# Patient Record
Sex: FEMALE | Race: WHITE | Employment: OTHER | URBAN - METROPOLITAN AREA
[De-identification: names, ages, dates, MRNs, and addresses within clinical notes are randomized per-mention and may not be internally consistent; named-entity substitution may affect disease eponyms.]

---

## 2018-07-27 ENCOUNTER — ANESTHESIA (OUTPATIENT)
Dept: SURGERY | Age: 83
DRG: 871 | End: 2018-07-27
Payer: MEDICARE

## 2018-07-27 ENCOUNTER — ANESTHESIA EVENT (OUTPATIENT)
Dept: SURGERY | Age: 83
DRG: 871 | End: 2018-07-27
Payer: MEDICARE

## 2018-07-27 ENCOUNTER — HOSPITAL ENCOUNTER (INPATIENT)
Age: 83
LOS: 1 days | Discharge: ANOTHER HOSPITAL | DRG: 871 | End: 2018-07-28
Attending: SURGERY | Admitting: INTERNAL MEDICINE
Payer: MEDICARE

## 2018-07-27 ENCOUNTER — ANESTHESIA EVENT (OUTPATIENT)
Dept: SURGERY | Age: 83
End: 2018-07-27

## 2018-07-27 ENCOUNTER — APPOINTMENT (OUTPATIENT)
Dept: GENERAL RADIOLOGY | Age: 83
DRG: 871 | End: 2018-07-27
Attending: ANESTHESIOLOGY
Payer: MEDICARE

## 2018-07-27 PROBLEM — K35.80 ACUTE APPENDICITIS: Status: ACTIVE | Noted: 2018-07-27

## 2018-07-27 PROBLEM — E87.6 HYPOKALEMIA: Status: ACTIVE | Noted: 2018-07-27

## 2018-07-27 LAB
ALBUMIN SERPL-MCNC: 3.3 G/DL (ref 3.2–4.6)
ALBUMIN/GLOB SERPL: 1 {RATIO} (ref 1.2–3.5)
ALP SERPL-CCNC: 70 U/L (ref 50–136)
ALT SERPL-CCNC: 27 U/L (ref 12–65)
ANION GAP SERPL CALC-SCNC: 15 MMOL/L (ref 7–16)
ANION GAP SERPL CALC-SCNC: 8 MMOL/L (ref 7–16)
APPEARANCE UR: CLEAR
APTT PPP: 30.7 SEC (ref 23.2–35.3)
ARTERIAL PATENCY WRIST A: ABNORMAL
ARTERIAL PATENCY WRIST A: POSITIVE
ARTERIAL PATENCY WRIST A: POSITIVE
AST SERPL-CCNC: 21 U/L (ref 15–37)
BACTERIA URNS QL MICRO: 0 /HPF
BASE DEFICIT BLDA-SCNC: 11.7 MMOL/L (ref 0–2)
BASE DEFICIT BLDA-SCNC: 4.5 MMOL/L (ref 0–2)
BASE DEFICIT BLDA-SCNC: 6.6 MMOL/L (ref 0–2)
BASOPHILS # BLD: 0 K/UL (ref 0–0.2)
BASOPHILS NFR BLD: 1 % (ref 0–2)
BDY SITE: ABNORMAL
BILIRUB SERPL-MCNC: 0.5 MG/DL (ref 0.2–1.1)
BILIRUB UR QL: NEGATIVE
BNP SERPL-MCNC: 682 PG/ML
BUN SERPL-MCNC: 11 MG/DL (ref 8–23)
BUN SERPL-MCNC: 12 MG/DL (ref 8–23)
CALCIUM SERPL-MCNC: 8.5 MG/DL (ref 8.3–10.4)
CALCIUM SERPL-MCNC: 9 MG/DL (ref 8.3–10.4)
CHLORIDE SERPL-SCNC: 104 MMOL/L (ref 98–107)
CHLORIDE SERPL-SCNC: 105 MMOL/L (ref 98–107)
CO2 SERPL-SCNC: 24 MMOL/L (ref 21–32)
CO2 SERPL-SCNC: 27 MMOL/L (ref 21–32)
COHGB MFR BLD: 0 % (ref 0.5–1.5)
COHGB MFR BLD: 0 % (ref 0.5–1.5)
COLOR UR: YELLOW
CREAT SERPL-MCNC: 0.86 MG/DL (ref 0.6–1)
CREAT SERPL-MCNC: 1.27 MG/DL (ref 0.6–1)
DIFFERENTIAL METHOD BLD: ABNORMAL
DO-HGB BLD-MCNC: 7 % (ref 0–5)
DO-HGB BLD-MCNC: 8 % (ref 0–5)
EOSINOPHIL # BLD: 0.2 K/UL (ref 0–0.8)
EOSINOPHIL NFR BLD: 2 % (ref 0.5–7.8)
EPI CELLS #/AREA URNS HPF: ABNORMAL /HPF
ERYTHROCYTE [DISTWIDTH] IN BLOOD BY AUTOMATED COUNT: 13.3 % (ref 11.9–14.6)
GLOBULIN SER CALC-MCNC: 3.2 G/DL (ref 2.3–3.5)
GLUCOSE SERPL-MCNC: 136 MG/DL (ref 65–100)
GLUCOSE SERPL-MCNC: 275 MG/DL (ref 65–100)
GLUCOSE UR STRIP.AUTO-MCNC: 100 MG/DL
HCO3 BLDA-SCNC: 19 MMOL/L (ref 22–26)
HCO3 BLDA-SCNC: 21 MMOL/L (ref 22–26)
HCO3 BLDA-SCNC: 22 MMOL/L (ref 22–26)
HCT VFR BLD AUTO: 33 % (ref 35.8–46.3)
HGB BLD-MCNC: 10.8 G/DL (ref 11.7–15.4)
HGB BLDMV-MCNC: 14.3 GM/DL (ref 11.7–15)
HGB BLDMV-MCNC: 14.7 GM/DL (ref 11.7–15)
HGB UR QL STRIP: ABNORMAL
IMM GRANULOCYTES # BLD: 0 K/UL (ref 0–0.5)
IMM GRANULOCYTES NFR BLD AUTO: 0 % (ref 0–5)
INR PPP: 1.2
KETONES UR QL STRIP.AUTO: NEGATIVE MG/DL
LEUKOCYTE ESTERASE UR QL STRIP.AUTO: NEGATIVE
LYMPHOCYTES # BLD: 1.7 K/UL (ref 0.5–4.6)
LYMPHOCYTES NFR BLD: 21 % (ref 13–44)
MAGNESIUM SERPL-MCNC: 2.3 MG/DL (ref 1.8–2.4)
MCH RBC QN AUTO: 31.8 PG (ref 26.1–32.9)
MCHC RBC AUTO-ENTMCNC: 32.7 G/DL (ref 31.4–35)
MCV RBC AUTO: 97.1 FL (ref 79.6–97.8)
METHGB MFR BLD: 0.3 % (ref 0–1.5)
METHGB MFR BLD: 0.3 % (ref 0–1.5)
MONOCYTES # BLD: 0.8 K/UL (ref 0.1–1.3)
MONOCYTES NFR BLD: 10 % (ref 4–12)
NEUTS SEG # BLD: 5.5 K/UL (ref 1.7–8.2)
NEUTS SEG NFR BLD: 66 % (ref 43–78)
NITRITE UR QL STRIP.AUTO: NEGATIVE
OTHER OBSERVATIONS,UCOM: ABNORMAL
OXYHGB MFR BLDA: 91.3 % (ref 94–97)
OXYHGB MFR BLDA: 92.4 % (ref 94–97)
PCO2 BLDA: 43 MMHG (ref 35–45)
PCO2 BLDA: 52 MMHG (ref 35–45)
PCO2 BLDA: 63 MMHG (ref 35–45)
PH BLDA: 7.09 [PH] (ref 7.35–7.45)
PH BLDA: 7.23 [PH] (ref 7.35–7.45)
PH BLDA: 7.32 [PH] (ref 7.35–7.45)
PH UR STRIP: 6 [PH] (ref 5–9)
PHOSPHATE SERPL-MCNC: 6.1 MG/DL (ref 2.3–3.7)
PLATELET # BLD AUTO: 190 K/UL (ref 150–450)
PMV BLD AUTO: 10.8 FL (ref 10.8–14.1)
PO2 BLDA: 75 MMHG (ref 80–105)
PO2 BLDA: 85 MMHG (ref 80–105)
PO2 BLDA: 88 MMHG (ref 80–105)
POTASSIUM SERPL-SCNC: 3.3 MMOL/L (ref 3.5–5.1)
POTASSIUM SERPL-SCNC: 3.4 MMOL/L (ref 3.5–5.1)
PROT SERPL-MCNC: 6.5 G/DL (ref 6.3–8.2)
PROT UR STRIP-MCNC: 30 MG/DL
PROTHROMBIN TIME: 15.1 SEC (ref 11.5–14.5)
RBC # BLD AUTO: 3.4 M/UL (ref 4.05–5.25)
RBC #/AREA URNS HPF: ABNORMAL /HPF
SAO2 % BLD: 92 % (ref 92–98.5)
SAO2 % BLD: 93 % (ref 92–98.5)
SERVICE CMNT-IMP: ABNORMAL
SODIUM SERPL-SCNC: 139 MMOL/L (ref 136–145)
SODIUM SERPL-SCNC: 144 MMOL/L (ref 136–145)
SP GR UR REFRACTOMETRY: 1.01 (ref 1–1.02)
TROPONIN I SERPL-MCNC: 0.42 NG/ML (ref 0.02–0.05)
UROBILINOGEN UR QL STRIP.AUTO: 0.2 EU/DL (ref 0.2–1)
VENTILATION MODE VENT: ABNORMAL
WBC # BLD AUTO: 8.3 K/UL (ref 4.3–11.1)
WBC URNS QL MICRO: ABNORMAL /HPF

## 2018-07-27 PROCEDURE — 86900 BLOOD TYPING SEROLOGIC ABO: CPT | Performed by: HOSPITALIST

## 2018-07-27 PROCEDURE — 74011000250 HC RX REV CODE- 250: Performed by: HOSPITALIST

## 2018-07-27 PROCEDURE — 74011250636 HC RX REV CODE- 250/636: Performed by: ANESTHESIOLOGY

## 2018-07-27 PROCEDURE — 74011000302 HC RX REV CODE- 302: Performed by: HOSPITALIST

## 2018-07-27 PROCEDURE — 75810000275 HC EMERGENCY DEPT VISIT NO LEVEL OF CARE: Performed by: EMERGENCY MEDICINE

## 2018-07-27 PROCEDURE — 81001 URINALYSIS AUTO W/SCOPE: CPT | Performed by: HOSPITALIST

## 2018-07-27 PROCEDURE — 80053 COMPREHEN METABOLIC PANEL: CPT | Performed by: EMERGENCY MEDICINE

## 2018-07-27 PROCEDURE — 74011250636 HC RX REV CODE- 250/636

## 2018-07-27 PROCEDURE — 74011250636 HC RX REV CODE- 250/636: Performed by: HOSPITALIST

## 2018-07-27 PROCEDURE — 99218 HC RM OBSERVATION: CPT

## 2018-07-27 PROCEDURE — 84484 ASSAY OF TROPONIN QUANT: CPT | Performed by: SURGERY

## 2018-07-27 PROCEDURE — 83735 ASSAY OF MAGNESIUM: CPT | Performed by: SURGERY

## 2018-07-27 PROCEDURE — 85610 PROTHROMBIN TIME: CPT | Performed by: SURGERY

## 2018-07-27 PROCEDURE — 84100 ASSAY OF PHOSPHORUS: CPT | Performed by: SURGERY

## 2018-07-27 PROCEDURE — 86580 TB INTRADERMAL TEST: CPT | Performed by: HOSPITALIST

## 2018-07-27 PROCEDURE — 77030019605

## 2018-07-27 PROCEDURE — 85025 COMPLETE CBC W/AUTO DIFF WBC: CPT | Performed by: EMERGENCY MEDICINE

## 2018-07-27 PROCEDURE — 74011250637 HC RX REV CODE- 250/637: Performed by: HOSPITALIST

## 2018-07-27 PROCEDURE — 77030032490 HC SLV COMPR SCD KNE COVD -B

## 2018-07-27 PROCEDURE — 82803 BLOOD GASES ANY COMBINATION: CPT

## 2018-07-27 PROCEDURE — 71045 X-RAY EXAM CHEST 1 VIEW: CPT

## 2018-07-27 PROCEDURE — 74011250637 HC RX REV CODE- 250/637: Performed by: ANESTHESIOLOGY

## 2018-07-27 PROCEDURE — 36600 WITHDRAWAL OF ARTERIAL BLOOD: CPT

## 2018-07-27 PROCEDURE — 94660 CPAP INITIATION&MGMT: CPT

## 2018-07-27 PROCEDURE — 85730 THROMBOPLASTIN TIME PARTIAL: CPT | Performed by: SURGERY

## 2018-07-27 PROCEDURE — 83880 ASSAY OF NATRIURETIC PEPTIDE: CPT | Performed by: HOSPITALIST

## 2018-07-27 PROCEDURE — 74011000250 HC RX REV CODE- 250

## 2018-07-27 RX ORDER — ENALAPRILAT 1.25 MG/ML
0.62 INJECTION INTRAVENOUS 2 TIMES DAILY
Status: DISCONTINUED | OUTPATIENT
Start: 2018-07-27 | End: 2018-07-28

## 2018-07-27 RX ORDER — ONDANSETRON 2 MG/ML
4 INJECTION INTRAMUSCULAR; INTRAVENOUS
Status: DISCONTINUED | OUTPATIENT
Start: 2018-07-27 | End: 2018-07-28 | Stop reason: HOSPADM

## 2018-07-27 RX ORDER — SODIUM CHLORIDE 0.9 % (FLUSH) 0.9 %
5-10 SYRINGE (ML) INJECTION AS NEEDED
Status: DISCONTINUED | OUTPATIENT
Start: 2018-07-27 | End: 2018-07-28 | Stop reason: HOSPADM

## 2018-07-27 RX ORDER — FUROSEMIDE 10 MG/ML
40 INJECTION INTRAMUSCULAR; INTRAVENOUS ONCE
Status: COMPLETED | OUTPATIENT
Start: 2018-07-27 | End: 2018-07-27

## 2018-07-27 RX ORDER — METOPROLOL TARTRATE 5 MG/5ML
5 INJECTION INTRAVENOUS ONCE
Status: COMPLETED | OUTPATIENT
Start: 2018-07-27 | End: 2018-07-27

## 2018-07-27 RX ORDER — SODIUM CHLORIDE 0.9 % (FLUSH) 0.9 %
5-10 SYRINGE (ML) INJECTION EVERY 8 HOURS
Status: DISCONTINUED | OUTPATIENT
Start: 2018-07-27 | End: 2018-07-28 | Stop reason: HOSPADM

## 2018-07-27 RX ORDER — FUROSEMIDE 10 MG/ML
INJECTION INTRAMUSCULAR; INTRAVENOUS
Status: COMPLETED
Start: 2018-07-27 | End: 2018-07-27

## 2018-07-27 RX ORDER — SODIUM BICARBONATE 1 MEQ/ML
50 SYRINGE (ML) INTRAVENOUS ONCE
Status: COMPLETED | OUTPATIENT
Start: 2018-07-27 | End: 2018-07-27

## 2018-07-27 RX ORDER — POTASSIUM CHLORIDE 14.9 MG/ML
20 INJECTION INTRAVENOUS ONCE
Status: DISCONTINUED | OUTPATIENT
Start: 2018-07-27 | End: 2018-07-27

## 2018-07-27 RX ORDER — METRONIDAZOLE 500 MG/100ML
500 INJECTION, SOLUTION INTRAVENOUS EVERY 8 HOURS
Status: DISCONTINUED | OUTPATIENT
Start: 2018-07-27 | End: 2018-07-28 | Stop reason: HOSPADM

## 2018-07-27 RX ORDER — SODIUM CHLORIDE 9 MG/ML
75 INJECTION, SOLUTION INTRAVENOUS CONTINUOUS
Status: DISCONTINUED | OUTPATIENT
Start: 2018-07-27 | End: 2018-07-28

## 2018-07-27 RX ORDER — CIPROFLOXACIN 2 MG/ML
400 INJECTION, SOLUTION INTRAVENOUS EVERY 12 HOURS
Status: DISCONTINUED | OUTPATIENT
Start: 2018-07-27 | End: 2018-07-28 | Stop reason: HOSPADM

## 2018-07-27 RX ORDER — HYDROMORPHONE HYDROCHLORIDE 2 MG/ML
1 INJECTION, SOLUTION INTRAMUSCULAR; INTRAVENOUS; SUBCUTANEOUS
Status: DISCONTINUED | OUTPATIENT
Start: 2018-07-27 | End: 2018-07-28 | Stop reason: HOSPADM

## 2018-07-27 RX ORDER — ACETAMINOPHEN 650 MG/1
650 SUPPOSITORY RECTAL
Status: DISCONTINUED | OUTPATIENT
Start: 2018-07-27 | End: 2018-07-28 | Stop reason: HOSPADM

## 2018-07-27 RX ORDER — SODIUM BICARBONATE 1 MEQ/ML
SYRINGE (ML) INTRAVENOUS
Status: COMPLETED
Start: 2018-07-27 | End: 2018-07-27

## 2018-07-27 RX ORDER — NITROGLYCERIN 80 MG/1
1 PATCH TRANSDERMAL DAILY
COMMUNITY
End: 2018-08-15 | Stop reason: ALTCHOICE

## 2018-07-27 RX ORDER — NALOXONE HYDROCHLORIDE 0.4 MG/ML
0.4 INJECTION, SOLUTION INTRAMUSCULAR; INTRAVENOUS; SUBCUTANEOUS AS NEEDED
Status: DISCONTINUED | OUTPATIENT
Start: 2018-07-27 | End: 2018-07-28 | Stop reason: HOSPADM

## 2018-07-27 RX ORDER — POTASSIUM CHLORIDE 14.9 MG/ML
20 INJECTION INTRAVENOUS
Status: COMPLETED | OUTPATIENT
Start: 2018-07-27 | End: 2018-07-28

## 2018-07-27 RX ORDER — MORPHINE SULFATE 2 MG/ML
2 INJECTION, SOLUTION INTRAMUSCULAR; INTRAVENOUS
Status: DISCONTINUED | OUTPATIENT
Start: 2018-07-27 | End: 2018-07-28 | Stop reason: HOSPADM

## 2018-07-27 RX ORDER — GUAIFENESIN 100 MG/5ML
81 LIQUID (ML) ORAL
Status: COMPLETED | OUTPATIENT
Start: 2018-07-27 | End: 2018-07-27

## 2018-07-27 RX ORDER — FUROSEMIDE 10 MG/ML
20 INJECTION INTRAMUSCULAR; INTRAVENOUS ONCE
Status: COMPLETED | OUTPATIENT
Start: 2018-07-27 | End: 2018-07-27

## 2018-07-27 RX ORDER — METOPROLOL TARTRATE 5 MG/5ML
5 INJECTION INTRAVENOUS
Status: DISCONTINUED | OUTPATIENT
Start: 2018-07-27 | End: 2018-07-28 | Stop reason: HOSPADM

## 2018-07-27 RX ORDER — MAGNESIUM SULFATE HEPTAHYDRATE 40 MG/ML
2 INJECTION, SOLUTION INTRAVENOUS ONCE
Status: COMPLETED | OUTPATIENT
Start: 2018-07-27 | End: 2018-07-27

## 2018-07-27 RX ORDER — FUROSEMIDE 10 MG/ML
40 INJECTION INTRAMUSCULAR; INTRAVENOUS EVERY 12 HOURS
Status: DISCONTINUED | OUTPATIENT
Start: 2018-07-28 | End: 2018-07-28

## 2018-07-27 RX ADMIN — Medication 10 ML: at 21:14

## 2018-07-27 RX ADMIN — MINERAL OIL AND WHITE PETROLATUM: 30; 940 OINTMENT OPHTHALMIC at 21:13

## 2018-07-27 RX ADMIN — ASPIRIN 81 MG 81 MG: 81 TABLET ORAL at 16:48

## 2018-07-27 RX ADMIN — MORPHINE SULFATE 2 MG: 2 INJECTION, SOLUTION INTRAMUSCULAR; INTRAVENOUS at 20:51

## 2018-07-27 RX ADMIN — POTASSIUM CHLORIDE 20 MEQ: 14.9 INJECTION, SOLUTION INTRAVENOUS at 23:16

## 2018-07-27 RX ADMIN — ENALAPRILAT 0.62 MG: 1.25 INJECTION INTRAVENOUS at 21:58

## 2018-07-27 RX ADMIN — FUROSEMIDE 40 MG: 10 INJECTION INTRAMUSCULAR; INTRAVENOUS at 18:08

## 2018-07-27 RX ADMIN — METOPROLOL TARTRATE 5 MG: 5 INJECTION, SOLUTION INTRAVENOUS at 20:08

## 2018-07-27 RX ADMIN — Medication 10 ML: at 20:25

## 2018-07-27 RX ADMIN — MAGNESIUM SULFATE HEPTAHYDRATE 2 G: 40 INJECTION, SOLUTION INTRAVENOUS at 21:12

## 2018-07-27 RX ADMIN — FUROSEMIDE 40 MG: 10 INJECTION, SOLUTION INTRAMUSCULAR; INTRAVENOUS at 18:08

## 2018-07-27 RX ADMIN — SODIUM BICARBONATE 50 MEQ: 84 INJECTION, SOLUTION INTRAVENOUS at 18:50

## 2018-07-27 RX ADMIN — FUROSEMIDE 20 MG: 10 INJECTION INTRAMUSCULAR; INTRAVENOUS at 17:33

## 2018-07-27 RX ADMIN — CIPROFLOXACIN 400 MG: 2 INJECTION, SOLUTION INTRAVENOUS at 20:56

## 2018-07-27 RX ADMIN — TUBERCULIN PURIFIED PROTEIN DERIVATIVE 5 UNITS: 5 INJECTION, SOLUTION INTRADERMAL at 21:14

## 2018-07-27 RX ADMIN — FAMOTIDINE 20 MG: 10 INJECTION INTRAVENOUS at 21:06

## 2018-07-27 RX ADMIN — FUROSEMIDE 20 MG: 10 INJECTION, SOLUTION INTRAMUSCULAR; INTRAVENOUS at 17:33

## 2018-07-27 RX ADMIN — Medication 50 MEQ: at 18:50

## 2018-07-27 RX ADMIN — MINERAL OIL AND WHITE PETROLATUM: 30; 940 OINTMENT OPHTHALMIC at 23:19

## 2018-07-27 RX ADMIN — Medication 10 ML: at 21:13

## 2018-07-27 RX ADMIN — POTASSIUM CHLORIDE 20 MEQ: 14.9 INJECTION, SOLUTION INTRAVENOUS at 21:12

## 2018-07-27 RX ADMIN — METRONIDAZOLE 500 MG: 500 INJECTION, SOLUTION INTRAVENOUS at 23:15

## 2018-07-27 RX ADMIN — NITROGLYCERIN 0.5 INCH: 20 OINTMENT TOPICAL at 20:49

## 2018-07-27 RX ADMIN — FUROSEMIDE 40 MG: 10 INJECTION, SOLUTION INTRAMUSCULAR; INTRAVENOUS at 19:55

## 2018-07-27 NOTE — PROGRESS NOTES
TRANSFER - IN REPORT: 
 
Verbal report received from [address] on Hayden Winters  being received from PACU for routine progression of care Report consisted of patients Situation, Background, Assessment and  
Recommendations(SBAR). Information from the following report(s) SBAR, Kardex, Intake/Output, MAR and Recent Results was reviewed with the receiving nurse. Opportunity for questions and clarification was provided. Assessment completed upon patients arrival to unit and care assumed.

## 2018-07-27 NOTE — H&P
H&P/Consult Note/Progress Note/Office Note: Ally Stone  MRN: [id number]  [id number]  Age:89 y.o. 
 
HPI: Ally Stone is a 80 y.o. female who is seen urgently for acute appendicitis. She started to have right lower quadrant pain since this Monday, her appetite was down but denies nausea, vomiting. She went to PCP yesterday, CT was done, and report was called today for acute appendicitis. She was immediately brought in the hospital for surgery. She just had lunch however. She has chronic pain, on oxycodone 15 mg every 4 hours. She has CAD, cardiac stent, on ASA, but denies active cardiac symptoms. History of pneumonia. Past Medical History:  
Diagnosis Date  Thyroid cyst Distant past  
 thyroid cyst removed x40 years ago Past Surgical History:  
Procedure Laterality Date  HX CHOLECYSTECTOMY  HX HEENT    
 thyroglossal cyst  
 HX HYSTERECTOMY Current Facility-Administered Medications Medication Dose Route Frequency  aspirin chewable tablet 81 mg  81 mg Oral NOW Amlodipine; Amoxicillin; and Tetracycline Social History Social History  Marital status:  Spouse name: N/A  
 Number of children: N/A  
 Years of education: N/A Social History Main Topics  Smoking status: Never Smoker  Smokeless tobacco: None  Alcohol use No  
 Drug use: No  
 Sexual activity: Not Asked Other Topics Concern  None Social History Narrative , lives with  and 2 daughters. Pt did work as  at one time. 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 History Smoking Status  Never Smoker Smokeless Tobacco  
 Not on file Family History Problem Relation Age of Onset  Other Father  David Bardales Other Mother  ROS: The patient has no difficulty with chest pain or shortness of breath. No fever or chills.   Comprehensive review of systems was otherwise unremarkable except as noted above. 
 
Physical Exam:  
Visit Vitals  /57 (BP 1 Location: Left arm, BP Patient Position: At rest)  Pulse 81  Temp 98.1 °F (36.7 °C)  Resp 19  
 Ht 5' 4\" (1.626 m)  Wt 103 lb (46.7 kg)  SpO2 99%  BMI 17.68 kg/m2 Constitutional: Alert, oriented, cooperative patient in no acute distress; appears stated age Eyes:Sclera are clear. EOMs intact ENMT: no external lesions gross hearing normal; no obvious neck masses, no ear or lip lesions, nares normal 
CV: RRR. Normal perfusion Resp: No JVD. Breathing is  non-labored; no audible wheezing. GI: soft and non-distended. Moderate tenderness at right lower quadrant. Musculoskeletal: unremarkable with normal function. No embolic signs or cyanosis. Neuro:  Oriented; moves all 4; no focal deficits Psychiatric: normal affect and mood, no memory impairment Recent vitals (if inpt): 
Patient Vitals for the past 24 hrs: 
 BP Temp Pulse Resp SpO2 Height Weight  
07/27/18 1519 136/57 98.1 °F (36.7 °C) 81 19 99 % 5' 4\" (1.626 m) 103 lb (46.7 kg) Labs: 
No results for input(s): WBC, HGB, PLT, NA, K, CL, CO2, BUN, CREA, GLU, PTP, INR, APTT, TBIL, TBILI, CBIL, SGOT, GPT, ALT, AP, AML, LPSE, LCAD, NH4, TROPT, TROIQ, PCO2, PO2, HCO3, HGBEXT, PLTEXT in the last 72 hours. No lab exists for component:  PH, INREXT Lab Results Component Value Date/Time WBC 15.0 (H) 12/01/2015 04:06 AM  
 HGB 11.1 (L) 12/01/2015 04:06 AM  
 PLATELET 510 55/84/0747 04:06 AM  
 Sodium 141 12/01/2015 04:06 AM  
 Potassium 3.6 12/01/2015 04:06 AM  
 Chloride 105 12/01/2015 04:06 AM  
 CO2 27 12/01/2015 04:06 AM  
 BUN 25 (H) 12/01/2015 04:06 AM  
 Creatinine 0.69 12/01/2015 04:06 AM  
 Glucose 115 (H) 12/01/2015 04:06 AM  
 INR 1.1 11/29/2015 09:55 AM  
 Bilirubin, total 0.6 11/29/2015 09:55 AM  
 Bilirubin, direct 0.2 11/22/2008 04:25 AM  
 AST (SGOT) 39 (H) 11/29/2015 09:55 AM  
 ALT (SGPT) 58 11/29/2015 09:55 AM  
 Alk.  phosphatase 70 11/29/2015 09:55 AM  
 Lactic acid 3.4 (H) 11/29/2015 10:04 AM  
 Troponin-I, Qt. 1.24 (HH) 11/29/2015 08:50 PM  
 
 
I reviewed recent labs and recent radiologic studies. I independently reviewed radiology images for studies I described above or studies I have ordered. Admission date (for inpatients): 7/27/2018 Day of Surgery  Procedure(s): 
APPENDECTOMY LAPAROSCOPIC 
 
CT: outside report reviewed ASSESSMENT/PLAN: 
Problem List  Date Reviewed: 12/28/2015 Codes Class Noted Acute appendicitis ICD-10-CM: K35.80 ICD-9-CM: 540.9  7/27/2018 Hypertension (Chronic) ICD-10-CM: I10 
ICD-9-CM: 401.9  12/1/2015 CAD (coronary artery disease) (Chronic) ICD-10-CM: I25.10 ICD-9-CM: 414.00  11/29/2015 Active Problems: 
  Acute appendicitis (7/27/2018) plan to do lap appy, poss open tonight since she just had lunch. Keep her in hospital prior to surgery.   
 
 
 
Signed:  Quynh Ervin MD,  FACS

## 2018-07-27 NOTE — PROGRESS NOTES
Received pt. In PACU on bipap    ABG being drawn by dayshift therapist and resulted to Dr. Miguel Henderson. Pt. Transported to ICU on NRB mask and placed on bipap (settings as per order) upon arrival    Stabilizing for repeat ABG.

## 2018-07-27 NOTE — ED TRIAGE NOTES
Pt in sent from pcp for appendicitis. States had CT at [facility] and was told to come to ed. States she had abdominal pain yesterday but no pain today. States nausea. No vomiting

## 2018-07-27 NOTE — PROGRESS NOTES
Patient received from PACU and place on cardiac monitor. Sinus tach with BBB. Heart rate 153. Patient respond to name. Respiration labored at 43. BIPAP in place.  at bedside. Orders received for Lasix. See Mar. Head of bed elevated. Dual skin assessment completed with Naseem Vega RN. Ecchymosis to upper extremities. Sacral/coccyx slightly red but blanchable. Allevyn place. Skin is cool,clammy and fragile. Bed in low/lock position.

## 2018-07-27 NOTE — PERIOP NOTES
TRANSFER - OUT REPORT: 
 
Verbal report given to Natalie MOON(name) on Alfonso Leblanc  being transferred to [facility](unit) for routine progression of care Report consisted of patients Situation, Background, Assessment and  
Recommendations(SBAR). Information from the following report(s) SBAR, Kardex, Intake/Output, MAR and Cardiac Rhythm SVT was reviewed with the receiving nurse. Lines:  
Peripheral IV 07/27/18 Right Arm (Active) Site Assessment Clean, dry, & intact;Dry;Clean; Intact 7/27/2018  4:35 PM  
Phlebitis Assessment 0 7/27/2018  4:35 PM  
Infiltration Assessment 0 7/27/2018  4:35 PM  
Dressing Status Clean, dry, & intact; Clean;Dry; Intact; New 7/27/2018  4:35 PM  
Dressing Type Tape;Transparent 7/27/2018  4:35 PM  
Hub Color/Line Status Pink; Infusing 7/27/2018  4:35 PM  
  
 
Opportunity for questions and clarification was provided. Patient transported with: 
 O2 @ 15 liters NRB mask

## 2018-07-28 ENCOUNTER — ANESTHESIA (OUTPATIENT)
Dept: SURGERY | Age: 83
End: 2018-07-28

## 2018-07-28 ENCOUNTER — APPOINTMENT (OUTPATIENT)
Dept: GENERAL RADIOLOGY | Age: 83
DRG: 871 | End: 2018-07-28
Attending: HOSPITALIST
Payer: MEDICARE

## 2018-07-28 ENCOUNTER — ANESTHESIA EVENT (OUTPATIENT)
Dept: SURGERY | Age: 83
DRG: 853 | End: 2018-07-28
Payer: MEDICARE

## 2018-07-28 ENCOUNTER — ANESTHESIA (OUTPATIENT)
Dept: SURGERY | Age: 83
DRG: 853 | End: 2018-07-28
Payer: MEDICARE

## 2018-07-28 ENCOUNTER — HOSPITAL ENCOUNTER (INPATIENT)
Age: 83
LOS: 4 days | Discharge: HOME HEALTH | DRG: 853 | End: 2018-08-02
Attending: SURGERY | Admitting: FAMILY MEDICINE
Payer: MEDICARE

## 2018-07-28 VITALS
HEIGHT: 64 IN | WEIGHT: 119.27 LBS | RESPIRATION RATE: 18 BRPM | HEART RATE: 82 BPM | SYSTOLIC BLOOD PRESSURE: 126 MMHG | OXYGEN SATURATION: 92 % | BODY MASS INDEX: 20.36 KG/M2 | TEMPERATURE: 98.1 F | DIASTOLIC BLOOD PRESSURE: 60 MMHG

## 2018-07-28 PROBLEM — I21.4 NSTEMI (NON-ST ELEVATED MYOCARDIAL INFARCTION) (HCC): Status: ACTIVE | Noted: 2018-07-28

## 2018-07-28 PROBLEM — J81.0 ACUTE PULMONARY EDEMA (HCC): Status: ACTIVE | Noted: 2018-07-28

## 2018-07-28 PROBLEM — A41.9 SEPSIS (HCC): Status: ACTIVE | Noted: 2018-07-28

## 2018-07-28 LAB
ABO + RH BLD: NORMAL
ABO + RH BLD: NORMAL
ALBUMIN SERPL-MCNC: 2.7 G/DL (ref 3.2–4.6)
ALBUMIN/GLOB SERPL: 0.8 {RATIO} (ref 1.2–3.5)
ALP SERPL-CCNC: 85 U/L (ref 50–136)
ALT SERPL-CCNC: 37 U/L (ref 12–65)
ANION GAP SERPL CALC-SCNC: 11 MMOL/L (ref 7–16)
APTT PPP: 71.9 SEC (ref 23.2–35.3)
ARTERIAL PATENCY WRIST A: POSITIVE
AST SERPL-CCNC: 59 U/L (ref 15–37)
BASE DEFICIT BLDA-SCNC: 2 MMOL/L (ref 0–2)
BASOPHILS # BLD: 0 K/UL (ref 0–0.2)
BASOPHILS NFR BLD: 0 % (ref 0–2)
BDY SITE: ABNORMAL
BILIRUB SERPL-MCNC: 0.4 MG/DL (ref 0.2–1.1)
BLOOD GROUP ANTIBODIES SERPL: NORMAL
BLOOD GROUP ANTIBODIES SERPL: NORMAL
BUN SERPL-MCNC: 18 MG/DL (ref 8–23)
CALCIUM SERPL-MCNC: 8.2 MG/DL (ref 8.3–10.4)
CHLORIDE SERPL-SCNC: 105 MMOL/L (ref 98–107)
CK MB CFR SERPL CALC: 13.8 %
CK MB SERPL-MCNC: 23.9 NG/ML (ref 0.5–3.6)
CK SERPL-CCNC: 173 U/L (ref 21–215)
CO2 SERPL-SCNC: 25 MMOL/L (ref 21–32)
CREAT SERPL-MCNC: 1.52 MG/DL (ref 0.6–1)
CRP SERPL-MCNC: 1.3 MG/DL (ref 0–0.9)
DIFFERENTIAL METHOD BLD: ABNORMAL
EOSINOPHIL # BLD: 0 K/UL (ref 0–0.8)
EOSINOPHIL NFR BLD: 0 % (ref 0.5–7.8)
ERYTHROCYTE [DISTWIDTH] IN BLOOD BY AUTOMATED COUNT: 13.7 % (ref 11.9–14.6)
ERYTHROCYTE [DISTWIDTH] IN BLOOD BY AUTOMATED COUNT: 13.7 % (ref 11.9–14.6)
GLOBULIN SER CALC-MCNC: 3.5 G/DL (ref 2.3–3.5)
GLUCOSE SERPL-MCNC: 275 MG/DL (ref 65–100)
HCO3 BLDA-SCNC: 21 MMOL/L (ref 22–26)
HCT VFR BLD AUTO: 38.6 % (ref 35.8–46.3)
HCT VFR BLD AUTO: 39.9 % (ref 35.8–46.3)
HGB BLD-MCNC: 12.9 G/DL (ref 11.7–15.4)
HGB BLD-MCNC: 13 G/DL (ref 11.7–15.4)
IMM GRANULOCYTES # BLD: 0.1 K/UL (ref 0–0.5)
IMM GRANULOCYTES NFR BLD AUTO: 0 % (ref 0–5)
LACTATE SERPL-SCNC: 2.4 MMOL/L (ref 0.4–2)
LYMPHOCYTES # BLD: 1.1 K/UL (ref 0.5–4.6)
LYMPHOCYTES NFR BLD: 6 % (ref 13–44)
MAGNESIUM SERPL-MCNC: 2.5 MG/DL (ref 1.8–2.4)
MCH RBC QN AUTO: 31.5 PG (ref 26.1–32.9)
MCH RBC QN AUTO: 32 PG (ref 26.1–32.9)
MCHC RBC AUTO-ENTMCNC: 32.6 G/DL (ref 31.4–35)
MCHC RBC AUTO-ENTMCNC: 33.4 G/DL (ref 31.4–35)
MCV RBC AUTO: 95.8 FL (ref 79.6–97.8)
MCV RBC AUTO: 96.6 FL (ref 79.6–97.8)
MM INDURATION POC: 0 MM (ref 0–5)
MONOCYTES # BLD: 0.8 K/UL (ref 0.1–1.3)
MONOCYTES NFR BLD: 4 % (ref 4–12)
MYOGLOBIN SERPL-MCNC: 248 NG/ML (ref 9–82)
NEUTS SEG # BLD: 15.6 K/UL (ref 1.7–8.2)
NEUTS SEG NFR BLD: 90 % (ref 43–78)
PCO2 BLDA: 33 MMHG (ref 35–45)
PH BLDA: 7.43 [PH] (ref 7.35–7.45)
PHOSPHATE SERPL-MCNC: 4.4 MG/DL (ref 2.3–3.7)
PLATELET # BLD AUTO: 214 K/UL (ref 150–450)
PLATELET # BLD AUTO: 233 K/UL (ref 150–450)
PMV BLD AUTO: 10.7 FL (ref 10.8–14.1)
PMV BLD AUTO: 10.9 FL (ref 10.8–14.1)
PO2 BLDA: 176 MMHG (ref 80–105)
POTASSIUM SERPL-SCNC: 5.1 MMOL/L (ref 3.5–5.1)
PPD POC: NEGATIVE NEGATIVE
PROT SERPL-MCNC: 6.2 G/DL (ref 6.3–8.2)
RBC # BLD AUTO: 4.03 M/UL (ref 4.05–5.25)
RBC # BLD AUTO: 4.13 M/UL (ref 4.05–5.25)
SERVICE CMNT-IMP: ABNORMAL
SODIUM SERPL-SCNC: 141 MMOL/L (ref 136–145)
SPECIMEN EXP DATE BLD: NORMAL
SPECIMEN EXP DATE BLD: NORMAL
T4 SERPL-MCNC: 7.5 UG/DL (ref 4.8–13.9)
TROPONIN I SERPL-MCNC: 2.96 NG/ML (ref 0.02–0.05)
TROPONIN I SERPL-MCNC: 4.44 NG/ML (ref 0.02–0.05)
TROPONIN I SERPL-MCNC: 5.27 NG/ML (ref 0.02–0.05)
TSH SERPL DL<=0.005 MIU/L-ACNC: 0.06 UIU/ML (ref 0.36–3.74)
VENTILATION MODE VENT: ABNORMAL
WBC # BLD AUTO: 17.5 K/UL (ref 4.3–11.1)
WBC # BLD AUTO: 20.7 K/UL (ref 4.3–11.1)

## 2018-07-28 PROCEDURE — 77030035048 HC TRCR ENDOSC OPTCL COVD -B: Performed by: SURGERY

## 2018-07-28 PROCEDURE — 77030008522 HC TBNG INSUF LAPRO STRY -B: Performed by: SURGERY

## 2018-07-28 PROCEDURE — 77030018836 HC SOL IRR NACL ICUM -A: Performed by: SURGERY

## 2018-07-28 PROCEDURE — 76010000161 HC OR TIME 1 TO 1.5 HR INTENSV-TIER 1: Performed by: SURGERY

## 2018-07-28 PROCEDURE — 77030016151 HC PROTCTR LNS DFOG COVD -B: Performed by: SURGERY

## 2018-07-28 PROCEDURE — 74011250636 HC RX REV CODE- 250/636: Performed by: SURGERY

## 2018-07-28 PROCEDURE — 83735 ASSAY OF MAGNESIUM: CPT | Performed by: HOSPITALIST

## 2018-07-28 PROCEDURE — 77010033678 HC OXYGEN DAILY

## 2018-07-28 PROCEDURE — 77030013794 HC KT TRNSDUC BLD EDWD -B: Performed by: ANESTHESIOLOGY

## 2018-07-28 PROCEDURE — 77030008756 HC TU IRR SUC STRY -B: Performed by: SURGERY

## 2018-07-28 PROCEDURE — 77030019940 HC BLNKT HYPOTHRM STRY -B: Performed by: ANESTHESIOLOGY

## 2018-07-28 PROCEDURE — 74011000250 HC RX REV CODE- 250: Performed by: ANESTHESIOLOGY

## 2018-07-28 PROCEDURE — 77030022473 HC HNDL ENDO GIA UNIV USDA -C: Performed by: SURGERY

## 2018-07-28 PROCEDURE — 74011250636 HC RX REV CODE- 250/636: Performed by: INTERNAL MEDICINE

## 2018-07-28 PROCEDURE — 86140 C-REACTIVE PROTEIN: CPT | Performed by: HOSPITALIST

## 2018-07-28 PROCEDURE — 74011000250 HC RX REV CODE- 250: Performed by: HOSPITALIST

## 2018-07-28 PROCEDURE — 74011250637 HC RX REV CODE- 250/637: Performed by: INTERNAL MEDICINE

## 2018-07-28 PROCEDURE — 88304 TISSUE EXAM BY PATHOLOGIST: CPT | Performed by: SURGERY

## 2018-07-28 PROCEDURE — 86900 BLOOD TYPING SEROLOGIC ABO: CPT | Performed by: SURGERY

## 2018-07-28 PROCEDURE — 74011250636 HC RX REV CODE- 250/636

## 2018-07-28 PROCEDURE — 99218 HC RM OBSERVATION: CPT

## 2018-07-28 PROCEDURE — 84436 ASSAY OF TOTAL THYROXINE: CPT | Performed by: HOSPITALIST

## 2018-07-28 PROCEDURE — 77030035051: Performed by: SURGERY

## 2018-07-28 PROCEDURE — 77030022474 HC RELD STPLR ENDO GIA COVD -C: Performed by: SURGERY

## 2018-07-28 PROCEDURE — 82550 ASSAY OF CK (CPK): CPT | Performed by: HOSPITALIST

## 2018-07-28 PROCEDURE — 82803 BLOOD GASES ANY COMBINATION: CPT

## 2018-07-28 PROCEDURE — 77030011640 HC PAD GRND REM COVD -A: Performed by: SURGERY

## 2018-07-28 PROCEDURE — 83605 ASSAY OF LACTIC ACID: CPT | Performed by: HOSPITALIST

## 2018-07-28 PROCEDURE — 77030012406 HC DRN WND PENRS BARD -A: Performed by: SURGERY

## 2018-07-28 PROCEDURE — 74011000250 HC RX REV CODE- 250: Performed by: INTERNAL MEDICINE

## 2018-07-28 PROCEDURE — 77030034154 HC SHR COAG HARM ACE J&J -F: Performed by: SURGERY

## 2018-07-28 PROCEDURE — 77030032490 HC SLV COMPR SCD KNE COVD -B: Performed by: SURGERY

## 2018-07-28 PROCEDURE — 74011000250 HC RX REV CODE- 250: Performed by: SURGERY

## 2018-07-28 PROCEDURE — 71045 X-RAY EXAM CHEST 1 VIEW: CPT

## 2018-07-28 PROCEDURE — 83874 ASSAY OF MYOGLOBIN: CPT | Performed by: HOSPITALIST

## 2018-07-28 PROCEDURE — 77030002996 HC SUT SLK J&J -A: Performed by: SURGERY

## 2018-07-28 PROCEDURE — 65620000000 HC RM CCU GENERAL

## 2018-07-28 PROCEDURE — 0D9W40Z DRAINAGE OF PERITONEUM WITH DRAINAGE DEVICE, PERCUTANEOUS ENDOSCOPIC APPROACH: ICD-10-PCS | Performed by: SURGERY

## 2018-07-28 PROCEDURE — 0DTJ4ZZ RESECTION OF APPENDIX, PERCUTANEOUS ENDOSCOPIC APPROACH: ICD-10-PCS | Performed by: SURGERY

## 2018-07-28 PROCEDURE — 82553 CREATINE MB FRACTION: CPT | Performed by: HOSPITALIST

## 2018-07-28 PROCEDURE — 85027 COMPLETE CBC AUTOMATED: CPT | Performed by: HOSPITALIST

## 2018-07-28 PROCEDURE — 77030013567 HC DRN WND RESERV BARD -A: Performed by: SURGERY

## 2018-07-28 PROCEDURE — 77030031139 HC SUT VCRL2 J&J -A: Performed by: SURGERY

## 2018-07-28 PROCEDURE — 77030019908 HC STETH ESOPH SIMS -A: Performed by: ANESTHESIOLOGY

## 2018-07-28 PROCEDURE — 77030005401 HC CATH RAD ARRO -A: Performed by: ANESTHESIOLOGY

## 2018-07-28 PROCEDURE — 77030008703 HC TU ET UNCUF COVD -A: Performed by: ANESTHESIOLOGY

## 2018-07-28 PROCEDURE — 77030029359 HC PRB ESOPH TEMP CATH ANTM -F: Performed by: ANESTHESIOLOGY

## 2018-07-28 PROCEDURE — 76060000033 HC ANESTHESIA 1 TO 1.5 HR: Performed by: SURGERY

## 2018-07-28 PROCEDURE — 65660000000 HC RM CCU STEPDOWN

## 2018-07-28 PROCEDURE — 74011250636 HC RX REV CODE- 250/636: Performed by: HOSPITALIST

## 2018-07-28 PROCEDURE — 36415 COLL VENOUS BLD VENIPUNCTURE: CPT | Performed by: SURGERY

## 2018-07-28 PROCEDURE — 74011000250 HC RX REV CODE- 250

## 2018-07-28 PROCEDURE — 36600 WITHDRAWAL OF ARTERIAL BLOOD: CPT

## 2018-07-28 PROCEDURE — 84443 ASSAY THYROID STIM HORMONE: CPT | Performed by: HOSPITALIST

## 2018-07-28 PROCEDURE — 77030027498 HC RELD STLPR EGIA VASC COVD -C: Performed by: SURGERY

## 2018-07-28 PROCEDURE — 76210000006 HC OR PH I REC 0.5 TO 1 HR: Performed by: SURGERY

## 2018-07-28 PROCEDURE — 85025 COMPLETE CBC W/AUTO DIFF WBC: CPT | Performed by: INTERNAL MEDICINE

## 2018-07-28 PROCEDURE — 77030013292 HC BOWL MX PRSM J&J -A: Performed by: ANESTHESIOLOGY

## 2018-07-28 PROCEDURE — 80053 COMPREHEN METABOLIC PANEL: CPT | Performed by: HOSPITALIST

## 2018-07-28 PROCEDURE — 77030020186 HC BOOT HL PROTCT SAGE -B

## 2018-07-28 PROCEDURE — 77030035220 HC TRCR ENDOSC BLNTPRT ANCHR COVD -B: Performed by: SURGERY

## 2018-07-28 PROCEDURE — 84484 ASSAY OF TROPONIN QUANT: CPT | Performed by: INTERNAL MEDICINE

## 2018-07-28 PROCEDURE — 84484 ASSAY OF TROPONIN QUANT: CPT | Performed by: HOSPITALIST

## 2018-07-28 PROCEDURE — 77030008477 HC STYL SATN SLP COVD -A: Performed by: ANESTHESIOLOGY

## 2018-07-28 PROCEDURE — C8929 TTE W OR WO FOL WCON,DOPPLER: HCPCS

## 2018-07-28 PROCEDURE — 77030037892: Performed by: SURGERY

## 2018-07-28 PROCEDURE — 85730 THROMBOPLASTIN TIME PARTIAL: CPT | Performed by: INTERNAL MEDICINE

## 2018-07-28 PROCEDURE — 84100 ASSAY OF PHOSPHORUS: CPT | Performed by: HOSPITALIST

## 2018-07-28 PROCEDURE — 93005 ELECTROCARDIOGRAM TRACING: CPT | Performed by: INTERNAL MEDICINE

## 2018-07-28 RX ORDER — NALOXONE HYDROCHLORIDE 0.4 MG/ML
0.4 INJECTION, SOLUTION INTRAMUSCULAR; INTRAVENOUS; SUBCUTANEOUS AS NEEDED
Status: DISCONTINUED | OUTPATIENT
Start: 2018-07-28 | End: 2018-08-02 | Stop reason: HOSPADM

## 2018-07-28 RX ORDER — NALOXONE HYDROCHLORIDE 0.4 MG/ML
0.4 INJECTION, SOLUTION INTRAMUSCULAR; INTRAVENOUS; SUBCUTANEOUS AS NEEDED
Status: CANCELLED | OUTPATIENT
Start: 2018-07-28

## 2018-07-28 RX ORDER — SODIUM CHLORIDE, SODIUM LACTATE, POTASSIUM CHLORIDE, CALCIUM CHLORIDE 600; 310; 30; 20 MG/100ML; MG/100ML; MG/100ML; MG/100ML
INJECTION, SOLUTION INTRAVENOUS
Status: DISCONTINUED | OUTPATIENT
Start: 2018-07-28 | End: 2018-07-28 | Stop reason: HOSPADM

## 2018-07-28 RX ORDER — DILTIAZEM HYDROCHLORIDE 5 MG/ML
10 INJECTION INTRAVENOUS ONCE
Status: COMPLETED | OUTPATIENT
Start: 2018-07-28 | End: 2018-07-28

## 2018-07-28 RX ORDER — HYDROMORPHONE HYDROCHLORIDE 2 MG/ML
1 INJECTION, SOLUTION INTRAMUSCULAR; INTRAVENOUS; SUBCUTANEOUS
Status: DISCONTINUED | OUTPATIENT
Start: 2018-07-28 | End: 2018-07-28 | Stop reason: SDUPTHER

## 2018-07-28 RX ORDER — DOCUSATE SODIUM 100 MG/1
100 CAPSULE, LIQUID FILLED ORAL 2 TIMES DAILY
Status: DISCONTINUED | OUTPATIENT
Start: 2018-07-29 | End: 2018-08-02 | Stop reason: HOSPADM

## 2018-07-28 RX ORDER — METOPROLOL TARTRATE 5 MG/5ML
5 INJECTION INTRAVENOUS
Status: DISCONTINUED | OUTPATIENT
Start: 2018-07-28 | End: 2018-07-29

## 2018-07-28 RX ORDER — ROCURONIUM BROMIDE 10 MG/ML
INJECTION, SOLUTION INTRAVENOUS AS NEEDED
Status: DISCONTINUED | OUTPATIENT
Start: 2018-07-28 | End: 2018-07-28 | Stop reason: HOSPADM

## 2018-07-28 RX ORDER — MORPHINE SULFATE 2 MG/ML
2 INJECTION, SOLUTION INTRAMUSCULAR; INTRAVENOUS
Status: DISCONTINUED | OUTPATIENT
Start: 2018-07-28 | End: 2018-08-02 | Stop reason: HOSPADM

## 2018-07-28 RX ORDER — ETOMIDATE 2 MG/ML
INJECTION INTRAVENOUS AS NEEDED
Status: DISCONTINUED | OUTPATIENT
Start: 2018-07-28 | End: 2018-07-28 | Stop reason: HOSPADM

## 2018-07-28 RX ORDER — ESMOLOL HYDROCHLORIDE 10 MG/ML
INJECTION INTRAVENOUS AS NEEDED
Status: DISCONTINUED | OUTPATIENT
Start: 2018-07-28 | End: 2018-07-28 | Stop reason: HOSPADM

## 2018-07-28 RX ORDER — METOPROLOL TARTRATE 25 MG/1
12.5 TABLET, FILM COATED ORAL 2 TIMES DAILY
Status: DISCONTINUED | OUTPATIENT
Start: 2018-07-28 | End: 2018-07-29

## 2018-07-28 RX ORDER — CIPROFLOXACIN 2 MG/ML
400 INJECTION, SOLUTION INTRAVENOUS EVERY 24 HOURS
Status: DISCONTINUED | OUTPATIENT
Start: 2018-07-28 | End: 2018-07-29

## 2018-07-28 RX ORDER — ACETAMINOPHEN 650 MG/1
650 SUPPOSITORY RECTAL
Status: CANCELLED | OUTPATIENT
Start: 2018-07-28

## 2018-07-28 RX ORDER — SODIUM CHLORIDE 0.9 % (FLUSH) 0.9 %
5-10 SYRINGE (ML) INJECTION AS NEEDED
Status: DISCONTINUED | OUTPATIENT
Start: 2018-07-28 | End: 2018-08-02 | Stop reason: HOSPADM

## 2018-07-28 RX ORDER — ONDANSETRON 2 MG/ML
4 INJECTION INTRAMUSCULAR; INTRAVENOUS
Status: DISCONTINUED | OUTPATIENT
Start: 2018-07-28 | End: 2018-08-02 | Stop reason: HOSPADM

## 2018-07-28 RX ORDER — GLYCOPYRROLATE 0.2 MG/ML
INJECTION INTRAMUSCULAR; INTRAVENOUS AS NEEDED
Status: DISCONTINUED | OUTPATIENT
Start: 2018-07-28 | End: 2018-07-28 | Stop reason: HOSPADM

## 2018-07-28 RX ORDER — LIDOCAINE HYDROCHLORIDE 20 MG/ML
INJECTION, SOLUTION EPIDURAL; INFILTRATION; INTRACAUDAL; PERINEURAL AS NEEDED
Status: DISCONTINUED | OUTPATIENT
Start: 2018-07-28 | End: 2018-07-28 | Stop reason: HOSPADM

## 2018-07-28 RX ORDER — CIPROFLOXACIN 2 MG/ML
400 INJECTION, SOLUTION INTRAVENOUS EVERY 12 HOURS
Status: DISCONTINUED | OUTPATIENT
Start: 2018-07-28 | End: 2018-07-28

## 2018-07-28 RX ORDER — PANTOPRAZOLE SODIUM 40 MG/1
40 TABLET, DELAYED RELEASE ORAL
Status: DISCONTINUED | OUTPATIENT
Start: 2018-07-29 | End: 2018-08-02 | Stop reason: HOSPADM

## 2018-07-28 RX ORDER — CIPROFLOXACIN 2 MG/ML
400 INJECTION, SOLUTION INTRAVENOUS EVERY 12 HOURS
Status: CANCELLED | OUTPATIENT
Start: 2018-07-28

## 2018-07-28 RX ORDER — HEPARIN SODIUM 5000 [USP'U]/100ML
12-25 INJECTION, SOLUTION INTRAVENOUS
Status: CANCELLED | OUTPATIENT
Start: 2018-07-28

## 2018-07-28 RX ORDER — HYDROMORPHONE HYDROCHLORIDE 2 MG/ML
1 INJECTION, SOLUTION INTRAMUSCULAR; INTRAVENOUS; SUBCUTANEOUS
Status: CANCELLED | OUTPATIENT
Start: 2018-07-28

## 2018-07-28 RX ORDER — METOPROLOL TARTRATE 5 MG/5ML
5 INJECTION INTRAVENOUS
Status: CANCELLED | OUTPATIENT
Start: 2018-07-28

## 2018-07-28 RX ORDER — ONDANSETRON 2 MG/ML
4 INJECTION INTRAMUSCULAR; INTRAVENOUS
Status: CANCELLED | OUTPATIENT
Start: 2018-07-28

## 2018-07-28 RX ORDER — METRONIDAZOLE 500 MG/100ML
500 INJECTION, SOLUTION INTRAVENOUS EVERY 8 HOURS
Status: CANCELLED | OUTPATIENT
Start: 2018-07-28

## 2018-07-28 RX ORDER — GUAIFENESIN 100 MG/5ML
81 LIQUID (ML) ORAL DAILY
Status: DISCONTINUED | OUTPATIENT
Start: 2018-07-28 | End: 2018-08-02 | Stop reason: HOSPADM

## 2018-07-28 RX ORDER — GUAIFENESIN 100 MG/5ML
81 LIQUID (ML) ORAL DAILY
Status: CANCELLED | OUTPATIENT
Start: 2018-07-28

## 2018-07-28 RX ORDER — FUROSEMIDE 10 MG/ML
40 INJECTION INTRAMUSCULAR; INTRAVENOUS EVERY 12 HOURS
Status: CANCELLED | OUTPATIENT
Start: 2018-07-28

## 2018-07-28 RX ORDER — MORPHINE SULFATE 2 MG/ML
2 INJECTION, SOLUTION INTRAMUSCULAR; INTRAVENOUS
Status: CANCELLED | OUTPATIENT
Start: 2018-07-28

## 2018-07-28 RX ORDER — METOPROLOL TARTRATE 25 MG/1
12.5 TABLET, FILM COATED ORAL 2 TIMES DAILY
Status: CANCELLED | OUTPATIENT
Start: 2018-07-28

## 2018-07-28 RX ORDER — HEPARIN SODIUM 5000 [USP'U]/100ML
12-25 INJECTION, SOLUTION INTRAVENOUS
Status: DISCONTINUED | OUTPATIENT
Start: 2018-07-28 | End: 2018-07-31

## 2018-07-28 RX ORDER — SODIUM CHLORIDE 0.9 % (FLUSH) 0.9 %
5-10 SYRINGE (ML) INJECTION AS NEEDED
Status: CANCELLED | OUTPATIENT
Start: 2018-07-28

## 2018-07-28 RX ORDER — MELATONIN
1000 DAILY
Status: CANCELLED | OUTPATIENT
Start: 2018-07-28

## 2018-07-28 RX ORDER — HEPARIN SODIUM 5000 [USP'U]/100ML
12-25 INJECTION, SOLUTION INTRAVENOUS
Status: DISCONTINUED | OUTPATIENT
Start: 2018-07-28 | End: 2018-07-28

## 2018-07-28 RX ORDER — METRONIDAZOLE 500 MG/100ML
500 INJECTION, SOLUTION INTRAVENOUS EVERY 12 HOURS
Status: DISCONTINUED | OUTPATIENT
Start: 2018-07-28 | End: 2018-07-31

## 2018-07-28 RX ORDER — ACETAMINOPHEN 650 MG/1
650 SUPPOSITORY RECTAL
Status: DISCONTINUED | OUTPATIENT
Start: 2018-07-28 | End: 2018-08-01

## 2018-07-28 RX ORDER — NEOSTIGMINE METHYLSULFATE 1 MG/ML
INJECTION INTRAVENOUS AS NEEDED
Status: DISCONTINUED | OUTPATIENT
Start: 2018-07-28 | End: 2018-07-28 | Stop reason: HOSPADM

## 2018-07-28 RX ORDER — METRONIDAZOLE 500 MG/100ML
500 INJECTION, SOLUTION INTRAVENOUS EVERY 8 HOURS
Status: DISCONTINUED | OUTPATIENT
Start: 2018-07-28 | End: 2018-07-28

## 2018-07-28 RX ORDER — HYDROMORPHONE HYDROCHLORIDE 2 MG/ML
1 INJECTION, SOLUTION INTRAMUSCULAR; INTRAVENOUS; SUBCUTANEOUS
Status: DISCONTINUED | OUTPATIENT
Start: 2018-07-28 | End: 2018-07-30 | Stop reason: SDUPTHER

## 2018-07-28 RX ORDER — ONDANSETRON 2 MG/ML
INJECTION INTRAMUSCULAR; INTRAVENOUS AS NEEDED
Status: DISCONTINUED | OUTPATIENT
Start: 2018-07-28 | End: 2018-07-28 | Stop reason: HOSPADM

## 2018-07-28 RX ORDER — MELATONIN
1000 DAILY
Status: DISCONTINUED | OUTPATIENT
Start: 2018-07-28 | End: 2018-08-02 | Stop reason: HOSPADM

## 2018-07-28 RX ORDER — HEPARIN SODIUM 5000 [USP'U]/ML
60 INJECTION, SOLUTION INTRAVENOUS; SUBCUTANEOUS ONCE
Status: COMPLETED | OUTPATIENT
Start: 2018-07-28 | End: 2018-07-28

## 2018-07-28 RX ORDER — FENTANYL CITRATE 50 UG/ML
INJECTION, SOLUTION INTRAMUSCULAR; INTRAVENOUS AS NEEDED
Status: DISCONTINUED | OUTPATIENT
Start: 2018-07-28 | End: 2018-07-28 | Stop reason: HOSPADM

## 2018-07-28 RX ORDER — BUPIVACAINE HYDROCHLORIDE 2.5 MG/ML
INJECTION, SOLUTION EPIDURAL; INFILTRATION; INTRACAUDAL AS NEEDED
Status: DISCONTINUED | OUTPATIENT
Start: 2018-07-28 | End: 2018-07-28 | Stop reason: HOSPADM

## 2018-07-28 RX ORDER — HEPARIN SODIUM 5000 [USP'U]/100ML
12-25 INJECTION, SOLUTION INTRAVENOUS
Status: DISCONTINUED | OUTPATIENT
Start: 2018-07-28 | End: 2018-07-28 | Stop reason: HOSPADM

## 2018-07-28 RX ADMIN — ESMOLOL HYDROCHLORIDE 20 MG: 10 INJECTION INTRAVENOUS at 14:24

## 2018-07-28 RX ADMIN — NEOSTIGMINE METHYLSULFATE 3 MG: 1 INJECTION INTRAVENOUS at 14:10

## 2018-07-28 RX ADMIN — ESMOLOL HYDROCHLORIDE 20 MG: 10 INJECTION INTRAVENOUS at 14:17

## 2018-07-28 RX ADMIN — FENTANYL CITRATE 25 MCG: 50 INJECTION, SOLUTION INTRAMUSCULAR; INTRAVENOUS at 13:32

## 2018-07-28 RX ADMIN — ESMOLOL HYDROCHLORIDE 20 MG: 10 INJECTION INTRAVENOUS at 13:52

## 2018-07-28 RX ADMIN — HEPARIN SODIUM 3250 UNITS: 5000 INJECTION INTRAVENOUS; SUBCUTANEOUS at 08:22

## 2018-07-28 RX ADMIN — ROCURONIUM BROMIDE 5 MG: 10 INJECTION, SOLUTION INTRAVENOUS at 13:34

## 2018-07-28 RX ADMIN — Medication 10 ML: at 21:57

## 2018-07-28 RX ADMIN — GLYCOPYRROLATE 0.3 MG: 0.2 INJECTION INTRAMUSCULAR; INTRAVENOUS at 14:10

## 2018-07-28 RX ADMIN — FENTANYL CITRATE 25 MCG: 50 INJECTION, SOLUTION INTRAMUSCULAR; INTRAVENOUS at 13:34

## 2018-07-28 RX ADMIN — METRONIDAZOLE 500 MG: 500 INJECTION, SOLUTION INTRAVENOUS at 19:54

## 2018-07-28 RX ADMIN — ESMOLOL HYDROCHLORIDE 10 MG: 10 INJECTION INTRAVENOUS at 14:00

## 2018-07-28 RX ADMIN — ESMOLOL HYDROCHLORIDE 20 MG: 10 INJECTION INTRAVENOUS at 14:20

## 2018-07-28 RX ADMIN — METOPROLOL TARTRATE 12.5 MG: 25 TABLET ORAL at 21:57

## 2018-07-28 RX ADMIN — HEPARIN SODIUM AND DEXTROSE 12 UNITS/KG/HR: 5000; 5 INJECTION INTRAVENOUS at 08:22

## 2018-07-28 RX ADMIN — HEPARIN SODIUM AND DEXTROSE 12 UNITS/KG/HR: 5000; 5 INJECTION INTRAVENOUS at 20:04

## 2018-07-28 RX ADMIN — FUROSEMIDE 40 MG: 10 INJECTION, SOLUTION INTRAMUSCULAR; INTRAVENOUS at 04:54

## 2018-07-28 RX ADMIN — CIPROFLOXACIN 400 MG: 2 INJECTION, SOLUTION INTRAVENOUS at 19:54

## 2018-07-28 RX ADMIN — SODIUM CHLORIDE, SODIUM LACTATE, POTASSIUM CHLORIDE, CALCIUM CHLORIDE: 600; 310; 30; 20 INJECTION, SOLUTION INTRAVENOUS at 13:15

## 2018-07-28 RX ADMIN — METRONIDAZOLE 500 MG: 500 INJECTION, SOLUTION INTRAVENOUS at 07:43

## 2018-07-28 RX ADMIN — Medication 10 ML: at 04:55

## 2018-07-28 RX ADMIN — MORPHINE SULFATE 2 MG: 2 INJECTION, SOLUTION INTRAMUSCULAR; INTRAVENOUS at 17:54

## 2018-07-28 RX ADMIN — METOROPROLOL TARTRATE 5 MG: 5 INJECTION, SOLUTION INTRAVENOUS at 16:12

## 2018-07-28 RX ADMIN — LIDOCAINE HYDROCHLORIDE 40 MG: 20 INJECTION, SOLUTION EPIDURAL; INFILTRATION; INTRACAUDAL; PERINEURAL at 13:32

## 2018-07-28 RX ADMIN — ROCURONIUM BROMIDE 20 MG: 10 INJECTION, SOLUTION INTRAVENOUS at 13:32

## 2018-07-28 RX ADMIN — PERFLUTREN 1 ML: 6.52 INJECTION, SUSPENSION INTRAVENOUS at 09:00

## 2018-07-28 RX ADMIN — FAMOTIDINE 20 MG: 10 INJECTION INTRAVENOUS at 08:22

## 2018-07-28 RX ADMIN — ASPIRIN 81 MG 81 MG: 81 TABLET ORAL at 10:21

## 2018-07-28 RX ADMIN — ETOMIDATE 10 MG: 2 INJECTION INTRAVENOUS at 13:33

## 2018-07-28 RX ADMIN — METOPROLOL TARTRATE 12.5 MG: 25 TABLET ORAL at 10:21

## 2018-07-28 RX ADMIN — ONDANSETRON 4 MG: 2 INJECTION INTRAMUSCULAR; INTRAVENOUS at 14:10

## 2018-07-28 RX ADMIN — DILTIAZEM HYDROCHLORIDE 10 MG: 5 INJECTION INTRAVENOUS at 15:56

## 2018-07-28 RX ADMIN — ETOMIDATE 10 MG: 2 INJECTION INTRAVENOUS at 13:32

## 2018-07-28 RX ADMIN — MINERAL OIL AND WHITE PETROLATUM: 30; 940 OINTMENT OPHTHALMIC at 04:55

## 2018-07-28 RX ADMIN — ESMOLOL HYDROCHLORIDE 10 MG: 10 INJECTION INTRAVENOUS at 14:10

## 2018-07-28 NOTE — ANESTHESIA POSTPROCEDURE EVALUATION
Post-Anesthesia Evaluation and Assessment Patient: Faisal Su MRN: [id number]  SSN: [id number] YOB: 1924  Age: 80 y.o. Sex: female Cardiovascular Function/Vital Signs Visit Vitals  BP (!) 168/91  Pulse (!) 139  Temp 36.4 °C (97.5 °F)  Resp (!) 42  
 Ht 5' 4\" (1.626 m)  Wt 54.1 kg (119 lb 4.3 oz)  SpO2 98%  BMI 20.47 kg/m2 Patient is status post general anesthesia for Procedure(s): PROCEDURE CANCELLED - NOT PERFORMED. Pt awaiting surgery in preop area and inadvertently received 1 liter of fluid bolus. She had acute onset of SOB. Oxygen was placed by NC and patient taken to PACU for EKG/CXR. Lasix 20 mg IV given. CXR returned and  showed pulmonary edema. A rios was placed and lasix 40 mg IV given. Oxygen changed to NRBM at 15 liters. EKG showed ST no acute changes. ABG ordered. Dr. Thuan Ibarra notified that surgery needed to be postponed and patient likely ICU admission overnight. He asked to consult cardiology. Discussed with family. Bicarb given after ABG reviewed with PH 7.09/O2 88/ CO2 63/ BE -11.7. BiPap started. I spoke with cardiology Link ) who recommended management by hospitalist tonight and to reconsult cardiology if patient did not respond to treatment. Hospitalist (Juan Bhatt) consulted. Repeat ABG PH 7.23/75/52/-6.6 on 60% FIO2. Pt moved to ICU. Discussed with family. Pt continues to improve in ICU on biPap and additional lasix 40 mg IV with UOP greater than 400 ml. ABG 7.35/85/43/-4.5 Dr. Markus Weems at bedside now ordering electrolytes, cardiac enzymes and EKG. Will follow. Family now also at bedside. Signed By: Vishal Julien MD   
 July 27, 2018

## 2018-07-28 NOTE — PROGRESS NOTES
History of present illness:  Female with history of coronary artery disease status post PCI to LAD in 2008 followed by Dr. Naima Toledo. Patient presented to [address] [place] [address]. Imaging of her abdomen and clinical presentation consistent with appendicitis. Patient received IV fluid resuscitation resulting in pulmonary edema. She was treated with noninvasive ventilation with improvement of respiratory symptoms at time of consultation patient was accompanied by 2 of her daughters. They stated patient has had episodes of occasional fatigue over the past several months. A cardiac monitor  monitor was placed by her cardiologist with no clear etiology. She has previous history of stenting of left anterior descending coronary artery this is an 2008 done by Dr. Medora Phalen. Patient's family state that she had an echocardiogram done recently at her cardiologist's office that was within normal limits. Cardiac biomarkers checked at the time of her respiratory decompensation revealed elevated cardiac biomarkers the troponin greater than 5. She had an echocardiogram performed that showed diminished left ventricle systolic function with ejection fraction 25-30% with multiple wall motion of modalities most notably on the anterior and apical wall segments. Cardiac history PCI to LAD 2008 Echocardiogram 7/28/2018 diminished left ventricular systolic function EF 10-15% mild aortic stenosis 1. Perioperative risk assessment case discussed with anesthesiology patient is high risk for surgical procedures her acute indications for surgery supersede her perioperative risk assessment and this was discussed in detail with patient and family in the room by myself and anesthesiologist Dr Adriana Cary.   
2. Coronary disease diminished left ventricular systolic function previous PCI to LAD patient should continue low-dose aspirin therapy she would have indications for dual antiplatelet therapy following her procedure for medical management. 3. Elevated troponin in setting of previous pulmonary edema acute infection. Likely patient has underlying coronary artery disease and is to supply demand mismatch event. She has no chest discomfort or EKG changes at this time. Medical management reasonable IV heparin has been discontinued prior to surgery. Patient woudl eb a candidate for arixtra when safe fom bleeding perspetive. 4. Congestive heart failure unknown previous left ventricle systolic function records should be checked to determine if this was preceding her current hospitalization. Additional medical management will be recommended following surgery. Review of Systems HENT: Negative for hearing loss. Respiratory: Negative for shortness of breath. Cardiovascular: Negative for chest pain. Past Medical History:  
Diagnosis Date  Thyroid cyst Distant past  
 thyroid cyst removed x40 years ago Past Surgical History:  
Procedure Laterality Date  HX CHOLECYSTECTOMY  HX HEENT    
 thyroglossal cyst  
 HX HYSTERECTOMY Family History Problem Relation Age of Onset  Other Father  [facility] Other Mother  Social History Social History  Marital status:  Spouse name: N/A  
 Number of children: N/A  
 Years of education: N/A Occupational History  Not on file. Social History Main Topics  Smoking status: Never Smoker  Smokeless tobacco: Not on file  Alcohol use No  
 Drug use: No  
 Sexual activity: Not on file Other Topics Concern  Not on file Social History Narrative , lives with  and 2 daughters. Pt did work as  at one time. 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 Current Facility-Administered Medications Medication Dose Route Frequency  heparin 25,000 units in dextrose 500 mL infusion  12-25 Units/kg/hr IntraVENous TITRATE  acetaminophen (TYLENOL) suppository 650 mg  650 mg Rectal Q4H PRN  
 morphine injection 2 mg  2 mg IntraVENous Q4H PRN  
 naloxone (NARCAN) injection 0.4 mg  0.4 mg IntraVENous PRN  
 sodium chloride (NS) flush 5-10 mL  5-10 mL IntraVENous PRN  
 white Petrolatum-Mineral Oil (AKWA TEARS) ophthalmic ointment   Both Eyes Q4H  
 NUTRITIONAL SUPPORT ELECTROLYTE PRN ORDERS   Does Not Apply PRN  
 ondansetron (ZOFRAN) injection 4 mg  4 mg IntraVENous Q4H PRN  
 aspirin chewable tablet 81 mg  81 mg Oral DAILY  cholecalciferol (VITAMIN D3) tablet 1,000 Units  1,000 Units Oral DAILY  metoprolol (LOPRESSOR) injection 5 mg  5 mg IntraVENous Q6H PRN  
 metoprolol tartrate (LOPRESSOR) tablet 12.5 mg  12.5 mg Oral BID  ciprofloxacin (CIPRO) 400 mg IVPB (premix)  400 mg IntraVENous Q24H  
 [START ON 7/29/2018] famotidine (PF) (PEPCID) 20 mg in sodium chloride 0.9% 10 mL injection  20 mg IntraVENous Q24H  
 metroNIDAZOLE (FLAGYL) IVPB premix 500 mg  500 mg IntraVENous Q12H Visit Vitals  /74  Pulse 90  Temp 98.3 °F (36.8 °C)  Resp 25  Wt 49.1 kg (108 lb 3.9 oz)  SpO2 95%  Breastfeeding No  
 BMI 18.58 kg/m2 Physical Exam  
Constitutional: She appears well-developed. Neck: Neck supple. Cardiovascular: Murmur heard. Abdominal: Soft. There is no rebound. Musculoskeletal: She exhibits no edema. Neurological: She is alert. dimished hearing Intake/Output Summary (Last 24 hours) at 07/28/18 1306 Last data filed at 07/28/18 1113 Gross per 24 hour Intake                0 ml Output              550 ml Net             -550 ml Lab Results Component Value Date/Time  Sodium 141 07/28/2018 03:16 AM  
 Potassium 5.1 07/28/2018 03:16 AM  
 Chloride 105 07/28/2018 03:16 AM  
 CO2 25 07/28/2018 03:16 AM  
 Anion gap 11 07/28/2018 03:16 AM  
 Glucose 275 (H) 07/28/2018 03:16 AM  
 BUN 18 07/28/2018 03:16 AM  
 Creatinine 1.52 (H) 07/28/2018 03:16 AM  
 GFR est AA 41 (L) 07/28/2018 03:16 AM  
 GFR est non-AA 34 (L) 07/28/2018 03:16 AM  
 Calcium 8.2 (L) 07/28/2018 03:16 AM  
 
Lab Results Component Value Date/Time WBC 17.5 (H) 07/28/2018 10:48 AM  
 HGB 12.9 07/28/2018 10:48 AM  
 HCT 38.6 07/28/2018 10:48 AM  
 PLATELET 546 18/46/5217 10:48 AM  
 MCV 95.8 07/28/2018 10:48 AM  
 
Lab Results Component Value Date/Time  07/28/2018 03:16 AM  
 CK - MB 23.9 (H) 07/28/2018 03:16 AM  
 CK-MB Index 13.8 07/28/2018 03:16 AM  
 Troponin-I, Qt. 4.44 (HH) 07/28/2018 08:37 AM  
  07/27/2018 04:29 PM  
  11/29/2015 09:55 AM  
 
No results found for: HBA1C, HGBE8, KED2AOXJ, DJM8DGZV Assessment: 
No diagnosis found. Recommendations:

## 2018-07-28 NOTE — PROGRESS NOTES
TRANSFER - IN REPORT: 
 
Verbal report received from SARAI Man(name) on Kristen Hills  To be received from PACU(unit) for routine progression of care Report consisted of patients Situation, Background, Assessment and  
Recommendations(SBAR). Information from the following report(s) OR Summary and MAR was reviewed with the receiving nurse. Opportunity for questions and clarification was provided. Assessment completed upon patients arrival to unit and care assumed.

## 2018-07-28 NOTE — PROGRESS NOTES
TRANSFER - IN REPORT: 
 
Verbal report received from Satish RN(name) on Keisha Pinto  To be received from [facility] ICU(unit) for urgent transfer; scheduled procedure. Report consisted of patients Situation, Background, Assessment and  
Recommendations(SBAR). Information from the following report(s) SBAR, Kardex, [facility] and Cardiac Rhythm SR was reviewed with the receiving nurse. Opportunity for questions and clarification was provided. Assessment to be completed upon patients arrival to unit and care assumed.

## 2018-07-28 NOTE — IP AVS SNAPSHOT
[address] 
[phone] Patient: Ally Stone MRN: [id number] AIU:[id number] A check armida indicates which time of day the medication should be taken. My Medications START taking these medications Instructions Each Dose to Equal  
 Morning Noon Evening Bedtime  
 amiodarone 200 mg tablet Commonly known as:  CORDARONE Start taking on:  8/3/2018 Take 1 Tab by mouth daily. 200 mg  
    
  
   
   
   
  
 carvedilol 3.125 mg tablet Commonly known as:  Haskel Scarce Take 1 Tab by mouth two (2) times daily (with meals). 3.125 mg  
    
  
   
   
  
   
  
 clopidogrel 75 mg Tab Commonly known as:  PLAVIX Start taking on:  8/3/2018 Take 1 Tab by mouth daily. 75 mg  
    
  
   
   
   
  
 pantoprazole 40 mg tablet Commonly known as:  PROTONIX Start taking on:  8/3/2018 Take 1 Tab by mouth Daily (before breakfast). 40 mg  
    
  
   
   
   
  
 potassium chloride 20 mEq tablet Commonly known as:  K-DUR, KLOR-CON Start taking on:  8/3/2018 Take 1 Tab by mouth daily. 20 mEq CHANGE how you take these medications Instructions Each Dose to Equal  
 Morning Noon Evening Bedtime  
 furosemide 40 mg tablet Commonly known as:  LASIX What changed:   
- medication strength 
- how much to take Take 1 Tab by mouth daily. 40 mg  
    
  
   
   
   
  
 lisinopril 2.5 mg tablet Commonly known as:  Irma Chandler What changed:   
- medication strength 
- how much to take Take 1 Tab by mouth daily. 2.5 mg  
    
  
   
   
   
  
  
CONTINUE taking these medications Instructions Each Dose to Equal  
 Morning Noon Evening Bedtime  
 aspirin 81 mg chewable tablet Take 81 mg by mouth daily. 81 mg NITRO-DUR 0.4 mg/hr Generic drug:  nitroglycerin 1 Patch by TransDERmal route daily. 9pm-11am  
 1 Patch  
    
   
   
   
  
  
 oxyCODONE IR 15 mg immediate release tablet Commonly known as:  OXY-IR Take 15 mg by mouth every four (4) hours as needed for Pain. 15 mg  
    
   
   
   
  
 VITAMIN B-6 100 mg tablet Generic drug:  pyridoxine (vitamin B6) Take 100 mg by mouth daily. Takes at NOON  
 100 mg  
    
   
  
   
   
  
 VITAMIN D3 2,000 unit Tab Generic drug:  cholecalciferol (vitamin D3) Take  by mouth daily. STOP taking these medications   
 metoprolol tartrate 50 mg tablet Commonly known as:  LOPRESSOR Where to Get Your Medications Information on where to get these meds will be given to you by the nurse or doctor. ! Ask your nurse or doctor about these medications  
  amiodarone 200 mg tablet  
 carvedilol 3.125 mg tablet  
 clopidogrel 75 mg Tab  
 furosemide 40 mg tablet  
 lisinopril 2.5 mg tablet  
 pantoprazole 40 mg tablet  
 potassium chloride 20 mEq tablet

## 2018-07-28 NOTE — PROGRESS NOTES
As requested by Dr. Natalie Duke, Dr. Jennifer Christine notified that OR will not occur at 0730 in AM due to need for ECHO. Dr. Jennifer Christine requests that he be called when ready to go to OR after ECHO is completed and resulted.

## 2018-07-28 NOTE — PROGRESS NOTES
Dr. Ham Hathaway spoke directly with Veronica Lopez, Cardiology NP on call, regarding patient current status and transfer to [facility].

## 2018-07-28 NOTE — PROGRESS NOTES
Patient is currently off BIPAP and on 2 LPM NC. She is tolerating well, and we will continue to monitor

## 2018-07-28 NOTE — PROGRESS NOTES
TRANSFER - OUT REPORT: 
 
Verbal report given to Adina Lam RN(name) on Sarika Big  being transferred to OR(unit) for ordered procedure Report consisted of patients Situation, Background, Assessment and  
Recommendations(SBAR). Information from the following report(s) SBAR, Kardex and MAR was reviewed with the receiving nurse. Opportunity for questions and clarification was provided. Patient transported with: 
 Monitor O2 @ 2 liters Registered Nurse

## 2018-07-28 NOTE — ROUTINE PROCESS
TRANSFER - OUT REPORT: 
 
Verbal report given to [facility] RN on Brittnee Damon  being transferred to [id number]  for routine post - op Report consisted of patients Situation, Background, Assessment and  
Recommendations(SBAR). Information from the following report(s) Procedure Summary was reviewed with the receiving nurse. Lines:  
Peripheral IV 07/28/18 Left Forearm (Active) Site Assessment Clean, dry, & intact 7/28/2018  3:16 PM  
Phlebitis Assessment 0 7/28/2018  3:16 PM  
Infiltration Assessment 0 7/28/2018  3:16 PM  
Dressing Status Clean, dry, & intact 7/28/2018  3:16 PM  
Dressing Type Transparent;Tape 7/28/2018  3:16 PM  
Hub Color/Line Status Pink; Infusing 7/28/2018  3:16 PM  
Alcohol Cap Used No 7/28/2018  3:16 PM  
   
Peripheral IV 07/28/18 Right Forearm (Active) Site Assessment Clean, dry, & intact 7/28/2018  3:16 PM  
Phlebitis Assessment 0 7/28/2018  3:16 PM  
Infiltration Assessment 0 7/28/2018  3:16 PM  
Dressing Status Clean, dry, & intact 7/28/2018  3:16 PM  
Dressing Type Transparent;Tape 7/28/2018  3:16 PM  
Hub Color/Line Status Pink; Infusing 7/28/2018  3:16 PM  
Alcohol Cap Used Yes 7/28/2018  3:16 PM  
   
Arterial Line 07/28/18 Left Radial artery (Active) Site Assessment Clean, dry, & intact 7/28/2018  3:16 PM  
Dressing Status Clean, dry, & intact 7/28/2018  3:16 PM  
Dressing Type Transparent;Tape 7/28/2018  3:16 PM  
Line Status Intact and in place 7/28/2018  3:16 PM  
Treatment Arm board on 7/28/2018  3:16 PM  
Affected Extremity/Extremities Pulses palpable;Range of motion performed; Color distal to insertion site pink (or appropriate for race) 7/28/2018  3:16 PM  
  
 
Opportunity for questions and clarification was provided. Patient transported with: 
 O2 @ 4 liters Registered Nurse VTE prophylaxis orders have been written for Brittnee Damon. Patient and family given floor number and nurses name. Family updated re: pt status after security code verified.

## 2018-07-28 NOTE — PROGRESS NOTES
Abnormal troponin called to . Orders received. Vida Thompson made aware of ECHO can only be done stat if Cardiology order.

## 2018-07-28 NOTE — PROGRESS NOTES
Late entry due to hands on patient care. Stat EKG done.  notified of Atrial fib. No further orders received. Will continue to monitor.

## 2018-07-28 NOTE — PROGRESS NOTES
Spoke with Nursing supervisor at [facility] requesting ICU or CCU bed. Awaiting room assignment.

## 2018-07-28 NOTE — PROGRESS NOTES
Patient arrived via EMS from [facility] ICU. Patient oriented x4, follows commands, eyes focus and track. Breath sounds diminished and shallow, symmetrical chest expansion on 2L NC. SR with BBB and PACs on monitor, S1/S2 auscultated. Bowel sounds active, abdomen intact and semi-soft. Dual skin assessment completed with Dave Villatoro, SARAI. Skin has scattered ecchymosis and varicosities present. Sacrum has nonblanchable redness, allevyn applied. Bilateral heels are boggy, prevalon boots applied. Lines: 20G L FA, 20G R FA Drips: Heparin @ 12, continued on admission and dual verified with Dave Villatoro RN. Drains: rios Patient denies chest pain and abdominal pain. EKG and Echo department notified of patient's arrival to complete ordered studies.

## 2018-07-28 NOTE — PROGRESS NOTES
[facility] transport form filled out and placed on chart to prepare for transport. Supervisor Corazon to notify us when bed assigned in CCU [place].

## 2018-07-28 NOTE — PROGRESS NOTES
Patient's second troponin at 6 hours is now 5.27. Previously 0.42 New EKG with T wave inversion. She has no chest pain. HR, BP improved , CKMB 23.9 and myoglobin 248, indicative of MI. Cr worse 1.52 ABG improved and clinically her pulmonary edema improving Increased RLQ tenderness and WBC has increased to 20.7 On ABX cipro/flagyl. Lactic acid is 2.4. She remains on BIPAP Needs transfer downtown for stat Cardiology evaluation, pulmonary management Have notified House supervisor who is trying to make arrangement if bed available

## 2018-07-28 NOTE — ANESTHESIA PREPROCEDURE EVALUATION
Anesthetic History No history of anesthetic complications Review of Systems / Medical History Patient summary reviewed and pertinent labs reviewed Pulmonary Within defined limits Neuro/Psych Within defined limits Cardiovascular Hypertension: well controlled Past MI (2008), CAD and cardiac stents (2008?) Pertinent negatives: Angina: 2008. Exercise tolerance: <4 METS: Walker/cane Comments: Takes bASA; last 7/27/18 Followed by cardiologist (Dr Jeanie Porter) who recently did holter monitor with no significant findings per patient. This was done because pt has had episodes of rapid heartbeat and difficulty breathing in the recent past. 
 
Pt inadvertently received 1 liter of LR in preop yesterday and developed flash pulmonary edema. Pt had lasix and bipap overnight and is doing much better on NC this am.  Pt is scheduled for an echo this am prior to surgery to assess EF/hospitalist also concerned about possible AS (though none on previous ECHO). Her cardiac enzymes were elevated last night and hospitalist has requested transfer downtown. She also developed a fib at [facility] last night. Update 7/28 1200 - Echo shows decreased LV fxn (EF~30%) with obvious focal WMA. No sig valve dz. A fib remains but HR controlled. Hemodynamically stable without complaints of CP/SOB. O2 sat mid-90's on 2L NC.  
 
  
GI/Hepatic/Renal 
Within defined limits Endo/Other Hypothyroidism: well controlled Other Findings Comments: presbycusis Physical Exam 
 
Airway Mallampati: I 
TM Distance: 4 - 6 cm Neck ROM: normal range of motion Mouth opening: Normal 
 
 Cardiovascular Rhythm: irregular Rate: normal 
 
 
 
 Dental 
No notable dental hx Pulmonary Breath sounds clear to auscultation Abdominal 
GI exam deferred Other Findings Anesthetic Plan ASA: 4 Anesthesia type: general 
 
Monitoring Plan: Arterial line Induction: Intravenous Anesthetic plan and risks discussed with: Patient Discussed with Dr. Idania Allen. Pt is high-risk for complications with surgery and anesthesia. She has depressed LV fxn and apical/inf WMA. She likely has some degree of CAD but she is not a candidate for cath/intervention. I discussed with Dr. Abhi Orellana and with patient's family. Her risk of proceeding with surgery vs her risk of non-operative management is hard to define. If she was managed conservatively with antibiotics/supportive care and she were to develop perforation with peritonitis, her prognosis would be extremely poor. We have decided to proceed with surgery after interval off Heparin gtt. Family understands she is high-risk.

## 2018-07-28 NOTE — PROGRESS NOTES
TRANSFER - OUT REPORT: 
 
Verbal report given to [facility] RN(name) on Juan Pablo Slade  being transferred to [facility] CCU 3305(unit) for routine progression of care Report consisted of patients Situation, Background, Assessment and  
Recommendations(SBAR). Information from the following report(s) SBAR, Kardex, Intake/Output, MAR, Recent Results and Cardiac Rhythm A. Fib/SR with BBB was reviewed with the receiving nurse. Lines:  
Peripheral IV 07/27/18 Right Arm (Active) Site Assessment Clean, dry, & intact 7/28/2018  3:30 AM  
Phlebitis Assessment 0 7/28/2018  3:30 AM  
Infiltration Assessment 0 7/28/2018  3:30 AM  
Dressing Status Clean, dry, & intact 7/28/2018  3:30 AM  
Dressing Type Tape;Transparent 7/28/2018  3:30 AM  
Hub Color/Line Status Pink;Flushed;Capped 7/28/2018  3:30 AM  
   
Peripheral IV 07/27/18 Left Forearm (Active) Site Assessment Clean, dry, & intact 7/28/2018  3:30 AM  
Phlebitis Assessment 0 7/28/2018  3:30 AM  
Infiltration Assessment 0 7/28/2018  3:30 AM  
Dressing Status Clean, dry, & intact 7/28/2018  3:30 AM  
Dressing Type Tape;Transparent 7/28/2018  3:30 AM  
Hub Color/Line Status Pink;Flushed;Capped 7/28/2018  3:30 AM  
  
 
Opportunity for questions and clarification was provided. Patient transported with: heparin gtt; O2; belongings; [facility] Patient's daughter notified of transfer.

## 2018-07-28 NOTE — DISCHARGE SUMMARY
Hospitalist Discharge Summary     Admit Date:  2018  3:22 PM   Name:  Brittnee Damon   Age:  80 y.o.  :  3/3/1924   MRN:  [id number]   PCP:  Tyrel Crane MD  Treatment Team: Attending Provider: Sarah Mabry MD; Consulting Provider: Rochelle Castro MD; Consulting Provider: Rock Martínez MD    Problem List for this Hospitalization:  Hospital Problems as of 2018  Date Reviewed: 2015          Codes Class Noted - Resolved POA    NSTEMI (non-ST elevated myocardial infarction) [facility]) ICD-10-CM: I21.4  ICD-9-CM: 410.70  2018 - Present Yes        Sepsis ([facility] ICD-10-CM: A41.9  ICD-9-CM: 038.9, 995.91  2018 - Present Yes        * (Principal)Acute appendicitis ICD-10-CM: K35.80  ICD-9-CM: 540.9  2018 - Present Yes        Hypokalemia ICD-10-CM: E87.6  ICD-9-CM: 276.8  2018 - Present Yes        Acute pulmonary edema with congestive heart failure ([facility] ICD-10-CM: I50.1  ICD-9-CM: 428.1  2015 - Present No        Hypertension (Chronic) ICD-10-CM: I10  ICD-9-CM: 401.9  2015 - Present Yes        Acute respiratory failure with hypoxia and hypercapnia (HCC) ICD-10-CM: J96.01, J96.02  ICD-9-CM: 518.81  2015 - Present No        CAD (coronary artery disease) (Chronic) ICD-10-CM: I25.10  ICD-9-CM: 414.00  2015 - Present Yes                Hospitalist Consult HPI from 2018:    \" 79 y/o female with hx CAD, HTN, EF 40-45% on echo  went to her PCP with complaint of RLQ pain. She was sent to [address] [place] [address] imaging center and had CT scan which showed acute appendicitis. Surgery called and she was sent to [facility] for appendectomy. Pre-operatively she received IV fluids which apparently put her into flash pulmonary edema so surgery is postponed so that CHF can be treated first. She was initially quite acidotic with ABG showing pH of 7.09, CO2 63 and PO2 88. Placed on BIPAP and given IV lasix. Hospitalist service consulted urgently to assist with treatment.  She was hypertensive and tachycardic but responded well to IV lopressor. Is tolerating BIPAP and starting to diurese. She denies any chest pain and prior to this admission had not complained of any CHF symptoms. Follow-up ABG is improving 7.32/43/85/22/96%. Further workup and treatment in progress. \"    Hospital Course:  Pt was admitted with acute appendicitis by Dr Rhonda Coronel. Started on cipro and flagyl. She was started on lasix and HTN control for pulm edema. Had to be placed on bipap. troponins came back elevated 5.27 and echo showing EF 40-45% and G2DD. Lactic acid 2.4. WBC now elevated 20.7. Heparin gtt will be started this morning. Her hypoxia improved and she was taken off bipap this morning. Her IV lasix will be stopped for now bc her resp status is improving and her Cr is climbing; do not want GLENYS to complicate care if cardiology plans cath. She is being transferred downtown to CCU for closer monitoring. Follow up instructions and discharge meds at bottom of this note. Plan was discussed with pt. All questions answered. Patient was stable at time of discharge. Diagnostic Imaging/Tests:   Xr Chest Sngl V    Result Date: 7/28/2018  Portable AP upright chest DATE: 7/28/2018, 0457 hours Comparison exam 7/27/2018 CLINICAL INFORMATION: Acute CHF Heart is normal in size and mediastinum unremarkable. Minimal infiltrate remains at the left lung base, left lung have not significantly improved. There is hazy infiltrate right upper and lower lobe. Right lung is also shown moderate improvement. No pleural effusion. IMPRESSION: Interval improvement in the appearance of the chest    Xr Chest Port    Result Date: 7/27/2018  Portable chest: History: Acute respiratory distress. Comparison: 12/28/2015 Findings: A single view of the chest was obtained at 1750 hours. The cardiac and mediastinal silhouette are normal in size and configuration.  Moderately extensive bilateral airspace opacities are present, right greater than left. There are no significant pleural effusions. IMPRESSION: Bilateral airspace opacities, right greater than left. The findings would raise suspicion for pulmonary edema and/or pneumonia. Echocardiogram results:  No results found for this visit on 07/27/18.       All Micro Results     None          Labs: Results:       BMP, Mg, Phos Recent Labs      07/28/18 0316 07/27/18 2015 07/27/18   1629   NA  141  144  139   K  5.1  3.4*  3.3*   CL  105  105  104   CO2  25  24  27   AGAP  11  15  8   BUN  18  12  11   CREA  1.52*  1.27*  0.86   CA  8.2*  9.0  8.5   GLU  275*  275*  136*   MG  2.5*  2.3   --    PHOS  4.4*  6.1*   --       CBC Recent Labs      07/28/18 0316 07/27/18   1629   WBC  20.7*  8.3   RBC  4.13  3.40*   HGB  13.0  10.8*   HCT  39.9  33.0*   PLT  214  190   GRANS   --   66   LYMPH   --   21   EOS   --   2   MONOS   --   10   BASOS   --   1   IG   --   0   ANEU   --   5.5   ABL   --   1.7   ESTEFANI   --   0.2   ABM   --   0.8   ABB   --   0.0   AIG   --   0.0      LFT Recent Labs      07/28/18 0316 07/27/18   1629   SGOT  59*  21   ALT  37  27   AP  85  70   TP  6.2*  6.5   ALB  2.7*  3.3   GLOB  3.5  3.2   AGRAT  0.8*  1.0*      Cardiac Testing Lab Results   Component Value Date/Time     07/27/2018 04:29 PM     11/29/2015 09:55 AM     07/28/2018 03:16 AM    CK - MB 23.9 (H) 07/28/2018 03:16 AM    CK-MB Index 13.8 07/28/2018 03:16 AM    Troponin-I, Qt. 5.27 (HH) 07/28/2018 03:16 AM    Troponin-I, Qt. 0.42 () 07/27/2018 08:15 PM    Troponin-I, Qt. 1.24 () 11/29/2015 08:50 PM      Coagulation Tests Lab Results   Component Value Date/Time    Prothrombin time 15.1 (H) 07/27/2018 08:15 PM    Prothrombin time 11.4 11/29/2015 09:55 AM    INR 1.2 07/27/2018 08:15 PM    INR 1.1 11/29/2015 09:55 AM    aPTT 30.7 07/27/2018 08:15 PM      A1c No results found for: HBA1C, HGBE8, LRH8PIET, DRW6NLQD   Lipid Panel Lab Results   Component Value Date/Time    Cholesterol, total 151 11/21/2008 05:07 AM    HDL Cholesterol 38 (L) 11/21/2008 05:07 AM    LDL, calculated 82.6 11/21/2008 05:07 AM    VLDL, calculated 30.4 (H) 11/21/2008 05:07 AM    Triglyceride 152 (H) 11/21/2008 05:07 AM    CHOL/HDL Ratio 4.0 11/21/2008 05:07 AM      Thyroid Panel Lab Results   Component Value Date/Time    TSH 0.057 (L) 07/28/2018 03:16 AM    T4, Total 7.5 07/28/2018 03:16 AM        Most Recent UA Lab Results   Component Value Date/Time    Color YELLOW 07/27/2018 11:23 PM    Appearance CLEAR 07/27/2018 11:23 PM    Specific gravity 1.007 07/27/2018 11:23 PM    pH (UA) 6.0 07/27/2018 11:23 PM    Protein 30 (A) 07/27/2018 11:23 PM    Glucose 100 07/27/2018 11:23 PM    Ketone NEGATIVE  07/27/2018 11:23 PM    Bilirubin NEGATIVE  07/27/2018 11:23 PM    Blood TRACE (A) 07/27/2018 11:23 PM    Urobilinogen 0.2 07/27/2018 11:23 PM    Nitrites NEGATIVE  07/27/2018 11:23 PM    Leukocyte Esterase NEGATIVE  07/27/2018 11:23 PM        Allergies   Allergen Reactions    Amlodipine Hives    Amoxicillin Rash    Tetracycline Rash     Immunization History   Administered Date(s) Administered    Influenza Vaccine 10/01/2015    Influenza Vaccine Whole 10/20/2008    TB Skin Test (PPD) Intradermal 07/27/2018       All Labs from Last 24 Hrs:  Recent Results (from the past 24 hour(s))   CBC WITH AUTOMATED DIFF    Collection Time: 07/27/18  4:29 PM   Result Value Ref Range    WBC 8.3 4.3 - 11.1 K/uL    RBC 3.40 (L) 4.05 - 5.25 M/uL    HGB 10.8 (L) 11.7 - 15.4 g/dL    HCT 33.0 (L) 35.8 - 46.3 %    MCV 97.1 79.6 - 97.8 FL    MCH 31.8 26.1 - 32.9 PG    MCHC 32.7 31.4 - 35.0 g/dL    RDW 13.3 11.9 - 14.6 %    PLATELET 044 594 - 002 K/uL    MPV 10.8 10.8 - 14.1 FL    DF AUTOMATED      NEUTROPHILS 66 43 - 78 %    LYMPHOCYTES 21 13 - 44 %    MONOCYTES 10 4.0 - 12.0 %    EOSINOPHILS 2 0.5 - 7.8 %    BASOPHILS 1 0.0 - 2.0 %    IMMATURE GRANULOCYTES 0 0.0 - 5.0 %    ABS. NEUTROPHILS 5.5 1.7 - 8.2 K/UL    ABS.  LYMPHOCYTES 1.7 0.5 - 4.6 K/UL ABS. MONOCYTES 0.8 0.1 - 1.3 K/UL    ABS. EOSINOPHILS 0.2 0.0 - 0.8 K/UL    ABS. BASOPHILS 0.0 0.0 - 0.2 K/UL    ABS. IMM. GRANS. 0.0 0.0 - 0.5 K/UL   METABOLIC PANEL, COMPREHENSIVE    Collection Time: 07/27/18  4:29 PM   Result Value Ref Range    Sodium 139 136 - 145 mmol/L    Potassium 3.3 (L) 3.5 - 5.1 mmol/L    Chloride 104 98 - 107 mmol/L    CO2 27 21 - 32 mmol/L    Anion gap 8 7 - 16 mmol/L    Glucose 136 (H) 65 - 100 mg/dL    BUN 11 8 - 23 MG/DL    Creatinine 0.86 0.6 - 1.0 MG/DL    GFR est AA >60 >60 ml/min/1.73m2    GFR est non-AA >60 >60 ml/min/1.73m2    Calcium 8.5 8.3 - 10.4 MG/DL    Bilirubin, total 0.5 0.2 - 1.1 MG/DL    ALT (SGPT) 27 12 - 65 U/L    AST (SGOT) 21 15 - 37 U/L    Alk. phosphatase 70 50 - 136 U/L    Protein, total 6.5 6.3 - 8.2 g/dL    Albumin 3.3 3.2 - 4.6 g/dL    Globulin 3.2 2.3 - 3.5 g/dL    A-G Ratio 1.0 (L) 1.2 - 3.5     BNP    Collection Time: 07/27/18  4:29 PM   Result Value Ref Range     pg/mL   BLOOD GAS, ARTERIAL    Collection Time: 07/27/18  6:15 PM   Result Value Ref Range    pH 7.09 (L) 7.35 - 7.45      PCO2 63 (H) 35 - 45 mmHg    PO2 88 80 - 105 mmHg    BICARBONATE 19 (L) 22 - 26 mmol/L    BASE DEFICIT 11.7 (H) 0 - 2 mmol/L    TOTAL HEMOGLOBIN 14.3 11.7 - 15.0 GM/DL    O2 SAT 93 92 - 98.5 %    Arterial O2 Hgb 92.4 (L) 94 - 97 %    CARBOXYHEMOGLOBIN 0.0 (L) 0.5 - 1.5 %    METHEMOGLOBIN 0.3 0.0 - 1.5 %    DEOXYHEMOGLOBIN 7 (H) 0.0 - 5.0 %    SITE RB     ALLENS TEST NA     Respiratory comment: SARAI Quesada at 7 27 2018 6 19 15 PM. Read back.     BLOOD GAS, ARTERIAL    Collection Time: 07/27/18  6:56 PM   Result Value Ref Range    pH 7.23 (L) 7.35 - 7.45      PCO2 52 (H) 35 - 45 mmHg    PO2 75 (L) 80 - 105 mmHg    BICARBONATE 21 (L) 22 - 26 mmol/L    BASE DEFICIT 6.6 (H) 0 - 2 mmol/L    TOTAL HEMOGLOBIN 14.7 11.7 - 15.0 GM/DL    O2 SAT 92 92 - 98.5 %    Arterial O2 Hgb 91.3 (L) 94 - 97 %    CARBOXYHEMOGLOBIN 0.0 (L) 0.5 - 1.5 %    METHEMOGLOBIN 0.3 0.0 - 1.5 % DEOXYHEMOGLOBIN 8 (H) 0.0 - 5.0 %    SITE RR     ALLENS TEST POSITIVE      Respiratory comment: SARAI Quesada at 7 27 2018 7 00 06 PM. Read back. BLOOD GAS, ARTERIAL    Collection Time: 07/27/18  8:02 PM   Result Value Ref Range    pH 7.32 (L) 7.35 - 7.45      PCO2 43 35 - 45 mmHg    PO2 85 80 - 105 mmHg    BICARBONATE 22 22 - 26 mmol/L    BASE DEFICIT 4.5 (H) 0 - 2 mmol/L    SITE RR     ALLENS TEST POSITIVE      MODE BIPAP     Respiratory comment: Dr. Araceli Ann at 7 27 2018 8 07 10 PM. Read back.     PTT    Collection Time: 07/27/18  8:15 PM   Result Value Ref Range    aPTT 30.7 23.2 - 35.3 SEC   PROTHROMBIN TIME + INR    Collection Time: 07/27/18  8:15 PM   Result Value Ref Range    Prothrombin time 15.1 (H) 11.5 - 14.5 sec    INR 1.2     MAGNESIUM    Collection Time: 07/27/18  8:15 PM   Result Value Ref Range    Magnesium 2.3 1.8 - 2.4 mg/dL   PHOSPHORUS    Collection Time: 07/27/18  8:15 PM   Result Value Ref Range    Phosphorus 6.1 (H) 2.3 - 3.7 MG/DL   METABOLIC PANEL, BASIC    Collection Time: 07/27/18  8:15 PM   Result Value Ref Range    Sodium 144 136 - 145 mmol/L    Potassium 3.4 (L) 3.5 - 5.1 mmol/L    Chloride 105 98 - 107 mmol/L    CO2 24 21 - 32 mmol/L    Anion gap 15 7 - 16 mmol/L    Glucose 275 (H) 65 - 100 mg/dL    BUN 12 8 - 23 MG/DL    Creatinine 1.27 (H) 0.6 - 1.0 MG/DL    GFR est AA 50 (L) >60 ml/min/1.73m2    GFR est non-AA 42 (L) >60 ml/min/1.73m2    Calcium 9.0 8.3 - 10.4 MG/DL   TROPONIN I    Collection Time: 07/27/18  8:15 PM   Result Value Ref Range    Troponin-I, Qt. 0.42 (HH) 0.02 - 0.05 NG/ML   TYPE & SCREEN    Collection Time: 07/27/18  8:48 PM   Result Value Ref Range    Crossmatch Expiration 07/30/2018     ABO/Rh(D) A NEGATIVE     Antibody screen NEG    URINALYSIS W/ RFLX MICROSCOPIC    Collection Time: 07/27/18 11:23 PM   Result Value Ref Range    Color YELLOW      Appearance CLEAR      Specific gravity 1.007 1.001 - 1.023      pH (UA) 6.0 5.0 - 9.0      Protein 30 (A) NEG mg/dL Glucose 100 mg/dL    Ketone NEGATIVE  NEG mg/dL    Bilirubin NEGATIVE  NEG      Blood TRACE (A) NEG      Urobilinogen 0.2 0.2 - 1.0 EU/dL    Nitrites NEGATIVE  NEG      Leukocyte Esterase NEGATIVE  NEG      WBC 5-10 0 /hpf    RBC 0-3 0 /hpf    Epithelial cells 0-3 0 /hpf    Bacteria 0 0 /hpf    Other observations RESULTS VERIFIED MANUALLY     BLOOD GAS, ARTERIAL    Collection Time: 07/28/18  3:10 AM   Result Value Ref Range    pH 7.43 7.35 - 7.45      PCO2 33 (L) 35 - 45 mmHg    PO2 176 (H) 80 - 105 mmHg    BICARBONATE 21 (L) 22 - 26 mmol/L    BASE DEFICIT 2.0 0 - 2 mmol/L    SITE RR     ALLENS TEST POSITIVE      MODE BIPAP     Respiratory comment:       Roberth Taylor RN at 7 28 2018 3 14 33 AM. Read back. Bipap   14 8  r. 16   METABOLIC PANEL, COMPREHENSIVE    Collection Time: 07/28/18  3:16 AM   Result Value Ref Range    Sodium 141 136 - 145 mmol/L    Potassium 5.1 3.5 - 5.1 mmol/L    Chloride 105 98 - 107 mmol/L    CO2 25 21 - 32 mmol/L    Anion gap 11 7 - 16 mmol/L    Glucose 275 (H) 65 - 100 mg/dL    BUN 18 8 - 23 MG/DL    Creatinine 1.52 (H) 0.6 - 1.0 MG/DL    GFR est AA 41 (L) >60 ml/min/1.73m2    GFR est non-AA 34 (L) >60 ml/min/1.73m2    Calcium 8.2 (L) 8.3 - 10.4 MG/DL    Bilirubin, total 0.4 0.2 - 1.1 MG/DL    ALT (SGPT) 37 12 - 65 U/L    AST (SGOT) 59 (H) 15 - 37 U/L    Alk.  phosphatase 85 50 - 136 U/L    Protein, total 6.2 (L) 6.3 - 8.2 g/dL    Albumin 2.7 (L) 3.2 - 4.6 g/dL    Globulin 3.5 2.3 - 3.5 g/dL    A-G Ratio 0.8 (L) 1.2 - 3.5     MAGNESIUM    Collection Time: 07/28/18  3:16 AM   Result Value Ref Range    Magnesium 2.5 (H) 1.8 - 2.4 mg/dL   PHOSPHORUS    Collection Time: 07/28/18  3:16 AM   Result Value Ref Range    Phosphorus 4.4 (H) 2.3 - 3.7 MG/DL   TSH 3RD GENERATION    Collection Time: 07/28/18  3:16 AM   Result Value Ref Range    TSH 0.057 (L) 0.358 - 3.740 uIU/mL   T4 (THYROXINE)    Collection Time: 07/28/18  3:16 AM   Result Value Ref Range    T4, Total 7.5 4.8 - 13.9 ug/dL   CBC W/O DIFF    Collection Time: 07/28/18  3:16 AM   Result Value Ref Range    WBC 20.7 (H) 4.3 - 11.1 K/uL    RBC 4.13 4.05 - 5.25 M/uL    HGB 13.0 11.7 - 15.4 g/dL    HCT 39.9 35.8 - 46.3 %    MCV 96.6 79.6 - 97.8 FL    MCH 31.5 26.1 - 32.9 PG    MCHC 32.6 31.4 - 35.0 g/dL    RDW 13.7 11.9 - 14.6 %    PLATELET 731 795 - 499 K/uL    MPV 10.7 (L) 10.8 - 14.1 FL   LACTIC ACID    Collection Time: 07/28/18  3:16 AM   Result Value Ref Range    Lactic acid 2.4 (HH) 0.4 - 2.0 MMOL/L   C REACTIVE PROTEIN, QT    Collection Time: 07/28/18  3:16 AM   Result Value Ref Range    C-Reactive protein 1.3 (H) 0.0 - 0.9 mg/dL   TROPONIN I    Collection Time: 07/28/18  3:16 AM   Result Value Ref Range    Troponin-I, Qt. 5.27 (HH) 0.02 - 0.05 NG/ML   CK    Collection Time: 07/28/18  3:16 AM   Result Value Ref Range     21 - 215 U/L   MYOGLOBIN    Collection Time: 07/28/18  3:16 AM   Result Value Ref Range    Myoglobin 248 (H) 9 - 82 ng/mL   CK-MB,QT    Collection Time: 07/28/18  3:16 AM   Result Value Ref Range    CK - MB 23.9 (H) 0.5 - 3.6 ng/ml    CK-MB Index 13.8 %       Discharge Exam:  Patient Vitals for the past 24 hrs:   Temp Pulse Resp BP SpO2   07/28/18 0738 - - - - 98 %   07/28/18 0631 - 84 25 153/74 97 %   07/28/18 0601 - 83 24 134/73 97 %   07/28/18 0550 - 87 27 149/70 97 %   07/28/18 0504 - (!) 109 28 133/62 97 %   07/28/18 0454 - 89 - 141/80 -   07/28/18 0400 - 88 22 141/80 97 %   07/28/18 0355 - 87 24 119/80 96 %   07/28/18 0345 - 94 24 126/75 96 %   07/28/18 0330 96.7 °F (35.9 °C) 88 20 137/73 97 %   07/28/18 0315 - (!) 104 24 141/81 98 %   07/28/18 0303 - - - - 99 %   07/28/18 0300 - 88 21 132/82 99 %   07/28/18 0245 - 87 28 128/75 99 %   07/28/18 0230 - 94 30 124/78 98 %   07/28/18 0215 - 88 27 153/75 98 %   07/28/18 0200 - 97 29 144/87 98 %   07/28/18 0145 - 92 (!) 31 151/86 98 %   07/28/18 0130 - (!) 103 (!) 31 166/79 99 %   07/28/18 0115 - (!) 102 29 (!) 142/99 99 %   07/28/18 0100 - (!) 112 (!) 38 147/90 99 % 07/28/18 0045 - (!) 103 28 152/90 98 %   07/28/18 0030 - (!) 103 (!) 31 158/88 99 %   07/28/18 0015 - (!) 109 (!) 35 (!) 157/91 99 %   07/28/18 0000 - 95 (!) 31 140/82 99 %   07/27/18 2345 - 90 (!) 44 132/81 98 %   07/27/18 2330 96.9 °F (36.1 °C) 95 (!) 32 157/90 98 %   07/27/18 2315 - 93 (!) 33 (!) 140/93 98 %   07/27/18 2300 - 90 26 109/68 98 %   07/27/18 2250 - - - - 98 %   07/27/18 2245 - 98 (!) 32 136/80 98 %   07/27/18 2230 - (!) 114 (!) 34 156/87 99 %   07/27/18 2215 - (!) 120 (!) 31 148/80 98 %   07/27/18 2200 - (!) 110 (!) 40 151/90 99 %   07/27/18 2158 - (!) 104 - 134/65 -   07/27/18 2145 - (!) 134 (!) 42 134/65 99 %   07/27/18 2130 - (!) 132 (!) 39 (!) 153/91 99 %   07/27/18 2115 - (!) 150 (!) 41 (!) 162/92 99 %   07/27/18 2100 - (!) 147 (!) 41 (!) 169/101 99 %   07/27/18 2049 - (!) 146 - - -   07/27/18 2045 - (!) 145 (!) 43 (!) 168/101 98 %   07/27/18 2030 - (!) 143 (!) 46 160/84 98 %   07/27/18 2015 - (!) 121 (!) 46 (!) 143/99 95 %   07/27/18 2008 - (!) 156 - (!) 164/102 -   07/27/18 2000 - (!) 133 (!) 58 (!) 164/102 94 %   07/27/18 1945 - (!) 154 (!) 67 (!) 178/97 94 %   07/27/18 1941 - (!) 151 (!) 44 (!) 165/96 93 %   07/27/18 1931 - (!) 139 (!) 42 (!) 168/91 -   07/27/18 1928 - (!) 146 (!) 38 (!) 157/95 -   07/27/18 1927 97.5 °F (36.4 °C) (!) 144 30 169/85 -   07/27/18 1856 - (!) 141 - 168/74 98 %   07/27/18 1854 - (!) 134 - 170/81 98 %   07/27/18 1840 - (!) 149 - 173/84 97 %   07/27/18 1834 - (!) 146 - 164/77 97 %   07/27/18 1831 - (!) 141 - 161/74 100 %   07/27/18 1820 - - - - 96 %   07/27/18 1819 - (!) 122 - 157/75 100 %   07/27/18 1816 - (!) 135 - 165/75 97 %   07/27/18 1811 - (!) 128 - 151/69 99 %   07/27/18 1806 - (!) 128 - 168/74 90 %   07/27/18 1757 - (!) 134 - - -   07/27/18 1733 - (!) 137 - - -   07/27/18 1519 98.1 °F (36.7 °C) 81 19 136/57 99 %     Oxygen Therapy  O2 Sat (%): 98 % (07/28/18 0738)  Pulse via Oximetry: 102 beats per minute (07/28/18 0738)  O2 Device: Nasal cannula (07/28/18 0738)  O2 Flow Rate (L/min): 2 l/min (07/28/18 0738)  O2 Temperature: 87.8 °F (31 °C) (07/28/18 0303)  FIO2 (%): 28 % (07/28/18 0738)    Intake/Output Summary (Last 24 hours) at 07/28/18 0828  Last data filed at 07/28/18 0454   Gross per 24 hour   Intake             1550 ml   Output              800 ml   Net              750 ml       General:    Well nourished. Alert. Eyes:   Normal sclera. Extraocular movements intact. ENT:  Normocephalic, atraumatic. Moist mucous membranes  CV:   Regular rate and rhythm. No murmur, rub, or gallop. Lungs:  Rales bilat  Abdomen: Soft, nontender, nondistended. Bowel sounds normal.   Extremities: Warm and dry. No cyanosis or edema. Neurologic: CN II-XII grossly intact. Sensation intact. Skin:     No rashes or jaundice. Psych:  Normal mood and affect.     Discharge Info:   Current Discharge Medication List        Current Facility-Administered Medications   Medication Dose Route Frequency    heparin (porcine) injection 3,250 Units  60 Units/kg IntraVENous ONCE    heparin 25,000 units in dextrose 500 mL infusion  12-25 Units/kg/hr IntraVENous TITRATE    sodium chloride (NS) flush 5-10 mL  5-10 mL IntraVENous Q8H    sodium chloride (NS) flush 5-10 mL  5-10 mL IntraVENous PRN    HYDROmorphone (PF) (DILAUDID) injection 1 mg  1 mg IntraVENous Q4H PRN    ondansetron (ZOFRAN) injection 4 mg  4 mg IntraVENous Q4H PRN    piperacillin-tazobactam (ZOSYN) 3.375 g in 0.9% sodium chloride (MBP/ADV) 100 mL  3.375 g IntraVENous ON CALL    NUTRITIONAL SUPPORT ELECTROLYTE PRN ORDERS   Does Not Apply PRN    sodium chloride (NS) flush 5-10 mL  5-10 mL IntraVENous Q8H    sodium chloride (NS) flush 5-10 mL  5-10 mL IntraVENous PRN    NUTRITIONAL SUPPORT ELECTROLYTE PRN ORDERS   Does Not Apply PRN    morphine injection 2 mg  2 mg IntraVENous Q4H PRN    acetaminophen (TYLENOL) suppository 650 mg  650 mg Rectal Q4H PRN    naloxone (NARCAN) injection 0.4 mg  0.4 mg IntraVENous PRN    white Petrolatum-Mineral Oil (AKWA TEARS) ophthalmic ointment   Both Eyes Q4H    tuberculin injection 5 Units  5 Units IntraDERMal ONCE    metoprolol (LOPRESSOR) injection 5 mg  5 mg IntraVENous Q6H PRN    ciprofloxacin (CIPRO) 400 mg IVPB (premix)  400 mg IntraVENous Q12H    metroNIDAZOLE (FLAGYL) IVPB premix 500 mg  500 mg IntraVENous Q8H    famotidine (PF) (PEPCID) 20 mg in sodium chloride 0.9% 10 mL injection  20 mg IntraVENous Q12H         Disposition: [facility]  Activity: Bedrest  Diet: DIET NPO    No orders of the defined types were placed in this encounter. Follow-up Information     Follow up With Details Comments Contact Info    Lan Hale MD   [address] [address]  [phone]            Time spent in patient discharge planning and coordination 35 minutes.     Signed:  Lawrence Lira MD

## 2018-07-28 NOTE — ANESTHESIA POSTPROCEDURE EVALUATION
Post-Anesthesia Evaluation and Assessment Patient: Alfonso Leblanc MRN: [id number]  SSN: [id number] YOB: 1924  Age: 80 y.o. Sex: female Cardiovascular Function/Vital Signs Visit Vitals  /66  Pulse 86  Temp 37.2 °C (99 °F)  Resp 15  Wt 49.1 kg (108 lb 3.9 oz)  SpO2 94%  Breastfeeding No  
 BMI 18.58 kg/m2 Patient is status post general anesthesia for Procedure(s): 
APPENDECTOMY LAPAROSCOPIC. Nausea/Vomiting: None Postoperative hydration reviewed and adequate. Pain: 
Pain Scale 1: Visual (07/28/18 1435) Pain Intensity 1: 0 (07/28/18 1435) Managed Neurological Status:  
Neuro (WDL): Exceptions to WDL (07/28/18 1435) Neuro Neurologic State: Sleeping;Eyes open to stimulus (07/28/18 1435) Orientation Level: Unable to verbalize (07/28/18 1435) Cognition: No command following (07/28/18 1435) Speech: Appropriate for age;Clear (07/28/18 0920) Assessment L Pupil: Brisk;Round (07/28/18 0920) Size L Pupil (mm): 3 (07/28/18 0920) Assessment R Pupil: Brisk;Round (07/28/18 0920) Size R Pupil (mm): 3 (07/28/18 0920) LUE Motor Response: Spontaneous  (07/28/18 1435) LLE Motor Response: Spontaneous  (07/28/18 1435) RUE Motor Response: Spontaneous  (07/28/18 1435) RLE Motor Response: Spontaneous  (07/28/18 1435) At baseline Mental Status and Level of Consciousness: Arousable Pulmonary Status:  
O2 Device: Nasal cannula (07/28/18 1435) Adequate oxygenation and airway patent Complications related to anesthesia: None Post-anesthesia assessment completed. Tolerated GA and surgery reasonably well. IVF limited to 300 ml intraop and 50 ml in PACU. HR required intermittent Esmolol boluses for rate control throughout the case and during initial PACU stay. Eventually given Diltiazem 10 mg IV for longer acting rate control. Discharged to CCU in stable condition. Signed By: Shanti Montano MD   
 July 28, 2018

## 2018-07-28 NOTE — PROGRESS NOTES
Patient arrived from PACU on 5L NC. COLLEEN drain present charged and with no output. Art line connected and zeroed with good waveform. Patient denies pain at this time. All surgical dressings are CDI.

## 2018-07-28 NOTE — PROGRESS NOTES
Dr. Rhonda Coronel and  notified Mrs. Mayo to be transferred to [facility] per =Troponin 5.27.

## 2018-07-28 NOTE — PROGRESS NOTES
Dr. Jarvis Olea aware that patient is currently at [facility] ICU until bed available at . Dr. Jarvis Olea stated she would notify  anesthesia of pending transfer.

## 2018-07-28 NOTE — ANESTHESIA PROCEDURE NOTES
Arterial Line Placement Start time: 7/28/2018 1:24 PM 
End time: 7/28/2018 1:26 PM 
Performed by: Xin Garcia Authorized by: Xin Garcia  
 
Pre-Procedure Indications:  Arterial pressure monitoring Preanesthetic Checklist: patient identified, risks and benefits discussed, anesthesia consent, site marked, patient being monitored, timeout performed and patient being monitored Timeout Time: 13:23 Procedure:  
Prep:  Chlorhexidine Seldinger Technique?: Yes Orientation:  Left Location:  Radial artery Catheter size:  20 G Number of attempts:  1 Cont Cardiac Output Sensor: No   
 
Assessment:  
Post-procedure:  Line secured and sterile dressing applied Patient Tolerance:  Patient tolerated the procedure well with no immediate complications

## 2018-07-28 NOTE — ANESTHESIA PREPROCEDURE EVALUATION
Anesthetic History No history of anesthetic complications Review of Systems / Medical History Patient summary reviewed and pertinent labs reviewed Pulmonary Within defined limits Neuro/Psych Within defined limits Cardiovascular Hypertension: well controlled Past MI (2008), CAD and cardiac stents (2008?) Pertinent negatives: Angina: 2008. Exercise tolerance: <4 METS: Walker/cane Comments: Takes bASA; last 7/27/18 Followed by cardiologist (Dr Rosibel Avitia) who recently did holter monitor with no significant findings per patient. This was done because pt has had episodes of rapid heartbeat and difficulty breathing in the recent past. 
 
2015 ECHO showed EF 40-45%, LAE Pt inadvertently received 1 liter of LR in preop yesterday and developed flash pulmonary edema. Pt had lasix and bipap overnight and is doing much better on NC this am.  Pt is scheduled for an echo this am prior to surgery to assess EF/hospitalist also concerned about possible AS (though none on previous ECHO). Her cardiac enzymes were elevated last night and hospitalist has requested transfer downtown. She also developed a fib at [facility] last night. She is beta blocked and is currently . GI/Hepatic/Renal 
Within defined limits Endo/Other Hypothyroidism: well controlled Other Findings Comments: presbycusis Physical Exam 
 
Airway Mallampati: I 
TM Distance: 4 - 6 cm Neck ROM: normal range of motion Mouth opening: Normal 
 
 Cardiovascular Rhythm: irregular Rate: abnormal 
 
 
 
 Dental 
No notable dental hx Pulmonary [place] Abdominal 
GI exam deferred Other Findings Anesthetic Plan ASA: 4 Anesthesia type: general 
 
Monitoring Plan: Arterial line Induction: Intravenous Anesthetic plan and risks discussed with: Patient Will need to review ECHO to be done this am.

## 2018-07-28 NOTE — PROGRESS NOTES
aware of patient still at [facility] waiting til bed available downtown.

## 2018-07-28 NOTE — BRIEF OP NOTE
BRIEF OPERATIVE NOTE Date of Procedure: 7/28/2018 Preoperative Diagnosis: acute appendicitis Postoperative Diagnosis: acute appendicitis with perforation and abscess at retrocecal location Procedure(s): 
APPENDECTOMY LAPAROSCOPIC, abscess cavity debridement and placement of drain Surgeon(s) and Role: Harshil Denson MD - Primary Surgical Staff: 
Circ-1: Severo Counter, RN; Sonia Aguilera Scrub Tech-2: Kevin Horn 
Event Time In Incision Start  1:47 PM  
Incision Close  2:25 PM  
 
Anesthesia: General  
Estimated Blood Loss: minimal 
Specimens:  
ID Type Source Tests Collected by Time Destination 1 : Appendix Preservative Appendix  Sary Estrada MD 7/28/2018  2:20 PM Pathology Findings: retrocecal appendix, the tip was perforated with an abscess around it. Complications: none Implants: COLLEEN x 1

## 2018-07-28 NOTE — IP AVS SNAPSHOT
[address] [facility] 
 
 
 [address] [facility] [address] [facility] 
[phone] Patient: Keisha Pinto MRN: [id number] ZNU:[birth year] About your hospitalization You were admitted on:  July 28, 2018 You last received care in the:  [facility] 3 TELEMETRY You were discharged on:  August 2, 2018 Why you were hospitalized Your primary diagnosis was:  Not on File Your diagnoses also included:  Sepsis (Hcc), Hypokalemia, Acute Appendicitis, Acute Pulmonary Edema With Congestive Heart Failure (Hcc), Acute Respiratory Failure With Hypoxia And Hypercapnia (Hcc), Hypertension, Cad (Coronary Artery Disease), Nstemi (Non-St Elevated Myocardial Infarction) (Hcc), Appendicitis, Acute, Respiratory Failure With Hypoxia And Hypercapnia (Hcc), Volume Overload, Demand Ischemia (Hcc), Systolic Chf, Acute On Chronic (Hcc) Follow-up Information Follow up With Details Comments Contact Info Kate Vale., MD On 8/9/2018 [address] [facility]   Mele Medel and Pauly Ash [facility] [address] 
[phone]  35 [facility]  RN/PT/OT  [address] [facility] 230 [facility] [address] 
[phone] Duc Walton MD On 8/15/2018 3pm 2 [address] 400 [facility] Cardiology [facility] [address] 
[phone] Your Scheduled Appointments Wednesday August 15, 2018  3:00 PM EDT HOSPITAL FOLLOW-UP with Duc Walton MD  
[address] (800 [facility]) 2 [facility] 400 [facility] 81  
[phone] Discharge Orders None A check armida indicates which time of day the medication should be taken. My Medications START taking these medications Instructions Each Dose to Equal  
 Morning Noon Evening Bedtime  
 amiodarone 200 mg tablet Commonly known as:  CORDARONE Start taking on:  8/3/2018 Take 1 Tab by mouth daily.   
 200 mg  
    
  
   
   
 carvedilol 3.125 mg tablet Commonly known as:  Darcie Ferrara Take 1 Tab by mouth two (2) times daily (with meals). 3.125 mg  
    
  
   
   
  
   
  
 clopidogrel 75 mg Tab Commonly known as:  PLAVIX Start taking on:  8/3/2018 Take 1 Tab by mouth daily. 75 mg  
    
  
   
   
   
  
 pantoprazole 40 mg tablet Commonly known as:  PROTONIX Start taking on:  8/3/2018 Take 1 Tab by mouth Daily (before breakfast). 40 mg  
    
  
   
   
   
  
 potassium chloride 20 mEq tablet Commonly known as:  K-DUR, KLOR-CON Start taking on:  8/3/2018 Take 1 Tab by mouth daily. 20 mEq CHANGE how you take these medications Instructions Each Dose to Equal  
 Morning Noon Evening Bedtime  
 furosemide 40 mg tablet Commonly known as:  LASIX What changed:   
- medication strength 
- how much to take Take 1 Tab by mouth daily. 40 mg  
    
  
   
   
   
  
 lisinopril 2.5 mg tablet Commonly known as:  Marimar Mercer What changed:   
- medication strength 
- how much to take Take 1 Tab by mouth daily. 2.5 mg  
    
  
   
   
   
  
  
CONTINUE taking these medications Instructions Each Dose to Equal  
 Morning Noon Evening Bedtime  
 aspirin 81 mg chewable tablet Take 81 mg by mouth daily. 81 mg NITRO-DUR 0.4 mg/hr Generic drug:  nitroglycerin 1 Patch by TransDERmal route daily. 9pm-11am  
 1 Patch  
    
   
   
   
  
  
 oxyCODONE IR 15 mg immediate release tablet Commonly known as:  OXY-IR Take 15 mg by mouth every four (4) hours as needed for Pain. 15 mg  
    
   
   
   
  
 VITAMIN B-6 100 mg tablet Generic drug:  pyridoxine (vitamin B6) Take 100 mg by mouth daily. Takes at NOON  
 100 mg  
    
   
  
   
   
  
 VITAMIN D3 2,000 unit Tab Generic drug:  cholecalciferol (vitamin D3) Take  by mouth daily. STOP taking these medications   
 metoprolol tartrate 50 mg tablet Commonly known as:  LOPRESSOR Where to Get Your Medications Information on where to get these meds will be given to you by the nurse or doctor. ! Ask your nurse or doctor about these medications  
  amiodarone 200 mg tablet  
 carvedilol 3.125 mg tablet  
 clopidogrel 75 mg Tab  
 furosemide 40 mg tablet  
 lisinopril 2.5 mg tablet  
 pantoprazole 40 mg tablet  
 potassium chloride 20 mEq tablet Opioid Education Prescription Opioids: What You Need to Know: 
 
Prescription opioids can be used to help relieve moderate-to-severe pain and are often prescribed following a surgery or injury, or for certain health conditions. These medications can be an important part of treatment but also come with serious risks. Opioids are strong pain medicines. Examples include hydrocodone, oxycodone, fentanyl, and morphine. Heroin is an example of an illegal opioid. It is important to work with your health care provider to make sure you are getting the safest, most effective care. WHAT ARE THE RISKS AND SIDE EFFECTS OF OPIOID USE? Prescription opioids carry serious risks of addiction and overdose, especially with prolonged use. An opioid overdose, often marked by slow breathing, can cause sudden death. The use of prescription opioids can have a number of side effects as well, even when taken as directed. · Tolerance-meaning you might need to take more of a medication for the same pain relief · Physical dependence-meaning you have symptoms of withdrawal when the medication is stopped. Withdrawal symptoms can include nausea, sweating, chills, diarrhea, stomach cramps, and muscle aches. Withdrawal can last up to several weeks, depending on which drug you took and how long you took it. · Increased sensitivity to pain · Constipation · Nausea, vomiting, and dry mouth · Sleepiness and dizziness · Confusion · Depression · Low levels of testosterone that can result in lower sex drive, energy, and strength · Itching and sweating RISKS ARE GREATER WITH:      
· History of drug misuse, substance use disorder, or overdose · Mental health conditions (such as depression or anxiety) · Sleep apnea · Older age (72 years or older) · Pregnancy Avoid alcohol while taking prescription opioids. Also, unless specifically advised by your health care provider, medications to avoid include: · Benzodiazepines (such as Xanax or Valium) · Muscle relaxants (such as Soma or Flexeril) · Hypnotics (such as Ambien or Lunesta) · Other prescription opioids KNOW YOUR OPTIONS Talk to your health care provider about ways to manage your pain that don't involve prescription opioids. Some of these options may actually work better and have fewer risks and side effects. Options may include: 
· Pain relievers such as acetaminophen, ibuprofen, and naproxen · Some medications that are also used for depression or seizures · Physical therapy and exercise · Counseling to help patients learn how to cope better with triggers of pain and stress. · Application of heat or cold compress · Massage therapy · Relaxation techniques Be Informed Make sure you know the name of your medication, how much and how often to take it, and its potential risks & side effects. IF YOU ARE PRESCRIBED OPIOIDS FOR PAIN: 
· Never take opioids in greater amounts or more often than prescribed. Remember the goal is not to be pain-free but to manage your pain at a tolerable level. · Follow up with your primary care provider to: · Work together to create a plan on how to manage your pain. · Talk about ways to help manage your pain that don't involve prescription opioids. · Talk about any and all concerns and side effects. · Help prevent misuse and abuse. · Never sell or share prescription opioids · Help prevent misuse and abuse. · Store prescription opioids in a secure place and out of reach of others (this may include visitors, children, friends, and family). · Safely dispose of unused/unwanted prescription opioids: Find your community drug take-back program or your pharmacy mail-back program, or flush them down the toilet, following guidance from the Food and Drug Administration (www.fda.gov/Drugs/ResourcesForYou). · Visit www.cdc.gov/drugoverdose to learn about the risks of opioid abuse and overdose. · If you believe you may be struggling with addiction, tell your health care provider and ask for guidance or call [employer] at 2-144-710-FXFC. Discharge Instructions Learning About Appendectomy What is an appendectomy? An appendectomy is surgery to take out the appendix. This organ is a small sac that is shaped like a finger. It's attached to your large intestine. Appendicitis happens when the appendix becomes infected and inflamed. An appendectomy is the main treatment for it. If surgery is delayed, the inflamed appendix may burst. A burst appendix can cause serious health problems. If your appendix has burst, you may need an emergency surgery to remove the burst appendix. How is this surgery done? Before surgery, you will get medicine to make you sleep. Appendectomy is usually done as a laparoscopic surgery. That means it is done with only small cuts. These cuts are called incisions. The doctor puts a lighted tube, or scope, and other surgical tools through the cuts in your belly. The doctor is able to see your organs with the scope. The doctor removes the appendix. The cuts heal quickly, and the scars usually fade over time. In some cases, the surgery is done through a single larger cut in the belly. This is called open surgery. What can you expect after surgery? Most people leave the hospital 1 to 3 days after surgery. Some even go home the same day. After you go home, it is normal to feel weak and tired for several days. Your belly may be swollen and may be painful. If you had laparoscopic surgery, you may have shoulder pain for about 24 hours. You may also feel sick to your stomach and have diarrhea, constipation, gas, or a headache. This usually goes away in a few days. Your recovery time depends on the type of surgery you had. If you had laparoscopic surgery, you will probably be able to go back to work or your normal routine 1 to 3 weeks after surgery. If you had an open surgery, it may take 2 to 4 weeks. If your appendix burst, you may have a drain in your incision. Your body will work fine without an appendix. You will not have to make any changes in your diet or daily life. After surgery, be sure to follow your doctor's advice about problems to watch for. These may include fever, worse belly pain, or problems with your incision. Follow-up care is a key part of your treatment and safety. Be sure to make and go to all appointments, and call your doctor if you are having problems. It's also a good idea to know your test results and keep a list of the medicines you take. Where can you learn more? Go to http://dinora-ellyn.info/. Enter H690 in the search box to learn more about \"Learning About Appendectomy. \" Current as of: May 12, 2017 Content Version: 11.7 © 3324-6938 [employer]. Care instructions adapted under license by [employer] (which disclaims liability or warranty for this information). If you have questions about a medical condition or this instruction, always ask your healthcare professional. [place] [address] any warranty or liability for your use of this information. Superficial Thrombophlebitis: Care Instructions Your Care Instructions Superficial thrombophlebitis is inflammation in a vein where a blood clot has formed close to the surface of the skin. You may be able to feel the clot as a firm lump under the skin. The skin over the clot can become red, tender, and warm to the touch. Blood clots in veins close to the skin's surface usually are not serious and often can be treated at home. Sometimes superficial thrombophlebitis spreads to a deeper vein (deep vein thrombosis, or DVT). These deeper clots can be serious, even life-threatening. It is very important that you follow your doctor's instructions, keep all follow-up appointments, and watch for new or worsening symptoms of a clot. Follow-up care is a key part of your treatment and safety. Be sure to make and go to all appointments, and call your doctor if you are having problems. It's also a good idea to know your test results and keep a list of the medicines you take. How can you care for yourself at home? · Take your medicines exactly as prescribed. Call your doctor if you think you are having a problem with your medicine. You will get more details on the specific medicines your doctor prescribes. · Prop up the sore leg or arm on a pillow anytime you sit or lie down. Try to keep it above the level of your heart. To prevent thrombophlebitis · Exercise. Keep blood moving in your legs to keep new clots from forming. · Get up out of bed as soon as possible after an illness or surgery. · Do not smoke. If you need help quitting, talk to your doctor about stop-smoking programs and medicines. These can increase your chances of quitting for good. · Ask your doctor about compression stockings. These may help prevent blood clots from forming in your legs. But there are different types of stockings, and they need to fit right. So your doctor will recommend what you need. When should you call for help? Call 911 anytime you think you may need emergency care. For example, call if:   · You have sudden chest pain and shortness of breath, or you cough up blood.  
  · You vomit blood or what looks like coffee grounds.  
  · You pass maroon or very bloody stools.  
  · You passed out (lost consciousness).  
 Call your doctor now or seek immediate medical care if: 
  · You have signs of a blood clot, such as: 
¨ Pain in your calf, back of the knee, thigh, or groin. ¨ Redness and swelling in your leg or groin.  
  · You notice a new hard, red, or tender area in your leg.  
  · Your stools are black and tarlike or have streaks of blood.  
 Watch closely for changes in your health, and be sure to contact your doctor if: 
  · You do not get better as expected. Where can you learn more? Go to http://dinora-ellyn.info/. Enter [id number] in the search box to learn more about \"Superficial Thrombophlebitis: Care Instructions. \" Current as of: November 21, 2017 Content Version: 11.7 © 4372-6923 [employer]. Care instructions adapted under license by [employer] (which disclaims liability or warranty for this information). If you have questions about a medical condition or this instruction, always ask your healthcare professional. [address] any warranty or liability for your use of this information. Blue Medora Announcement We are excited to announce that we are making your provider's discharge notes available to you in Blue Medora. You will see these notes when they are completed and signed by the physician that discharged you from your recent hospital stay. If you have any questions or concerns about any information you see in Blue Medora, please call the Health Information Department where you were seen or reach out to your Primary Care Provider for more information about your plan of care. Introducing [facility]! Dear Jose Hoyt: 
Thank you for requesting a Blue Medora account.   Our records indicate that you already have an active [employer] account. You can access your account anytime at [email or web address] Media Did you know that you can access your hospital and ER discharge instructions at any time in Interior Define? You can also review all of your test results from your hospital stay or ER visit. Additional Information If you have questions, please visit the Frequently Asked Questions section of the Interior Define website at [email or web address] Remember, Interior Define is NOT to be used for urgent needs. For medical emergencies, dial 911. Now available from your iPhone and Android! Introducing Basim Bridges As a [facility] patient, I wanted to make you aware of our electronic visit tool called Basim Bridges. [facility] 24/7 allows you to connect within minutes with a medical provider 24 hours a day, seven days a week via a mobile device or tablet or logging into a secure website from your computer. You can access Basim Bridges from anywhere in the [place]. A virtual visit might be right for you when you have a simple condition and feel like you just dont want to get out of bed, or cant get away from work for an appointment, when your regular [facility] provider is not available (evenings, weekends or holidays), or when youre out of town and need minor care. Electronic visits cost only $49 and if the [facility] Myesha 24/7 provider determines a prescription is needed to treat your condition, one can be electronically transmitted to a nearby pharmacy*. Please take a moment to enroll today if you have not already done so. The enrollment process is free and takes just a few minutes. To enroll, please download the Cortney Myesha 24/7 winter to your tablet or phone, or visit [email or web address] to enroll on your computer.    
And, as an [address] [place] [address] patient with a [employer] account, the results of your visits will be scanned into your electronic medical record and your primary care provider will be able to view the scanned results. We urge you to continue to see your regular [employer] provider for your ongoing medical care. And while your primary care provider may not be the one available when you seek a Basim Valdesfin virtual visit, the peace of mind you get from getting a real diagnosis real time can be priceless. For more information on Basim [employer]uis felipefin, view our Frequently Asked Questions (FAQs) at [email or web address]. Sincerely, 
 
Mckinley Haas MD 
Chief Medical Officer [employer] *:  certain medications cannot be prescribed via [place] Semiluis felipefin Providers Seen During Your Hospitalization Provider Specialty Primary office phone Sary Estrada MD General Surgery [phone] Praful Zhu, [address] [place] [address] Internal Medicine [phone] Your Primary Care Physician (PCP) Primary Care Physician Office Phone Office Fax Atilio Uribe [phone] You are allergic to the following Allergen Reactions Amlodipine Hives Amoxicillin Rash Tetracycline Rash Recent Documentation Height Weight Breastfeeding? BMI Smoking Status 1.626 m 48.9 kg No 18.52 kg/m2 Never Smoker Emergency Contacts Name Discharge Info Relation Home Work Mobile [facility] CAREGIVER [3] Daughter [21] [phone] [facility] CAREGIVER [3] Daughter [21] [phone] Patient Belongings The following personal items are in your possession at time of discharge: 
  Dental Appliances: None  Visual Aid: Contacts   Hearing Aids/Status: Bilateral, Functioning  Home Medications: None   Jewelry: None  Clothing: None    Other Valuables: None  Personal Items Sent to Safe: none Please provide this summary of care documentation to your next provider. Signatures-by signing, you are acknowledging that this After Visit Summary has been reviewed with you and you have received a copy. Patient Signature:  ____________________________________________________________ Date:  ____________________________________________________________  
  
[facility] Perfect Provider Signature:  ____________________________________________________________ Date:  ____________________________________________________________

## 2018-07-28 NOTE — PROGRESS NOTES
Patient remain lethargic but easy to arouse. Follow some simple commands. Denies chest pain. Waiting for transfer to CCU  [place]. Dr.BDava Mack talk with Julien Mack NP for Cardiology. Will continue to monitor.

## 2018-07-28 NOTE — H&P
H&P/Consult Note/Progress Note/Office Note: Barrera Chua  MRN: [id number]  FPP:  Age:89 y. o. 
  
HPI: Barrera Chua is a 80 y.o. female who is seen urgently for acute appendicitis. She started to have right lower quadrant pain since this Monday, her appetite was down but denies nausea, vomiting. She went to PCP yesterday, CT was done, and report was called today for acute appendicitis. She was immediately brought in the hospital for surgery. She just had lunch however.  
  
She has chronic pain, on oxycodone 15 mg every 4 hours. She has CAD, cardiac stent, on ASA, but denies active cardiac symptoms. History of pneumonia.  
  
  
  
  
    
Past Medical History:  
Diagnosis Date  Thyroid cyst Distant past  
  thyroid cyst removed x40 years ago  
  
     
Past Surgical History:  
Procedure Laterality Date  HX CHOLECYSTECTOMY      
 HX HEENT      
  thyroglossal cyst  
 HX HYSTERECTOMY      
  
      
Current Facility-Administered Medications Medication Dose Route Frequency  aspirin chewable tablet 81 mg  81 mg Oral NOW  
  
Amlodipine; Amoxicillin; and Tetracycline Social History  
  
   
     
Social History  Marital status:   
    Spouse name: N/A  
 Number of children: N/A  
 Years of education: N/A  
  
    
Social History Main Topics  Smoking status: Never Smoker  Smokeless tobacco: None  Alcohol use No  
 Drug use: No  
 Sexual activity: Not Asked  
  
    
Other Topics Concern  None  
  
   
Social History Narrative  
  , lives with  and 2 daughters. Pt did work as  at one time. 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   
  
  
   
History Smoking Status  Never Smoker Smokeless Tobacco  
 Not on file  
  
      
Family History Problem Relation Age of Onset  Other Father    
     24 [facility] Other Mother    
      
  
ROS: The patient has no difficulty with chest pain or shortness of breath.   No fever or chills. Comprehensive review of systems was otherwise unremarkable except as noted above. 
  
Physical Exam:  
    
Visit Vitals  /57 (BP 1 Location: Left arm, BP Patient Position: At rest)  Pulse 81  Temp 98.1 °F (36.7 °C)  Resp 19  
 Ht 5' 4\" (1.626 m)  Wt 103 lb (46.7 kg)  SpO2 99%  BMI 17.68 kg/m2  
  
Constitutional: Alert, oriented, cooperative patient in no acute distress; appears stated age Eyes:Sclera are clear. EOMs intact ENMT: no external lesions gross hearing normal; no obvious neck masses, no ear or lip lesions, nares normal 
CV: RRR. Normal perfusion Resp: No JVD. Breathing is  non-labored; no audible wheezing. GI: soft and non-distended. Moderate tenderness at right lower quadrant. Musculoskeletal: unremarkable with normal function. No embolic signs or cyanosis. Neuro:  Oriented; moves all 4; no focal deficits Psychiatric: normal affect and mood, no memory impairment 
  
Recent vitals (if inpt): 
Patient Vitals for the past 24 hrs: 
  BP Temp Pulse Resp SpO2 Height Weight  
07/27/18 1519 136/57 98.1 °F (36.7 °C) 81 19 99 % 5' 4\" (1.626 m) 103 lb (46.7 kg)  
  
  
Labs: 
No results for input(s): WBC, HGB, PLT, NA, K, CL, CO2, BUN, CREA, GLU, PTP, INR, APTT, TBIL, TBILI, CBIL, SGOT, GPT, ALT, AP, AML, LPSE, LCAD, NH4, TROPT, TROIQ, PCO2, PO2, HCO3, HGBEXT, PLTEXT in the last 72 hours. 
  
No lab exists for component:  PH, INREXT 
  
     
Lab Results Component Value Date/Time  
  WBC 15.0 (H) 12/01/2015 04:06 AM  
  HGB 11.1 (L) 12/01/2015 04:06 AM  
  PLATELET 136 06/20/3868 04:06 AM  
  Sodium 141 12/01/2015 04:06 AM  
  Potassium 3.6 12/01/2015 04:06 AM  
  Chloride 105 12/01/2015 04:06 AM  
  CO2 27 12/01/2015 04:06 AM  
  BUN 25 (H) 12/01/2015 04:06 AM  
  Creatinine 0.69 12/01/2015 04:06 AM  
  Glucose 115 (H) 12/01/2015 04:06 AM  
  INR 1.1 11/29/2015 09:55 AM  
  Bilirubin, total 0.6 11/29/2015 09:55 AM  
  Bilirubin, direct 0.2 11/22/2008 04:25 AM  
  AST (SGOT) 39 (H) 11/29/2015 09:55 AM  
  ALT (SGPT) 58 11/29/2015 09:55 AM  
  Alk. phosphatase 70 11/29/2015 09:55 AM  
  Lactic acid 3.4 (H) 11/29/2015 10:04 AM  
  Troponin-I, Qt. 1.24 (HH) 11/29/2015 08:50 PM  
  
  
I reviewed recent labs and recent radiologic studies. I independently reviewed radiology images for studies I described above or studies I have ordered. Admission date (for inpatients): 7/27/2018 Day of Surgery  Procedure(s): 
APPENDECTOMY LAPAROSCOPIC 
  
CT: outside report reviewed 
  
ASSESSMENT/PLAN: 
Problem List  Date Reviewed: 12/28/2015  
          Codes Class Noted  
  Acute appendicitis ICD-10-CM: K35.80 ICD-9-CM: 172. 9   7/27/2018  
     
  Hypertension (Chronic) ICD-10-CM: I10 
ICD-9-CM: 274. 9   12/1/2015  
     
  CAD (coronary artery disease) (Chronic) ICD-10-CM: I25.10 ICD-9-CM: 414.00   11/29/2015  
     
  
  
Active Problems: 
  Acute appendicitis (7/27/2018) 
  
 plan to do lap appy, poss open tonight since she just had lunch. Keep her in hospital prior to surgery.   
 
New events since admission as described in chart.  
  
  
  
Signed:  Sary Estrada MD,  FACS

## 2018-07-28 NOTE — PROGRESS NOTES
Patient resting quietly at presently. Patient follow  simple commands. Semi dickens position. Remain on BIPAP. Heart rate . Tachypnea. Bed in low/lock position. Bed alarm on.

## 2018-07-28 NOTE — CONSULTS
Hospitalist H&P/Consult Note     Admit Date:  2018  3:22 PM   Name:  Ellie Sumner   Age:  80 y.o.  :  3/3/1924   MRN:  [id number]   PCP:  David Pinzon MD  Treatment Team: Attending Provider: Chase Wong MD; Consulting Provider: Manuella Fabry, MD    HPI:   79 y/o female with hx CAD, HTN, EF 40-45% on echo  went to her PCP with complaint of RLQ pain. She was sent to [address] [place] [address] imaging center and had CT scan which showed acute appendicitis. Surgery called and she was sent to [facility] for appendectomy. Pre-operatively she received IV fluids which apparently put her into flash pulmonary edema so surgery is postponed so that CHF can be treated first. She was initially quite acidotic with ABG showing pH of 7.09, CO2 63 and PO2 88. Placed on BIPAP and given IV lasix. Hospitalist service consulted urgently to assist with treatment. She was hypertensive and tachycardic but responded well to IV lopressor. Is tolerating BIPAP and starting to diurese. She denies any chest pain and prior to this admission had not complained of any CHF symptoms. Follow-up ABG is improving 7.32/43/85/22/96%. Further workup and treatment in progress. 10 systems reviewed and negative except as noted in HPI. no F/C,   Past Medical History:   Diagnosis Date    Thyroid cyst Distant past    thyroid cyst removed x40 years ago      Past Surgical History:   Procedure Laterality Date    HX CHOLECYSTECTOMY      HX HEENT      thyroglossal cyst    HX HYSTERECTOMY        Prior to Admission Medications   Prescriptions Last Dose Informant Patient Reported? Taking?   aspirin 81 mg chewable tablet 2018 at Unknown time  Yes Yes   Sig: Take 81 mg by mouth daily. cholecalciferol, vitamin D3, (VITAMIN D3) 2,000 unit tab 2018 at Unknown time  Yes Yes   Sig: Take  by mouth daily. furosemide (LASIX) 20 mg tablet 2018 at 0900  No Yes   Sig: Take 1 Tab by mouth daily.    lisinopril (PRINIVIL, ZESTRIL) 40 mg tablet 2018 at 0900  Yes Yes   Sig: Take 40 mg by mouth daily. metoprolol (LOPRESSOR) 50 mg tablet 2018 at 0900  Yes Yes   Sig: Take 12.5 mg by mouth two (2) times a day. nitroglycerin (NITRO-DUR) 0.4 mg/hr 2018 at Unknown time  Yes Yes   Si Patch by TransDERmal route daily. 9pm-11am   oxyCODONE IR (OXY-IR) 15 mg immediate release tablet 2018 at 1330  Yes Yes   Sig: Take 15 mg by mouth every four (4) hours as needed for Pain.   pyridoxine (VITAMIN B-6) 100 mg tablet 2018 at Unknown time  Yes Yes   Sig: Take 100 mg by mouth daily.  Takes at [facility]      Facility-Administered Medications: None     Allergies   Allergen Reactions    Amlodipine Hives    Amoxicillin Rash    Tetracycline Rash      Social History   Substance Use Topics    Smoking status: Never Smoker    Smokeless tobacco: Not on file    Alcohol use No      Family History   Problem Relation Age of Onset    Other Father          Other Mother            Immunization History   Administered Date(s) Administered    Influenza Vaccine 10/01/2015    Influenza Vaccine Whole 10/20/2008       Objective:   Patient Vitals for the past 24 hrs:   Temp Pulse Resp BP SpO2   18 - (!) 156 - (!) 164/102 -   18 193 - (!) 139 (!) 42 (!) 168/91 -   18 - (!) 146 (!) 38 (!) 157/95 -   18 97.5 °F (36.4 °C) (!) 144 30 169/85 -   18 185 - (!) 141 - 168/74 98 %   18 185 - (!) 134 - 170/81 98 %   18 1840 - (!) 149 - 173/84 97 %   18 183 - (!) 146 - 164/77 97 %   18 183 - (!) 141 - 161/74 100 %   18 - - - - 96 %   18 - (!) 122 - 157/75 100 %   18 - (!) 135 - 165/75 97 %   18 - (!) 128 - 151/69 99 %   18 1806 - (!) 128 - 168/74 90 %   18 1757 - (!) 134 - - -   18 1733 - (!) 137 - - -   18 1519 98.1 °F (36.7 °C) 81 19 136/57 99 %     Oxygen Therapy  O2 Sat (%): 98 % (18 1856)  Pulse via Oximetry: 151 beats per minute (07/27/18 1820)  O2 Device: BIPAP (07/27/18 1927)  FIO2 (%): 60 % (07/27/18 1942)    Intake/Output Summary (Last 24 hours) at 07/27/18 2041  Last data filed at 07/27/18 1733   Gross per 24 hour   Intake             1000 ml   Output                0 ml   Net             1000 ml       Physical Exam:  General:    Thin, frail, elderly, in moderate respiratory distress   Eyes:   Normal sclera. Extraocular movements intact. ENT:  Normocephalic, atraumatic. Moist mucous membranes  CV:   Tachycardic, hypertensive, increased JVD   Lungs:  Diffuse bilateral rales, tachypneic, on BIPAP  Abdomen: Firm, distended with RLQ tenderness  Extremities: Warm and dry. No cyanosis SCDs in place  Neurologic: CN II-XII grossly intact. Sensation intact. Skin:     No rashes or jaundice. No wounds. Psych:  Lethargic   In [employer]    I reviewed the labs, imaging, EKGs, telemetry, and other studies done this admission. Data Review:   Recent Results (from the past 24 hour(s))   CBC WITH AUTOMATED DIFF    Collection Time: 07/27/18  4:29 PM   Result Value Ref Range    WBC 8.3 4.3 - 11.1 K/uL    RBC 3.40 (L) 4.05 - 5.25 M/uL    HGB 10.8 (L) 11.7 - 15.4 g/dL    HCT 33.0 (L) 35.8 - 46.3 %    MCV 97.1 79.6 - 97.8 FL    MCH 31.8 26.1 - 32.9 PG    MCHC 32.7 31.4 - 35.0 g/dL    RDW 13.3 11.9 - 14.6 %    PLATELET 035 467 - 063 K/uL    MPV 10.8 10.8 - 14.1 FL    DF AUTOMATED      NEUTROPHILS 66 43 - 78 %    LYMPHOCYTES 21 13 - 44 %    MONOCYTES 10 4.0 - 12.0 %    EOSINOPHILS 2 0.5 - 7.8 %    BASOPHILS 1 0.0 - 2.0 %    IMMATURE GRANULOCYTES 0 0.0 - 5.0 %    ABS. NEUTROPHILS 5.5 1.7 - 8.2 K/UL    ABS. LYMPHOCYTES 1.7 0.5 - 4.6 K/UL    ABS. MONOCYTES 0.8 0.1 - 1.3 K/UL    ABS. EOSINOPHILS 0.2 0.0 - 0.8 K/UL    ABS. BASOPHILS 0.0 0.0 - 0.2 K/UL    ABS. IMM.  GRANS. 0.0 0.0 - 0.5 K/UL   METABOLIC PANEL, COMPREHENSIVE    Collection Time: 07/27/18  4:29 PM   Result Value Ref Range    Sodium 139 136 - 145 mmol/L    Potassium 3.3 (L) 3.5 - 5.1 mmol/L    Chloride 104 98 - 107 mmol/L    CO2 27 21 - 32 mmol/L    Anion gap 8 7 - 16 mmol/L    Glucose 136 (H) 65 - 100 mg/dL    BUN 11 8 - 23 MG/DL    Creatinine 0.86 0.6 - 1.0 MG/DL    GFR est AA >60 >60 ml/min/1.73m2    GFR est non-AA >60 >60 ml/min/1.73m2    Calcium 8.5 8.3 - 10.4 MG/DL    Bilirubin, total 0.5 0.2 - 1.1 MG/DL    ALT (SGPT) 27 12 - 65 U/L    AST (SGOT) 21 15 - 37 U/L    Alk. phosphatase 70 50 - 136 U/L    Protein, total 6.5 6.3 - 8.2 g/dL    Albumin 3.3 3.2 - 4.6 g/dL    Globulin 3.2 2.3 - 3.5 g/dL    A-G Ratio 1.0 (L) 1.2 - 3.5     BLOOD GAS, ARTERIAL    Collection Time: 07/27/18  6:15 PM   Result Value Ref Range    pH 7.09 (L) 7.35 - 7.45      PCO2 63 (H) 35 - 45 mmHg    PO2 88 80 - 105 mmHg    BICARBONATE 19 (L) 22 - 26 mmol/L    BASE DEFICIT 11.7 (H) 0 - 2 mmol/L    TOTAL HEMOGLOBIN 14.3 11.7 - 15.0 GM/DL    O2 SAT 93 92 - 98.5 %    Arterial O2 Hgb 92.4 (L) 94 - 97 %    CARBOXYHEMOGLOBIN 0.0 (L) 0.5 - 1.5 %    METHEMOGLOBIN 0.3 0.0 - 1.5 %    DEOXYHEMOGLOBIN 7 (H) 0.0 - 5.0 %    SITE RB     ALLENS TEST NA     Respiratory comment: SARAI Quesada at 7 27 2018 6 19 15 PM. Read back. BLOOD GAS, ARTERIAL    Collection Time: 07/27/18  6:56 PM   Result Value Ref Range    pH 7.23 (L) 7.35 - 7.45      PCO2 52 (H) 35 - 45 mmHg    PO2 75 (L) 80 - 105 mmHg    BICARBONATE 21 (L) 22 - 26 mmol/L    BASE DEFICIT 6.6 (H) 0 - 2 mmol/L    TOTAL HEMOGLOBIN 14.7 11.7 - 15.0 GM/DL    O2 SAT 92 92 - 98.5 %    Arterial O2 Hgb 91.3 (L) 94 - 97 %    CARBOXYHEMOGLOBIN 0.0 (L) 0.5 - 1.5 %    METHEMOGLOBIN 0.3 0.0 - 1.5 %    DEOXYHEMOGLOBIN 8 (H) 0.0 - 5.0 %    SITE RR     ALLENS TEST POSITIVE      Respiratory comment: SARAI Quesada at 7 27 2018 7 00 06 PM. Read back.     BLOOD GAS, ARTERIAL    Collection Time: 07/27/18  8:02 PM   Result Value Ref Range    pH 7.32 (L) 7.35 - 7.45      PCO2 43 35 - 45 mmHg    PO2 85 80 - 105 mmHg    BICARBONATE 22 22 - 26 mmol/L    BASE DEFICIT 4.5 (H) 0 - 2 mmol/L    SITE RR ALLENS TEST POSITIVE      MODE BIPAP     Respiratory comment: Dr. Araceli Ann at 7 27 2018 8 07 10 PM. Read back. PTT    Collection Time: 07/27/18  8:15 PM   Result Value Ref Range    aPTT 30.7 23.2 - 35.3 SEC   PROTHROMBIN TIME + INR    Collection Time: 07/27/18  8:15 PM   Result Value Ref Range    Prothrombin time 15.1 (H) 11.5 - 14.5 sec    INR 1.2         Imaging Studies:  CXR Results  (Last 48 hours)               07/27/18 1744  XR CHEST PORT Final result    Impression:  IMPRESSION: Bilateral airspace opacities, right greater than left. The findings   would raise suspicion for pulmonary edema and/or pneumonia. Narrative:  Portable chest:        History: Acute respiratory distress. Comparison: 12/28/2015       Findings: A single view of the chest was obtained at 1750 hours. The cardiac and mediastinal silhouette are normal in size and configuration. Moderately extensive bilateral airspace opacities are present, right greater   than left. There are no significant pleural effusions.                CT Results  (Last 48 hours)    None          Assessment and Plan:     Hospital Problems as of 7/27/2018  Date Reviewed: 12/28/2015          Codes Class Noted - Resolved POA    * (Principal)Acute appendicitis ICD-10-CM: K35.80  ICD-9-CM: 540.9  7/27/2018 - Present Yes        Acute respiratory failure with hypoxia and hypercapnia (HCC) ICD-10-CM: J96.01, J96.02  ICD-9-CM: 518.81  11/29/2015 - Present No        Acute pulmonary edema with congestive heart failure ([facility] [address] ICD-10-CM: I50.1  ICD-9-CM: 428.1  12/1/2015 - Present No        Hypokalemia ICD-10-CM: E87.6  ICD-9-CM: 276.8  7/27/2018 - Present Yes        Hypertension (Chronic) ICD-10-CM: I10  ICD-9-CM: 401.9  12/1/2015 - Present Yes        CAD (coronary artery disease) (Chronic) ICD-10-CM: I25.10  ICD-9-CM: 414.00  11/29/2015 - Present Yes              PLAN:  · Patient has been admitted to ICU for acute respiratory failure with acute pulmonary edema  · Surgery for acute appendicitis on hold tonight  · Agree with aggressive diuresis IV lasix 40 mg BID  · Continue BIPAP as she is tolerating and ABG numbers improving. Hopefully will respond to current treatment and not require intubation and mechanical ventilator  · ICU order set has been utilized. Bedrest, NPO, rios cath, ins/outs  · Place nitro paste, use IV morphine prn. Holding asa as scheduled for surgery  · Check STAT EKG to r/o acute MI. Check stat troponin and BNP. If troponin indicates acute MI would suggest she transfer to [facility]  · If possible obtain echo in am prior to surgery. EF 2015 40-45 %. With grade 2 diastolic dysfunction. Aortic valve on chest xray appears calcified. Would like to r/o severe stenosis as she would not be able to tolerate fluids intraoperatively  · Check stat BMP, Mg, Phos. Place on electrolyte replacement protocol  · Type and screen. Check baseline coags  · Use IV metoprolol prn for HTN and tachycardia. As she is NPO and unable to take her PO lisinopril will use IV vasotec for now  · IV pepcid for GI prophylaxis  · As she has amoxicillin allergy will use IV cipro/flagyl for GI antibiotic prophylaxis  · SCDs for DVT prophylaxis  · Discussed with family in room, nursing staff and Anesthesia  · Total time spent in critical care in stabilization of this acutely ill patient 60 minutes. She will be high risk for surgery given acute CHF but surgery will be necessary for appendicitis. May need to remain on ventilator for a period of time post-operatively.        Estimated LOS:  Several days, minimum    Signed:  Kyleigh Vann MD

## 2018-07-29 PROBLEM — J96.92 RESPIRATORY FAILURE WITH HYPOXIA AND HYPERCAPNIA (HCC): Status: ACTIVE | Noted: 2018-07-29

## 2018-07-29 PROBLEM — K35.80 APPENDICITIS, ACUTE: Status: ACTIVE | Noted: 2018-07-29

## 2018-07-29 PROBLEM — J96.91 RESPIRATORY FAILURE WITH HYPOXIA AND HYPERCAPNIA (HCC): Status: ACTIVE | Noted: 2018-07-29

## 2018-07-29 LAB
ANION GAP SERPL CALC-SCNC: 8 MMOL/L (ref 7–16)
APTT PPP: 156.4 SEC (ref 23.2–35.3)
APTT PPP: 66.7 SEC (ref 23.2–35.3)
APTT PPP: 69.5 SEC (ref 23.2–35.3)
ATRIAL RATE: 72 BPM
ATRIAL RATE: 83 BPM
BASOPHILS # BLD: 0 K/UL (ref 0–0.2)
BASOPHILS NFR BLD: 0 % (ref 0–2)
BNP SERPL-MCNC: 2033 PG/ML
BUN SERPL-MCNC: 25 MG/DL (ref 8–23)
CALCIUM SERPL-MCNC: 8.2 MG/DL (ref 8.3–10.4)
CALCULATED P AXIS, ECG09: 90 DEGREES
CALCULATED P AXIS, ECG09: 94 DEGREES
CALCULATED R AXIS, ECG10: -87 DEGREES
CALCULATED R AXIS, ECG10: -89 DEGREES
CALCULATED T AXIS, ECG11: 114 DEGREES
CALCULATED T AXIS, ECG11: 114 DEGREES
CHLORIDE SERPL-SCNC: 109 MMOL/L (ref 98–107)
CO2 SERPL-SCNC: 26 MMOL/L (ref 21–32)
CREAT SERPL-MCNC: 1.2 MG/DL (ref 0.6–1)
DIAGNOSIS, 93000: NORMAL
DIAGNOSIS, 93000: NORMAL
DIFFERENTIAL METHOD BLD: ABNORMAL
EOSINOPHIL # BLD: 0 K/UL (ref 0–0.8)
EOSINOPHIL NFR BLD: 0 % (ref 0.5–7.8)
ERYTHROCYTE [DISTWIDTH] IN BLOOD BY AUTOMATED COUNT: 13.9 % (ref 11.9–14.6)
GLUCOSE SERPL-MCNC: 183 MG/DL (ref 65–100)
HCT VFR BLD AUTO: 33 % (ref 35.8–46.3)
HGB BLD-MCNC: 10.9 G/DL (ref 11.7–15.4)
IMM GRANULOCYTES # BLD: 0.1 K/UL (ref 0–0.5)
IMM GRANULOCYTES NFR BLD AUTO: 0 % (ref 0–5)
LYMPHOCYTES # BLD: 1.5 K/UL (ref 0.5–4.6)
LYMPHOCYTES NFR BLD: 10 % (ref 13–44)
MCH RBC QN AUTO: 31.7 PG (ref 26.1–32.9)
MCHC RBC AUTO-ENTMCNC: 33 G/DL (ref 31.4–35)
MCV RBC AUTO: 95.9 FL (ref 79.6–97.8)
MONOCYTES # BLD: 1.1 K/UL (ref 0.1–1.3)
MONOCYTES NFR BLD: 8 % (ref 4–12)
NEUTS SEG # BLD: 12.5 K/UL (ref 1.7–8.2)
NEUTS SEG NFR BLD: 82 % (ref 43–78)
P-R INTERVAL, ECG05: 168 MS
P-R INTERVAL, ECG05: 180 MS
PLATELET # BLD AUTO: 179 K/UL (ref 150–450)
PMV BLD AUTO: 10.4 FL (ref 10.8–14.1)
POTASSIUM SERPL-SCNC: 3.7 MMOL/L (ref 3.5–5.1)
Q-T INTERVAL, ECG07: 468 MS
Q-T INTERVAL, ECG07: 520 MS
QRS DURATION, ECG06: 150 MS
QRS DURATION, ECG06: 150 MS
QTC CALCULATION (BEZET), ECG08: 549 MS
QTC CALCULATION (BEZET), ECG08: 569 MS
RBC # BLD AUTO: 3.44 M/UL (ref 4.05–5.25)
SODIUM SERPL-SCNC: 143 MMOL/L (ref 136–145)
VENTRICULAR RATE, ECG03: 72 BPM
VENTRICULAR RATE, ECG03: 83 BPM
WBC # BLD AUTO: 15.1 K/UL (ref 4.3–11.1)

## 2018-07-29 PROCEDURE — 74011000250 HC RX REV CODE- 250: Performed by: NURSE PRACTITIONER

## 2018-07-29 PROCEDURE — 83880 ASSAY OF NATRIURETIC PEPTIDE: CPT | Performed by: FAMILY MEDICINE

## 2018-07-29 PROCEDURE — 36600 WITHDRAWAL OF ARTERIAL BLOOD: CPT

## 2018-07-29 PROCEDURE — 74011250636 HC RX REV CODE- 250/636: Performed by: SURGERY

## 2018-07-29 PROCEDURE — 74011000250 HC RX REV CODE- 250: Performed by: SURGERY

## 2018-07-29 PROCEDURE — 74011250637 HC RX REV CODE- 250/637: Performed by: INTERNAL MEDICINE

## 2018-07-29 PROCEDURE — 36415 COLL VENOUS BLD VENIPUNCTURE: CPT | Performed by: INTERNAL MEDICINE

## 2018-07-29 PROCEDURE — 93005 ELECTROCARDIOGRAM TRACING: CPT | Performed by: INTERNAL MEDICINE

## 2018-07-29 PROCEDURE — 77010033678 HC OXYGEN DAILY

## 2018-07-29 PROCEDURE — 80048 BASIC METABOLIC PNL TOTAL CA: CPT | Performed by: FAMILY MEDICINE

## 2018-07-29 PROCEDURE — 74011250636 HC RX REV CODE- 250/636: Performed by: INTERNAL MEDICINE

## 2018-07-29 PROCEDURE — 85025 COMPLETE CBC W/AUTO DIFF WBC: CPT | Performed by: FAMILY MEDICINE

## 2018-07-29 PROCEDURE — 65620000000 HC RM CCU GENERAL

## 2018-07-29 PROCEDURE — 74011250637 HC RX REV CODE- 250/637: Performed by: SURGERY

## 2018-07-29 PROCEDURE — 85730 THROMBOPLASTIN TIME PARTIAL: CPT | Performed by: INTERNAL MEDICINE

## 2018-07-29 PROCEDURE — 85730 THROMBOPLASTIN TIME PARTIAL: CPT | Performed by: SURGERY

## 2018-07-29 PROCEDURE — 74011000258 HC RX REV CODE- 258: Performed by: NURSE PRACTITIONER

## 2018-07-29 RX ORDER — FUROSEMIDE 10 MG/ML
20 INJECTION INTRAMUSCULAR; INTRAVENOUS ONCE
Status: COMPLETED | OUTPATIENT
Start: 2018-07-29 | End: 2018-07-29

## 2018-07-29 RX ORDER — DIPHENHYDRAMINE HYDROCHLORIDE 50 MG/ML
12.5 INJECTION, SOLUTION INTRAMUSCULAR; INTRAVENOUS ONCE
Status: COMPLETED | OUTPATIENT
Start: 2018-07-29 | End: 2018-07-29

## 2018-07-29 RX ORDER — CIPROFLOXACIN 500 MG/1
500 TABLET ORAL EVERY 24 HOURS
Status: DISCONTINUED | OUTPATIENT
Start: 2018-07-30 | End: 2018-07-29

## 2018-07-29 RX ORDER — CIPROFLOXACIN 500 MG/1
500 TABLET ORAL EVERY 24 HOURS
Status: DISCONTINUED | OUTPATIENT
Start: 2018-07-30 | End: 2018-08-02 | Stop reason: HOSPADM

## 2018-07-29 RX ORDER — METOPROLOL TARTRATE 25 MG/1
12.5 TABLET, FILM COATED ORAL EVERY 8 HOURS
Status: DISCONTINUED | OUTPATIENT
Start: 2018-07-29 | End: 2018-08-02

## 2018-07-29 RX ORDER — HEPARIN SODIUM 5000 [USP'U]/ML
35 INJECTION, SOLUTION INTRAVENOUS; SUBCUTANEOUS ONCE
Status: COMPLETED | OUTPATIENT
Start: 2018-07-29 | End: 2018-07-29

## 2018-07-29 RX ORDER — CLOPIDOGREL BISULFATE 75 MG/1
75 TABLET ORAL DAILY
Status: DISCONTINUED | OUTPATIENT
Start: 2018-07-30 | End: 2018-08-02 | Stop reason: HOSPADM

## 2018-07-29 RX ORDER — METOPROLOL SUCCINATE 50 MG/1
25 TABLET, EXTENDED RELEASE ORAL DAILY
Status: DISCONTINUED | OUTPATIENT
Start: 2018-07-30 | End: 2018-07-29

## 2018-07-29 RX ORDER — DILTIAZEM HYDROCHLORIDE 5 MG/ML
10 INJECTION INTRAVENOUS ONCE
Status: COMPLETED | OUTPATIENT
Start: 2018-07-29 | End: 2018-07-29

## 2018-07-29 RX ADMIN — SODIUM CHLORIDE 5 MG/HR: 900 INJECTION, SOLUTION INTRAVENOUS at 21:25

## 2018-07-29 RX ADMIN — DOCUSATE SODIUM 100 MG: 100 CAPSULE, LIQUID FILLED ORAL at 08:49

## 2018-07-29 RX ADMIN — DILTIAZEM HYDROCHLORIDE 10 MG: 5 INJECTION INTRAVENOUS at 21:18

## 2018-07-29 RX ADMIN — HEPARIN SODIUM 1800 UNITS: 5000 INJECTION, SOLUTION INTRAVENOUS; SUBCUTANEOUS at 18:14

## 2018-07-29 RX ADMIN — HEPARIN SODIUM 1700 UNITS: 5000 INJECTION, SOLUTION INTRAVENOUS; SUBCUTANEOUS at 03:56

## 2018-07-29 RX ADMIN — MORPHINE SULFATE 2 MG: 2 INJECTION, SOLUTION INTRAMUSCULAR; INTRAVENOUS at 00:05

## 2018-07-29 RX ADMIN — METOPROLOL TARTRATE 12.5 MG: 25 TABLET, FILM COATED ORAL at 22:05

## 2018-07-29 RX ADMIN — MORPHINE SULFATE 2 MG: 2 INJECTION, SOLUTION INTRAMUSCULAR; INTRAVENOUS at 07:28

## 2018-07-29 RX ADMIN — METRONIDAZOLE 500 MG: 500 INJECTION, SOLUTION INTRAVENOUS at 08:49

## 2018-07-29 RX ADMIN — METOPROLOL TARTRATE 12.5 MG: 25 TABLET, FILM COATED ORAL at 15:34

## 2018-07-29 RX ADMIN — VITAMIN D, TAB 1000IU (100/BT) 1000 UNITS: 25 TAB at 08:49

## 2018-07-29 RX ADMIN — DOCUSATE SODIUM 100 MG: 100 CAPSULE, LIQUID FILLED ORAL at 18:13

## 2018-07-29 RX ADMIN — DIPHENHYDRAMINE HYDROCHLORIDE 12.5 MG: 50 INJECTION, SOLUTION INTRAMUSCULAR; INTRAVENOUS at 20:01

## 2018-07-29 RX ADMIN — PANTOPRAZOLE SODIUM 40 MG: 40 TABLET, DELAYED RELEASE ORAL at 08:48

## 2018-07-29 RX ADMIN — METOPROLOL TARTRATE 12.5 MG: 25 TABLET ORAL at 08:49

## 2018-07-29 RX ADMIN — CIPROFLOXACIN 400 MG: 2 INJECTION, SOLUTION INTRAVENOUS at 19:11

## 2018-07-29 RX ADMIN — ASPIRIN 81 MG 81 MG: 81 TABLET ORAL at 08:49

## 2018-07-29 RX ADMIN — METRONIDAZOLE 500 MG: 500 INJECTION, SOLUTION INTRAVENOUS at 22:15

## 2018-07-29 RX ADMIN — FUROSEMIDE 20 MG: 10 INJECTION, SOLUTION INTRAMUSCULAR; INTRAVENOUS at 14:07

## 2018-07-29 NOTE — OP NOTES
[facility] REPORT    Tereso Vanessa  MR#: [id number]  : 1924  ACCOUNT #: [de-identified]   DATE OF SERVICE: 2018    SURGEON:  Shubham Barry MD    PREOPERATIVE DIAGNOSIS:  Acute appendicitis. POSTOPERATIVE DIAGNOSIS:  Acute appendicitis with perforation and abscess at retrocecal location. NAME OF PROCEDURE:  Laparoscopic appendectomy with abscess cavity debridement and placement of drain. ANESTHESIA:  General.    SPECIMENS REMOVED: as above    COMPLICATIONS:  none    IMPLANTS:  COLLEEN x 1    INDICATION:  This is a 80-year-old female who presented to the emergency room yesterday with a 5-day history of right lower quadrant pain. She had a CT scan in the primary care office indicating acute appendicitis. The patient was immediately brought into the hospital for surgery. However, she developed pulmonary edema, congestive heart failure in the preoperative area and she was admitted for BiPAP. A cardiology consult was obtained and she was found to have elevated troponin. Echo showed focal wall motion abnormality with decreased ejection fraction. We had extensive discussion with the family and the patient. She is considered very high risk for surgery. However, if she developed sepsis then it would be fatal.  We eventually decided to proceed with surgery. Everybody understands her surgical risk is high. FINDINGS:  She has a retrocecal appendix. The tip of the appendix was already perforated with abscess around it. DESCRIPTION OF PROCEDURE:  After informed consent obtained, the patient brought into the operating room, left in supine position. General anesthesia was administered. The patient was then prepped and draped in usual routine fashion. The infraumbilical incision was made and the Bhavana cannula inserted. Pneumoperitoneum created.   Two 5 mm trocars placed in the suprapubic area in the left lower quadrant, and with appropriate positioning the right lower quadrant was exposed. The root appendix looks unremarkable, but the appendix obviously traveled behind the cecum with the tip densely stuck into an abscess cavity. I first isolated the base and this was divided with Endo-SHAHANA stapler and then the cecum was dissected off the retroperitoneum and the abscess cavity was entered and the appendix actually broke into pieces. This was removed and placed into an Endobag. Then the abscess cavity was cleaned out and the pus was actually removed. Surgical field inspected. There was no evidence of bleeding. The staple line looks nice. A #19 COLLEEN round was placed. Then all the trocars were removed. The infraumbilical incisions were closed on the fascia with 0 Vicryl stitch. All skin incisions were closed with 4-0 Vicryl stitch in subcuticular fashion. The patient tolerated the procedure well, transferred to recovery room in stable condition. All instrument counts and lap counts were correct. ESTIMATED BLOOD LOSS:  Minimum.       Stuart Moran MD       BY / Herminia Vernon  D: 07/28/2018 15:12     T: 07/28/2018 17:23  JOB #: [id number]

## 2018-07-29 NOTE — PROGRESS NOTES
Arterial line becoming positional, not correlating. Site benign, dc'd line with tip intact, pressure held for 5 minutes, no obvious bleeding or hematoma at site. Dressing applied with pressure bandage. Left hand with good color, warm, cap refill.

## 2018-07-29 NOTE — PROGRESS NOTES
[facility] CARDIOLOGY PROGRESS NOTE 
      
 
7/29/2018 11:54 AM 
 
Admit Date: 7/28/2018 Subjective: POD #1 s/p appendectomy. Review of Systems Constitutional: Negative for fever. Respiratory: Negative for cough. Cardiovascular: Positive for chest pain. Gastrointestinal: Positive for abdominal pain. Neurological: Negative for dizziness. Psychiatric/Behavioral: Negative for depression. Objective:  
  
Vitals:  
 07/29/18 0800 07/29/18 0849 07/29/18 1118 07/29/18 1130 BP: 130/56 146/71  151/72 Pulse:  (!) 101 (!) 101 83 Resp:      
Temp:    98.3 °F (36.8 °C) SpO2: 95%  (!) 89% 94% Weight:      
 
 
 
Physical Exam  
HENT:  
Head: Normocephalic and atraumatic. Eyes: Conjunctivae are normal. Pupils are equal, round, and reactive to light. Neck: Normal range of motion. No JVD present. Cardiovascular: Normal rate. Murmur heard. Pulmonary/Chest: Effort normal. She has decreased breath sounds in the right lower field and the left lower field. She has no wheezes. Abdominal: She exhibits no distension. Musculoskeletal: She exhibits no edema. Neurological: She is alert. dimminshed hearing Skin: No rash noted. She is not diaphoretic. Data Review:  
Recent Labs  
   07/29/18 
 0855  07/28/18 
 1753  07/28/18 
 1048  07/28/18 
 8941  07/28/18 
 0316  07/27/18 2015 NA  143   --    --    --   141  144  
K  3.7   --    --    --   5.1  3.4* MG   --    --    --    --   2.5*  2.3 BUN  25*   --    --    --   18  12 CREA  1.20*   --    --    --   1.52*  1.27* GLU  183*   --    --    --   275*  275* WBC  15.1*   --   17.5*   --   20.7*   --   
HGB  10.9*   --   12.9   --   13.0   --   
HCT  33.0*   --   38.6   --   39.9   --   
PLT  179   --   233   --   214   --   
INR   --    --    --    --    --   1.2  
TROIQ   --   2.96*   --   4.44*  5.27*  0.42* Intake/Output Summary (Last 24 hours) at 07/29/18 1154 Last data filed at 07/29/18 1047 
 Gross per 24 hour Intake            974.5 ml Output             1147 ml Net           -172.5 ml  
 
Current Facility-Administered Medications Medication Dose Route Frequency  [START ON 7/30/2018] clopidogrel (PLAVIX) tablet 75 mg  75 mg Oral DAILY  acetaminophen (TYLENOL) suppository 650 mg  650 mg Rectal Q4H PRN  
 morphine injection 2 mg  2 mg IntraVENous Q4H PRN  
 naloxone (NARCAN) injection 0.4 mg  0.4 mg IntraVENous PRN  
 sodium chloride (NS) flush 5-10 mL  5-10 mL IntraVENous PRN  
 white Petrolatum-Mineral Oil (AKWA TEARS) ophthalmic ointment   Both Eyes Q4H  
 NUTRITIONAL SUPPORT ELECTROLYTE PRN ORDERS   Does Not Apply PRN  
 ondansetron (ZOFRAN) injection 4 mg  4 mg IntraVENous Q4H PRN  
 aspirin chewable tablet 81 mg  81 mg Oral DAILY  cholecalciferol (VITAMIN D3) tablet 1,000 Units  1,000 Units Oral DAILY  metoprolol (LOPRESSOR) injection 5 mg  5 mg IntraVENous Q6H PRN  
 metoprolol tartrate (LOPRESSOR) tablet 12.5 mg  12.5 mg Oral BID  ciprofloxacin (CIPRO) 400 mg IVPB (premix)  400 mg IntraVENous Q24H  
 metroNIDAZOLE (FLAGYL) IVPB premix 500 mg  500 mg IntraVENous Q12H  
 HYDROmorphone (PF) (DILAUDID) injection 1 mg  1 mg IntraVENous Q4H PRN  
 heparin 25,000 units in dextrose 500 mL infusion  12-25 Units/kg/hr IntraVENous TITRATE  pantoprazole (PROTONIX) tablet 40 mg  40 mg Oral ACB  docusate sodium (COLACE) capsule 100 mg  100 mg Oral BID Assessment/Plan: 1. Non-ST elevated myocardial infarction likely demand mismatch in the setting of acute illness with underlying coronary disease. Patient has a history of LAD stent. Initiate dual antiplatelet therapy heparin drip present. Patient hypertensive and tachycardic sinus rhythm with PACs. On short acting beta blocker. Medical management 2. Cardiomyopathy patient on short acting metoprolol which will be transitioned to long-acting beta blocker. ACE inhibitor therapy in the future.  IV lasix today x1.  
 
 
 
Keith Restrepo MD 
7/29/2018 11:54 AM

## 2018-07-29 NOTE — PROGRESS NOTES
Bedside rounding with Dr. Bri Ramos, updated on pt, reviewed heparin protocol. Dr. Bri Ramos removed lap dressing to abdomen and updated family and pt on poc. Verbal orders to remove rios.

## 2018-07-29 NOTE — PROGRESS NOTES
Bedside report given to [facility], RN. Pt alert and oriented, family at bedside, pt denies pain or needs at this time.

## 2018-07-29 NOTE — PROGRESS NOTES
Bedside rounding with Dr. Kristine Bryan cardiology, updated on pt. Verbal order for ekg. Orders entered.

## 2018-07-29 NOTE — PROGRESS NOTES
Hospitalist Progress Note Admit Date:  2018  9:18 AM  
Name:  Reinaldo Lock Age:  80 y.o. 
:  3/3/1924 MRN:  [id number] PCP:  Yohannes Austin MD 
Treatment Team: Attending Provider: Shubham Barry MD; Consulting Provider: Lionel Buckley MD; Utilization Review: Kale Childs RN Subjective:  
From notes from [address] Pt was admitted with acute appendicitis by Dr Shawna Hernandez. Started on cipro and flagyl. She was started on lasix and HTN control for pulm edema. Had to be placed on bipap. troponins came back elevated 5.27 and echo showing EF 40-45% and G2DD. Lactic acid 2.4. WBC now elevated 20.7. Heparin gtt will be started this morning. Her hypoxia improved and she was taken off bipap this morning. Her IV lasix will be stopped for now bc her resp status is improving and her Cr is climbing; do not want GLENYS to complicate care if cardiology plans cath. She is being transferred downtown to CCU for closer monitoring for elevated trop. Objective:  
Patient Vitals for the past 24 hrs: 
 Temp Pulse Resp BP SpO2  
18 1130 98.3 °F (36.8 °C) 83 - 151/72 94 %  
18 1118 - (!) 101 - - (!) 89 %  
18 0849 - (!) 101 - 146/71 -  
18 0800 - - - 130/56 95 %  
18 0759 - 81 22 - -  
18 0730 98.6 °F (37 °C) (!) 114 24 139/74 94 %  
18 0645 - 78 21 - 95 %  
18 0630 - 93 22 167/69 95 %  
18 0615 - 81 22 - 96 %  
18 0600 - 93 25 122/70 96 %  
18 0545 - 76 17 - 96 %  
18 0530 - 83 19 124/56 96 %  
18 0515 - 74 23 - 97 %  
18 0500 - 76 20 - 96 %  
18 0445 - 71 19 - 93 % 18 0443 - 72 21 129/57 94 %  
18 0430 - 77 20 - 95 %  
18 0415 - 75 19 - 96 %  
18 0400 - 77 24 - 94 %  
18 0345 - 75 - - 97 %  
18 0330 98.8 °F (37.1 °C) 70 21 120/56 97 %  
18 0315 - 70 24 - 97 %  
18 0300 - 73 22 - 96 %  
18 0245 - 93 23 - 95 %  
18 0230 - 74 20 - 96 %  
18 0215 - 81 (!) 40 - 90 % 07/29/18 0203 - 86 25 140/63 96 %  
07/29/18 0200 - 91 (!) 38 - 96 %  
07/29/18 0145 - 72 21 - 97 %  
07/29/18 0133 - 76 24 116/89 95 %  
07/29/18 0130 - 79 26 - 95 %  
07/29/18 0115 - 75 21 - 96 %  
07/29/18 0100 - 79 22 129/56 96 %  
07/29/18 0045 - 69 20 - 97 %  
07/29/18 0033 - 71 22 - 95 %  
07/29/18 0030 - 73 25 123/59 95 %  
07/29/18 0015 - 74 19 - 94 %  
07/29/18 0013 - 76 24 - 95 %  
07/29/18 0002 98.8 °F (37.1 °C) 78 30 142/60 95 %  
07/28/18 2346 - 75 24 122/58 98 %  
07/28/18 2345 - 69 22 - 97 %  
07/28/18 2331 - 75 21 124/66 96 %  
07/28/18 2330 - 73 27 - 96 %  
07/28/18 2315 - 73 19 133/63 97 %  
07/28/18 2300 - 74 17 125/63 97 %  
07/28/18 2245 - 76 22 131/73 95 %  
07/28/18 2230 - 81 30 131/67 96 %  
07/28/18 2215 - 80 (!) 34 118/55 96 %  
07/28/18 2147 - 90 22 137/62 93 % 07/28/18 2131 - 83 22 105/59 95 %  
07/28/18 2115 - 78 16 126/59 96 %  
07/28/18 2100 - 78 24 112/56 97 %  
07/28/18 2045 - 76 21 115/54 96 %  
07/28/18 2030 - 92 21 133/58 96 %  
07/28/18 2015 - 81 27 120/59 97 %  
07/28/18 2001 98.7 °F (37.1 °C) 84 21 129/61 95 %  
07/28/18 2000 - 80 18 - 96 %  
07/28/18 1946 - 85 25 116/56 95 %  
07/28/18 1945 - 83 21 - 96 %  
07/28/18 1930 - 78 21 127/60 97 %  
07/28/18 1915 - 78 20 139/64 95 %  
07/28/18 1900 - 77 20 142/63 90 % 07/28/18 1830 - 78 20 144/64 98 %  
07/28/18 1816 - 77 21 138/65 97 %  
07/28/18 1801 - 96 22 141/67 97 %  
07/28/18 1745 - 80 21 150/68 99 % 07/28/18 1730 - 78 21 142/66 100 % 07/28/18 1715 - 75 19 131/70 100 % 07/28/18 1700 - 74 23 129/66 100 % 07/28/18 1645 - 74 22 134/64 90 % 07/28/18 1632 - - - - 98 %  
07/28/18 1630 - 71 21 128/59 98 %  
07/28/18 1615 - 80 24 - 92 %  
07/28/18 1609 98.1 °F (36.7 °C) 75 21 154/66 97 %  
07/28/18 1602 - 76 23 156/67 96 %  
07/28/18 1556 - (!) 125 - 149/80 -  
07/28/18 1554 - (!) 125 - - -  
07/28/18 1519 - 86 16 - 94 %  
07/28/18 1516 97.8 °F (36.6 °C) 85 16 154/66 94 %  
07/28/18 1511 - 81 17 144/61 93 %  
07/28/18 1506 - 80 16 136/71 98 %  
07/28/18 1501 - 90 18 136/70 97 %  
07/28/18 1456 - 97 16 135/70 97 %  
07/28/18 1450 - (!) 125 15 149/80 97 %  
07/28/18 1446 - (!) 125 12 153/82 96 %  
07/28/18 1440 - (!) 127 14 145/85 92 %  
07/28/18 1435 99 °F (37.2 °C) (!) 125 14 142/83 93 % 07/28/18 1231 - 90 25 125/74 95 % Oxygen Therapy O2 Sat (%): 94 % (07/29/18 1130) Pulse via Oximetry: 82 beats per minute (07/29/18 1130) O2 Device: Nasal cannula (07/29/18 0730) O2 Flow Rate (L/min): 2 l/min (07/29/18 0730) Intake/Output Summary (Last 24 hours) at 07/29/18 1230 Last data filed at 07/29/18 1047 Gross per 24 hour Intake            974.5 ml Output             1147 ml Net           -172.5 ml  
   
 
General:    Well nourished. Alert. on nasal cannula, no chest pain HEENT- normal   
CV:   RRR. No murmur, rub, or gallop. Lungs:   Coarse breath sounds bilaterally Abdomen:   Soft, nontender, nondistended. , mild tenderness lower abdomen,no guarding, drainage over op site CNS- no focal neurological deficits Extremities: Warm and dry. No cyanosis or edema. Skin:     No rashes or jaundice. Data Review: 
I have reviewed all labs, meds, telemetry events, and studies from the last 24 hours. Recent Results (from the past 24 hour(s)) TROPONIN I Collection Time: 07/28/18  5:53 PM  
Result Value Ref Range Troponin-I, Qt. 2.96 (HH) 0.02 - 0.05 NG/ML  
PLEASE READ & DOCUMENT PPD TEST IN 24 HRS Collection Time: 07/28/18  9:15 PM  
Result Value Ref Range PPD Negative Negative  
 mm Induration 0 mm PTT Collection Time: 07/29/18  2:13 AM  
Result Value Ref Range aPTT 69.5 (H) 23.2 - 35.3 SEC  
EKG, 12 LEAD, INITIAL Collection Time: 07/29/18  7:59 AM  
Result Value Ref Range Ventricular Rate 83 BPM  
 Atrial Rate 83 BPM  
 P-R Interval 168 ms QRS Duration 150 ms  
 Q-T Interval 468 ms QTC Calculation (Bezet) 549 ms Calculated P Axis 90 degrees  Calculated R Axis -89 degrees Calculated T Axis 114 degrees Diagnosis Sinus rhythm with Premature supraventricular complexes Left axis deviation Left ventricular hypertrophy with QRS widening and repolarization abnormality Abnormal ECG When compared with ECG of 28-JUL-2018 09:44, 
T wave inversion no longer evident in Anterior leads CBC WITH AUTOMATED DIFF Collection Time: 07/29/18  8:55 AM  
Result Value Ref Range WBC 15.1 (H) 4.3 - 11.1 K/uL  
 RBC 3.44 (L) 4.05 - 5.25 M/uL  
 HGB 10.9 (L) 11.7 - 15.4 g/dL HCT 33.0 (L) 35.8 - 46.3 % MCV 95.9 79.6 - 97.8 FL  
 MCH 31.7 26.1 - 32.9 PG  
 MCHC 33.0 31.4 - 35.0 g/dL  
 RDW 13.9 11.9 - 14.6 % PLATELET 868 668 - 471 K/uL MPV 10.4 (L) 10.8 - 14.1 FL  
 DF AUTOMATED NEUTROPHILS 82 (H) 43 - 78 % LYMPHOCYTES 10 (L) 13 - 44 % MONOCYTES 8 4.0 - 12.0 % EOSINOPHILS 0 (L) 0.5 - 7.8 % BASOPHILS 0 0.0 - 2.0 % IMMATURE GRANULOCYTES 0 0.0 - 5.0 %  
 ABS. NEUTROPHILS 12.5 (H) 1.7 - 8.2 K/UL  
 ABS. LYMPHOCYTES 1.5 0.5 - 4.6 K/UL  
 ABS. MONOCYTES 1.1 0.1 - 1.3 K/UL  
 ABS. EOSINOPHILS 0.0 0.0 - 0.8 K/UL  
 ABS. BASOPHILS 0.0 0.0 - 0.2 K/UL  
 ABS. IMM. GRANS. 0.1 0.0 - 0.5 K/UL METABOLIC PANEL, BASIC Collection Time: 07/29/18  8:55 AM  
Result Value Ref Range Sodium 143 136 - 145 mmol/L Potassium 3.7 3.5 - 5.1 mmol/L Chloride 109 (H) 98 - 107 mmol/L  
 CO2 26 21 - 32 mmol/L Anion gap 8 7 - 16 mmol/L Glucose 183 (H) 65 - 100 mg/dL BUN 25 (H) 8 - 23 MG/DL Creatinine 1.20 (H) 0.6 - 1.0 MG/DL  
 GFR est AA 54 (L) >60 ml/min/1.73m2 GFR est non-AA 44 (L) >60 ml/min/1.73m2 Calcium 8.2 (L) 8.3 - 10.4 MG/DL  
BNP Collection Time: 07/29/18  8:55 AM  
Result Value Ref Range BNP 2033 pg/mL PTT Collection Time: 07/29/18  9:54 AM  
Result Value Ref Range aPTT 156.4 (HH) 23.2 - 35.3 SEC All Micro Results None Current Meds: 
Current Facility-Administered Medications Medication Dose Route Frequency  [START ON 7/30/2018] clopidogrel (PLAVIX) tablet 75 mg  75 mg Oral DAILY  metoprolol tartrate (LOPRESSOR) tablet 12.5 mg  12.5 mg Oral Q8H  
 furosemide (LASIX) injection 20 mg  20 mg IntraVENous ONCE  
 acetaminophen (TYLENOL) suppository 650 mg  650 mg Rectal Q4H PRN  
 morphine injection 2 mg  2 mg IntraVENous Q4H PRN  
 naloxone (NARCAN) injection 0.4 mg  0.4 mg IntraVENous PRN  
 sodium chloride (NS) flush 5-10 mL  5-10 mL IntraVENous PRN  
 white Petrolatum-Mineral Oil (AKWA TEARS) ophthalmic ointment   Both Eyes Q4H  
 NUTRITIONAL SUPPORT ELECTROLYTE PRN ORDERS   Does Not Apply PRN  
 ondansetron (ZOFRAN) injection 4 mg  4 mg IntraVENous Q4H PRN  
 aspirin chewable tablet 81 mg  81 mg Oral DAILY  cholecalciferol (VITAMIN D3) tablet 1,000 Units  1,000 Units Oral DAILY  ciprofloxacin (CIPRO) 400 mg IVPB (premix)  400 mg IntraVENous Q24H  
 metroNIDAZOLE (FLAGYL) IVPB premix 500 mg  500 mg IntraVENous Q12H  
 HYDROmorphone (PF) (DILAUDID) injection 1 mg  1 mg IntraVENous Q4H PRN  
 heparin 25,000 units in dextrose 500 mL infusion  12-25 Units/kg/hr IntraVENous TITRATE  pantoprazole (PROTONIX) tablet 40 mg  40 mg Oral ACB  docusate sodium (COLACE) capsule 100 mg  100 mg Oral BID Other Studies (last 24 hours): No results found. Assessment and Plan:  
 
Hospital Problems as of 7/29/2018  Date Reviewed: 12/28/2015 Codes Class Noted - Resolved POA  
 NSTEMI (non-ST elevated myocardial infarction) ([facility].) ICD-10-CM: I21.4 ICD-9-CM: 410.70  7/28/2018 - Present Yes Sepsis ([facility] ICD-10-CM: A41.9 ICD-9-CM: 038.9, 995.91  7/28/2018 - Present Yes Acute appendicitis ICD-10-CM: K35.80 ICD-9-CM: 540.9  7/27/2018 - Present Yes Hypokalemia ICD-10-CM: E87.6 ICD-9-CM: 276.8  7/27/2018 - Present Yes Acute pulmonary edema with congestive heart failure (HCC) ICD-10-CM: I50.1 ICD-9-CM: 428.1  12/1/2015 - Present Yes  Hypertension (Chronic) ICD-10-CM: I10 
ICD-9-CM: 401.9  12/1/2015 - Present Yes Acute respiratory failure with hypoxia and hypercapnia (HCC) ICD-10-CM: J96.01, J96.02 
ICD-9-CM: 518.81  11/29/2015 - Present Yes CAD (coronary artery disease) (Chronic) ICD-10-CM: I25.10 ICD-9-CM: 414.00  11/29/2015 - Present Yes PLAN:  
Sepsis - improving Acute appendicitis- s/p laproscopic surgery- drain present, improving abdominal pain , on gi soft diet. H/o cad- now Elevated trop- nstemi- heparin drip- cardiology following Acute chf- improving- echo pending 
lenard- improving  
htn- stable. DC planning/Dispo: DVT ppx:  heparin Signed: 
Delta Pringle MD

## 2018-07-29 NOTE — PROGRESS NOTES
H&P/Consult Note/Progress Note/Office Note: Ester Alpers  MRN: [id number]  SJY:[birth year]  Age:89 y. o. 
  
HPI: Ester Alpers is a 80 y.o. female who is seen urgently for acute appendicitis. She started to have right lower quadrant pain since this Monday, her appetite was down but denies nausea, vomiting. She went to PCP yesterday, CT was done, and report was called today for acute appendicitis. She was immediately brought in the hospital for surgery. She just had lunch however.  
  
She has chronic pain, on oxycodone 15 mg every 4 hours. She has CAD, cardiac stent, on ASA, but denies active cardiac symptoms. History of pneumonia.  
  
7/29/18: POD #1 s/p Laparoscopic appendectomy. Pt sitting up in bed. No complaints. Tolerating Clear Liquids. Copeland patent - 1475ml 24hr. COLLEEN drain 7ml 24/hr. Family at bedside. AF, NAD. WBC 15.1, Creat 1.20,  
  
  
    
Past Medical History:  
Diagnosis Date  Thyroid cyst Distant past  
  thyroid cyst removed x40 years ago  
  
     
Past Surgical History:  
Procedure Laterality Date  HX CHOLECYSTECTOMY      
 HX HEENT      
  thyroglossal cyst  
 HX HYSTERECTOMY      
  
      
Current Facility-Administered Medications Medication Dose Route Frequency  aspirin chewable tablet 81 mg  81 mg Oral NOW  
  
Amlodipine; Amoxicillin; and Tetracycline Social History  
  
   
     
Social History  Marital status:   
    Spouse name: N/A  
 Number of children: N/A  
 Years of education: N/A  
  
    
Social History Main Topics  Smoking status: Never Smoker  Smokeless tobacco: None  Alcohol use No  
 Drug use: No  
 Sexual activity: Not Asked  
  
    
Other Topics Concern  None  
  
   
Social History Narrative  
  , lives with  and 2 daughters. Pt did work as  at one time. 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   
  
  
   
History Smoking Status  Never Smoker Smokeless Tobacco  
 Not on file  
  
 Family History Problem Relation Age of Onset  Other Father    
     24 [facility] Other Mother    
      
  
ROS: Comprehensive review of systems was otherwise unremarkable except as noted above. 
  
Physical Exam:  
    
Visit Vitals  /57 (BP 1 Location: Left arm, BP Patient Position: At rest)  Pulse 81  Temp 98.1 °F (36.7 °C)  Resp 19  
 Ht 5' 4\" (1.626 m)  Wt 103 lb (46.7 kg)  SpO2 99%  BMI 17.68 kg/m2  
  
Constitutional: Alert, cooperative, no acute distress; appears stated age Eyes:Sclera are clear. ENMT: no external lesions gross hearing normal; no obvious neck masses, no ear or lip lesions, nares normal 
CV: RRR. Normal perfusion Resp: No JVD. Breathing is  non-labored; no audible wheezing. GI: soft, appropriately tender, non-distended. Post op dressing removed. COLLEEN drain : Copeland Musculoskeletal: unremarkable with normal function. No embolic signs or cyanosis. Neuro:  Oriented; moves all 4; no focal deficits Psychiatric: normal affect and mood, no memory impairment 
  
Recent vitals (if inpt): 
Patient Vitals for the past 24 hrs: 
  BP Temp Pulse Resp SpO2 Height Weight  
18 1519 136/57 98.1 °F (36.7 °C) 81 19 99 % 5' 4\" (1.626 m) 103 lb (46.7 kg)  
  
  
Labs: 
No results for input(s): WBC, HGB, PLT, NA, K, CL, CO2, BUN, CREA, GLU, PTP, INR, APTT, TBIL, TBILI, CBIL, SGOT, GPT, ALT, AP, AML, LPSE, LCAD, NH4, TROPT, TROIQ, PCO2, PO2, HCO3, HGBEXT, PLTEXT in the last 72 hours. 
  
No lab exists for component:  PH, INREXT 
  
     
Lab Results Component Value Date/Time  
  WBC 15.0 (H) 2015 04:06 AM  
  HGB 11.1 (L) 2015 04:06 AM  
  PLATELET 469  04:06 AM  
  Sodium 141 2015 04:06 AM  
  Potassium 3.6 2015 04:06 AM  
  Chloride 105 2015 04:06 AM  
  CO2 27 2015 04:06 AM  
  BUN 25 (H) 2015 04:06 AM  
  Creatinine 0.69 2015 04:06 AM  
  Glucose 115 (H) 2015 04:06 AM  
  INR 1.1 11/29/2015 09:55 AM  
  Bilirubin, total 0.6 11/29/2015 09:55 AM  
  Bilirubin, direct 0.2 11/22/2008 04:25 AM  
  AST (SGOT) 39 (H) 11/29/2015 09:55 AM  
  ALT (SGPT) 58 11/29/2015 09:55 AM  
  Alk. phosphatase 70 11/29/2015 09:55 AM  
  Lactic acid 3.4 (H) 11/29/2015 10:04 AM  
  Troponin-I, Qt. 1.24 (HH) 11/29/2015 08:50 PM  
  
  
I reviewed recent labs and recent radiologic studies. I independently reviewed radiology images for studies I described above or studies I have ordered. Admission date (for inpatients): 7/27/2018 Day of Surgery  Procedure(s): 
APPENDECTOMY LAPAROSCOPIC 
  
CT: outside report reviewed 
  
ASSESSMENT/PLAN: 
Problem List  Date Reviewed: 12/28/2015  
          Codes Class Noted  
  Acute appendicitis ICD-10-CM: K35.80 ICD-9-CM: 686. 9   7/27/2018  
     
  Hypertension (Chronic) ICD-10-CM: I10 
ICD-9-CM: 044. 9   12/1/2015  
     
  CAD (coronary artery disease) (Chronic) ICD-10-CM: I25.10 ICD-9-CM: 414.00   11/29/2015  
     
  
  
Active Problems: 
  Acute appendicitis (7/27/2018) 
  
Plan: 
IV abx - Cipro & Flagyl DC Copeland Advance to Soft diet Will likely remove COLLEEN drain tomorrow. Will hold heparin 4 hours prior to removing drain Hospitalist following Cardiology following 
  
Signed:  Bridger Orellana MD,  FACS

## 2018-07-29 NOTE — PROGRESS NOTES
Report received from Rosangela Diaz, [facility]. Heparin drip verified at 14 units/kg/hr.

## 2018-07-29 NOTE — PROGRESS NOTES
Patient's right posterior forearm noted to be red and has small white raised area which looks like bite armida. Patient c/o feeling very itchy once Cipro IVPB was started. Cipro stopped, IV on right hand flushed and is patent- no infiltration noted. MD Varghese Taylor notified and orders received to stop IV Cipro and start Cipro 500 mg tablet PO every 12 hrs, and to administer benadryl 12.5 mg IVP once for itchiness.

## 2018-07-29 NOTE — PROGRESS NOTES
Pt oob to chair, sats high 90's on 2lnc, placed on room air while oob in chair, sats high 80's. Placed back on 2lnc, sats 95%. Pt denies sob or cp. Family at side.

## 2018-07-29 NOTE — PROGRESS NOTES
Right posterior forearm noted to have redness with small raised area noted. Pt denies pain or itching at the site. Daughters at bedside and voiced appears to look like a bite. Will monitor and report off.

## 2018-07-29 NOTE — PROGRESS NOTES
Bedside report from Yusuf Thompson, rn.  Pt alert and oriented, c/o pain 7/10 in lower abdomen. Hypoactive bs, whitney in place with abd dressing intact, old drainage seen.

## 2018-07-30 PROBLEM — I24.8 DEMAND ISCHEMIA (HCC): Status: ACTIVE | Noted: 2018-07-30

## 2018-07-30 PROBLEM — I21.4 NSTEMI (NON-ST ELEVATED MYOCARDIAL INFARCTION) (HCC): Status: RESOLVED | Noted: 2018-07-28 | Resolved: 2018-07-30

## 2018-07-30 PROBLEM — E87.70 VOLUME OVERLOAD: Status: ACTIVE | Noted: 2018-07-30

## 2018-07-30 LAB
ANION GAP SERPL CALC-SCNC: 7 MMOL/L (ref 7–16)
APTT PPP: 104.3 SEC (ref 23.2–35.3)
APTT PPP: 50.9 SEC (ref 23.2–35.3)
APTT PPP: 79.9 SEC (ref 23.2–35.3)
BASOPHILS # BLD: 0 K/UL (ref 0–0.2)
BASOPHILS NFR BLD: 0 % (ref 0–2)
BUN SERPL-MCNC: 19 MG/DL (ref 8–23)
CALCIUM SERPL-MCNC: 8 MG/DL (ref 8.3–10.4)
CHLORIDE SERPL-SCNC: 106 MMOL/L (ref 98–107)
CO2 SERPL-SCNC: 29 MMOL/L (ref 21–32)
CREAT SERPL-MCNC: 0.81 MG/DL (ref 0.6–1)
DIFFERENTIAL METHOD BLD: ABNORMAL
EOSINOPHIL # BLD: 0.1 K/UL (ref 0–0.8)
EOSINOPHIL NFR BLD: 1 % (ref 0.5–7.8)
ERYTHROCYTE [DISTWIDTH] IN BLOOD BY AUTOMATED COUNT: 13.8 % (ref 11.9–14.6)
GLUCOSE SERPL-MCNC: 118 MG/DL (ref 65–100)
HCT VFR BLD AUTO: 34 % (ref 35.8–46.3)
HGB BLD-MCNC: 11 G/DL (ref 11.7–15.4)
IMM GRANULOCYTES # BLD: 0 K/UL (ref 0–0.5)
IMM GRANULOCYTES NFR BLD AUTO: 0 % (ref 0–5)
LYMPHOCYTES # BLD: 1.6 K/UL (ref 0.5–4.6)
LYMPHOCYTES NFR BLD: 13 % (ref 13–44)
MCH RBC QN AUTO: 31.4 PG (ref 26.1–32.9)
MCHC RBC AUTO-ENTMCNC: 32.4 G/DL (ref 31.4–35)
MCV RBC AUTO: 97.1 FL (ref 79.6–97.8)
MM INDURATION POC: 0 MM (ref 0–5)
MONOCYTES # BLD: 1.3 K/UL (ref 0.1–1.3)
MONOCYTES NFR BLD: 11 % (ref 4–12)
NEUTS SEG # BLD: 8.9 K/UL (ref 1.7–8.2)
NEUTS SEG NFR BLD: 75 % (ref 43–78)
PLATELET # BLD AUTO: 190 K/UL (ref 150–450)
PMV BLD AUTO: 11 FL (ref 10.8–14.1)
POTASSIUM SERPL-SCNC: 3 MMOL/L (ref 3.5–5.1)
PPD POC: NEGATIVE NEGATIVE
RBC # BLD AUTO: 3.5 M/UL (ref 4.05–5.25)
SODIUM SERPL-SCNC: 142 MMOL/L (ref 136–145)
WBC # BLD AUTO: 12 K/UL (ref 4.3–11.1)

## 2018-07-30 PROCEDURE — 74011250637 HC RX REV CODE- 250/637: Performed by: INTERNAL MEDICINE

## 2018-07-30 PROCEDURE — 74011000258 HC RX REV CODE- 258: Performed by: INTERNAL MEDICINE

## 2018-07-30 PROCEDURE — 74011000250 HC RX REV CODE- 250: Performed by: SURGERY

## 2018-07-30 PROCEDURE — 80048 BASIC METABOLIC PNL TOTAL CA: CPT | Performed by: FAMILY MEDICINE

## 2018-07-30 PROCEDURE — 36415 COLL VENOUS BLD VENIPUNCTURE: CPT | Performed by: FAMILY MEDICINE

## 2018-07-30 PROCEDURE — 74011250636 HC RX REV CODE- 250/636: Performed by: INTERNAL MEDICINE

## 2018-07-30 PROCEDURE — 65660000000 HC RM CCU STEPDOWN

## 2018-07-30 PROCEDURE — 85730 THROMBOPLASTIN TIME PARTIAL: CPT | Performed by: INTERNAL MEDICINE

## 2018-07-30 PROCEDURE — 74011250637 HC RX REV CODE- 250/637: Performed by: SURGERY

## 2018-07-30 PROCEDURE — 85730 THROMBOPLASTIN TIME PARTIAL: CPT | Performed by: NURSE PRACTITIONER

## 2018-07-30 PROCEDURE — 85025 COMPLETE CBC W/AUTO DIFF WBC: CPT | Performed by: FAMILY MEDICINE

## 2018-07-30 PROCEDURE — 74011000258 HC RX REV CODE- 258: Performed by: NURSE PRACTITIONER

## 2018-07-30 PROCEDURE — 74011000250 HC RX REV CODE- 250: Performed by: NURSE PRACTITIONER

## 2018-07-30 PROCEDURE — 74011250636 HC RX REV CODE- 250/636: Performed by: SURGERY

## 2018-07-30 RX ORDER — POTASSIUM CHLORIDE 14.9 MG/ML
20 INJECTION INTRAVENOUS ONCE
Status: DISPENSED | OUTPATIENT
Start: 2018-07-30 | End: 2018-07-30

## 2018-07-30 RX ORDER — POTASSIUM CHLORIDE 20MEQ/15ML
40 LIQUID (ML) ORAL 2 TIMES DAILY
Status: COMPLETED | OUTPATIENT
Start: 2018-07-30 | End: 2018-07-30

## 2018-07-30 RX ORDER — HYDROMORPHONE HYDROCHLORIDE 2 MG/ML
1 INJECTION, SOLUTION INTRAMUSCULAR; INTRAVENOUS; SUBCUTANEOUS
Status: DISCONTINUED | OUTPATIENT
Start: 2018-07-30 | End: 2018-08-02 | Stop reason: HOSPADM

## 2018-07-30 RX ORDER — HYDROMORPHONE HYDROCHLORIDE 2 MG/ML
1 INJECTION, SOLUTION INTRAMUSCULAR; INTRAVENOUS; SUBCUTANEOUS
Status: DISCONTINUED | OUTPATIENT
Start: 2018-07-30 | End: 2018-07-30 | Stop reason: SDUPTHER

## 2018-07-30 RX ORDER — HEPARIN SODIUM 5000 [USP'U]/ML
35 INJECTION, SOLUTION INTRAVENOUS; SUBCUTANEOUS ONCE
Status: COMPLETED | OUTPATIENT
Start: 2018-07-30 | End: 2018-07-30

## 2018-07-30 RX ADMIN — DOCUSATE SODIUM 100 MG: 100 CAPSULE, LIQUID FILLED ORAL at 18:23

## 2018-07-30 RX ADMIN — METRONIDAZOLE 500 MG: 500 INJECTION, SOLUTION INTRAVENOUS at 21:12

## 2018-07-30 RX ADMIN — METOPROLOL TARTRATE 12.5 MG: 25 TABLET, FILM COATED ORAL at 05:16

## 2018-07-30 RX ADMIN — METOPROLOL TARTRATE 12.5 MG: 25 TABLET, FILM COATED ORAL at 13:34

## 2018-07-30 RX ADMIN — Medication 5 ML: at 14:20

## 2018-07-30 RX ADMIN — PANTOPRAZOLE SODIUM 40 MG: 40 TABLET, DELAYED RELEASE ORAL at 08:02

## 2018-07-30 RX ADMIN — CIPROFLOXACIN 500 MG: 500 TABLET, FILM COATED ORAL at 18:23

## 2018-07-30 RX ADMIN — METRONIDAZOLE 500 MG: 500 INJECTION, SOLUTION INTRAVENOUS at 10:28

## 2018-07-30 RX ADMIN — CLOPIDOGREL BISULFATE 75 MG: 75 TABLET ORAL at 08:53

## 2018-07-30 RX ADMIN — Medication 10 ML: at 13:26

## 2018-07-30 RX ADMIN — POTASSIUM CHLORIDE 40 MEQ: 20 SOLUTION ORAL at 18:00

## 2018-07-30 RX ADMIN — SODIUM CHLORIDE 12.5 MG/HR: 900 INJECTION, SOLUTION INTRAVENOUS at 04:15

## 2018-07-30 RX ADMIN — HEPARIN SODIUM 1900 UNITS: 5000 INJECTION, SOLUTION INTRAVENOUS; SUBCUTANEOUS at 22:10

## 2018-07-30 RX ADMIN — AMIODARONE HYDROCHLORIDE 150 MG: 50 INJECTION, SOLUTION INTRAVENOUS at 08:27

## 2018-07-30 RX ADMIN — HEPARIN SODIUM AND DEXTROSE 14 UNITS/KG/HR: 5000; 5 INJECTION INTRAVENOUS at 07:17

## 2018-07-30 RX ADMIN — VITAMIN D, TAB 1000IU (100/BT) 1000 UNITS: 25 TAB at 08:53

## 2018-07-30 RX ADMIN — METOPROLOL TARTRATE 12.5 MG: 25 TABLET, FILM COATED ORAL at 21:12

## 2018-07-30 RX ADMIN — MORPHINE SULFATE 2 MG: 2 INJECTION, SOLUTION INTRAMUSCULAR; INTRAVENOUS at 12:38

## 2018-07-30 RX ADMIN — POTASSIUM CHLORIDE 40 MEQ: 20 SOLUTION ORAL at 08:02

## 2018-07-30 RX ADMIN — DOCUSATE SODIUM 100 MG: 100 CAPSULE, LIQUID FILLED ORAL at 08:53

## 2018-07-30 RX ADMIN — ASPIRIN 81 MG 81 MG: 81 TABLET ORAL at 08:53

## 2018-07-30 RX ADMIN — AMIODARONE HYDROCHLORIDE 1 MG/MIN: 50 INJECTION, SOLUTION INTRAVENOUS at 08:48

## 2018-07-30 NOTE — PROGRESS NOTES
H&P/Consult Note/Progress Note/Office Note: Edith Jeffries  MRN: [id number]  HJR:4/9/1998  Age:89 y. o. 
  
HPI: Edith Jeffries is a 80 y.o. female who is seen urgently for acute appendicitis. She started to have right lower quadrant pain since this Monday, her appetite was down but denies nausea, vomiting. She went to PCP yesterday, CT was done, and report was called today for acute appendicitis. She was immediately brought in the hospital for surgery. She just had lunch however.  
  
She has chronic pain, on oxycodone 15 mg every 4 hours. She has CAD, cardiac stent, on ASA, but denies active cardiac symptoms. History of pneumonia.  
  
7/29/18: POD #1 s/p Laparoscopic appendectomy. Pt sitting up in bed. No complaints. Tolerating Clear Liquids. Copeland patent - 1475ml 24hr. COLLEEN drain 7ml 24/hr. Family at bedside. AF, NAD. WBC 15.1, Creat 1.20,  
7/30/18 POD 2, awake in bed, tolerating diet. NAD, AF. K 3.0 
  
  
    
Past Medical History:  
Diagnosis Date  Thyroid cyst Distant past  
  thyroid cyst removed x40 years ago  
  
     
Past Surgical History:  
Procedure Laterality Date  HX CHOLECYSTECTOMY      
 HX HEENT      
  thyroglossal cyst  
 HX HYSTERECTOMY      
  
      
Current Facility-Administered Medications Medication Dose Route Frequency  aspirin chewable tablet 81 mg  81 mg Oral NOW  
  
Amlodipine; Amoxicillin; and Tetracycline Social History  
  
   
     
Social History  Marital status:   
    Spouse name: N/A  
 Number of children: N/A  
 Years of education: N/A  
  
    
Social History Main Topics  Smoking status: Never Smoker  Smokeless tobacco: None  Alcohol use No  
 Drug use: No  
 Sexual activity: Not Asked  
  
    
Other Topics Concern  None  
  
   
Social History Narrative  
  , lives with  and 2 daughters. Pt did work as  at one time. 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   
  
  
   
History Smoking Status  Never Smoker Smokeless Tobacco  
 Not on file  
  
      
Family History Problem Relation Age of Onset  Other Father    
     24 [facility] Other Mother    
      
  
ROS: Comprehensive review of systems was otherwise unremarkable except as noted above. 
  
Physical Exam:  
    
Visit Vitals  /57 (BP 1 Location: Left arm, BP Patient Position: At rest)  Pulse 81  Temp 98.1 °F (36.7 °C)  Resp 19  
 Ht 5' 4\" (1.626 m)  Wt 103 lb (46.7 kg)  SpO2 99%  BMI 17.68 kg/m2  
  
Constitutional: Alert, cooperative, no acute distress; appears stated age Eyes:Sclera are clear. ENMT: no external lesions gross hearing normal; no obvious neck masses, no ear or lip lesions, nares normal 
CV: RRR. Normal perfusion Resp: No JVD. Breathing is  non-labored; no audible wheezing. GI: soft, appropriately tender, non-distended. Post op dressing removed. COLLEEN drain : Copeland Musculoskeletal: unremarkable with normal function. No embolic signs or cyanosis. Neuro:  Oriented; moves all 4; no focal deficits Psychiatric: normal affect and mood, no memory impairment 
  
Recent vitals (if inpt): 
Patient Vitals for the past 24 hrs: 
  BP Temp Pulse Resp SpO2 Height Weight  
18 1519 136/57 98.1 °F (36.7 °C) 81 19 99 % 5' 4\" (1.626 m) 103 lb (46.7 kg)  
  
  
Labs: 
No results for input(s): WBC, HGB, PLT, NA, K, CL, CO2, BUN, CREA, GLU, PTP, INR, APTT, TBIL, TBILI, CBIL, SGOT, GPT, ALT, AP, AML, LPSE, LCAD, NH4, TROPT, TROIQ, PCO2, PO2, HCO3, HGBEXT, PLTEXT in the last 72 hours. 
  
No lab exists for component:  PH, INREXT 
  
     
Lab Results Component Value Date/Time  
  WBC 15.0 (H) 2015 04:06 AM  
  HGB 11.1 (L) 2015 04:06 AM  
  PLATELET 122  04:06 AM  
  Sodium 141 2015 04:06 AM  
  Potassium 3.6 2015 04:06 AM  
  Chloride 105 2015 04:06 AM  
  CO2 27 2015 04:06 AM  
  BUN 25 (H) 2015 04:06 AM  
  Creatinine 0.69 2015 04:06 AM  
  Glucose 115 (H) 12/01/2015 04:06 AM  
  INR 1.1 11/29/2015 09:55 AM  
  Bilirubin, total 0.6 11/29/2015 09:55 AM  
  Bilirubin, direct 0.2 11/22/2008 04:25 AM  
  AST (SGOT) 39 (H) 11/29/2015 09:55 AM  
  ALT (SGPT) 58 11/29/2015 09:55 AM  
  Alk. phosphatase 70 11/29/2015 09:55 AM  
  Lactic acid 3.4 (H) 11/29/2015 10:04 AM  
  Troponin-I, Qt. 1.24 (HH) 11/29/2015 08:50 PM  
  
  
I reviewed recent labs and recent radiologic studies. I independently reviewed radiology images for studies I described above or studies I have ordered. Admission date (for inpatients): 7/27/2018 Day of Surgery  Procedure(s): 
APPENDECTOMY LAPAROSCOPIC 
  
CT: outside report reviewed 
  
ASSESSMENT/PLAN: 
Problem List  Date Reviewed: 12/28/2015  
          Codes Class Noted  
  Acute appendicitis ICD-10-CM: K35.80 ICD-9-CM: 170. 9   7/27/2018  
     
  Hypertension (Chronic) ICD-10-CM: I10 
ICD-9-CM: 663. 9   12/1/2015  
     
  CAD (coronary artery disease) (Chronic) ICD-10-CM: I25.10 ICD-9-CM: 414.00   11/29/2015  
     
  
  
Active Problems: 
  Acute appendicitis (7/27/2018) 
  
Plan: 
IV abx - Cipro & Flagyl x 5 days (8/3) Advance to Soft diet Hold Heparin, will remove COLLEEN at noon Hospitalist following Cardiology following Replete K Will request hospitalist take over as primary; surgical issues resolved 
 
  
Signed: Yuridia Willett, NP

## 2018-07-30 NOTE — PROGRESS NOTES
Date of Outreach Update: Elsi Hoover was seen and assessed. MEWS Score: 2 (07/30/18 0800) Vitals:  
 07/30/18 0730 07/30/18 0800 07/30/18 0830 07/30/18 1230 BP: 134/68 127/70 129/62 110/68 Pulse: 93 81 86 (!) 109 Resp: 23 26 21 20 Temp:  98.4 °F (36.9 °C)  98 °F (36.7 °C) SpO2: 97% 96% 92% 99% Weight:      
  
 
 Pain Assessment Pain Intensity 1: 4 (07/30/18 0950) Pain Location 1: Leg 
Pain Intervention(s) 1: Repositioned Patient Stated Pain Goal: 3 Previous Outreach assessment has been reviewed. Pt currently in sinus samira (58) with amio at 1 and heparin restarted after whitney was dc'd. Pt resting, family at bedside. D/w primary nurse reuben who voices no concerns at this time and states amio soon to be decreased to 0.5. Will continue to follow up per outreach protocol.  
 
Signed By:   Mak Estevez RN 
  July 30, 2018 2:21 PM

## 2018-07-30 NOTE — PROGRESS NOTES
Date of Outreach Update: Nita Goldberg was seen and assessed since moving from CCU. MEWS Score: 2 (07/30/18 0800) Vitals:  
 07/30/18 0701 07/30/18 0730 07/30/18 0800 07/30/18 0830 BP: 128/60 134/68 127/70 129/62 Pulse: 83 93 81 86 Resp: 21 23 26 21 Temp:   98.4 °F (36.9 °C) SpO2: 97% 97% 96% 92% Weight:      
  
 
 Pain Assessment Pain Intensity 1: 4 (07/30/18 0950) Pain Location 1: Leg 
Pain Intervention(s) 1: Repositioned Patient Stated Pain Goal: 3 Pt currently sitting on bsc with family at side. Nurse at bedside. Heparin remains on hold for plans to d/c whitney drain today. Primary nurse states cardizem on hold with amio continuing. Notified pt, family and nurse to call with any questions or concerns. Will continue to follow up per outreach protocol.  
 
Signed By:   Wilton Higuera RN 
  July 30, 2018 10:35 AM

## 2018-07-30 NOTE — PROGRESS NOTES
Hospitalist Progress Note 2018 Admit Date: 2018  9:18 AM  
NAME: Thalia Lind :  3/3/1924 MRN:  [id number] Attending: Kalia Meraz MD 
PCP:  Kristen Flor MD 
 
SUBJECTIVE:  
34H with pmhx of CAD, HTN presented with acute appendicitis s/p Lap Appy on , admitted by Dr Rhonda Coronel. Started on cipro and flagyl. Started on lasix and HTN control for pulm edema. Required bipap. Troponins came back elevated at 5.27 and echo with EF 40-45% AND Grade 2 DD. Cardiology following and heparin gtt started . Developed afib rvr o/n  and started on amiodarone/cardizem gtt. Review of Systems negative with exception of pertinent positives noted above PHYSICAL EXAM  
 
Visit Vitals  /60  Pulse 83  Temp 98 °F (36.7 °C)  Resp 21  
 Wt 51.9 kg (114 lb 6.7 oz)  SpO2 97%  Breastfeeding No  
 BMI 19.64 kg/m2 Temp (24hrs), Av.2 °F (36.8 °C), Min:98 °F (36.7 °C), Max:98.3 °F (36.8 °C) Oxygen Therapy O2 Sat (%): 97 % (18) Pulse via Oximetry: 91 beats per minute (18) O2 Device: Nasal cannula (18) O2 Flow Rate (L/min): 2 l/min (18) Intake/Output Summary (Last 24 hours) at 18 0844 Last data filed at 18 0972 Gross per 24 hour Intake          1194.59 ml Output             1103 ml Net            91.59 ml General: No acute distress   
Lungs:  CTA Bilaterally. Heart:  Regular rate and rhythm,  No murmur, rub, or gallop Abdomen: Soft, Non distended, Non tender, Positive bowel sounds Extremities: No cyanosis, clubbing or edema Neurologic:  No focal deficits ASSESSMENT Active Hospital Problems Diagnosis Date Noted  Appendicitis, acute 2018  Respiratory failure with hypoxia and hypercapnia ([address] 2018  Sepsis ([address] 2018  
 NSTEMI (non-ST elevated myocardial infarction) ([facility] 2018  Hypokalemia 2018  Acute appendicitis 2018  Acute pulmonary edema with congestive heart failure ([facility] 12/01/2015  Hypertension 12/01/2015  Acute respiratory failure with hypoxia and hypercapnia ([facility] 11/29/2015  CAD (coronary artery disease) 11/29/2015 A/p 
- Acute appendicitis - on cipro/flagyl. WBC trending down. On GI soft diet 
- NSTEMI, afib rvr - cardiology following. afib rvr o/n. Now on amiodarone/cardizem gtt/ hep gtt. Asa . EF 40-45%. - Acute CHFe - improving.  
- GLENYS - resolved 
- HTN - stable, monitor with gtts as above - hypokalemia - replete, repeat in AM. DVT Prophylaxis: hep gtt Dispo - transfer to telemetry when bed available if ok with primary team 
 
Signed By: Stanislaw Morris DO   
 July 30, 2018

## 2018-07-30 NOTE — PROGRESS NOTES
TRANSFER - IN REPORT: 
 
Verbal report received from Bill RN(name) on [facility]  being received from CCU(unit) for routine progression of care Report consisted of patients Situation, Background, Assessment and  
Recommendations(SBAR). Information from the following report(s) SBAR, Kardex, Procedure Summary, Intake/Output, MAR, Recent Results, Med Rec Status and Cardiac Rhythm A. Fib was reviewed with the receiving nurse. Opportunity for questions and clarification was provided. Assessment completed upon patients arrival to unit and care assumed.

## 2018-07-30 NOTE — PROGRESS NOTES
Patient sinus Bula Spice with Heart Rate 43. Byron Olvera RN updated with patient status. Orders to stop Amiodarone and watch reate closely. Heparin drip continued. Stopped Amiodarone gtt. Rate recovered in 2 hours to 55-65 HR Sinus Bula Spice with LBBB. Will monitor closely.

## 2018-07-30 NOTE — WOUND CARE
Coccyx with 2x1x0.1cm erythema with partial thickness skin loss, stage 2 present on admisison, recommend foam dressing every other day, frequent turning and foam cushion for chair. Daughter in room during exam. Will monitor.

## 2018-07-30 NOTE — PROGRESS NOTES
Patient in A-fib rate 130s-155, /93.  Cardiology notified, new orders for cardizem bolus and gtt from Kenny Kenney NP.

## 2018-07-30 NOTE — PROGRESS NOTES
Bedside, Verbal and Written shift change report given to Diann Jordan RN (oncoming nurse) by Raul Majano RN (offgoing nurse). Report included the following information SBAR, Kardex, ED Summary, OR Summary, Procedure Summary, Intake/Output, MAR, Accordion, Recent Results, Med Rec Status, Cardiac Rhythm A-Fib and Alarm Parameters . Dually verified Heparin at 14 units/kg/hr

## 2018-07-30 NOTE — PROGRESS NOTES
Verbal bedside report received from Claudia White, [id number]. Assumed care of patient.

## 2018-07-30 NOTE — PROGRESS NOTES
7/30/2018 10:21 AM 
 
Admit Date: 7/28/2018 Admit Diagnosis: Appendicitis; Elevated Troponin;acute appendicitis;NSTEMI (n* Subjective: No cp or sob Objective:  
  
Visit Vitals  /62  Pulse 86  Temp 98.4 °F (36.9 °C)  Resp 21  
 Wt 51.9 kg (114 lb 6.7 oz)  SpO2 92%  Breastfeeding No  
 BMI 19.64 kg/m2 Physical Exam: 
Neil Zia, Well Nourished, No Acute Distress, Alert & Oriented x 3, appropriate mood. Neck- supple, no JVD 
CV- regular rate and rhythm no MRG Lung- clear bilaterally Abd- soft, nontender, nondistended Ext- no edema bilaterally. Skin- warm and dry Data Review:  
Recent Labs  
   07/30/18 0409   07/27/18 2015 NA  142   < >  144 K  3.0*   < >  3.4*  
BUN  19   < >  12 CREA  0.81   < >  1.27* WBC  12.0*   < >   --   
HGB  11.0*   < >   --   
HCT  34.0*   < >   --   
PLT  190   < >   --   
INR   --    --   1.2  
 < > = values in this interval not displayed. Assessment/Plan: Active Problems: 
  Acute respiratory failure with hypoxia and hypercapnia ([address] (11/29/2015) CAD (coronary artery disease) (11/29/2015) Acute pulmonary edema with volume overload- Resolved. Continue current meds Hypertension (12/1/2015)Stable. Continue current medical therapy. Acute appendicitis (7/27/2018) Hypokalemia (7/27/2018) Demand ischemia- not MI- conservative therapy given age Hypokalemia- new- replete Af with rvr - worse- add iv amio- wean cardizem as toelrated Sepsis ([address] (7/28/2018) Appendicitis, acute (7/29/2018) Respiratory failure with hypoxia and hypercapnia ([address] (7/29/2018)

## 2018-07-30 NOTE — PROGRESS NOTES
Bedside and Verbal shift change report given to Delon Mcgill RN (oncoming nurse) by self Lorri hunt). Report included the following information SBAR and Recent Results.

## 2018-07-30 NOTE — PROGRESS NOTES
Spoke with surgical team and Heparin placed on hold at 0800 for COLLEEN to be pulled at 12pm... Spoke with cardiology Dr. Nino Huerta and piter gtt to be started and diltiazem gtt to continue at present time . .. May titrate dilt gtt for HR less then 100. .. Patient alert oriented swallowing well. .. VSS remains in afib

## 2018-07-30 NOTE — PROGRESS NOTES
Skin assessment completed upon arrival to unit. Sacurm red and slightly blanches in come areas. Alleyvn in place, wound consult placed. Heels pink but blanchable; prevalon boots in place. Puncture sites to abdomen with band-aids in place. COLLEEN drain to lower abdomen.

## 2018-07-31 PROBLEM — I50.23 SYSTOLIC CHF, ACUTE ON CHRONIC (HCC): Status: ACTIVE | Noted: 2018-07-31

## 2018-07-31 LAB
ANION GAP SERPL CALC-SCNC: 6 MMOL/L (ref 7–16)
APTT PPP: 92.5 SEC (ref 23.2–35.3)
BUN SERPL-MCNC: 18 MG/DL (ref 8–23)
CALCIUM SERPL-MCNC: 8.5 MG/DL (ref 8.3–10.4)
CHLORIDE SERPL-SCNC: 106 MMOL/L (ref 98–107)
CO2 SERPL-SCNC: 27 MMOL/L (ref 21–32)
CREAT SERPL-MCNC: 0.73 MG/DL (ref 0.6–1)
ERYTHROCYTE [DISTWIDTH] IN BLOOD BY AUTOMATED COUNT: 13.8 % (ref 11.9–14.6)
GLUCOSE SERPL-MCNC: 131 MG/DL (ref 65–100)
HCT VFR BLD AUTO: 34.5 % (ref 35.8–46.3)
HGB BLD-MCNC: 11.2 G/DL (ref 11.7–15.4)
MAGNESIUM SERPL-MCNC: 2 MG/DL (ref 1.8–2.4)
MCH RBC QN AUTO: 31.7 PG (ref 26.1–32.9)
MCHC RBC AUTO-ENTMCNC: 32.5 G/DL (ref 31.4–35)
MCV RBC AUTO: 97.7 FL (ref 79.6–97.8)
MM INDURATION POC: 0 MM (ref 0–5)
PLATELET # BLD AUTO: 217 K/UL (ref 150–450)
PMV BLD AUTO: 10.6 FL (ref 10.8–14.1)
POTASSIUM SERPL-SCNC: 4.4 MMOL/L (ref 3.5–5.1)
PPD POC: NEGATIVE NEGATIVE
RBC # BLD AUTO: 3.53 M/UL (ref 4.05–5.25)
SODIUM SERPL-SCNC: 139 MMOL/L (ref 136–145)
WBC # BLD AUTO: 14.9 K/UL (ref 4.3–11.1)

## 2018-07-31 PROCEDURE — 74011250636 HC RX REV CODE- 250/636: Performed by: INTERNAL MEDICINE

## 2018-07-31 PROCEDURE — 74011000250 HC RX REV CODE- 250: Performed by: SURGERY

## 2018-07-31 PROCEDURE — 74011250637 HC RX REV CODE- 250/637: Performed by: INTERNAL MEDICINE

## 2018-07-31 PROCEDURE — 65660000000 HC RM CCU STEPDOWN

## 2018-07-31 PROCEDURE — 36415 COLL VENOUS BLD VENIPUNCTURE: CPT | Performed by: INTERNAL MEDICINE

## 2018-07-31 PROCEDURE — 85730 THROMBOPLASTIN TIME PARTIAL: CPT | Performed by: INTERNAL MEDICINE

## 2018-07-31 PROCEDURE — 83735 ASSAY OF MAGNESIUM: CPT | Performed by: INTERNAL MEDICINE

## 2018-07-31 PROCEDURE — 77010033678 HC OXYGEN DAILY

## 2018-07-31 PROCEDURE — 80048 BASIC METABOLIC PNL TOTAL CA: CPT | Performed by: INTERNAL MEDICINE

## 2018-07-31 PROCEDURE — 85027 COMPLETE CBC AUTOMATED: CPT | Performed by: INTERNAL MEDICINE

## 2018-07-31 PROCEDURE — 74011250637 HC RX REV CODE- 250/637: Performed by: SURGERY

## 2018-07-31 RX ORDER — FUROSEMIDE 40 MG/1
40 TABLET ORAL DAILY
Status: DISCONTINUED | OUTPATIENT
Start: 2018-08-01 | End: 2018-08-02 | Stop reason: HOSPADM

## 2018-07-31 RX ORDER — METRONIDAZOLE 500 MG/1
500 TABLET ORAL EVERY 12 HOURS
Status: DISCONTINUED | OUTPATIENT
Start: 2018-07-31 | End: 2018-08-02 | Stop reason: HOSPADM

## 2018-07-31 RX ORDER — FUROSEMIDE 10 MG/ML
40 INJECTION INTRAMUSCULAR; INTRAVENOUS ONCE
Status: COMPLETED | OUTPATIENT
Start: 2018-07-31 | End: 2018-07-31

## 2018-07-31 RX ORDER — AMIODARONE HYDROCHLORIDE 200 MG/1
200 TABLET ORAL DAILY
Status: DISCONTINUED | OUTPATIENT
Start: 2018-07-31 | End: 2018-08-02 | Stop reason: HOSPADM

## 2018-07-31 RX ORDER — HEPARIN SODIUM 5000 [USP'U]/ML
5000 INJECTION, SOLUTION INTRAVENOUS; SUBCUTANEOUS EVERY 8 HOURS
Status: DISCONTINUED | OUTPATIENT
Start: 2018-07-31 | End: 2018-08-02 | Stop reason: HOSPADM

## 2018-07-31 RX ADMIN — AMIODARONE HYDROCHLORIDE 200 MG: 200 TABLET ORAL at 10:48

## 2018-07-31 RX ADMIN — HEPARIN SODIUM 5000 UNITS: 5000 INJECTION INTRAVENOUS; SUBCUTANEOUS at 22:27

## 2018-07-31 RX ADMIN — DOCUSATE SODIUM 100 MG: 100 CAPSULE, LIQUID FILLED ORAL at 18:02

## 2018-07-31 RX ADMIN — FUROSEMIDE 40 MG: 10 INJECTION, SOLUTION INTRAMUSCULAR; INTRAVENOUS at 19:29

## 2018-07-31 RX ADMIN — METRONIDAZOLE 500 MG: 500 TABLET ORAL at 21:00

## 2018-07-31 RX ADMIN — CIPROFLOXACIN 500 MG: 500 TABLET, FILM COATED ORAL at 19:29

## 2018-07-31 RX ADMIN — DOCUSATE SODIUM 100 MG: 100 CAPSULE, LIQUID FILLED ORAL at 08:07

## 2018-07-31 RX ADMIN — ASPIRIN 81 MG 81 MG: 81 TABLET ORAL at 08:07

## 2018-07-31 RX ADMIN — METRONIDAZOLE 500 MG: 500 INJECTION, SOLUTION INTRAVENOUS at 08:07

## 2018-07-31 RX ADMIN — METOPROLOL TARTRATE 12.5 MG: 25 TABLET, FILM COATED ORAL at 22:00

## 2018-07-31 RX ADMIN — CLOPIDOGREL BISULFATE 75 MG: 75 TABLET ORAL at 08:07

## 2018-07-31 RX ADMIN — PANTOPRAZOLE SODIUM 40 MG: 40 TABLET, DELAYED RELEASE ORAL at 08:07

## 2018-07-31 RX ADMIN — VITAMIN D, TAB 1000IU (100/BT) 1000 UNITS: 25 TAB at 08:07

## 2018-07-31 RX ADMIN — MORPHINE SULFATE 2 MG: 2 INJECTION, SOLUTION INTRAMUSCULAR; INTRAVENOUS at 05:45

## 2018-07-31 RX ADMIN — MORPHINE SULFATE 2 MG: 2 INJECTION, SOLUTION INTRAMUSCULAR; INTRAVENOUS at 19:29

## 2018-07-31 RX ADMIN — METOPROLOL TARTRATE 12.5 MG: 25 TABLET, FILM COATED ORAL at 05:35

## 2018-07-31 RX ADMIN — METOPROLOL TARTRATE 12.5 MG: 25 TABLET, FILM COATED ORAL at 13:22

## 2018-07-31 NOTE — PROGRESS NOTES
Verbal bedside report given to Kaur Tai oncoming RN. Patient's situation, background, assessment and recommendations provided. Opportunity for questions provided. Oncoming RN assumed care of patient.

## 2018-07-31 NOTE — PROGRESS NOTES
Bedside shift change report given to Mohsen Reese RN (oncoming nurse) by self Stephanie hunt). Report included the following information SBAR and Recent Results.

## 2018-07-31 NOTE — PROGRESS NOTES
Care Management Interventions PCP Verified by CM: Yes (July 25, 2018) Palliative Care Criteria Met (RRAT>21 & CHF Dx)?: No (RRAT 24 Dx Appendicitis) Transition of Care Consult (CM Consult): Discharge Planning Discharge Durable Medical Equipment: No 
Physical Therapy Consult: No 
Occupational Therapy Consult: No 
Speech Therapy Consult: No 
Current Support Network: Relative's Home (lives with her 2 daughters) Confirm Follow Up Transport: Family Plan discussed with Pt/Family/Caregiver: Yes Freedom of Choice Offered: Yes Discharge Location Discharge Placement: Home with home health Met with patient for d/c planning. Patient alert and oriented x 3, independent of ADL's prior to admission and lives with her 2 daughters. DME include: walker, straight cane, shower chair, raised toilet seat, transfer chair, ramp, walk in shower and oximetry to check O2 sats. She has Medicare and [employer]. She [facility] her medications [facility]. PCP is Dr. Josette Estrada. She plans to d/c home with home health and request [address], PT/OT at d/c. PPD has been placed in case would need rehab. Spoke with patient nurse Faustino Chery about PT dez. CM following.

## 2018-07-31 NOTE — PROGRESS NOTES
Verbal bedside report received from [facility], [address] [place] [address]. Assumed care of patient.

## 2018-07-31 NOTE — PROGRESS NOTES
Date of Outreach Update: Chrissie Caller was seen and assessed. MEWS Score: 1 (07/30/18 2106) Vitals:  
 07/30/18 1703 07/30/18 2003 07/30/18 2106 07/31/18 3422 BP:   133/63 122/73 Pulse: 62  73 79 Resp:   18 18 Temp:   98.5 °F (36.9 °C) 98 °F (36.7 °C) SpO2:   96% 98% Weight:  54.7 kg (120 lb 8 oz) Height:  5' 4\" (1.626 m) Pain Assessment Pain Intensity 1: 0 (07/31/18 0001) Pain Location 1: Abdomen Pain Intervention(s) 1: Medication (see MAR) Patient Stated Pain Goal: 0 Previous Outreach assessment has been reviewed. There have been no significant clinical changes since the completion of the last dated Outreach assessment. Will continue to follow up per outreach protocol.  
 
Signed By:   Cassandra Covington RN 
  July 31, 2018 1:48 AM

## 2018-07-31 NOTE — PHYSICIAN ADVISORY
Letter of Determination: Inpatient Status Appropriate This patient was originally hospitalized as Inpatient Status on 7/27/2018 for acute appendicitis. This patient is appropriate for Inpatient Admission in accordance with CMS regulation Section 43 .3. Specifically, patient's stay is expected to be more than Two Midnights and was medically necessary. The patient's stay was medically necessary based on extreme advanced age, known coronary artery disease, and respiratory acidosis with pH of 7.09. Consistent with CMS guidelines, patient meets for inpatient status. It is our recommendation that this patient's hospitalization status should be INPATIENT status.  
  
The final decision regarding the patient's hospitalization status depends on the attending physician's judgement. Abelino Carmona MD, EPIFANIO, Physician Advisor [facility].

## 2018-07-31 NOTE — PROGRESS NOTES
7/31/2018 2:25 PM 
 
Admit Date: 7/28/2018 Admit Diagnosis: Appendicitis; Elevated Troponin;acute appendicitis;NSTEMI (n* Subjective: No cp or sob Objective:  
  
Visit Vitals  /68 (BP 1 Location: Right arm, BP Patient Position: Sitting)  Pulse 73  Temp 98.4 °F (36.9 °C)  Resp 16  
 Ht 5' 4\" (1.626 m)  Wt 51.4 kg (113 lb 4.8 oz)  SpO2 97%  Breastfeeding No  
 BMI 19.45 kg/m2 Physical Exam: 
Maria Elena Carolina, Well Nourished, No Acute Distress, Alert & Oriented x 3, appropriate mood. Neck- supple, no JVD 
CV- regular rate and rhythm no MRG Lung- clear bilaterally Abd- soft, nontender, nondistended Ext- no edema bilaterally. Skin- warm and dry Data Review:  
Recent Labs  
   07/31/18 
 8374 NA  139  
K  4.4 BUN  18 CREA  0.73 WBC  14.9* HGB  11.2* HCT  34.5*  
PLT  217 Assessment/Plan: Active Problems: 
  Acute respiratory failure with hypoxia and hypercapnia ([address] (11/29/2015) Resolved. Continue current meds CAD (coronary artery disease) (11/29/2015) Hypertension (12/1/2015) Acute appendicitis (7/27/2018) Hypokalemia (7/27/2018) Sepsis ([address] (7/28/2018) Appendicitis, acute (7/29/2018) Respiratory failure with hypoxia and hypercapnia ([address] (7/29/2018) Volume overload (7/30/2018) Demand ischemia ([address] (7/30/2018)Stable. Continue current medical therapy. Af- in nsr change to po amio- no need for [facility] with short duration and recent surgery

## 2018-07-31 NOTE — PROGRESS NOTES
Date of Outreach Update: Johan Harkins was seen and assessed. MEWS Score: 1 (07/31/18 1254) Vitals:  
 07/31/18 7062 07/31/18 0544 07/31/18 0843 07/31/18 1254 BP:  133/66 141/74 125/68 Pulse: 99 95 94 73 Resp:  18 16 16 Temp:  97.7 °F (36.5 °C) 98.2 °F (36.8 °C) 98.4 °F (36.9 °C) SpO2:  97% 96% 97% Weight:  51.4 kg (113 lb 4.8 oz) Height:      
  
 
 Pain Assessment Pain Intensity 1: 0 (07/31/18 0730) Pain Location 1: Abdomen Pain Intervention(s) 1: Medication (see MAR) Patient Stated Pain Goal: 0 Previous Outreach assessment has been reviewed. There have been no significant clinical changes since the completion of the last dated Outreach assessment. Will continue to follow up per outreach protocol.  
 
Signed By:   Enrico Gaytan RN 
  July 31, 2018 4:33 PM

## 2018-07-31 NOTE — PROGRESS NOTES
Hospitalist Progress Note 2018 Admit Date: 2018  9:18 AM  
NAME: Hayden Winters :  3/3/1924 MRN:  [id number] Attending: Allison Landin DO 
PCP:  Zora Guerra MD 
 
SUBJECTIVE:  
19K with pmhx of CAD, HTN presented with acute appendicitis s/p Lap Appy on , admitted by Dr Rupa Ramachandran. Started on cipro and flagyl. Started on lasix and HTN control for pulm edema. Required bipap. Troponins came back elevated at 5.2. Echo in  with EF 40-45% AND Grade 2 DD. Repeat echo this admission pending read. Cardiology following and heparin gtt started . Considered NSTEMI with recs for medical mgmt. Developed afib rvr o/n  and started on amiodarone/cardizem gtt. Now transitioned to oral amiodarone   - still requiring supplemental oxygen. Bibasilar crackles on exam. Suspect pulm edema. Review of Systems negative with exception of pertinent positives noted above PHYSICAL EXAM  
 
Visit Vitals  /70 (BP 1 Location: Right arm, BP Patient Position: At rest)  Pulse 90  Temp 96.9 °F (36.1 °C)  Resp 16  
 Ht 5' 4\" (1.626 m)  Wt 51.4 kg (113 lb 4.8 oz)  SpO2 96%  Breastfeeding No  
 BMI 19.45 kg/m2 Temp (24hrs), Av °F (36.7 °C), Min:96.9 °F (36.1 °C), Max:98.5 °F (36.9 °C) Oxygen Therapy O2 Sat (%): 96 % (18) Pulse via Oximetry: 98 beats per minute (18 0830) O2 Device: Nasal cannula (18) O2 Flow Rate (L/min): 2 l/min (18 0730) Intake/Output Summary (Last 24 hours) at 18 1814 Last data filed at 18 1254 Gross per 24 hour Intake              720 ml Output                0 ml Net              720 ml General: No acute distress   
Lungs:  CTA Bilaterally. Heart:  Regular rate and rhythm,  No murmur, rub, or gallop Abdomen: Soft, Non distended, Non tender, Positive bowel sounds Extremities: No cyanosis, clubbing or edema Neurologic:  No focal deficits ASSESSMENT C/Alfred [facility] 1106 Problems Diagnosis Date Noted  Volume overload 07/30/2018  Demand ischemia ([facility] 07/30/2018  Appendicitis, acute 07/29/2018  Respiratory failure with hypoxia and hypercapnia ([address] 07/29/2018  Sepsis ([address] 07/28/2018  Hypokalemia 07/27/2018  Acute appendicitis 07/27/2018  Hypertension 12/01/2015  Acute respiratory failure with hypoxia and hypercapnia ([facility] 11/29/2015  CAD (coronary artery disease) 11/29/2015 A/p 
- Acute appendicitis - on cipro/flagyl. GI soft diet 
- NSTEMI, afib rvr - cardiology following. afib rvr o/n. Now SR on amiodarone. Pending read on repeat echo. 2015 echo with EF 40%. - Acute/chronic CHFe - iv lasix x 1 today and resume oral dose for AM 
- GLENYS - resolved. Monitor with diuresis 
- HTN - stable, monitor with gtts as above DVT Prophylaxis: hep sq Dispo - pt prefers home w/ hhc. Pending PT/OT eval. Place ppd just in case Signed By: Phylicia Ocampo,    
 July 31, 2018

## 2018-07-31 NOTE — PROGRESS NOTES
Date of Outreach Update: Jimmy York was seen and assessed. MEWS Score: 1 (07/30/18 2106) Vitals:  
 07/30/18 1703 07/30/18 2003 07/30/18 2106 07/31/18 1164 BP:   133/63 122/73 Pulse: 62  73 79 Resp:   18 18 Temp:   98.5 °F (36.9 °C) 98 °F (36.7 °C) SpO2:   96% 98% Weight:  54.7 kg (120 lb 8 oz) Height:  5' 4\" (1.626 m) Pain Assessment Pain Intensity 1: 0 (07/31/18 0001) Pain Location 1: Abdomen Pain Intervention(s) 1: Medication (see MAR) Patient Stated Pain Goal: 0 Previous Outreach assessment has been reviewed. There have been no significant clinical changes since the completion of the last dated Outreach assessment. Will continue to follow up per outreach protocol.  
 
Signed By:   Vicky Orozco RN 
  July 31, 2018 3:14 AM

## 2018-08-01 LAB
ANION GAP SERPL CALC-SCNC: 8 MMOL/L (ref 7–16)
BUN SERPL-MCNC: 17 MG/DL (ref 8–23)
CALCIUM SERPL-MCNC: 8.3 MG/DL (ref 8.3–10.4)
CHLORIDE SERPL-SCNC: 106 MMOL/L (ref 98–107)
CO2 SERPL-SCNC: 27 MMOL/L (ref 21–32)
CREAT SERPL-MCNC: 0.72 MG/DL (ref 0.6–1)
ERYTHROCYTE [DISTWIDTH] IN BLOOD BY AUTOMATED COUNT: 13.7 % (ref 11.9–14.6)
GLUCOSE SERPL-MCNC: 112 MG/DL (ref 65–100)
HCT VFR BLD AUTO: 36.1 % (ref 35.8–46.3)
HGB BLD-MCNC: 11.8 G/DL (ref 11.7–15.4)
MCH RBC QN AUTO: 31.7 PG (ref 26.1–32.9)
MCHC RBC AUTO-ENTMCNC: 32.7 G/DL (ref 31.4–35)
MCV RBC AUTO: 97 FL (ref 79.6–97.8)
PLATELET # BLD AUTO: 239 K/UL (ref 150–450)
PMV BLD AUTO: 10.6 FL (ref 10.8–14.1)
POTASSIUM SERPL-SCNC: 3.7 MMOL/L (ref 3.5–5.1)
RBC # BLD AUTO: 3.72 M/UL (ref 4.05–5.25)
SODIUM SERPL-SCNC: 141 MMOL/L (ref 136–145)
WBC # BLD AUTO: 11.5 K/UL (ref 4.3–11.1)

## 2018-08-01 PROCEDURE — 77010033678 HC OXYGEN DAILY

## 2018-08-01 PROCEDURE — 97535 SELF CARE MNGMENT TRAINING: CPT

## 2018-08-01 PROCEDURE — 74011250636 HC RX REV CODE- 250/636: Performed by: INTERNAL MEDICINE

## 2018-08-01 PROCEDURE — 94760 N-INVAS EAR/PLS OXIMETRY 1: CPT

## 2018-08-01 PROCEDURE — 74011250637 HC RX REV CODE- 250/637: Performed by: INTERNAL MEDICINE

## 2018-08-01 PROCEDURE — 36415 COLL VENOUS BLD VENIPUNCTURE: CPT | Performed by: SURGERY

## 2018-08-01 PROCEDURE — 97161 PT EVAL LOW COMPLEX 20 MIN: CPT

## 2018-08-01 PROCEDURE — 97530 THERAPEUTIC ACTIVITIES: CPT

## 2018-08-01 PROCEDURE — 85027 COMPLETE CBC AUTOMATED: CPT | Performed by: SURGERY

## 2018-08-01 PROCEDURE — 97165 OT EVAL LOW COMPLEX 30 MIN: CPT

## 2018-08-01 PROCEDURE — 80048 BASIC METABOLIC PNL TOTAL CA: CPT | Performed by: INTERNAL MEDICINE

## 2018-08-01 PROCEDURE — 65660000000 HC RM CCU STEPDOWN

## 2018-08-01 PROCEDURE — 74011000250 HC RX REV CODE- 250: Performed by: SURGERY

## 2018-08-01 PROCEDURE — 74011250637 HC RX REV CODE- 250/637: Performed by: SURGERY

## 2018-08-01 RX ORDER — ACETAMINOPHEN 325 MG/1
650 TABLET ORAL
Status: DISCONTINUED | OUTPATIENT
Start: 2018-08-01 | End: 2018-08-02 | Stop reason: HOSPADM

## 2018-08-01 RX ORDER — OXYCODONE HYDROCHLORIDE 15 MG/1
15 TABLET ORAL
Status: DISCONTINUED | OUTPATIENT
Start: 2018-08-01 | End: 2018-08-02 | Stop reason: HOSPADM

## 2018-08-01 RX ADMIN — DOCUSATE SODIUM 100 MG: 100 CAPSULE, LIQUID FILLED ORAL at 08:11

## 2018-08-01 RX ADMIN — VITAMIN D, TAB 1000IU (100/BT) 1000 UNITS: 25 TAB at 08:11

## 2018-08-01 RX ADMIN — HEPARIN SODIUM 5000 UNITS: 5000 INJECTION INTRAVENOUS; SUBCUTANEOUS at 22:18

## 2018-08-01 RX ADMIN — OXYCODONE HYDROCHLORIDE 15 MG: 15 TABLET ORAL at 14:08

## 2018-08-01 RX ADMIN — CLOPIDOGREL BISULFATE 75 MG: 75 TABLET ORAL at 08:11

## 2018-08-01 RX ADMIN — HEPARIN SODIUM 5000 UNITS: 5000 INJECTION INTRAVENOUS; SUBCUTANEOUS at 14:08

## 2018-08-01 RX ADMIN — FUROSEMIDE 40 MG: 40 TABLET ORAL at 08:11

## 2018-08-01 RX ADMIN — HYPROMELLOSE 2910 (4000 MPA.S) 1 DROP: 25 SOLUTION/ DROPS OPHTHALMIC at 17:29

## 2018-08-01 RX ADMIN — OXYCODONE HYDROCHLORIDE 15 MG: 15 TABLET ORAL at 09:46

## 2018-08-01 RX ADMIN — HEPARIN SODIUM 5000 UNITS: 5000 INJECTION INTRAVENOUS; SUBCUTANEOUS at 05:42

## 2018-08-01 RX ADMIN — DOCUSATE SODIUM 100 MG: 100 CAPSULE, LIQUID FILLED ORAL at 17:28

## 2018-08-01 RX ADMIN — METOPROLOL TARTRATE 12.5 MG: 25 TABLET, FILM COATED ORAL at 05:42

## 2018-08-01 RX ADMIN — CIPROFLOXACIN 500 MG: 500 TABLET, FILM COATED ORAL at 22:14

## 2018-08-01 RX ADMIN — OXYCODONE HYDROCHLORIDE 15 MG: 15 TABLET ORAL at 22:13

## 2018-08-01 RX ADMIN — HYPROMELLOSE 2910 (4000 MPA.S) 1 DROP: 25 SOLUTION/ DROPS OPHTHALMIC at 09:11

## 2018-08-01 RX ADMIN — HYPROMELLOSE 2910 (4000 MPA.S) 1 DROP: 25 SOLUTION/ DROPS OPHTHALMIC at 22:20

## 2018-08-01 RX ADMIN — METOPROLOL TARTRATE 12.5 MG: 25 TABLET, FILM COATED ORAL at 14:08

## 2018-08-01 RX ADMIN — MORPHINE SULFATE 2 MG: 2 INJECTION, SOLUTION INTRAMUSCULAR; INTRAVENOUS at 03:21

## 2018-08-01 RX ADMIN — HYPROMELLOSE 2910 (4000 MPA.S) 1 DROP: 25 SOLUTION/ DROPS OPHTHALMIC at 14:27

## 2018-08-01 RX ADMIN — METOPROLOL TARTRATE 12.5 MG: 25 TABLET, FILM COATED ORAL at 22:14

## 2018-08-01 RX ADMIN — METRONIDAZOLE 500 MG: 500 TABLET ORAL at 08:12

## 2018-08-01 RX ADMIN — PANTOPRAZOLE SODIUM 40 MG: 40 TABLET, DELAYED RELEASE ORAL at 08:11

## 2018-08-01 RX ADMIN — METRONIDAZOLE 500 MG: 500 TABLET ORAL at 22:14

## 2018-08-01 RX ADMIN — ASPIRIN 81 MG 81 MG: 81 TABLET ORAL at 08:12

## 2018-08-01 RX ADMIN — AMIODARONE HYDROCHLORIDE 200 MG: 200 TABLET ORAL at 08:11

## 2018-08-01 NOTE — PROGRESS NOTES
8/1/2018 7:55 AM 
 
Admit Date: 7/28/2018 Admit Diagnosis: Appendicitis; Elevated Troponin;acute appendicitis;NSTEMI (n* Subjective: No cp or sob Objective:  
  
Visit Vitals  /67 (BP 1 Location: Right arm, BP Patient Position: At rest)  Pulse 92  Temp 98 °F (36.7 °C)  Resp 18  Ht 5' 4\" (1.626 m)  Wt 48.8 kg (107 lb 9.6 oz)  SpO2 97%  Breastfeeding No  
 BMI 18.47 kg/m2 Physical Exam: 
Ilean Aruna, Well Nourished, No Acute Distress, Alert & Oriented x 3, appropriate mood. Neck- supple, no JVD 
CV- regular rate and rhythm no MRG Lung- clear bilaterally Abd- soft, nontender, nondistended Ext- no edema bilaterally. Skin- warm and dry Data Review:  
Recent Labs 08/01/18 
 9801  07/31/18 
 1381 NA  141  139  
K  3.7  4.4 BUN  17  18 CREA  0.72  0.73 WBC   --   14.9* HGB   --   11.2* HCT   --   34.5*  
PLT   --   217 Assessment/Plan: Active Problems: 
  Acute respiratory failure with hypoxia and hypercapnia (HCC) (11/29/2015)Stable. Continue current medical therapy. CAD (coronary artery disease) (11/29/2015) Hypertension (12/1/2015)Stable. Continue current medical therapy. Acute appendicitis (7/27/2018) Hypokalemia (7/27/2018) Sepsis ([address] (7/28/2018) Appendicitis, acute (7/29/2018) Respiratory failure with hypoxia and hypercapnia ([address] (7/29/2018) Volume overload (7/30/2018) Demand ischemia ([address] (7/30/2018) Systolic CHF, acute on chronic ([address] (7/31/2018)Improved with current therapy. Will continue medications Wean off O2 Af- in nsr- doing well

## 2018-08-01 NOTE — PROGRESS NOTES
Bedside and Verbal shift change report given to Jenny Nascimento RN (oncoming nurse) by self Farshadjaci Rodgers nurse). Report included the following information SBAR, Kardex, MAR and Recent Results.

## 2018-08-01 NOTE — PROGRESS NOTES
Hospitalist Progress Note 2018 Admit Date: 2018  9:18 AM  
NAME: Ally Stone :  3/3/1924 MRN:  [id number] Attending: Roxann Hernandez DO 
PCP:  Zachary Griffith MD 
 
SUBJECTIVE:  
86S with pmhx of CAD, HTN presented with acute appendicitis s/p Lap Appy on , admitted by Dr Army D[place]. Started on cipro and flagyl. Started on lasix and HTN control for pulm edema. Required bipap. Troponins came back elevated at 5.2. Echo in  with EF 40-45% AND Grade 2 DD, now with EF 20%. Repeat echo this admission pending read. Cardiology following and heparin gtt started . Considered NSTEMI with recs for medical mgmt. Developed afib rvr o/n  and started on amiodarone/cardizem gtt. Now transitioned to oral amiodarone   - still requiring supplemental oxygen. Bibasilar crackles on exam. Suspect pulm edema.  - off oxygen today. Legs less swollen after diuresis yesterday. Review of Systems negative with exception of pertinent positives noted above PHYSICAL EXAM  
 
Visit Vitals  /65  Pulse 61  Temp 97.9 °F (36.6 °C)  Resp 18  Ht 5' 4\" (1.626 m)  Wt 48.8 kg (107 lb 9.6 oz)  SpO2 95%  Breastfeeding No  
 BMI 18.47 kg/m2 Temp (24hrs), Av.6 °F (36.4 °C), Min:96.9 °F (36.1 °C), Max:98 °F (36.7 °C) Oxygen Therapy O2 Sat (%): 95 % (18 1332) Pulse via Oximetry: 88 beats per minute (18 0840) O2 Device: Room air (18 0902) O2 Flow Rate (L/min): 0 l/min (18 0840) FIO2 (%): 21 % (18 0840) Intake/Output Summary (Last 24 hours) at 18 1625 Last data filed at 18 1617 Gross per 24 hour Intake              155 ml Output             1750 ml Net            -1595 ml General: No acute distress   
Lungs:  CTA Bilaterally. Heart:  Regular rate and rhythm,  No murmur, rub, or gallop Abdomen: Soft, Non distended, Non tender, Positive bowel sounds Extremities: No cyanosis, clubbing or edema Neurologic:  No focal deficits ASSESSMENT Active Hospital Problems Diagnosis Date Noted  Systolic CHF, acute on chronic ([facility] 07/31/2018  Volume overload 07/30/2018  Demand ischemia ([address] 07/30/2018  Appendicitis, acute 07/29/2018  Respiratory failure with hypoxia and hypercapnia ([address] 07/29/2018  Sepsis ([facility] 07/28/2018  Hypokalemia 07/27/2018  Acute appendicitis 07/27/2018  Hypertension 12/01/2015  Acute respiratory failure with hypoxia and hypercapnia ([facility] 11/29/2015  CAD (coronary artery disease) 11/29/2015 A/p 
- Acute appendicitis - on cipro/flagyl. GI soft diet 
- NSTEMI, afib rvr - cardiology following. pafib on amiodarone, now rate controlled with lopressor - Acute/chronic CHFe - diuresed well, continue lasix 40 daily - GLENYS - resolved. Monitor with diuresis 
- HTN - stable, monitor with gtts as above DVT Prophylaxis: hep sq Dispo - likely home in AM with [facility]. Signed By: Joan Carballo DO August 1, 2018

## 2018-08-01 NOTE — PROGRESS NOTES
Date of Outreach Update: Brittnee Damon was seen and assessed. MEWS Score: 1 (07/31/18 1647) Vitals:  
 07/31/18 0544 07/31/18 2339 07/31/18 1254 07/31/18 1647 BP: 133/66 141/74 125/68 152/70 Pulse: 95 94 73 90 Resp: 18 16 16 16 Temp: 97.7 °F (36.5 °C) 98.2 °F (36.8 °C) 98.4 °F (36.9 °C) 96.9 °F (36.1 °C) SpO2: 97% 96% 97% 96% Weight: 51.4 kg (113 lb 4.8 oz) Height:      
  
 
 Pain Assessment Pain Intensity 1: 0 (07/31/18 0730) Pain Location 1: Abdomen Pain Intervention(s) 1: Medication (see MAR) Patient Stated Pain Goal: 0 Previous Outreach assessment has been reviewed. There have been no significant clinical changes since the completion of the last dated Outreach assessment. Pt resting in bed, family and primary nurse at bedside. Pt currently being medicated for pain. No other complaints at this time. Coarse respirations. PT receiving lasix. Per primary, pt became dyspneic upon ambulation yesterday. Pt denies SOB at this time. A fib on monitor, . Will continue to follow up per outreach protocol.  
 
Signed By:   Qian Waterman RN 
  July 31, 2018 8:03 PM

## 2018-08-01 NOTE — PROGRESS NOTES
Verbal bedside report given to Richie Eubanks oncoming RN. Patient's situation, background, assessment and recommendations provided. Opportunity for questions provided. Oncoming RN assumed care of patient.

## 2018-08-01 NOTE — PROGRESS NOTES
Problem: Self Care Deficits Care Plan (Adult) Goal: *Acute Goals and Plan of Care (Insert Text) 1. Pt will toilet with SBA 2. Pt will complete functional mobility for ADLs with SBA 3. Pt will complete lower body dressing with SBA using AE as needed 4. Pt will complete grooming and hygiene at sink with SBA 5. Pt will demonstrate independence with HEP to promote increased BUE strength and functional use for ADLs 6. Pt will tolerate 23 minutes functional activity with one or fewer rest breaks to promote increased endurance for ADLs Timeframe: 7 days OCCUPATIONAL THERAPY: Initial Assessment, Treatment Day: Day of Assessment and PM 8/1/2018 INPATIENT: Hospital Day: 5 [place] / Product Type: Medicare /  
  
NAME/AGE/GENDER: Edith Jeffries is a 80 y.o. female PRIMARY DIAGNOSIS:  Appendicitis Elevated Troponin 
acute appendicitis NSTEMI (non-ST elevated myocardial infarction) ([facility] [address] Appendicitis, acute Respiratory failure with hypoxia and hypercapnia (HCC) <principal problem not specified> <principal problem not specified> Procedure(s) (LRB): 
APPENDECTOMY LAPAROSCOPIC (N/A) 4 Days Post-Op ICD-10: Treatment Diagnosis:  
 · Generalized Muscle Weakness (M62.81) Precautions/Allergies: 
  falls Amlodipine; Amoxicillin; and Tetracycline ASSESSMENT:  
 
Ms. Yaona Hensley was admitted with the above. Pt lives with her adult children and was independent with ADLs at baseline, uses a cane or rollator. This session, pt presented with general weakness and < mobility, endurance, and balance impacting ADLs. Pt toileted with CGA, completed hygiene at sink with SBA-CGA. Pt required min assist to don socks to advance past toes, educated on available AE. Pt transferred with CGA and RW. Pt educated on use of RW for positioning to avoid pushing too far ahead, on technique to approach and leave sink using RW,  positioning at sink, and on safe hand placement for fall prevention. Pt c/o mild to moderate fatigue with ADLs, educated on energy conservation techniques and pt/ family verbalized good understanding. Pt would benefit from OT services to address deficits, in this setting and post d/c. This section established at most recent assessment PROBLEM LIST (Impairments causing functional limitations): 1. Decreased Strength 2. Decreased ADL/Functional Activities 3. Decreased Transfer Abilities 4. Decreased Balance 5. Decreased Activity Tolerance 6. Increased Fatigue INTERVENTIONS PLANNED: (Benefits and precautions of occupational therapy have been discussed with the patient.) 1. Activities of daily living training 2. Adaptive equipment training 3. Balance training 4. Donning&doffing training 5. Therapeutic activity 6. Therapeutic exercise TREATMENT PLAN: Frequency/Duration: Follow patient 3 times/ week to address above goals. Rehabilitation Potential For Stated Goals: Good RECOMMENDED REHABILITATION/EQUIPMENT: (at time of discharge pending progress): Due to the probability of continued deficits (see above) this patient will likely need continued skilled occupational therapy after discharge. Equipment:  
 None at this time OCCUPATIONAL PROFILE AND HISTORY:  
History of Present Injury/Illness (Reason for Referral): 
See H&P Past Medical History/Comorbidities: Ms. Naveed Walton  has a past medical history of Thyroid cyst (Distant past). Ms. Naveed Walton  has a past surgical history that includes hx cholecystectomy; hx hysterectomy; and hx heent. Social History/Living Environment:  
Home Environment: Private residence # Steps to Enter: 1 Wheelchair Ramp: Yes One/Two Story Residence: One story Living Alone: No 
Support Systems: Child(tamra) Patient Expects to be Discharged to[de-identified] Private residence Current DME Used/Available at Home: Vonna Papa, rollator, Shower chair, Grab bars, Basilio Highman, straight Tub or Shower Type: Shower Prior Level of Function/Work/Activity: 
Independent with ADLs, lives with daughters, uses a cane or a RW 
  
Number of Personal Factors/Comorbidities that affect the Plan of Care: Brief history (0):  LOW COMPLEXITY ASSESSMENT OF OCCUPATIONAL PERFORMANCE[de-identified]  
Activities of Daily Living:  
Basic ADLs (From Assessment) Complex ADLs (From Assessment) Feeding: Independent Oral Facial Hygiene/Grooming: Stand-by assistance Bathing: Contact guard assistance Upper Body Dressing: Setup Lower Body Dressing: Minimum assistance Toileting: Contact guard assistance Instrumental ADL Meal Preparation: Minimum assistance Homemaking: Moderate assistance Medication Management: Setup Grooming/Bathing/Dressing Activities of Daily Living Grooming Washing Face: Stand-by assistance;Contact guard assistance Washing Hands: Contact guard assistance Cognitive Retraining Safety/Judgement: Awareness of environment Lower Body Dressing Assistance Socks: Moderate assistance Bed/Mat Mobility Supine to Sit: Supervision Sit to Supine: Supervision Sit to Stand: Stand-by assistance Bed to Chair: Contact guard assistance Scooting: Supervision Most Recent Physical Functioning:  
Gross Assessment: 
AROM: Within functional limits Strength: Generally decreased, functional 
Sensation: Intact Posture: 
  
Balance: 
Sitting: Intact Standing: Impaired Standing - Static: Good Standing - Dynamic : Fair Bed Mobility: 
Supine to Sit: Supervision Sit to Supine: Supervision Scooting: Supervision Wheelchair Mobility: 
  
Transfers: 
Sit to Stand: Stand-by assistance Stand to Sit: Supervision Bed to Chair: Contact guard assistance Patient Vitals for the past 6 hrs: 
 BP SpO2 O2 Flow Rate (L/min) Pulse 08/01/18 0838 124/61 98 % 1.5 l/min 80  
08/01/18 0840 - 93 % 0 l/min -  
08/01/18 0902 - 90 % - -  
08/01/18 1332 109/65 95 % - 61 Mental Status Neurologic State: Alert Orientation Level: Oriented X4 Cognition: Follows commands, Appropriate decision making Perception: Appears intact Perseveration: No perseveration noted Safety/Judgement: Awareness of environment Physical Skills Involved: 
1. Balance 2. Strength 3. Activity Tolerance Cognitive Skills Affected (resulting in the inability to perform in a timely and safe manner): 1. none Psychosocial Skills Affected: 1. Habits/Routines 2. Environmental Adaptation Number of elements that affect the Plan of Care: 1-3:  LOW COMPLEXITY CLINICAL DECISION MAKIN[address] [place] [address] AM-PAC 6 Clicks Daily Activity Inpatient Short Form How much help from another person does the patient currently need. .. Total A Lot A Little None 1. Putting on and taking off regular lower body clothing? [] 1   [] 2   [x] 3   [] 4  
2. Bathing (including washing, rinsing, drying)? [] 1   [] 2   [x] 3   [] 4  
3. Toileting, which includes using toilet, bedpan or urinal?   [] 1   [] 2   [] 3   [x] 4  
4. Putting on and taking off regular upper body clothing? [] 1   [] 2   [] 3   [x] 4  
5. Taking care of personal grooming such as brushing teeth? [] 1   [] 2   [] 3   [x] 4  
6. Eating meals? [] 1   [] 2   [] 3   [x] 4  
© , Trustees of [address] [place] [address], under license to [employer]. All rights reserved Score:  Initial: 22 Most Recent: X (Date: -- ) Interpretation of Tool:  Represents activities that are increasingly more difficult (i.e. Bed mobility, Transfers, Gait). Score 24 23 22-20 19-15 14-10 9-7 6 Modifier CH CI CJ CK CL CM CN   
 
? Self Care:  
  - CURRENT STATUS: CJ - 20%-39% impaired, limited or restricted  - GOAL STATUS: CI - 1%-19% impaired, limited or restricted  - D/C STATUS:  ---------------To be determined--------------- [place] / Product Type: Medicare /   
 
Medical Necessity:    
· Patient demonstrates good rehab potential due to higher previous functional level. Reason for Services/Other Comments: 
· Patient continues to require skilled intervention due to decreased ADLs and functional performance from baseline. Use of outcome tool(s) and clinical judgement create a POC that gives a: LOW COMPLEXITY  
 
 
 
TREATMENT:  
(In addition to Assessment/Re-Assessment sessions the following treatments were rendered) Pre-treatment Symptoms/Complaints:   
Pain: Initial:  
Pain Intensity 1: 6 Pain Location 1: Foot, Ankle Pain Orientation 1: Right, Left Pain Intervention(s) 1: Nurse notified  Post Session:  same Self Care: (8 min): Procedure(s) (per grid) utilized to improve and/or restore self-care/home management as related to dressing and grooming. Required minimal verbal cueing to facilitate activities of daily living skills and compensatory activities. Assessment/Reassessment Braces/Orthotics/Lines/Etc:  
· O2 Device: Room air Treatment/Session Assessment:   
· Response to Treatment:  No adverse reaction · Interdisciplinary Collaboration:  
o Occupational Therapist 
o Registered Nurse · After treatment position/precautions:  
o Up in chair 
o Bed/Chair-wheels locked 
o Call light within reach 
o RN notified 
o Family at bedside · Compliance with Program/Exercises: Will assess as treatment progresses. · Recommendations/Intent for next treatment session: \"Next visit will focus on advancements to more challenging activities and reduction in assistance provided\". Total Treatment Duration: OT Patient Time In/Time Out Time In: 2673 Time Out: 6493 Serafin Alba OT

## 2018-08-01 NOTE — PROGRESS NOTES
Bedside and Verbal shift change report given to self (oncoming nurse) by Jane Magana RN (offgoing nurse). Report included the following information SBAR, Kardex, MAR and Recent Results.

## 2018-08-01 NOTE — PROGRESS NOTES
Date of Outreach Update: Huong Marshall was seen and assessed. MEWS Score: 1 (08/01/18 0100) Vitals:  
 07/31/18 1254 07/31/18 1647 07/31/18 2100 08/01/18 0100 BP: 125/68 152/70 157/72 130/68 Pulse: 73 90 90 81 Resp: 16 16 18 17 Temp: 98.4 °F (36.9 °C) 96.9 °F (36.1 °C) 97.4 °F (36.3 °C) 97.9 °F (36.6 °C) SpO2: 97% 96% 96% 96% Weight:      
Height:      
  
 
 Pain Assessment Pain Intensity 1: 0 (08/01/18 0030) Pain Location 1: Abdomen Pain Intervention(s) 1: Medication (see MAR) Patient Stated Pain Goal: 0 Previous Outreach assessment has been reviewed. There have been no significant clinical changes since the completion of the last dated Outreach assessment. Will continue to follow up per outreach protocol. Signed By:   Braulio Pastor RN   August 1, 2018 2:52 AM

## 2018-08-01 NOTE — PROGRESS NOTES
[facility] OUTREACH NURSE PROGRESS REPORT SUBJECTIVE: Called to assess patient secondary to recent transfer from unit. MEWS Score: 1 (08/01/18 3689) Vitals:  
 08/01/18 0500 08/01/18 3196 08/01/18 3116 08/01/18 0840 BP: 142/67  124/61 Pulse: 92  80 Resp: 18  19 Temp: 98 °F (36.7 °C)  97.7 °F (36.5 °C) SpO2: 97%  98% 93% Weight:  48.8 kg (107 lb 9.6 oz) Height:      
  
EKG: unchanged from previous tracings, atrial fibrillation, rate 70s. LAB DATA: 
 
Recent Labs 08/01/18 
 9574  07/31/18 
 2460  07/30/18 
 5979 NA  141  139  142  
K  3.7  4.4  3.0*  
CL  106  106  106 CO2  27  27  29 AGAP  8  6*  7 GLU  112*  131*  118* BUN  17  18  19 CREA  0.72  0.73  0.81 GFRAA  >60  >60  >60 GFRNA  >60  >60  >60  
CA  8.3  8.5  8.0*  
MG   --   2.0   --   
  
 
Recent Labs 08/01/18 
 2825  07/31/18 
 1628  07/30/18 
 7321 WBC  11.5*  14.9*  12.0*  
HGB  11.8  11.2*  11.0*  
HCT  36.1  34.5*  34.0*  
PLT  239  217  190 OBJECTIVE: On arrival to room, I found patient to be lying in bed with daughter at bedside. Visit Vitals  /61  Pulse 80  Temp 97.7 °F (36.5 °C)  Resp 19  
 Ht 5' 4\" (1.626 m)  Wt 48.8 kg (107 lb 9.6 oz)  SpO2 93%  Breastfeeding No  
 BMI 18.47 kg/m2 General appearance: alert, cooperative, no distress, appears stated age Lungs: clear to auscultation bilaterally Heart: regular rate and rhythm, S1, S2 normal, no murmur, click, rub or gallop Neurologic: Grossly normal 
 
 
 Pain Assessment Pain Intensity 1: 0 (08/01/18 0820) Pain Location 1: Abdomen Pain Intervention(s) 1: Medication (see MAR) Patient Stated Pain Goal: 0 
 
  
  
  
  
 
  
  
  
   
 
ASSESSMENT:  Patient alert and oriented. [place]. Denies pain. Denies shortness of breath or chest pain. On RA, Sp02 93%. Lungs clear to auscultation. Abdominal lap sites with steri strips CDI.  Patient worked with physical therapy today and was able to walk the halls without difficulty. PLAN:  WIll discharge from protocol at this time.

## 2018-08-01 NOTE — PROGRESS NOTES
Problem: Mobility Impaired (Adult and Pediatric) Goal: *Acute Goals and Plan of Care (Insert Text) LTG: 
(1.)Ms. Gabriel Adam will move from supine to sit and sit to supine , scoot up and down and roll side to side in bed with MODIFIED INDEPENDENCE within 7 treatment day(s). (2.)Ms. Gabriel Adam will transfer from bed to chair and chair to bed with MODIFIED INDEPENDENCE using the least restrictive device within 7 treatment day(s). (3.)Ms. Gabriel Adam will ambulate with MODIFIED INDEPENDENCE for 250 feet with the least restrictive device within 7 treatment day(s). (4.)Ms. Gabriel Adam will participate in therapeutic activity/exerices x 23 minutes for increased strength within 7 days. ________________________________________________________________________________________________ PHYSICAL THERAPY: Initial Assessment, Treatment Day: Day of Assessment, AM 8/1/2018 INPATIENT: Hospital Day: 5 [place] / Product Type: Medicare /  
  
NAME/AGE/GENDER: Dell Del Toro is a 80 y.o. female PRIMARY DIAGNOSIS: Appendicitis Elevated Troponin 
acute appendicitis NSTEMI (non-ST elevated myocardial infarction) ([facility] [address] Appendicitis, acute Respiratory failure with hypoxia and hypercapnia (HCC) <principal problem not specified> <principal problem not specified> Procedure(s) (LRB): 
APPENDECTOMY LAPAROSCOPIC (N/A) 4 Days Post-Op ICD-10: Treatment Diagnosis:  
 · Generalized Muscle Weakness (M62.81) Precaution/Allergies: 
Amlodipine; Amoxicillin; and Tetracycline ASSESSMENT:  
 
Ms. Gabriel Adam is a 80 y.o. female in the hospital for the above who was supine in bed upon arrival.  Pt reports that she lives in a one story house with her daughters that has 1 steps to enter. Pt also reported that PTA she was independent with ADLs and ambulated with SPC/RW depending on distance [facility] for long). Pt admitted to no recent falls in the past year. Ms. Gabriel Adam presents to PT with [facility] AROM and decreased strength in B hip flexors. During evaluation pt performed bed mobility with supervision. She was also able to perform STS transfers with supervision and RW. Pt demonstrated good to fair standing balance and decreased gait speed. She was given CGA for ambulating with RW.  Ms. Srinivasa Garcia could benefit from skilled PT as she is currently functioning below her baseline. In addition to evaluation pt received treatment consisting of therapeutic activity. She performed standing activities with RW and close supervision. Pt also ambulated in crowe with RW and CGA and no loss of balance. She returned to room and performed numerous STS transfers with RW and verbal cuing with supervision. Pt then performed bed mobility again with supervision. Post treatment SpO2 recorded at 89% on room air with pt given instruction for breathing techniques. She benefit from treatment to increase safety, balance, and activity pacing. This section established at most recent assessment PROBLEM LIST (Impairments causing functional limitations): 1. Decreased Strength 2. Decreased Ambulation Ability/Technique 3. Decreased Balance 4. Decreased Activity Tolerance INTERVENTIONS PLANNED: (Benefits and precautions of physical therapy have been discussed with the patient.) 1. Balance Exercise 2. Bed Mobility 3. Family Education 4. Gait Training 5. Therapeutic Activites 6. Therapeutic Exercise/Strengthening 7. Transfer Training 8. Group Therapy TREATMENT PLAN: Frequency/Duration: 3 times a week for duration of hospital stay Rehabilitation Potential For Stated Goals: Good RECOMMENDED REHABILITATION/EQUIPMENT: (at time of discharge pending progress): Due to the probability of continued deficits (see above) this patient will not likely need continued skilled physical therapy after discharge. Equipment:  
 None at this time HISTORY:  
History of Present Injury/Illness (Reason for Referral): 
See H&P Past Medical History/Comorbidities: Ms. Sotero Puentes  has a past medical history of Thyroid cyst (Distant past). Ms. Sotero Puentes  has a past surgical history that includes hx cholecystectomy; hx hysterectomy; and hx heent. Social History/Living Environment:  
Home Environment: Private residence # Steps to Enter: 1 Wheelchair Ramp: Yes One/Two Story Residence: One story Living Alone: No 
Support Systems: Child(tamra) Patient Expects to be Discharged to[de-identified] Unknown Current DME Used/Available at Home: Ernestine Justo, rollator, Shower chair, Commode, bedside, Grab bars Tub or Shower Type: Shower Prior Level of Function/Work/Activity: 
Pt lives with daughters in one story house and was independent with ADLs. Uses SPC for community walking and rollator for household ambulation. Number of Personal Factors/Comorbidities that affect the Plan of Care: 0: LOW COMPLEXITY EXAMINATION:  
Most Recent Physical Functioning:  
Gross Assessment: 
AROM: Within functional limits Strength: Generally decreased, functional (B hip flexors 3+/5) Sensation: Intact Posture: 
  
Balance: 
Sitting: Intact Standing: Impaired Standing - Static: Good Standing - Dynamic : Fair Bed Mobility: 
Supine to Sit: Supervision Sit to Supine: Supervision Scooting: Supervision Wheelchair Mobility: 
  
Transfers: 
Sit to Stand: Supervision Stand to Sit: Supervision Gait: 
  
Speed/Ngoc: Slow Step Length: Right shortened;Left shortened Gait Abnormalities: Decreased step clearance Distance (ft): 250 Feet (ft) Assistive Device: Walker, rolling Ambulation - Level of Assistance: Contact guard assistance Body Structures Involved: 1. Metabolic 2. Endocrine 3. Muscles Body Functions Affected: 1. Neuromusculoskeletal 
2. Movement Related 3. Metobolic/Endocrine Activities and Participation Affected: 1. Mobility 2. Domestic Life 3. Community, Social and [place] Number of elements that affect the Plan of Care: 4+: HIGH COMPLEXITY CLINICAL PRESENTATION:  
Presentation: Stable and uncomplicated: LOW COMPLEXITY CLINICAL DECISION MAKING:  
[employer] AM-PAC 6 Clicks Basic Mobility Inpatient Short Form How much difficulty does the patient currently have. .. Unable A Lot A Little None 1. Turning over in bed (including adjusting bedclothes, sheets and blankets)? [] 1   [] 2   [] 3   [x] 4  
2. Sitting down on and standing up from a chair with arms ( e.g., wheelchair, bedside commode, etc.)   [] 1   [] 2   [] 3   [x] 4  
3. Moving from lying on back to sitting on the side of the bed? [] 1   [] 2   [] 3   [x] 4 How much help from another person does the patient currently need. .. Total A Lot A Little None 4. Moving to and from a bed to a chair (including a wheelchair)? [] 1   [] 2   [x] 3   [] 4  
5. Need to walk in hospital room? [] 1   [] 2   [x] 3   [] 4  
6. Climbing 3-5 steps with a railing? [] 1   [] 2   [x] 3   [] 4  
© 2007, Trustees of [employer], under license to [employer]. All rights reserved Score:  Initial: 21 Most Recent: X (Date: -- ) Interpretation of Tool:  Represents activities that are increasingly more difficult (i.e. Bed mobility, Transfers, Gait). Score 24 23 22-20 19-15 14-10 9-7 6 Modifier CH CI CJ CK CL CM CN   
 
? Mobility - Walking and Moving Around:  
  - CURRENT STATUS: CJ - 20%-39% impaired, limited or restricted  - GOAL STATUS: CI - 1%-19% impaired, limited or restricted  - D/C STATUS:  ---------------To be determined--------------- [place] / Product Type: Medicare /   
 
Medical Necessity:    
· Patient demonstrates good rehab potential due to higher previous functional level. Reason for Services/Other Comments: 
· Patient continues to require skilled intervention due to decreased balance and activity tolerance.   
Use of outcome tool(s) and clinical judgement create a POC that gives a: Clear prediction of patient's progress: LOW COMPLEXITY  
  
 
 
 
TREATMENT:  
(In addition to Assessment/Re-Assessment sessions the following treatments were rendered) Pre-treatment Symptoms/Complaints:  B LE pain  
Pain: Initial:  
Pain Intensity 1: 7 Pain Location 1: Leg 
Pain Orientation 1: Left, Right  Post Session:  7/10 Therapeutic Activity: (    8 minutes): Therapeutic activities including Bed transfers, Ambulation on level ground, standing activities and STS transfers to improve mobility, strength, balance and activity tolerance. Required minimal   to promote static and dynamic balance in standing and proper transfer techniques with activity pacing. Date: 
8/1/18 Date: 
 Date: Activity/Exercise Parameters Parameters Parameters STS 1 x 5 Braces/Orthotics/Lines/Etc:  
· O2 Device: Room air Treatment/Session Assessment:   
· Response to Treatment:  Tolerated fairly given decreased SpO2 after exertion. · Interdisciplinary Collaboration:  
o Physical Therapist 
o Registered Nurse · After treatment position/precautions:  
o Supine in bed 
o Bed/Chair-wheels locked 
o Bed in low position 
o Family at bedside · Compliance with Program/Exercises: Will assess as treatment progresses. · Recommendations/Intent for next treatment session: \"Next visit will focus on advancements to more challenging activities and reduction in assistance provided\". Total Treatment Duration: PT Patient Time In/Time Out Time In: 5171 Time Out: 9696 Joanne Sanabria PT, DPT

## 2018-08-02 ENCOUNTER — APPOINTMENT (OUTPATIENT)
Dept: ULTRASOUND IMAGING | Age: 83
DRG: 853 | End: 2018-08-02
Attending: INTERNAL MEDICINE
Payer: MEDICARE

## 2018-08-02 ENCOUNTER — HOME HEALTH ADMISSION (OUTPATIENT)
Dept: HOME HEALTH SERVICES | Facility: HOME HEALTH | Age: 83
End: 2018-08-02
Payer: MEDICARE

## 2018-08-02 VITALS
HEART RATE: 86 BPM | TEMPERATURE: 98.5 F | HEIGHT: 64 IN | WEIGHT: 107.9 LBS | DIASTOLIC BLOOD PRESSURE: 74 MMHG | BODY MASS INDEX: 18.42 KG/M2 | RESPIRATION RATE: 18 BRPM | SYSTOLIC BLOOD PRESSURE: 144 MMHG | OXYGEN SATURATION: 94 %

## 2018-08-02 LAB
ANION GAP SERPL CALC-SCNC: 12 MMOL/L (ref 7–16)
BUN SERPL-MCNC: 16 MG/DL (ref 8–23)
CALCIUM SERPL-MCNC: 8.6 MG/DL (ref 8.3–10.4)
CHLORIDE SERPL-SCNC: 103 MMOL/L (ref 98–107)
CO2 SERPL-SCNC: 27 MMOL/L (ref 21–32)
CREAT SERPL-MCNC: 0.83 MG/DL (ref 0.6–1)
ERYTHROCYTE [DISTWIDTH] IN BLOOD BY AUTOMATED COUNT: 13.8 % (ref 11.9–14.6)
GLUCOSE SERPL-MCNC: 103 MG/DL (ref 65–100)
HCT VFR BLD AUTO: 33.5 % (ref 35.8–46.3)
HGB BLD-MCNC: 11.1 G/DL (ref 11.7–15.4)
MCH RBC QN AUTO: 31.7 PG (ref 26.1–32.9)
MCHC RBC AUTO-ENTMCNC: 33.1 G/DL (ref 31.4–35)
MCV RBC AUTO: 95.7 FL (ref 79.6–97.8)
PLATELET # BLD AUTO: 224 K/UL (ref 150–450)
PMV BLD AUTO: 10.4 FL (ref 10.8–14.1)
POTASSIUM SERPL-SCNC: 3.2 MMOL/L (ref 3.5–5.1)
RBC # BLD AUTO: 3.5 M/UL (ref 4.05–5.25)
SODIUM SERPL-SCNC: 142 MMOL/L (ref 136–145)
WBC # BLD AUTO: 9.5 K/UL (ref 4.3–11.1)

## 2018-08-02 PROCEDURE — 74011250636 HC RX REV CODE- 250/636: Performed by: INTERNAL MEDICINE

## 2018-08-02 PROCEDURE — 80048 BASIC METABOLIC PNL TOTAL CA: CPT

## 2018-08-02 PROCEDURE — 36415 COLL VENOUS BLD VENIPUNCTURE: CPT

## 2018-08-02 PROCEDURE — 74011250637 HC RX REV CODE- 250/637: Performed by: INTERNAL MEDICINE

## 2018-08-02 PROCEDURE — 93971 EXTREMITY STUDY: CPT

## 2018-08-02 PROCEDURE — 74011250637 HC RX REV CODE- 250/637: Performed by: SURGERY

## 2018-08-02 PROCEDURE — 85027 COMPLETE CBC AUTOMATED: CPT

## 2018-08-02 RX ORDER — POTASSIUM CHLORIDE 20 MEQ/1
20 TABLET, EXTENDED RELEASE ORAL DAILY
Status: DISCONTINUED | OUTPATIENT
Start: 2018-08-03 | End: 2018-08-02 | Stop reason: HOSPADM

## 2018-08-02 RX ORDER — PANTOPRAZOLE SODIUM 40 MG/1
40 TABLET, DELAYED RELEASE ORAL
Qty: 30 TAB | Refills: 0 | Status: SHIPPED | OUTPATIENT
Start: 2018-08-03 | End: 2018-11-28 | Stop reason: DRUGHIGH

## 2018-08-02 RX ORDER — CARVEDILOL 3.12 MG/1
3.12 TABLET ORAL 2 TIMES DAILY WITH MEALS
Qty: 60 TAB | Refills: 0 | Status: SHIPPED | OUTPATIENT
Start: 2018-08-02 | End: 2018-08-31

## 2018-08-02 RX ORDER — CARVEDILOL 3.12 MG/1
3.12 TABLET ORAL 2 TIMES DAILY WITH MEALS
Status: DISCONTINUED | OUTPATIENT
Start: 2018-08-02 | End: 2018-08-02 | Stop reason: HOSPADM

## 2018-08-02 RX ORDER — POTASSIUM CHLORIDE 20 MEQ/1
20 TABLET, EXTENDED RELEASE ORAL DAILY
Qty: 30 TAB | Refills: 0 | Status: ON HOLD | OUTPATIENT
Start: 2018-08-03 | End: 2018-09-28 | Stop reason: CLARIF

## 2018-08-02 RX ORDER — AMIODARONE HYDROCHLORIDE 200 MG/1
200 TABLET ORAL DAILY
Qty: 30 TAB | Refills: 0 | Status: SHIPPED | OUTPATIENT
Start: 2018-08-03 | End: 2018-08-15 | Stop reason: ALTCHOICE

## 2018-08-02 RX ORDER — FUROSEMIDE 40 MG/1
40 TABLET ORAL DAILY
Qty: 30 TAB | Refills: 0 | Status: ON HOLD | OUTPATIENT
Start: 2018-08-02 | End: 2018-08-31

## 2018-08-02 RX ORDER — POTASSIUM CHLORIDE 20 MEQ/1
40 TABLET, EXTENDED RELEASE ORAL
Status: COMPLETED | OUTPATIENT
Start: 2018-08-02 | End: 2018-08-02

## 2018-08-02 RX ORDER — LISINOPRIL 5 MG/1
2.5 TABLET ORAL DAILY
Status: DISCONTINUED | OUTPATIENT
Start: 2018-08-02 | End: 2018-08-02 | Stop reason: HOSPADM

## 2018-08-02 RX ORDER — LISINOPRIL 2.5 MG/1
2.5 TABLET ORAL DAILY
Qty: 30 TAB | Refills: 0 | Status: SHIPPED | OUTPATIENT
Start: 2018-08-02 | End: 2018-08-31

## 2018-08-02 RX ORDER — CLOPIDOGREL BISULFATE 75 MG/1
75 TABLET ORAL DAILY
Qty: 30 TAB | Refills: 0 | Status: ON HOLD | OUTPATIENT
Start: 2018-08-03 | End: 2018-09-28 | Stop reason: CLARIF

## 2018-08-02 RX ADMIN — LISINOPRIL 2.5 MG: 5 TABLET ORAL at 11:18

## 2018-08-02 RX ADMIN — ASPIRIN 81 MG 81 MG: 81 TABLET ORAL at 09:17

## 2018-08-02 RX ADMIN — METOPROLOL TARTRATE 12.5 MG: 25 TABLET, FILM COATED ORAL at 05:21

## 2018-08-02 RX ADMIN — VITAMIN D, TAB 1000IU (100/BT) 1000 UNITS: 25 TAB at 09:17

## 2018-08-02 RX ADMIN — METRONIDAZOLE 500 MG: 500 TABLET ORAL at 09:17

## 2018-08-02 RX ADMIN — POTASSIUM CHLORIDE 40 MEQ: 20 TABLET, EXTENDED RELEASE ORAL at 09:36

## 2018-08-02 RX ADMIN — HEPARIN SODIUM 5000 UNITS: 5000 INJECTION INTRAVENOUS; SUBCUTANEOUS at 05:21

## 2018-08-02 RX ADMIN — AMIODARONE HYDROCHLORIDE 200 MG: 200 TABLET ORAL at 09:17

## 2018-08-02 RX ADMIN — HYPROMELLOSE 2910 (4000 MPA.S) 1 DROP: 25 SOLUTION/ DROPS OPHTHALMIC at 09:19

## 2018-08-02 RX ADMIN — Medication 10 ML: at 05:21

## 2018-08-02 RX ADMIN — DOCUSATE SODIUM 100 MG: 100 CAPSULE, LIQUID FILLED ORAL at 09:17

## 2018-08-02 RX ADMIN — PANTOPRAZOLE SODIUM 40 MG: 40 TABLET, DELAYED RELEASE ORAL at 09:17

## 2018-08-02 RX ADMIN — CLOPIDOGREL BISULFATE 75 MG: 75 TABLET ORAL at 09:17

## 2018-08-02 RX ADMIN — CARVEDILOL 3.12 MG: 3.12 TABLET, FILM COATED ORAL at 09:37

## 2018-08-02 RX ADMIN — FUROSEMIDE 40 MG: 40 TABLET ORAL at 09:17

## 2018-08-02 NOTE — PROGRESS NOTES
Bedside and Verbal shift change report given to Anastasiia and Ryan Aldrich RN (oncoming nurse) by Jessica Ferro (offgoing nurse). Report included the following information SBAR, Kardex, Accordion and Recent Results.

## 2018-08-02 NOTE — DISCHARGE INSTRUCTIONS
Learning About Appendectomy  What is an appendectomy? An appendectomy is surgery to take out the appendix. This organ is a small sac that is shaped like a finger. It's attached to your large intestine. Appendicitis happens when the appendix becomes infected and inflamed. An appendectomy is the main treatment for it. If surgery is delayed, the inflamed appendix may burst. A burst appendix can cause serious health problems. If your appendix has burst, you may need an emergency surgery to remove the burst appendix. How is this surgery done? Before surgery, you will get medicine to make you sleep. Appendectomy is usually done as a laparoscopic surgery. That means it is done with only small cuts. These cuts are called incisions. The doctor puts a lighted tube, or scope, and other surgical tools through the cuts in your belly. The doctor is able to see your organs with the scope. The doctor removes the appendix. The cuts heal quickly, and the scars usually fade over time. In some cases, the surgery is done through a single larger cut in the belly. This is called open surgery. What can you expect after surgery? Most people leave the hospital 1 to 3 days after surgery. Some even go home the same day. After you go home, it is normal to feel weak and tired for several days. Your belly may be swollen and may be painful. If you had laparoscopic surgery, you may have shoulder pain for about 24 hours. You may also feel sick to your stomach and have diarrhea, constipation, gas, or a headache. This usually goes away in a few days. Your recovery time depends on the type of surgery you had. If you had laparoscopic surgery, you will probably be able to go back to work or your normal routine 1 to 3 weeks after surgery. If you had an open surgery, it may take 2 to 4 weeks. If your appendix burst, you may have a drain in your incision. Your body will work fine without an appendix.  You will not have to make any changes in your diet or daily life. After surgery, be sure to follow your doctor's advice about problems to watch for. These may include fever, worse belly pain, or problems with your incision. Follow-up care is a key part of your treatment and safety. Be sure to make and go to all appointments, and call your doctor if you are having problems. It's also a good idea to know your test results and keep a list of the medicines you take. Where can you learn more? Go to http://dinora-ellyn.info/. Enter V066 in the search box to learn more about \"Learning About Appendectomy. \"  Current as of: May 12, 2017  Content Version: 11.7  © 2382-8625 [employer]. Care instructions adapted under license by [employer] (which disclaims liability or warranty for this information). If you have questions about a medical condition or this instruction, always ask your healthcare professional. [address] any warranty or liability for your use of this information. Superficial Thrombophlebitis: Care Instructions  Your Care Instructions  Superficial thrombophlebitis is inflammation in a vein where a blood clot has formed close to the surface of the skin. You may be able to feel the clot as a firm lump under the skin. The skin over the clot can become red, tender, and warm to the touch. Blood clots in veins close to the skin's surface usually are not serious and often can be treated at home. Sometimes superficial thrombophlebitis spreads to a deeper vein (deep vein thrombosis, or DVT). These deeper clots can be serious, even life-threatening. It is very important that you follow your doctor's instructions, keep all follow-up appointments, and watch for new or worsening symptoms of a clot. Follow-up care is a key part of your treatment and safety. Be sure to make and go to all appointments, and call your doctor if you are having problems.  It's also a good idea to know your test results and keep a list of the medicines you take. How can you care for yourself at home? · Take your medicines exactly as prescribed. Call your doctor if you think you are having a problem with your medicine. You will get more details on the specific medicines your doctor prescribes. · Prop up the sore leg or arm on a pillow anytime you sit or lie down. Try to keep it above the level of your heart. To prevent thrombophlebitis  · Exercise. Keep blood moving in your legs to keep new clots from forming. · Get up out of bed as soon as possible after an illness or surgery. · Do not smoke. If you need help quitting, talk to your doctor about stop-smoking programs and medicines. These can increase your chances of quitting for good. · Ask your doctor about compression stockings. These may help prevent blood clots from forming in your legs. But there are different types of stockings, and they need to fit right. So your doctor will recommend what you need. When should you call for help? Call 911 anytime you think you may need emergency care. For example, call if:    · You have sudden chest pain and shortness of breath, or you cough up blood.     · You vomit blood or what looks like coffee grounds.     · You pass maroon or very bloody stools.     · You passed out (lost consciousness).    Call your doctor now or seek immediate medical care if:    · You have signs of a blood clot, such as:  ¨ Pain in your calf, back of the knee, thigh, or groin. ¨ Redness and swelling in your leg or groin.     · You notice a new hard, red, or tender area in your leg.     · Your stools are black and tarlike or have streaks of blood.    Watch closely for changes in your health, and be sure to contact your doctor if:    · You do not get better as expected. Where can you learn more? Go to http://dinora-ellyn.info/.   Enter [phone] in the search box to learn more about \"Superficial Thrombophlebitis: Care Instructions. \"  Current as of: November 21, 2017  Content Version: 11.7  © 8217-3143 [employer], [facility]. Care instructions adapted under license by [employer] (which disclaims liability or warranty for this information). If you have questions about a medical condition or this instruction, always ask your healthcare professional. [place] [address] any warranty or liability for your use of this information.

## 2018-08-02 NOTE — PROGRESS NOTES
Care Management Interventions PCP Verified by CM: No 
Palliative Care Criteria Met (RRAT>21 & CHF Dx)?: No (RRAT 24 Dx Appendicitis) Mode of Transport at Discharge: Other (see comment) (daughter to provide transportation) Transition of Care Consult (CM Consult): Home Health Discharge Durable Medical Equipment: No 
Physical Therapy Consult: Yes Occupational Therapy Consult: Yes Speech Therapy Consult: No 
Current Support Network: Relative's Home (lives with her 2 daughters) Confirm Follow Up Transport: Family Plan discussed with Pt/Family/Caregiver: Yes Freedom of Choice Offered: Yes Discharge Location Discharge Placement: Home with home health Patient ready for d/c today. Patient in agreement with discharge home with home health.  [facility] as they have had them in the past. Patient to d/c home with daughters and [address] [place] [address] home health PT/OT/RN

## 2018-08-02 NOTE — DISCHARGE SUMMARY
Hospitalist Discharge Summary     Patient ID:  Danielle Frias  [id number]  71 y.o.  3/3/1924  Admit date: 7/28/2018  9:18 AM  Discharge date and time: 8/2/2018  Attending: Joan Carballo DO  PCP:  Essence Swan MD  Treatment Team: Attending Provider: Joan Carballo DO; Consulting Provider: Mere Latham MD; Utilization Review: Jordy James RN; Utilization Review: Amador Feng RN; Care Manager: Ramirez Lindsey RN    Principal Diagnosis <principal problem not specified>   Active Problems:    Acute respiratory failure with hypoxia and hypercapnia ([address] (11/29/2015)      CAD (coronary artery disease) (11/29/2015)      Hypertension (12/1/2015)      Acute appendicitis (7/27/2018)      Hypokalemia (7/27/2018)      Sepsis ([address] (7/28/2018)      Appendicitis, acute (7/29/2018)      Respiratory failure with hypoxia and hypercapnia ([address] (7/29/2018)      Volume overload (7/30/2018)      Demand ischemia ([address] (8/97/1874)      Systolic CHF, acute on chronic ([address] (7/31/2018)             Hospital Course:  Please refer to the admission H&P for details of presentation. In summary, the patient is 89F with pmhx of CAD, HTN presented with acute appendicitis s/p Lap Appy on 7/29, admitted by Dr Hector Adams. Started on cipro and flagyl - completed 6 day course. Started on lasix and HTN control for pulm edema. Required bipap. Troponins came back elevated at 5.2. Echo in 2015 with EF 40-45% AND Grade 2 DD, now with EF 20%. Cardiology following and heparin gtt started 7/29. Considered NSTEMI with recs for medical mgmt. Meds adjusted to continue ace and coreg. Developed afib rvr o/n 7/29 and started on amiodarone/cardizem gtt. Now transitioned to oral amiodarone with rate control. Required IV diuresis for ac/ch systolic CHF exacerbation. Diuresed well and home dose of lasix increased at discharge. Was found to have LUE superficial thrombosis from IV site. Instructed to apply warm compresses to LUE.  Stable for discharge home. Has supportive family that lives with her. Significant Diagnostic Studies:   Final result (Exam End: 7/28/2018  4:53 AM) Open        Study Result      Portable AP upright chest     DATE: 7/28/2018, 0457 hours     Comparison exam 7/27/2018     CLINICAL INFORMATION: Acute CHF     Heart is normal in size and mediastinum unremarkable. Minimal infiltrate remains  at the left lung base, left lung have not significantly improved. There is hazy  infiltrate right upper and lower lobe. Right lung is also shown moderate  improvement. No pleural effusion.     IMPRESSION  IMPRESSION: Interval improvement in the appearance of the chest     Final result (Exam End: 7/27/2018  5:44 PM) Open        Study Result      Portable chest:      History: Acute respiratory distress.       Comparison: 12/28/2015     Findings: A single view of the chest was obtained at 1750 hours.     The cardiac and mediastinal silhouette are normal in size and configuration. Moderately extensive bilateral airspace opacities are present, right greater  than left. There are no significant pleural effusions.     IMPRESSION  IMPRESSION: Bilateral airspace opacities, right greater than left. The findings  would raise suspicion for pulmonary edema and/or pneumonia.               Labs: Results:       Chemistry Recent Labs      08/02/18   0338  08/01/18   0358  07/31/18   0329   GLU  103*  112*  131*   NA  142  141  139   K  3.2*  3.7  4.4   CL  103  106  106   CO2  27  27  27   BUN  16  17  18   CREA  0.83  0.72  0.73   CA  8.6  8.3  8.5   AGAP  12  8  6*      CBC w/Diff Recent Labs      08/02/18   0338  08/01/18   0826  07/31/18   0329   WBC  9.5  11.5*  14.9*   RBC  3.50*  3.72*  3.53*   HGB  11.1*  11.8  11.2*   HCT  33.5*  36.1  34.5*   PLT  224  239  217      Cardiac Enzymes No results for input(s): CPK, CKND1, YASH in the last 72 hours.     No lab exists for component: CKRMB, TROIP   Coagulation Recent Labs      07/31/18   0329  07/30/18 1910   APTT  92.5*  50.9*       Lipid Panel Lab Results   Component Value Date/Time    Cholesterol, total 151 11/21/2008 05:07 AM    HDL Cholesterol 38 (L) 11/21/2008 05:07 AM    LDL, calculated 82.6 11/21/2008 05:07 AM    VLDL, calculated 30.4 (H) 11/21/2008 05:07 AM    Triglyceride 152 (H) 11/21/2008 05:07 AM    CHOL/HDL Ratio 4.0 11/21/2008 05:07 AM      BNP No results for input(s): BNPP in the last 72 hours. Liver Enzymes No results for input(s): TP, ALB, TBIL, AP, SGOT, GPT in the last 72 hours. No lab exists for component: DBIL   Thyroid Studies Lab Results   Component Value Date/Time    T4, Total 7.5 07/28/2018 03:16 AM    TSH 0.057 (L) 07/28/2018 03:16 AM            Discharge Exam:  Visit Vitals    /60 (BP 1 Location: Right arm, BP Patient Position: At rest)    Pulse 84    Temp 98.5 °F (36.9 °C)    Resp 18    Ht 5' 4\" (1.626 m)    Wt 48.9 kg (107 lb 14.4 oz)    SpO2 94%    Breastfeeding No    BMI 18.52 kg/m2     General appearance: alert, cooperative, no distress, appears stated age  Lungs: clear to auscultation bilaterally  Heart: regular rate and rhythm, S1, S2 normal, no murmur, click, rub or gallop  Abdomen: soft, non-tender. Bowel sounds normal. No masses,  no organomegaly  Extremities: no cyanosis or edema  Neurologic: Grossly normal    Disposition: home  Discharge Condition: stable  Patient Instructions:   Current Discharge Medication List      START taking these medications    Details   amiodarone (CORDARONE) 200 mg tablet Take 1 Tab by mouth daily. Qty: 30 Tab, Refills: 0      carvedilol (COREG) 3.125 mg tablet Take 1 Tab by mouth two (2) times daily (with meals). Qty: 60 Tab, Refills: 0      clopidogrel (PLAVIX) 75 mg tab Take 1 Tab by mouth daily. Qty: 30 Tab, Refills: 0      pantoprazole (PROTONIX) 40 mg tablet Take 1 Tab by mouth Daily (before breakfast). Qty: 30 Tab, Refills: 0      potassium chloride (K-DUR, KLOR-CON) 20 mEq tablet Take 1 Tab by mouth daily.   Qty: 30 Tab, Refills: 0         CONTINUE these medications which have CHANGED    Details   furosemide (LASIX) 40 mg tablet Take 1 Tab by mouth daily. Qty: 30 Tab, Refills: 0      lisinopril (PRINIVIL, ZESTRIL) 2.5 mg tablet Take 1 Tab by mouth daily. Qty: 30 Tab, Refills: 0         CONTINUE these medications which have NOT CHANGED    Details   nitroglycerin (NITRO-DUR) 0.4 mg/hr 1 Patch by TransDERmal route daily. 9pm-11am      cholecalciferol, vitamin D3, (VITAMIN D3) 2,000 unit tab Take  by mouth daily. aspirin 81 mg chewable tablet Take 81 mg by mouth daily. oxyCODONE IR (OXY-IR) 15 mg immediate release tablet Take 15 mg by mouth every four (4) hours as needed for Pain.      pyridoxine (VITAMIN B-6) 100 mg tablet Take 100 mg by mouth daily.  Takes at [employer] [facility] taking these medications       metoprolol (LOPRESSOR) 50 mg tablet Comments:   Reason for Stopping:               Activity: as tolerated  Diet: cardiac    Follow-up  ·   PCP in 1 week, Cardiology in 2-3 weeks  Time spent to discharge patient 35 minutes  Signed:  Teresa Bourne DO  8/2/2018  9:40 AM

## 2018-08-02 NOTE — PROGRESS NOTES
Problem: Falls - Risk of 
Goal: *Absence of Falls Document Archana Latin Fall Risk and appropriate interventions in the flowsheet. Outcome: Progressing Towards Goal 
Fall Risk Interventions: 
Mobility Interventions: Patient to call before getting OOB Medication Interventions: Patient to call before getting OOB, Teach patient to arise slowly, Bed/chair exit alarm Elimination Interventions: Call light in reach, Patient to call for help with toileting needs Problem: Pressure Injury - Risk of 
Goal: *Prevention of pressure injury Document Ahsan Scale and appropriate interventions in the flowsheet. Pressure Injury Interventions: 
Sensory Interventions: Assess changes in LOC Activity Interventions: Increase time out of bed, Pressure redistribution bed/mattress(bed type), PT/OT evaluation Mobility Interventions: Pressure redistribution bed/mattress (bed type), PT/OT evaluation Nutrition Interventions: Document food/fluid/supplement intake, Offer support with meals,snacks and hydration Friction and Shear Interventions: Apply protective barrier, creams and emollients, Feet elevated on foot rest, Foam dressings/transparent film/skin sealants, HOB 30 degrees or less, Lift sheet, Lift team/patient mobility team, Minimize layers, Sit at 90-degree angle, Transfer aides:transfer board/Brandon lift/ceiling lift, Transferring/repositioning devices, Trapeze to reposition

## 2018-08-02 NOTE — PROGRESS NOTES
[address] Face to Face Encounter Patients Name: Tiesha Archer    YOB: 1924 Ordering Physician: Alejo Kendrick MD 
 
Primary Diagnosis: Appendicitis Elevated Troponin 
acute appendicitis NSTEMI (non-ST elevated myocardial infarction) ([facility] Appendicitis, acute Respiratory failure with hypoxia and hypercapnia ([facility] Date of Face to Face:   8/2/2018 Face to Face Encounter findings are related to primary reason for home care:   yes. 1. I certify that the patient needs intermittent care as follows: skilled nursing care:  skilled observation/assessment, patient education 
physical therapy: strengthening 
occupational therapy:  ADL safety (ie. cooking, bathing, dressing) 2. I certify that this patient is homebound, that is: Patient's condition makes leaving the home medically contraindicated; and patient has a normal inability to leave the home and leaving the home requires considerable and taxing effort. Patient may leave the home for infrequent and short duration for medical reasons, and occasional absences for non-medical reasons. Homebound status is due to the following functional limitations:  
 
3. I certify that this patient is under my care and that I, or a nurse practitioner or  [id number], or clinical nurse specialist, or certified nurse midwife, working with me, had a Face-to-Face Encounter that meets the physician Face-to-Face Encounter requirements. The following are the clinical findings from the [address] [place] [address] encounter that support the need for skilled services and is a summary of the encounter:  
 
See Progress notes and d/c summary Ortega Avitia RN 
8/2/2018 THE FOLLOWING TO BE COMPLETED BY THE COMMUNITY PHYSICIAN: 
 
I concur with the findings described above from the [facility] encounter that this patient is homebound and in need of a skilled service.  
 
Certifying Physician: _____________________________________ Printed Certifying Physician Name: _____________________________________ Date: _________________

## 2018-08-02 NOTE — PROGRESS NOTES
Discharge instructions reviewed with pt and family . Prescriptions given for new medications and med info sheets provided for all new medications. Opportunity for questions provided. pt voiced understanding of all discharge instructions.   
Iv and monitor removed by primary Rn

## 2018-08-02 NOTE — PROGRESS NOTES
8/2/2018 9:15 AM 
 
Admit Date: 7/28/2018 Admit Diagnosis: Appendicitis; Elevated Troponin;acute appendicitis;NSTEMI (n* Subjective: No cp or sob Objective:  
  
Visit Vitals  /60 (BP 1 Location: Right arm, BP Patient Position: At rest)  Pulse 84  Temp 98.5 °F (36.9 °C)  Resp 18  Ht 5' 4\" (1.626 m)  Wt 48.9 kg (107 lb 14.4 oz)  SpO2 94%  Breastfeeding No  
 BMI 18.52 kg/m2 Physical Exam: 
[place], Well Nourished, No Acute Distress, Alert & Oriented x 3, appropriate mood. Neck- supple, no JVD 
CV- regular rate and rhythm no MRG Lung- clear bilaterally Abd- soft, nontender, nondistended Ext- no edema bilaterally. Skin- warm and dry Data Review:  
Recent Labs 08/02/18 
 8548 NA  142  
K  3.2*  
BUN  16  
CREA  0.83 WBC  9.5 HGB  11.1*  
HCT  33.5*  
PLT  224 Assessment/Plan: Active Problems: 
  Acute respiratory failure with hypoxia and hypercapnia ([address] (11/29/2015) CAD (coronary artery disease) (11/29/2015) Hypertension (12/1/2015)Improved with current therapy. Will continue medications Acute appendicitis (7/27/2018) Hypokalemia (7/27/2018) Sepsis ([address] (7/28/2018) Appendicitis, acute (7/29/2018) Respiratory failure with hypoxia and hypercapnia ([address] (7/29/2018) Volume overload (7/30/2018) Demand ischemia ([address] (7/30/2018) Systolic CHF, acute on chronic ([address] (7/31/2018)Improved with current therapy. Will continue medications Replete K and add daily K- ok for dc with us EF 20- add ace- change to coreg

## 2018-08-02 NOTE — PROGRESS NOTES
Bedside and Verbal shift change report given to self (oncoming nurse) by Guru Pelletier RN (offgoing nurse). Report included the following information SBAR, Kardex, MAR and Recent Results.

## 2018-08-02 NOTE — PROGRESS NOTES
Problem: Falls - Risk of 
Goal: *Absence of Falls Document [place] Fall Risk and appropriate interventions in the flowsheet. Outcome: Progressing Towards Goal 
Fall Risk Interventions: 
Mobility Interventions: Bed/chair exit alarm, Communicate number of staff needed for ambulation/transfer, Patient to call before getting OOB Medication Interventions: Bed/chair exit alarm, Patient to call before getting OOB, Teach patient to arise slowly Elimination Interventions: Call light in reach, Bed/chair exit alarm, Toileting schedule/hourly rounds, Patient to call for help with toileting needs Problem: Pressure Injury - Risk of 
Goal: *Prevention of pressure injury Document Ahsan Scale and appropriate interventions in the flowsheet. Outcome: Progressing Towards Goal 
Pressure Injury Interventions: 
Sensory Interventions: Float heels, Discuss PT/OT consult with provider Moisture Interventions: Check for incontinence Q2 hours and as needed Activity Interventions: Increase time out of bed, Pressure redistribution bed/mattress(bed type) Mobility Interventions: Float heels, HOB 30 degrees or less, PT/OT evaluation Nutrition Interventions: Document food/fluid/supplement intake Friction and Shear Interventions: HOB 30 degrees or less

## 2018-08-03 ENCOUNTER — TELEPHONE (OUTPATIENT)
Dept: HOME HEALTH SERVICES | Facility: HOME HEALTH | Age: 83
End: 2018-08-03

## 2018-08-03 ENCOUNTER — PATIENT OUTREACH (OUTPATIENT)
Dept: CASE MANAGEMENT | Age: 83
End: 2018-08-03

## 2018-08-03 NOTE — TELEPHONE ENCOUNTER
[address] [place] [address] Anibal Santos Pharmacist consult. Mrs. Donaldo Javier went home yesterday prior to my consult. I spoke with her daughter Shaila Corets today. She said her mother does not hear well, so to talk with her. I explained my part of the [facility] team.  She said her mother ate a good breakfast and has walked around the house a little today. It was scary for a 80year old to have surgery for appendicitis. I reviewed her discharge medications. They got her 5 new prescriptions yesterday and started the Coreg last night and the others this morning. Shaila Cortes puts the medications in a daily pill planner. I gave her my cell phone number and asked to be called with any medication questions or issues. She said I could call back any time.

## 2018-08-03 NOTE — PROGRESS NOTES
Transition of Care Discharge Follow-up Questionnaire Date/Time of [facility] Outreach: 8/3/18 1:32 PM  
What was the patient hospitalized for? Patient was hospitalized for Sepsis Does the patient understand his/her diagnosis and/or treatment and what happened during the hospitalization? CM Assessed Risk for Readmission: 
 
 
 
Patient stated Risk for Readmission: 
 
 Spoke to daughter who consented to [facility] A [facility]UN[facility] outreach, and verbalized understanding of treatment and diagnosis. Risk for Readmission completed and care coordinator does not feel patient would likely readmit. Patient has good family support. Daughter agreed. Daughter stats someone is always home with patient. Did the patient receive discharge instructions? Daughter states d/c instructions received. Review any discharge instructions (see notes in [facility]). Ask patient if they understand these. Do they have any questions? Daughter discussed discharge plan and instruction as Daughter understood it to be with Care Coordinator. Which I have documented in the pertinent areas throughout this progress note. Were home services ordered (nursing, PT, OT, ST, etc.)? [facility] ordered at d/c PT, OT, RN services. If so, has the first visit occurred? If not, why? (Assist with coordination of services if necessary.) Scheduled for tomorrow per daughter who spoke to [facility] staff today. Was any DME ordered? No DME ordered at d/c. If so, has it been received? If not, why?  (Assist with coordination of arranging DME orders if necessary.) N/A Complete a review of all medications (new, continued and discontinued meds per the D/C instructions and medication tab in [facility]). Review of all meds completed with Daughter and Care Coordinator. Cordarone, Coreg, Plavix, Protonix, K-Dur, Klor-Con prescribed at d/c. Were all new prescriptions filled? If not, why?  (Assist with obtainment of medications if necessary.) Yes.   
Does the patient understand the purpose and dosing instructions for all medications? (If patient has questions, provide explanation and education.) Indicated by Daughter to Care Coordinator, the purpose and dosing instructions for all medications were understood. Does the patient have any problems in performing ADLs? (If patient is unable to perform ADLs  what is the limiting factor(s)? Do they have a support system that can assist? If no support system is present, discuss possible assistance that they may be able to obtain.) Daughter states patient is independent with all ADLs. Please note family assists as needed per daughter. Does the patient have all follow-up appointments scheduled? 7 day f/up with PCP? 
 
7-14 day f/up with specialist? 
 
If f/up has not been made  what actions has the care coordinator made to accomplish this? Has transportation been arranged? [facility]monary follow-up should be within 7 days of discharge; all others should have PCP follow-up within 7 days of discharge; follow-ups with other specialists as appropriate or ordered.) PCP f/u 8/9. Cardiology f/u 8/15. Daughter states patient has transportation. Any other questions or concerns expressed by the patient? Gratitude stated and no further questions asked or needs identified. [facility] Call Completed By:  
Italia Hurtado LPN/ Care Coordinator IVEWTX:[phone] [place] [address] [place] [address] 
[email or web address] This note will not be viewable in [address]

## 2018-08-04 ENCOUNTER — HOME CARE VISIT (OUTPATIENT)
Dept: SCHEDULING | Facility: HOME HEALTH | Age: 83
End: 2018-08-04
Payer: MEDICARE

## 2018-08-04 PROCEDURE — 400013 HH SOC

## 2018-08-04 PROCEDURE — 3331090002 HH PPS REVENUE DEBIT

## 2018-08-04 PROCEDURE — 3331090001 HH PPS REVENUE CREDIT

## 2018-08-04 PROCEDURE — G0299 HHS/HOSPICE OF RN EA 15 MIN: HCPCS

## 2018-08-05 VITALS
TEMPERATURE: 99.3 F | RESPIRATION RATE: 20 BRPM | SYSTOLIC BLOOD PRESSURE: 128 MMHG | HEART RATE: 64 BPM | DIASTOLIC BLOOD PRESSURE: 60 MMHG

## 2018-08-05 PROCEDURE — 3331090001 HH PPS REVENUE CREDIT

## 2018-08-05 PROCEDURE — 3331090002 HH PPS REVENUE DEBIT

## 2018-08-06 PROCEDURE — 3331090001 HH PPS REVENUE CREDIT

## 2018-08-06 PROCEDURE — 3331090002 HH PPS REVENUE DEBIT

## 2018-08-07 ENCOUNTER — HOME CARE VISIT (OUTPATIENT)
Dept: SCHEDULING | Facility: HOME HEALTH | Age: 83
End: 2018-08-07
Payer: MEDICARE

## 2018-08-07 VITALS
SYSTOLIC BLOOD PRESSURE: 94 MMHG | RESPIRATION RATE: 20 BRPM | HEART RATE: 57 BPM | DIASTOLIC BLOOD PRESSURE: 40 MMHG | OXYGEN SATURATION: 96 % | TEMPERATURE: 98 F

## 2018-08-07 PROCEDURE — G0299 HHS/HOSPICE OF RN EA 15 MIN: HCPCS

## 2018-08-07 PROCEDURE — 3331090002 HH PPS REVENUE DEBIT

## 2018-08-07 PROCEDURE — G0151 HHCP-SERV OF PT,EA 15 MIN: HCPCS

## 2018-08-07 PROCEDURE — G0152 HHCP-SERV OF OT,EA 15 MIN: HCPCS

## 2018-08-07 PROCEDURE — 3331090001 HH PPS REVENUE CREDIT

## 2018-08-08 VITALS
RESPIRATION RATE: 18 BRPM | DIASTOLIC BLOOD PRESSURE: 56 MMHG | HEART RATE: 66 BPM | TEMPERATURE: 98.9 F | SYSTOLIC BLOOD PRESSURE: 102 MMHG

## 2018-08-08 PROCEDURE — 3331090002 HH PPS REVENUE DEBIT

## 2018-08-08 PROCEDURE — 3331090001 HH PPS REVENUE CREDIT

## 2018-08-09 ENCOUNTER — HOME CARE VISIT (OUTPATIENT)
Dept: SCHEDULING | Facility: HOME HEALTH | Age: 83
End: 2018-08-09
Payer: MEDICARE

## 2018-08-09 PROCEDURE — 3331090001 HH PPS REVENUE CREDIT

## 2018-08-09 PROCEDURE — G0299 HHS/HOSPICE OF RN EA 15 MIN: HCPCS

## 2018-08-09 PROCEDURE — 3331090002 HH PPS REVENUE DEBIT

## 2018-08-10 ENCOUNTER — HOME CARE VISIT (OUTPATIENT)
Dept: SCHEDULING | Facility: HOME HEALTH | Age: 83
End: 2018-08-10
Payer: MEDICARE

## 2018-08-10 ENCOUNTER — TELEPHONE (OUTPATIENT)
Dept: HOME HEALTH SERVICES | Facility: HOME HEALTH | Age: 83
End: 2018-08-10

## 2018-08-10 VITALS
HEART RATE: 68 BPM | TEMPERATURE: 99.5 F | OXYGEN SATURATION: 99 % | RESPIRATION RATE: 16 BRPM | DIASTOLIC BLOOD PRESSURE: 60 MMHG | SYSTOLIC BLOOD PRESSURE: 108 MMHG

## 2018-08-10 VITALS
OXYGEN SATURATION: 99 % | DIASTOLIC BLOOD PRESSURE: 58 MMHG | SYSTOLIC BLOOD PRESSURE: 118 MMHG | HEART RATE: 60 BPM | TEMPERATURE: 98.7 F | RESPIRATION RATE: 16 BRPM

## 2018-08-10 VITALS
SYSTOLIC BLOOD PRESSURE: 101 MMHG | RESPIRATION RATE: 20 BRPM | DIASTOLIC BLOOD PRESSURE: 44 MMHG | TEMPERATURE: 98 F | HEART RATE: 61 BPM | OXYGEN SATURATION: 96 %

## 2018-08-10 VITALS
RESPIRATION RATE: 17 BRPM | OXYGEN SATURATION: 98 % | DIASTOLIC BLOOD PRESSURE: 90 MMHG | SYSTOLIC BLOOD PRESSURE: 130 MMHG | HEART RATE: 60 BPM | TEMPERATURE: 98 F

## 2018-08-10 PROCEDURE — 3331090002 HH PPS REVENUE DEBIT

## 2018-08-10 PROCEDURE — G0151 HHCP-SERV OF PT,EA 15 MIN: HCPCS

## 2018-08-10 PROCEDURE — 3331090001 HH PPS REVENUE CREDIT

## 2018-08-10 PROCEDURE — G0158 HHC OT ASSISTANT EA 15: HCPCS

## 2018-08-11 PROCEDURE — 3331090001 HH PPS REVENUE CREDIT

## 2018-08-11 PROCEDURE — 3331090002 HH PPS REVENUE DEBIT

## 2018-08-12 PROCEDURE — 3331090002 HH PPS REVENUE DEBIT

## 2018-08-12 PROCEDURE — 3331090001 HH PPS REVENUE CREDIT

## 2018-08-13 ENCOUNTER — HOME CARE VISIT (OUTPATIENT)
Dept: SCHEDULING | Facility: HOME HEALTH | Age: 83
End: 2018-08-13
Payer: MEDICARE

## 2018-08-13 VITALS
SYSTOLIC BLOOD PRESSURE: 112 MMHG | HEART RATE: 60 BPM | DIASTOLIC BLOOD PRESSURE: 60 MMHG | TEMPERATURE: 98.5 F | RESPIRATION RATE: 18 BRPM

## 2018-08-13 PROCEDURE — 3331090002 HH PPS REVENUE DEBIT

## 2018-08-13 PROCEDURE — 3331090001 HH PPS REVENUE CREDIT

## 2018-08-14 ENCOUNTER — HOME CARE VISIT (OUTPATIENT)
Dept: SCHEDULING | Facility: HOME HEALTH | Age: 83
End: 2018-08-14
Payer: MEDICARE

## 2018-08-14 VITALS
OXYGEN SATURATION: 97 % | DIASTOLIC BLOOD PRESSURE: 58 MMHG | HEART RATE: 67 BPM | SYSTOLIC BLOOD PRESSURE: 111 MMHG | TEMPERATURE: 97.7 F | RESPIRATION RATE: 17 BRPM

## 2018-08-14 PROCEDURE — G0158 HHC OT ASSISTANT EA 15: HCPCS

## 2018-08-14 PROCEDURE — 3331090002 HH PPS REVENUE DEBIT

## 2018-08-14 PROCEDURE — 3331090001 HH PPS REVENUE CREDIT

## 2018-08-15 PROCEDURE — 3331090001 HH PPS REVENUE CREDIT

## 2018-08-15 PROCEDURE — 3331090002 HH PPS REVENUE DEBIT

## 2018-08-16 ENCOUNTER — HOME CARE VISIT (OUTPATIENT)
Dept: SCHEDULING | Facility: HOME HEALTH | Age: 83
End: 2018-08-16
Payer: MEDICARE

## 2018-08-16 VITALS
RESPIRATION RATE: 16 BRPM | OXYGEN SATURATION: 97 % | DIASTOLIC BLOOD PRESSURE: 40 MMHG | SYSTOLIC BLOOD PRESSURE: 126 MMHG | TEMPERATURE: 98.7 F | BODY MASS INDEX: 18.61 KG/M2 | HEART RATE: 60 BPM | WEIGHT: 108.4 LBS

## 2018-08-16 VITALS
DIASTOLIC BLOOD PRESSURE: 60 MMHG | SYSTOLIC BLOOD PRESSURE: 113 MMHG | HEART RATE: 96 BPM | TEMPERATURE: 97.6 F | OXYGEN SATURATION: 97 % | RESPIRATION RATE: 17 BRPM

## 2018-08-16 PROCEDURE — 3331090001 HH PPS REVENUE CREDIT

## 2018-08-16 PROCEDURE — G0158 HHC OT ASSISTANT EA 15: HCPCS

## 2018-08-16 PROCEDURE — 3331090002 HH PPS REVENUE DEBIT

## 2018-08-16 PROCEDURE — G0299 HHS/HOSPICE OF RN EA 15 MIN: HCPCS

## 2018-08-17 ENCOUNTER — TELEPHONE (OUTPATIENT)
Dept: HOME HEALTH SERVICES | Facility: HOME HEALTH | Age: 83
End: 2018-08-17

## 2018-08-17 ENCOUNTER — HOME CARE VISIT (OUTPATIENT)
Dept: SCHEDULING | Facility: HOME HEALTH | Age: 83
End: 2018-08-17
Payer: MEDICARE

## 2018-08-17 PROCEDURE — G0151 HHCP-SERV OF PT,EA 15 MIN: HCPCS

## 2018-08-17 PROCEDURE — 3331090001 HH PPS REVENUE CREDIT

## 2018-08-17 PROCEDURE — 3331090002 HH PPS REVENUE DEBIT

## 2018-08-18 VITALS
HEART RATE: 56 BPM | SYSTOLIC BLOOD PRESSURE: 121 MMHG | DIASTOLIC BLOOD PRESSURE: 58 MMHG | TEMPERATURE: 98.9 F | RESPIRATION RATE: 16 BRPM | OXYGEN SATURATION: 96 %

## 2018-08-18 PROCEDURE — 3331090001 HH PPS REVENUE CREDIT

## 2018-08-18 PROCEDURE — 3331090002 HH PPS REVENUE DEBIT

## 2018-08-19 PROCEDURE — 3331090002 HH PPS REVENUE DEBIT

## 2018-08-19 PROCEDURE — 3331090001 HH PPS REVENUE CREDIT

## 2018-08-20 ENCOUNTER — HOME CARE VISIT (OUTPATIENT)
Dept: SCHEDULING | Facility: HOME HEALTH | Age: 83
End: 2018-08-20
Payer: MEDICARE

## 2018-08-20 VITALS
TEMPERATURE: 99.1 F | DIASTOLIC BLOOD PRESSURE: 55 MMHG | RESPIRATION RATE: 16 BRPM | OXYGEN SATURATION: 92 % | SYSTOLIC BLOOD PRESSURE: 110 MMHG | HEART RATE: 64 BPM

## 2018-08-20 PROCEDURE — G0152 HHCP-SERV OF OT,EA 15 MIN: HCPCS

## 2018-08-20 PROCEDURE — 3331090001 HH PPS REVENUE CREDIT

## 2018-08-20 PROCEDURE — 3331090002 HH PPS REVENUE DEBIT

## 2018-08-21 PROCEDURE — 3331090002 HH PPS REVENUE DEBIT

## 2018-08-21 PROCEDURE — 3331090001 HH PPS REVENUE CREDIT

## 2018-08-22 ENCOUNTER — PATIENT OUTREACH (OUTPATIENT)
Dept: CASE MANAGEMENT | Age: 83
End: 2018-08-22

## 2018-08-22 PROCEDURE — 3331090001 HH PPS REVENUE CREDIT

## 2018-08-22 PROCEDURE — 3331090002 HH PPS REVENUE DEBIT

## 2018-08-22 NOTE — PROGRESS NOTES
Attempted f/u call, and care giver answered and stated that patient and other contacts were unavailable but she would give them my message requesting a return call. Meek Mathew LPN/ Care Coordinator  [address] [facility] [address] / [place] [address] [facility]  [email or web address]. [facility] Hospitalhis note will not be viewable in [address]

## 2018-08-23 ENCOUNTER — TELEPHONE (OUTPATIENT)
Dept: HOME HEALTH SERVICES | Facility: HOME HEALTH | Age: 83
End: 2018-08-23

## 2018-08-23 PROCEDURE — 3331090002 HH PPS REVENUE DEBIT

## 2018-08-23 PROCEDURE — 3331090001 HH PPS REVENUE CREDIT

## 2018-08-24 ENCOUNTER — HOME CARE VISIT (OUTPATIENT)
Dept: SCHEDULING | Facility: HOME HEALTH | Age: 83
End: 2018-08-24
Payer: MEDICARE

## 2018-08-24 VITALS
RESPIRATION RATE: 18 BRPM | HEART RATE: 63 BPM | SYSTOLIC BLOOD PRESSURE: 158 MMHG | DIASTOLIC BLOOD PRESSURE: 58 MMHG | TEMPERATURE: 97.7 F | OXYGEN SATURATION: 97 %

## 2018-08-24 PROCEDURE — G0299 HHS/HOSPICE OF RN EA 15 MIN: HCPCS

## 2018-08-24 PROCEDURE — 3331090002 HH PPS REVENUE DEBIT

## 2018-08-24 PROCEDURE — 3331090001 HH PPS REVENUE CREDIT

## 2018-08-25 PROCEDURE — 3331090001 HH PPS REVENUE CREDIT

## 2018-08-25 PROCEDURE — 3331090002 HH PPS REVENUE DEBIT

## 2018-08-26 PROCEDURE — 3331090001 HH PPS REVENUE CREDIT

## 2018-08-26 PROCEDURE — 3331090002 HH PPS REVENUE DEBIT

## 2018-08-27 PROCEDURE — 3331090001 HH PPS REVENUE CREDIT

## 2018-08-27 PROCEDURE — 3331090002 HH PPS REVENUE DEBIT

## 2018-08-30 ENCOUNTER — PATIENT OUTREACH (OUTPATIENT)
Dept: CASE MANAGEMENT | Age: 83
End: 2018-08-30

## 2018-08-30 ENCOUNTER — APPOINTMENT (OUTPATIENT)
Dept: GENERAL RADIOLOGY | Age: 83
DRG: 291 | End: 2018-08-30
Payer: MEDICARE

## 2018-08-30 ENCOUNTER — HOSPITAL ENCOUNTER (INPATIENT)
Age: 83
LOS: 1 days | Discharge: HOME | DRG: 291 | End: 2018-08-31
Admitting: HOSPITALIST
Payer: MEDICARE

## 2018-08-30 DIAGNOSIS — I50.23 ACUTE ON CHRONIC SYSTOLIC CONGESTIVE HEART FAILURE (HCC): Primary | ICD-10-CM

## 2018-08-30 PROBLEM — I50.9 ACUTE CHF (CONGESTIVE HEART FAILURE) (HCC): Status: ACTIVE | Noted: 2018-08-30

## 2018-08-30 LAB
ALBUMIN SERPL-MCNC: 3.1 G/DL (ref 3.2–4.6)
ALBUMIN/GLOB SERPL: 0.9 {RATIO} (ref 1.2–3.5)
ALP SERPL-CCNC: 80 U/L (ref 50–136)
ALT SERPL-CCNC: 49 U/L (ref 12–65)
ANION GAP SERPL CALC-SCNC: 10 MMOL/L (ref 7–16)
AST SERPL-CCNC: 53 U/L (ref 15–37)
ATRIAL RATE: 48 BPM
BACTERIA URNS QL MICRO: 0 /HPF
BASOPHILS # BLD: 0.1 K/UL (ref 0–0.2)
BASOPHILS NFR BLD: 1 % (ref 0–2)
BILIRUB SERPL-MCNC: 0.6 MG/DL (ref 0.2–1.1)
BNP SERPL-MCNC: 2169 PG/ML
BUN SERPL-MCNC: 10 MG/DL (ref 8–23)
CALCIUM SERPL-MCNC: 8.7 MG/DL (ref 8.3–10.4)
CALCULATED P AXIS, ECG09: 95 DEGREES
CALCULATED R AXIS, ECG10: -80 DEGREES
CALCULATED T AXIS, ECG11: 97 DEGREES
CASTS URNS QL MICRO: NORMAL /LPF
CHLORIDE SERPL-SCNC: 108 MMOL/L (ref 98–107)
CO2 SERPL-SCNC: 25 MMOL/L (ref 21–32)
CREAT SERPL-MCNC: 0.83 MG/DL (ref 0.6–1)
DIAGNOSIS, 93000: NORMAL
DIFFERENTIAL METHOD BLD: ABNORMAL
EOSINOPHIL # BLD: 0.3 K/UL (ref 0–0.8)
EOSINOPHIL NFR BLD: 2 % (ref 0.5–7.8)
EPI CELLS #/AREA URNS HPF: NORMAL /HPF
ERYTHROCYTE [DISTWIDTH] IN BLOOD BY AUTOMATED COUNT: 13.7 %
GLOBULIN SER CALC-MCNC: 3.6 G/DL (ref 2.3–3.5)
GLUCOSE SERPL-MCNC: 163 MG/DL (ref 65–100)
HCT VFR BLD AUTO: 34.4 % (ref 35.8–46.3)
HGB BLD-MCNC: 10.8 G/DL (ref 11.7–15.4)
IMM GRANULOCYTES # BLD: 0.1 K/UL (ref 0–0.5)
IMM GRANULOCYTES NFR BLD AUTO: 1 % (ref 0–5)
LYMPHOCYTES # BLD: 2.5 K/UL (ref 0.5–4.6)
LYMPHOCYTES NFR BLD: 22 % (ref 13–44)
MAGNESIUM SERPL-MCNC: 2.2 MG/DL (ref 1.8–2.4)
MCH RBC QN AUTO: 31.7 PG (ref 26.1–32.9)
MCHC RBC AUTO-ENTMCNC: 31.4 G/DL (ref 31.4–35)
MCV RBC AUTO: 100.9 FL (ref 79.6–97.8)
MONOCYTES # BLD: 1 K/UL (ref 0.1–1.3)
MONOCYTES NFR BLD: 9 % (ref 4–12)
NEUTS SEG # BLD: 7.4 K/UL (ref 1.7–8.2)
NEUTS SEG NFR BLD: 65 % (ref 43–78)
NRBC # BLD: 0 K/UL (ref 0–0.2)
PLATELET # BLD AUTO: 244 K/UL (ref 150–450)
PMV BLD AUTO: 10.3 FL (ref 9.4–12.3)
POTASSIUM SERPL-SCNC: 3.6 MMOL/L (ref 3.5–5.1)
PROT SERPL-MCNC: 6.7 G/DL (ref 6.3–8.2)
Q-T INTERVAL, ECG07: 378 MS
QRS DURATION, ECG06: 150 MS
QTC CALCULATION (BEZET), ECG08: 464 MS
RBC # BLD AUTO: 3.41 M/UL (ref 4.05–5.2)
RBC #/AREA URNS HPF: NORMAL /HPF
SODIUM SERPL-SCNC: 143 MMOL/L (ref 136–145)
T4 FREE SERPL-MCNC: 1.7 NG/DL (ref 0.78–1.46)
TROPONIN I BLD-MCNC: 0.05 NG/ML (ref 0.02–0.05)
TROPONIN I SERPL-MCNC: 0.04 NG/ML (ref 0.02–0.05)
TROPONIN I SERPL-MCNC: 0.06 NG/ML (ref 0.02–0.05)
TSH SERPL DL<=0.005 MIU/L-ACNC: 0.28 UIU/ML (ref 0.36–3.74)
VENTRICULAR RATE, ECG03: 91 BPM
WBC # BLD AUTO: 11.4 K/UL (ref 4.3–11.1)
WBC URNS QL MICRO: NORMAL /HPF

## 2018-08-30 PROCEDURE — 99285 EMERGENCY DEPT VISIT HI MDM: CPT

## 2018-08-30 PROCEDURE — 84484 ASSAY OF TROPONIN QUANT: CPT

## 2018-08-30 PROCEDURE — 96374 THER/PROPH/DIAG INJ IV PUSH: CPT

## 2018-08-30 PROCEDURE — 97530 THERAPEUTIC ACTIVITIES: CPT

## 2018-08-30 PROCEDURE — 83735 ASSAY OF MAGNESIUM: CPT

## 2018-08-30 PROCEDURE — 85025 COMPLETE CBC W/AUTO DIFF WBC: CPT

## 2018-08-30 PROCEDURE — 97161 PT EVAL LOW COMPLEX 20 MIN: CPT

## 2018-08-30 PROCEDURE — 71045 X-RAY EXAM CHEST 1 VIEW: CPT

## 2018-08-30 PROCEDURE — 74011250636 HC RX REV CODE- 250/636: Performed by: HOSPITALIST

## 2018-08-30 PROCEDURE — 74011250636 HC RX REV CODE- 250/636

## 2018-08-30 PROCEDURE — 74011250637 HC RX REV CODE- 250/637: Performed by: HOSPITALIST

## 2018-08-30 PROCEDURE — 65660000000 HC RM CCU STEPDOWN

## 2018-08-30 PROCEDURE — 74011250637 HC RX REV CODE- 250/637: Performed by: NURSE PRACTITIONER

## 2018-08-30 PROCEDURE — 84439 ASSAY OF FREE THYROXINE: CPT

## 2018-08-30 PROCEDURE — 83880 ASSAY OF NATRIURETIC PEPTIDE: CPT

## 2018-08-30 PROCEDURE — 84443 ASSAY THYROID STIM HORMONE: CPT

## 2018-08-30 PROCEDURE — 80053 COMPREHEN METABOLIC PANEL: CPT

## 2018-08-30 PROCEDURE — 97165 OT EVAL LOW COMPLEX 30 MIN: CPT

## 2018-08-30 PROCEDURE — 81015 MICROSCOPIC EXAM OF URINE: CPT

## 2018-08-30 PROCEDURE — 93005 ELECTROCARDIOGRAM TRACING: CPT

## 2018-08-30 PROCEDURE — 36415 COLL VENOUS BLD VENIPUNCTURE: CPT

## 2018-08-30 PROCEDURE — 81003 URINALYSIS AUTO W/O SCOPE: CPT

## 2018-08-30 RX ORDER — ENOXAPARIN SODIUM 100 MG/ML
30 INJECTION SUBCUTANEOUS EVERY 24 HOURS
Status: DISCONTINUED | OUTPATIENT
Start: 2018-08-30 | End: 2018-08-31 | Stop reason: HOSPADM

## 2018-08-30 RX ORDER — LANOLIN ALCOHOL/MO/W.PET/CERES
100 CREAM (GRAM) TOPICAL DAILY
Status: DISCONTINUED | OUTPATIENT
Start: 2018-08-30 | End: 2018-08-31 | Stop reason: HOSPADM

## 2018-08-30 RX ORDER — FUROSEMIDE 10 MG/ML
40 INJECTION INTRAMUSCULAR; INTRAVENOUS
Status: COMPLETED | OUTPATIENT
Start: 2018-08-30 | End: 2018-08-30

## 2018-08-30 RX ORDER — MELATONIN
2000 DAILY
Status: DISCONTINUED | OUTPATIENT
Start: 2018-08-31 | End: 2018-08-31 | Stop reason: HOSPADM

## 2018-08-30 RX ORDER — ONDANSETRON 2 MG/ML
4 INJECTION INTRAMUSCULAR; INTRAVENOUS
Status: DISCONTINUED | OUTPATIENT
Start: 2018-08-30 | End: 2018-08-31 | Stop reason: HOSPADM

## 2018-08-30 RX ORDER — NITROGLYCERIN 80 MG/1
1 PATCH TRANSDERMAL DAILY
Status: DISCONTINUED | OUTPATIENT
Start: 2018-08-30 | End: 2018-08-31 | Stop reason: HOSPADM

## 2018-08-30 RX ORDER — DOCUSATE SODIUM 100 MG/1
100 CAPSULE, LIQUID FILLED ORAL
Status: DISCONTINUED | OUTPATIENT
Start: 2018-08-30 | End: 2018-08-31 | Stop reason: HOSPADM

## 2018-08-30 RX ORDER — GUAIFENESIN 100 MG/5ML
81 LIQUID (ML) ORAL DAILY
Status: DISCONTINUED | OUTPATIENT
Start: 2018-08-30 | End: 2018-08-31 | Stop reason: HOSPADM

## 2018-08-30 RX ORDER — POTASSIUM CHLORIDE 20 MEQ/1
20 TABLET, EXTENDED RELEASE ORAL DAILY
Status: DISCONTINUED | OUTPATIENT
Start: 2018-08-30 | End: 2018-08-31 | Stop reason: HOSPADM

## 2018-08-30 RX ORDER — DOCUSATE SODIUM 100 MG/1
100 CAPSULE, LIQUID FILLED ORAL AS NEEDED
Status: DISCONTINUED | OUTPATIENT
Start: 2018-08-30 | End: 2018-08-30 | Stop reason: SDUPTHER

## 2018-08-30 RX ORDER — LISINOPRIL 5 MG/1
2.5 TABLET ORAL DAILY
Status: DISCONTINUED | OUTPATIENT
Start: 2018-08-30 | End: 2018-08-31 | Stop reason: HOSPADM

## 2018-08-30 RX ORDER — CARVEDILOL 3.12 MG/1
3.12 TABLET ORAL 2 TIMES DAILY WITH MEALS
Status: DISCONTINUED | OUTPATIENT
Start: 2018-08-30 | End: 2018-08-31

## 2018-08-30 RX ORDER — ACETAMINOPHEN 325 MG/1
650 TABLET ORAL
Status: DISCONTINUED | OUTPATIENT
Start: 2018-08-30 | End: 2018-08-31 | Stop reason: HOSPADM

## 2018-08-30 RX ORDER — CLOPIDOGREL BISULFATE 75 MG/1
75 TABLET ORAL DAILY
Status: DISCONTINUED | OUTPATIENT
Start: 2018-08-30 | End: 2018-08-31 | Stop reason: HOSPADM

## 2018-08-30 RX ORDER — OXYCODONE HYDROCHLORIDE 15 MG/1
15 TABLET ORAL
Status: DISCONTINUED | OUTPATIENT
Start: 2018-08-30 | End: 2018-08-31 | Stop reason: HOSPADM

## 2018-08-30 RX ORDER — POLYETHYLENE GLYCOL 3350 17 G/17G
17 POWDER, FOR SOLUTION ORAL DAILY PRN
Status: DISCONTINUED | OUTPATIENT
Start: 2018-08-30 | End: 2018-08-31 | Stop reason: HOSPADM

## 2018-08-30 RX ORDER — FUROSEMIDE 10 MG/ML
40 INJECTION INTRAMUSCULAR; INTRAVENOUS 2 TIMES DAILY
Status: DISCONTINUED | OUTPATIENT
Start: 2018-08-30 | End: 2018-08-31 | Stop reason: HOSPADM

## 2018-08-30 RX ORDER — PANTOPRAZOLE SODIUM 40 MG/1
40 TABLET, DELAYED RELEASE ORAL
Status: DISCONTINUED | OUTPATIENT
Start: 2018-08-30 | End: 2018-08-31 | Stop reason: HOSPADM

## 2018-08-30 RX ORDER — AMIODARONE HYDROCHLORIDE 200 MG/1
200 TABLET ORAL 2 TIMES DAILY
Status: DISCONTINUED | OUTPATIENT
Start: 2018-08-30 | End: 2018-08-31 | Stop reason: HOSPADM

## 2018-08-30 RX ADMIN — CARVEDILOL 3.12 MG: 3.12 TABLET, FILM COATED ORAL at 17:15

## 2018-08-30 RX ADMIN — FUROSEMIDE 40 MG: 10 INJECTION, SOLUTION INTRAMUSCULAR; INTRAVENOUS at 17:15

## 2018-08-30 RX ADMIN — AMIODARONE HYDROCHLORIDE 200 MG: 200 TABLET ORAL at 17:15

## 2018-08-30 RX ADMIN — AMIODARONE HYDROCHLORIDE 200 MG: 200 TABLET ORAL at 10:16

## 2018-08-30 RX ADMIN — POTASSIUM CHLORIDE 20 MEQ: 20 TABLET, EXTENDED RELEASE ORAL at 08:43

## 2018-08-30 RX ADMIN — LISINOPRIL 2.5 MG: 5 TABLET ORAL at 08:44

## 2018-08-30 RX ADMIN — FUROSEMIDE 40 MG: 10 INJECTION, SOLUTION INTRAMUSCULAR; INTRAVENOUS at 03:11

## 2018-08-30 RX ADMIN — CARVEDILOL 3.12 MG: 3.12 TABLET, FILM COATED ORAL at 08:44

## 2018-08-30 RX ADMIN — ASPIRIN 81 MG 81 MG: 81 TABLET ORAL at 08:43

## 2018-08-30 RX ADMIN — OXYCODONE HYDROCHLORIDE 15 MG: 15 TABLET ORAL at 08:43

## 2018-08-30 RX ADMIN — PANTOPRAZOLE SODIUM 40 MG: 40 TABLET, DELAYED RELEASE ORAL at 08:44

## 2018-08-30 RX ADMIN — ENOXAPARIN SODIUM 30 MG: 100 INJECTION SUBCUTANEOUS at 08:44

## 2018-08-30 RX ADMIN — OXYCODONE HYDROCHLORIDE 15 MG: 15 TABLET ORAL at 22:12

## 2018-08-30 RX ADMIN — FUROSEMIDE 40 MG: 10 INJECTION, SOLUTION INTRAMUSCULAR; INTRAVENOUS at 08:43

## 2018-08-30 RX ADMIN — CLOPIDOGREL BISULFATE 75 MG: 75 TABLET ORAL at 08:43

## 2018-08-30 NOTE — IP AVS SNAPSHOT
[address] 322 [facility] 
[phone] Patient: Juan Pablo Slade MRN: [id number] BCN:[id number] About your hospitalization You were admitted on:  August 30, 2018 You last received care in the:  [facility] 6 MED SURG You were discharged on:  August 31, 2018 Why you were hospitalized Your primary diagnosis was:  Systolic Chf, Acute On Chronic (Hcc) Your diagnoses also included:  Acute Respiratory Failure With Hypoxia And Hypercapnia (Hcc), Cad (Coronary Artery Disease), Hypertension, Volume Overload, Atrial Fibrillation (Hcc), Hypokalemia Follow-up Information Follow up With Details Comments Contact Info Wilton Tabor MD On 9/6/2018 at 9:00 [address] [facility] Mele Ledbetter and Estella Blankenship [facility] [address] 
[phone] Lamar Gaona MD Schedule an appointment as soon as possible for a visit in 1 week PATIENT WILL NEED TO CALL AND SCHEDULE. [facility] [address] 
[phone] Discharge Orders None A check armida indicates which time of day the medication should be taken. My Medications START taking these medications Instructions Each Dose to Equal  
 Morning Noon Evening Bedtime  
 amiodarone 200 mg tablet Commonly known as:  CORDARONE Your next dose is: This evening Take 1 Tab by mouth two (2) times a day. 200 mg CHANGE how you take these medications Instructions Each Dose to Equal  
 Morning Noon Evening Bedtime  
 carvedilol 6.25 mg tablet Commonly known as:  Chyna Albert What changed:   
- medication strength 
- how much to take Your next dose is: This evening Take 1 Tab by mouth two (2) times daily (with meals) for 30 days. 6.25 mg  
    
   
   
  
   
  
 * clopidogrel 75 mg Tab Commonly known as:  PLAVIX What changed:  Another medication with the same name was added.  Make sure you understand how and when to take each. Your next dose is:  9-1 Take 1 Tab by mouth daily. 75 mg  
    
   
   
   
  
 * clopidogrel 75 mg Tab Commonly known as:  PLAVIX What changed: You were already taking a medication with the same name, and this prescription was added. Make sure you understand how and when to take each. Your next dose is:  9-1 Take 1 Tab by mouth daily for 30 days. 75 mg  
    
   
   
   
  
 furosemide 40 mg tablet Commonly known as:  LASIX What changed:   
- how much to take - when to take this Your next dose is:  9-1 Take 1.5 Tabs by mouth daily (after breakfast) for 30 days. Indications: Pulmonary Edema due to Chronic Heart Failure 60 mg  
    
   
   
   
  
 * Notice: This list has 2 medication(s) that are the same as other medications prescribed for you. Read the directions carefully, and ask your doctor or other care provider to review them with you. CONTINUE taking these medications Instructions Each Dose to Equal  
 Morning Noon Evening Bedtime  
 aspirin 81 mg chewable tablet Your next dose is:  9-1 Take 81 mg by mouth daily. 81 mg  
    
   
   
   
  
 lisinopril 2.5 mg tablet Commonly known as:  Ora Bee Your next dose is:  9-1 Take 1 Tab by mouth daily for 30 days. 2.5 mg  
    
   
   
   
  
 nitroglycerin 0.4 mg/hr Commonly known as:  AESGHPLD Your next dose is:  9-1  
   
 1 Patch by TransDERmal route daily. 1 Patch  
    
   
   
   
  
 oxyCODONE IR 15 mg immediate release tablet Commonly known as:  OXY-IR Your next dose is:  As needed Take 15 mg by mouth every four (4) hours as needed for Pain. 15 mg  
    
   
   
   
  
 pantoprazole 40 mg tablet Commonly known as:  PROTONIX Your next dose is:  9-1 Take 1 Tab by mouth Daily (before breakfast). 40 mg  
    
   
   
   
  
 polyethylene glycol 17 gram/dose powder Commonly known as:  Odette Fail Your next dose is:  As needed Take 17 g by mouth as needed (constipation). 17 g  
    
   
   
   
  
 potassium chloride 20 mEq tablet Commonly known as:  K-DUR, KLOR-CON Your next dose is:  9-1 Take 1 Tab by mouth daily. 20 mEq VITAMIN B-6 100 mg tablet Generic drug:  pyridoxine (vitamin B6) Your next dose is:  9-1 Take 100 mg by mouth daily. Takes at NOON  
 100 mg  
    
   
   
   
  
 VITAMIN D3 2,000 unit Tab Generic drug:  cholecalciferol (vitamin D3) Your next dose is:  9-1 Take  by mouth daily. STOP taking these medications   
 metoprolol tartrate 25 mg tablet Commonly known as:  LOPRESSOR Where to Get Your Medications Information on where to get these meds will be given to you by the nurse or doctor. ! Ask your nurse or doctor about these medications  
  amiodarone 200 mg tablet  
 carvedilol 6.25 mg tablet  
 clopidogrel 75 mg Tab  
 furosemide 40 mg tablet  
 lisinopril 2.5 mg tablet Opioid Education Prescription Opioids: What You Need to Know: 
 
 
 
F-face looks uneven A-arms unable to move or move unevenly S-speech slurred or non-existent T-time-call 911 as soon as signs and symptoms begin-DO NOT go Back to bed or wait to see if you get better-TIME IS BRAIN.  
 
Warning Signs of HEART ATTACK  
 Call 911 if you have these symptoms: 
? Chest discomfort. Most heart attacks involve discomfort in the center of the chest that lasts more than a few minutes, or that goes away and comes back. It can feel like uncomfortable pressure, squeezing, fullness, or pain. ? Discomfort in other areas of the upper body. Symptoms can include pain or discomfort in one or both arms, the back, neck, jaw, or stomach. ? Shortness of breath with or without chest discomfort. ? Other signs may include breaking out in a cold sweat, nausea, or lightheadedness. Don't wait more than five minutes to call [address]! Fast action can save your life. Calling 911 is almost always the fastest way to get lifesaving treatment. Emergency Medical Services staff can begin treatment when they arrive  up to an hour sooner than if someone gets to the hospital by car. The discharge information has been reviewed with the patient. The patient verbalized understanding. Discharge medications reviewed with the patient and appropriate educational materials and side effects teaching were provided. ___________________________________________________________________________________________________________________________________ Heart Failure: Care Instructions Your Care Instructions Heart failure occurs when your heart does not pump as much blood as the body needs. Failure does not mean that the heart has stopped pumping but rather that it is not pumping as well as it should. Over time, this causes fluid buildup in your lungs and other parts of your body. Fluid buildup can cause shortness of breath, fatigue, swollen ankles, and other problems. By taking medicines regularly, reducing sodium (salt) in your diet, checking your weight every day, and making lifestyle changes, you can feel better and live longer. Follow-up care is a key part of your treatment and safety.  Be sure to make and go to all appointments, and call your doctor if you are having problems. It's also a good idea to know your test results and keep a list of the medicines you take. How can you care for yourself at home? Medicines 
  · Be safe with medicines. Take your medicines exactly as prescribed. Call your doctor if you think you are having a problem with your medicine.  
  · Do not take any vitamins, over-the-counter medicine, or herbal products without talking to your doctor first. Avtar Henderson not take ibuprofen (Advil or Motrin) and naproxen (Aleve) without talking to your doctor first. They could make your heart failure worse.  
  · You may be taking some of the following medicine. ¨ Beta-blockers can slow heart rate, decrease blood pressure, and improve your condition. Taking a beta-blocker may lower your chance of needing to be hospitalized. ¨ Angiotensin-converting enzyme inhibitors (ACEIs) reduce the heart's workload, lower blood pressure, and reduce swelling. Taking an ACEI may lower your chance of needing to be hospitalized again. ¨ Angiotensin II receptor blockers (ARBs) work like ACEIs. Your doctor may prescribe them instead of ACEIs. ¨ Diuretics, also called water pills, reduce swelling. ¨ Potassium supplements replace this important mineral, which is sometimes lost with diuretics. ¨ Aspirin and other blood thinners prevent blood clots, which can cause a stroke or heart attack.  
 You will get more details on the specific medicines your doctor prescribes. Diet 
  · Your doctor may suggest that you limit sodium to 2,000 milligrams (mg) a day or less. That is less than 1 teaspoon of salt a day, including all the salt you eat in cooking or in packaged foods. People get most of their sodium from processed foods. Fast food and restaurant meals also tend to be very high in sodium.  
  · Ask your doctor how much liquid you can drink each day. You may have to limit liquids.  
Elsy Posey   · Weigh yourself without clothing at the same time each day. Record your weight. Call your doctor if you have a sudden weight gain, such as more than 2 to 3 pounds in a day or 5 pounds in a week. (Your doctor may suggest a different range of weight gain.) A sudden weight gain may mean that your heart failure is getting worse.  
 Activity level 
  · Start light exercise (if your doctor says it is okay). Even if you can only do a small amount, exercise will help you get stronger, have more energy, and manage your weight and your stress. Walking is an easy way to get exercise. Start out by walking a little more than you did before. Bit by bit, increase the amount you walk.  
  · When you exercise, watch for signs that your heart is working too hard. You are pushing yourself too hard if you cannot talk while you are exercising. If you become short of breath or dizzy or have chest pain, stop, sit down, and rest.  
  · If you feel \"wiped out\" the day after you exercise, walk slower or for a shorter distance until you can work up to a better pace.  
  · Get enough rest at night. Sleeping with 1 or 2 pillows under your upper body and head may help you breathe easier.  
 Lifestyle changes 
  · Do not smoke. Smoking can make a heart condition worse. If you need help quitting, talk to your doctor about stop-smoking programs and medicines. These can increase your chances of quitting for good. Quitting smoking may be the most important step you can take to protect your heart.  
  · Limit alcohol to 2 drinks a day for men and 1 drink a day for women. Too much alcohol can cause health problems.  
  · Avoid getting sick from colds and the flu. Get a pneumococcal vaccine shot. If you have had one before, ask your doctor whether you need another dose. Get a flu shot each year. If you must be around people with colds or the flu, wash your hands often. When should you call for help? Call 911 if you have symptoms of sudden heart failure such as: 
  · You have severe trouble breathing.  
  · You cough up pink, foamy mucus.  
  · You have a new irregular or rapid heartbeat.  
 Call your doctor now or seek immediate medical care if: 
  · You have new or increased shortness of breath.  
  · You are dizzy or lightheaded, or you feel like you may faint.  
  · You have sudden weight gain, such as more than 2 to 3 pounds in a day or 5 pounds in a week. (Your doctor may suggest a different range of weight gain.)  
  · You have increased swelling in your legs, ankles, or feet.  
  · You are suddenly so tired or weak that you cannot do your usual activities.  
 Watch closely for changes in your health, and be sure to contact your doctor if you develop new symptoms. Where can you learn more? Go to http://dinora-ellyn.info/. Enter U700 in the search box to learn more about \"Heart Failure: Care Instructions. \" Current as of: May 10, 2017 Content Version: 11.7 © 7546-3802 [employer] (V3D). Care instructions adapted under license by [employer] (which disclaims liability or warranty for this information). If you have questions about a medical condition or this instruction, always ask your healthcare professional. [address] any warranty or liability for your use of this information. Limiting Sodium and Fluids With Heart Failure: Care Instructions Your Care Instructions Sodium causes your body to hold on to extra water. This may cause your heart failure symptoms to get worse. Limiting sodium may help you feel better and lower your risk of having to go to the hospital. 
People get most of their sodium from processed foods. Fast food and restaurant meals also tend to be very high in sodium. Your doctor may suggest that you limit sodium to 2,000 milligrams (mg) a day or less.  That is less than 1 teaspoon of salt a day, including all the salt you eat in cooked or packaged foods. Usually, you have to limit the amount of liquids you drink only if your heart failure is severe. Limiting sodium alone often is enough to help your body get rid of extra fluids. However, your doctor may tell you to limit your fluid intake to a set amount each day. Follow-up care is a key part of your treatment and safety. Be sure to make and go to all appointments, and call your doctor if you are having problems. It's also a good idea to know your test results and keep a list of the medicines you take. How can you care for yourself at home? Read food labels · Read food labels on cans and food packages. The labels tell you how much sodium is in each serving. Make sure that you look at the serving size. If you eat more than the serving size, you have eaten more sodium than is listed for one serving. · Food labels also tell you the Percent Daily Value. If the Percent Daily Value says 50%, it means that you will get at least 50% of all the sodium you need for the entire day in one serving. Choose products with low Percent Daily Values for sodium. · Be aware that sodium can come in forms other than salt, including monosodium glutamate (MSG), sodium citrate, and sodium bicarbonate (baking soda). MSG is often added to Asian food. You can sometimes ask for food without MSG or salt. Buy low-sodium foods · Buy foods that are labeled \"unsalted\" (no salt added), \"sodium-free\" (less than 5 mg of sodium per serving), or \"low-sodium\" (less than 140 mg of sodium per serving). A food labeled \"light sodium\" has less than half of the full-sodium version of that food. Foods labeled \"reduced-sodium\" may still have too much sodium. · Buy fresh vegetables or plain, frozen vegetables. Buy low-sodium versions of canned vegetables, soups, and other canned goods. Prepare low-sodium meals · Use less salt each day when cooking. Reducing salt in this way will help you adjust to the taste. Do not add salt after cooking. Take the salt shaker off the table. · Flavor your food with garlic, lemon juice, onion, vinegar, herbs, and spices instead of salt. Do not use soy sauce, steak sauce, onion salt, garlic salt, mustard, or ketchup on your food. · Make your own salad dressings, sauces, and ketchup without adding salt. · Use less salt (or none) when recipes call for it. You can often use half the salt a recipe calls for without losing flavor. Other dishes like rice, pasta, and grains do not need added salt. · Rinse canned vegetables. This removes some-but not all-of the salt. · Avoid water that has a naturally high sodium content or that has been treated with water softeners, which add sodium. Call your local water company to find out the sodium content of your water supply. If you buy bottled water, read the label and choose a sodium-free brand. Avoid high-sodium foods, such as: 
· Smoked, cured, salted, and canned meat, fish, and poultry. · Ham, courtney, hot dogs, and luncheon meats. · Regular, hard, and processed cheese and regular peanut butter. · Crackers with salted tops. · Frozen prepared meals. · Canned and dried soups, broths, and bouillon, unless labeled sodium-free or low-sodium. · Canned vegetables, unless labeled sodium-free or low-sodium. · Salted snack foods such as chips and pretzels. · [place] fries, pizza, tacos, and other fast foods. · Pickles, olives, ketchup, and other condiments, especially soy sauce, unless labeled sodium-free or low-sodium. If you cannot cook for yourself · Have family members or friends help you, or have someone cook low-sodium meals. · Check with your local senior nutrition program to find out where meals are served and whether they offer a low-sodium option.  You can often find these programs through your local health department or hospital. 
 · Have meals delivered to your home. [facility] have a Meals on JILL Sinha. These programs provide one hot meal a day for older adults, delivered to their homes. Ask whether these meals are low-sodium. Let them know that you are on a low-sodium diet. Limiting fluid intake · Find a method that works for you. You might simply write down how much you drink every time you do. Some people keep a container filled with the amount of fluid allowed for that day. If they drink from a source other than the container, then they pour out that amount. · Measure your regular drinking glasses to find out how much fluid each one holds. Once you know this, you will not have to measure every time. · Besides water, milk, juices, and other drinks, some foods have a lot of fluid. Count any foods that will melt (such as ice cream or gelatin dessert) or liquid foods (such as soup) as part of your fluid intake for the day. Where can you learn more? Go to http://dinora-ellyn.info/. Enter A166 in the search box to learn more about \"Limiting Sodium and Fluids With Heart Failure: Care Instructions. \" Current as of: December 6, 2017 Content Version: 11.7 © 3119-7437 Sp[employer]Time. Care instructions adapted under license by Moment[employer] (which disclaims liability or warranty for this information). If you have questions about a medical condition or this instruction, always ask your healthcare professional. [place] [address] any warranty or liability for your use of this information. [employer] Announcement We are excited to announce that we are making your provider's discharge notes available to you in [employer]. You will see these notes when they are completed and signed by the physician that discharged you from your recent hospital stay.   If you have any questions or concerns about any information you see in [employer], please call the [employer] Information Department where you were seen or reach out to your Primary Care Provider for more information about your plan of care. Introducing hospitals & HEALTH SERVICES! Dear Jay Roy: 
Thank you for requesting a [employer] account. Our records indicate that you already have an active [employer] account. You can access your account anytime at [email or web address]. [email or web address] Did you know that you can access your hospital and ER discharge instructions at any time in [employer]feld? You can also review all of your test results from your hospital stay or ER visit. Additional Information If you have questions, please visit the Frequently Asked Questions section of the [employer] website at [email or web address] Remember, [employer]feld is NOT to be used for urgent needs. For medical emergencies, dial 911. Now available from your iPhone and Android! Introducing Basim Bridges As a [facility] patient, I wanted to make you aware of our electronic visit tool called Basim Bridges. [facility] 24/QRuso allows you to connect within minutes with a medical provider 24 hours a day, seven days a week via a mobile device or tablet or logging into a secure website from your computer. You can access Basim Bridges from anywhere in the [place]. A virtual visit might be right for you when you have a simple condition and feel like you just dont want to get out of bed, or cant get away from work for an appointment, when your regular [facility] provider is not available (evenings, weekends or holidays), or when youre out of town and need minor care. Electronic visits cost only $49 and if the [facility] 24/7 provider determines a prescription is needed to treat your condition, one can be electronically transmitted to a nearby pharmacy*. Please take a moment to enroll today if you have not already done so. The enrollment process is free and takes just a few minutes.   To enroll, please download the [employer] 24/7 winter to your tablet or phone, or visit [email or web address] to enroll on your computer. And, as an [address] [place] [address] patient with a [employer] account, the results of your visits will be scanned into your electronic medical record and your primary care provider will be able to view the scanned results. We urge you to continue to see your regular [facility] provider for your ongoing medical care. And while your primary care provider may not be the one available when you seek a [employer] virtual visit, the peace of mind you get from getting a real diagnosis real time can be priceless. For more information on [employer], view our Frequently Asked Questions (FAQs) at [email or web address]. Sincerely, 
 
Arielle Marshall MD 
Chief Medical Officer Doretha Malini Mas *:  certain medications cannot be prescribed via [employer] Providers Seen During Your Hospitalization Provider Specialty Primary office phone Nina Kelly MD Emergency Medicine [phone] Joselin Yeboah MD Internal Medicine [phone] Your Primary Care Physician (PCP) Primary Care Physician Office Phone Office Fax Atilio Uribe [phone] You are allergic to the following Allergen Reactions Amlodipine Hives Amoxicillin Rash Tetracycline Rash Recent Documentation Height Weight BMI Smoking Status 1.626 m 45.4 kg 17.16 kg/m2 Never Smoker Emergency Contacts Name Discharge Info Relation Home Work Mobile Sandra Mayo DISCHARGE CAREGIVER [3] Daughter [21] [phone] [address] [facility] CAREGIVER [3] Daughter [21] [phone] Patient Belongings  The following personal items are in your possession at time of discharge: 
  Dental Appliances: None      Hearing Aids/Status: With patient  Home Medications: None   Jewelry: Ring, With patient  Clothing: At bedside    Other Valuables: None Please provide this summary of care documentation to your next provider. Signatures-by signing, you are acknowledging that this After Visit Summary has been reviewed with you and you have received a copy. Patient Signature:  ____________________________________________________________ Date:  ____________________________________________________________  
  
Jose Alberto [place] Provider Signature:  ____________________________________________________________ Date:  ____________________________________________________________

## 2018-08-30 NOTE — CONSULTS
[address] Cardiology Consult                Date of  Admission: 8/30/2018  1:22 AM     Primary Care Physician: Dr. Bayron Allen  Primary Cardiologist: Dr. Quin Mann   Referring Physician: Hospitalist   Consulting Physician: Dr. Kurt Vizcarra    CC/Reason for consult: heart failure       Breanna Dumas is a 80 y.o. female with prior h/o chronic sHF, PAF, CAD/NSTEMI s/p PCI to LAD in 2008. Recent admission to [address] with acute appendicitis 7/27 and received a bolus of IVF prior to surgery. She developed acute CHF then and had to be treated for this first. An echocardiogram then showed an EF of 20-30% with moderate diffuse hypokinesis and mild aortic valve stenosis. She had a non ST elevation MI due to supply demand mismatch and is not felt to be a candidate for invasive therapies. She also had PAF requiring IV amiodarone then transitioned to po prior to d/c. At f/u appt with Dr. Quin Mann 8/15/18, her amiodarone was stopped d/t being back in NSR. She was suppose to see her regular cardiologist Dr. Viviane Ness post appt with Dr. Pacheco Ness and her BB was changed to metoprolol. Patient presented to Ed at [facility] with SOB that started yesterday. She denies any edema or weight gain. Monitor shows SR but did have WCT likely A. Fib with aberrancy. She was hypoxic in ED with sats 80s that improved with O2. In ED, labs showed troop x 1 neg, BNP 2169, CXR CHF. ED gave IV Lasix and hospitalist admitted and consulted cardiology for CHF. Currently her SOB has improved. She denies any chest pain or heart palpitations.         Diagnosis    Acute respiratory failure with hypoxia and hypercapnia (HCC)    CAD (coronary artery disease)    Hypertension    Acute appendicitis    Hypokalemia    Sepsis (HCC)    Appendicitis, acute    Respiratory failure with hypoxia and hypercapnia (HCC)    Volume overload    Demand ischemia (HCC)    Systolic CHF, acute on chronic (HCC)    Acute CHF (congestive heart failure) (HCC)       Past Medical History:   Diagnosis Date    Thyroid cyst Distant past    thyroid cyst removed x40 years ago      Past Surgical History:   Procedure Laterality Date    HX CHOLECYSTECTOMY      HX HEENT      thyroglossal cyst    HX HYSTERECTOMY       Allergies   Allergen Reactions    Amlodipine Hives    Amoxicillin Rash    Tetracycline Rash      Family History   Problem Relation Age of Onset    Other Father          Other Mother              Current Facility-Administered Medications   Medication Dose Route Frequency    . PHARMACY TO SUBSTITUTE PER PROTOCOL (Reordered from: cholecalciferol, vitamin D3, (VITAMIN D3) 2,000 unit tab)    Per Protocol    aspirin chewable tablet 81 mg  81 mg Oral DAILY    oxyCODONE IR (ROXICODONE) tablet 15 mg  15 mg Oral Q4H PRN    pyridoxine (vitamin B6) (VITAMIN B-6) tablet 100 mg  100 mg Oral DAILY    lisinopril (PRINIVIL, ZESTRIL) tablet 2.5 mg  2.5 mg Oral DAILY    carvedilol (COREG) tablet 3.125 mg  3.125 mg Oral BID WITH MEALS    clopidogrel (PLAVIX) tablet 75 mg  75 mg Oral DAILY    pantoprazole (PROTONIX) tablet 40 mg  40 mg Oral ACB    potassium chloride (K-DUR, KLOR-CON) SR tablet 20 mEq  20 mEq Oral DAILY    nitroglycerin (NITRODUR) 0.4 mg/hr patch 1 Patch  1 Patch TransDERmal DAILY    polyethylene glycol (MIRALAX) powder 17 g  17 g Oral PRN    ondansetron (ZOFRAN) injection 4 mg  4 mg IntraVENous Q6H PRN    acetaminophen (TYLENOL) tablet 650 mg  650 mg Oral Q6H PRN    furosemide (LASIX) injection 40 mg  40 mg IntraVENous BID    enoxaparin (LOVENOX) injection 30 mg  30 mg SubCUTAneous Q24H    docusate sodium (COLACE) capsule 100 mg  100 mg Oral BID PRN     Current Outpatient Prescriptions   Medication Sig    nitroglycerin (NITRODUR) 0.4 mg/hr 1 Patch by TransDERmal route daily.  polyethylene glycol (MIRALAX) 17 gram/dose powder Take 17 g by mouth as needed (constipation).     metoprolol tartrate (LOPRESSOR) 25 mg tablet Take 25 mg by mouth two (2) times a day.  furosemide (LASIX) 40 mg tablet Take 1 Tab by mouth daily.  lisinopril (PRINIVIL, ZESTRIL) 2.5 mg tablet Take 1 Tab by mouth daily.  carvedilol (COREG) 3.125 mg tablet Take 1 Tab by mouth two (2) times daily (with meals).  clopidogrel (PLAVIX) 75 mg tab Take 1 Tab by mouth daily.  pantoprazole (PROTONIX) 40 mg tablet Take 1 Tab by mouth Daily (before breakfast).  potassium chloride (K-DUR, KLOR-CON) 20 mEq tablet Take 1 Tab by mouth daily.  cholecalciferol, vitamin D3, (VITAMIN D3) 2,000 unit tab Take  by mouth daily.  aspirin 81 mg chewable tablet Take 81 mg by mouth daily.  oxyCODONE IR (OXY-IR) 15 mg immediate release tablet Take 15 mg by mouth every four (4) hours as needed for Pain.  pyridoxine (VITAMIN B-6) 100 mg tablet Take 100 mg by mouth daily. Takes at [facility]   Constitution: Negative for diaphoresis, weakness and malaise/fatigue. HENT: Negative for congestion. Cardiovascular: Positive for dyspnea on exertion. Negative for chest pain, claudication, cyanosis, irregular heartbeat, leg swelling, near-syncope, orthopnea, palpitations, paroxysmal nocturnal dyspnea and syncope. Respiratory: Positive for shortness of breath. Negative for cough and wheezing. Endocrine: Negative for cold intolerance and heat intolerance. Hematologic/Lymphatic: Does not bruise/bleed easily. Skin: Negative for nail changes. Neurological: Negative for dizziness and headaches.         Physical Exam  Vitals:    08/30/18 0500 08/30/18 0529 08/30/18 0600 08/30/18 0729   BP: 179/74 165/85 179/75 136/70   Pulse: 74 91 77 66   Resp: 28 19 29    Temp:       SpO2: 97% 97% 97% 96%   Weight:       Height:           Physical Exam:  General: Well Developed, Well Nourished, No Acute Distress  HEENT: pupils equal and round, no abnormalities noted  Neck: supple, + JVD, no carotid bruits  Heart: S1S2 with RRR without murmurs or gallops  Lungs: Crackles in bases  Abd: soft, nontender, nondistended, with good bowel sounds  Ext: warm, trace edema, calves supple/nontender, pulses 2+ bilaterally  Skin: warm and dry  Psychiatric: Normal mood and affect  Neurologic: Alert and oriented X 3    Cardiographics    Telemetry: SR with WCT  ECG: SR  Echocardiogram:  7/28/2018 diminished left ventricular systolic function EF 12-96% mild aortic stenosis per Dr. Barrington Dickerson note. Labs:   Recent Labs      08/30/18   0130   NA  143   K  3.6   MG  2.2   BUN  10   CREA  0.83   GLU  163*   WBC  11.4*   HGB  10.8*   HCT  34.4*   PLT  244        Assessment/Plan:     Assessment:      Principal Problem:    Acute CHF (congestive heart failure) ([facility] [address] (8/30/2018)- actually acute on chronic systolic HF- will continue daily lasix IV and daily labs. Will need to continue approved BB for CHF (coreg) and ACE-I. Active Problems:    Acute respiratory failure with hypoxia and hypercapnia (HCC) (11/29/2015)- per primary team       CAD (coronary artery disease) (11/29/2015)- with recent NSTEMI July 2018. Will continue asa, plavix, BB, ACE-I. No active angina sx. Dgt/pt opt to continue medical therapy. No aggressive measures given age. Hypertension (12/1/2015)- controlled      Volume overload (7/30/2018)- see above      Systolic CHF, acute on chronic ([facility] (7/31/2018)- see above. 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 Will start amiodarone 200 mg bid then transition to 200 mg daily at d/c. Hypokalemia- replaced in ED, Labs in am       Thank you very much for this referral. We appreciate the opportunity to participate in this patient's care. We will follow along with above stated plan. Manoj aCsey, FABIAN  Consulting MD: Dr. Carolyn Jacob:    Patient seen and examined by me. Agree with above note by physician extender.   Key findings are:  No CP or  palpitations but worsening fatigue, SOB, with probable acute on chronic systolic CHF exacerbation, mild clinically but with fairly high BNP today with EF <35% on echo. Tele shows multiple short clinically asymptomatic runs of WCT with irregularity, probably short bursts of pAF with aberrancy (sharonda's) with underlying NSR with frequent PAC's and occasional PVC's at baseline. On low dose BB's but will probably need amiodarone again, longer term as tolerate. Regardless of whether WCT is VT vs. pAF with aberrancy, she is not a candidate for any invasive cardiac procedures and does not want any aggressive measures anyway. We will start amio taper, continue low dose coreg and titrate as tolerated, follow rhythm on tele and replete lytes as needed. We will continue to follow with you. CV- RRR without murmur, intermittent ectopy, 8-9cm JVD at 30deg  Lungs- Clear bilaterally, mildly decreased bibasilar with faint crackles  Abd- soft, nontender, nondistended  Ext- trace LE pitting edema    Plan: As above.     Seema Butler MD  [facility] Cardiology  Pager [phone]

## 2018-08-30 NOTE — PROGRESS NOTES
Problem: Falls - Risk of 
Goal: *Absence of Falls Document Ellyn Lu Fall Risk and appropriate interventions in the flowsheet. Outcome: Progressing Towards Goal 
Fall Risk Interventions: 
Mobility Interventions: Utilize walker, cane, or other assistive device Medication Interventions: Teach patient to arise slowly Elimination Interventions: Patient to call for help with toileting needs, Call light in reach

## 2018-08-30 NOTE — PROGRESS NOTES
Hospitalist Progress Note Patient: Phani Galvan MRN: [id number]  SSN: [id number] YOB: 1924  Age: 80 y.o. Sex: female Admit Date: 8/30/2018 LOS: 0 days Subjective:  
 
From H&P: \"79 y/o female with hx recent admission for ruptured appendicitis and CHF. She was admitted to [facility] for acute appendicitis 7/27 and received a bolus of IVF prior to surgery. She developed acute CHF then and had to be treated for this first. An echocardiogram then showed an EF of 20-30% with moderate diffuse hypokinesis and mild aortic valve stenosis. She had a non ST elevation MI due to supply demand mismatch and is not felt to be a candidate for invasive therapies. Since her discharge after surgery she has had follow up appointments with cardiology Dr Hilario Garcia on 8/15 and later with her primary cardiologist Dr Claude Knox. She is no longer on amiodarone as she is in NSR. Only on lasix as a prn. And she was switched back to metoprolol from carvedilol. She had been fine but yesterday began having shortness of breath. No edema to legas. No change in salt intake or fluids. Today in ED she is in a sinus rhythm but BNP elevated to 2169 but troponin is 0.05. Chest xray shows CHF. She was also hypoxic on RA in the 80s which improved with NC O2. After receiving IV lasix she is feeling better. \" 
 
8/30 - She feels much improved this afternoon. Denies CP. Denies LE edema. Review of systems negative except stated above. Objective:  
 
Visit Vitals  /88 (BP 1 Location: Right arm, BP Patient Position: Sitting)  Pulse 75  Temp 98 °F (36.7 °C)  Resp 22  
 Ht 5' 4\" (1.626 m)  Wt 49 kg (108 lb)  SpO2 94%  BMI 18.54 kg/m2 Oxygen Therapy O2 Sat (%): 94 % (08/30/18 1536) Pulse via Oximetry: 54 beats per minute (08/30/18 1059) O2 Device: Room air (08/30/18 1442) Intake and Output:  
Intake/Output Summary (Last 24 hours) at 08/30/18 1730 Last data filed at 08/30/18 1533 Gross per 24 hour Intake              330 ml Output             3180 ml Net            -2850 ml Physical Exam:  
GENERAL: alert, cooperative, no distress, appears stated age EYE: conjunctivae/corneas clear. PERRL. THROAT & NECK: normal and no erythema or exudates noted. LUNG: clear to auscultation bilaterally HEART: regular rate and rhythm, S1S2, no murmur, no JVD ABDOMEN: soft, non-tender, non-distended. Bowel sounds normal.  
EXTREMITIES:  No edema, 2+ pedal/radial pulses bilaterally SKIN: no rash or abnormalities NEUROLOGIC: A&Ox3. Cranial nerves 2-12 grossly intact. Lab/Data Review: 
Recent Results (from the past 24 hour(s)) EKG, 12 LEAD, INITIAL Collection Time: 08/30/18  1:28 AM  
Result Value Ref Range Ventricular Rate 91 BPM  
 Atrial Rate 48 BPM  
 QRS Duration 150 ms  
 Q-T Interval 378 ms QTC Calculation (Bezet) 464 ms Calculated P Axis 95 degrees Calculated R Axis -80 degrees Calculated T Axis 97 degrees Diagnosis    
  !! AGE AND GENDER SPECIFIC ECG ANALYSIS !! Normal sinus rhythm Left axis deviation Left bundle branch block Abnormal ECG When compared with ECG of 29-JUL-2018 07:59, Fusion complexes are now Present Left bundle branch block is now Present Confirmed by HI GALLEGOS (), Tessa Goldberg ([id number]) on 8/30/2018 9:07:53 AM 
Also confirmed by Eunice Berger MD (), Theodore Truong ([id number])  on 8/30/2018 9:09:55 AM 
  
MAGNESIUM Collection Time: 08/30/18  1:30 AM  
Result Value Ref Range Magnesium 2.2 1.8 - 2.4 mg/dL CBC WITH AUTOMATED DIFF Collection Time: 08/30/18  1:30 AM  
Result Value Ref Range WBC 11.4 (H) 4.3 - 11.1 K/uL  
 RBC 3.41 (L) 4.05 - 5.2 M/uL  
 HGB 10.8 (L) 11.7 - 15.4 g/dL HCT 34.4 (L) 35.8 - 46.3 % .9 (H) 79.6 - 97.8 FL  
 MCH 31.7 26.1 - 32.9 PG  
 MCHC 31.4 31.4 - 35.0 g/dL  
 RDW 13.7 % PLATELET 613 399 - 513 K/uL MPV 10.3 9.4 - 12.3 FL ABSOLUTE NRBC 0.00 0.0 - 0.2 K/uL  
 DF AUTOMATED NEUTROPHILS 65 43 - 78 % LYMPHOCYTES 22 13 - 44 % MONOCYTES 9 4.0 - 12.0 % EOSINOPHILS 2 0.5 - 7.8 % BASOPHILS 1 0.0 - 2.0 % IMMATURE GRANULOCYTES 1 0.0 - 5.0 %  
 ABS. NEUTROPHILS 7.4 1.7 - 8.2 K/UL  
 ABS. LYMPHOCYTES 2.5 0.5 - 4.6 K/UL  
 ABS. MONOCYTES 1.0 0.1 - 1.3 K/UL  
 ABS. EOSINOPHILS 0.3 0.0 - 0.8 K/UL  
 ABS. BASOPHILS 0.1 0.0 - 0.2 K/UL  
 ABS. IMM. GRANS. 0.1 0.0 - 0.5 K/UL METABOLIC PANEL, COMPREHENSIVE Collection Time: 08/30/18  1:30 AM  
Result Value Ref Range Sodium 143 136 - 145 mmol/L Potassium 3.6 3.5 - 5.1 mmol/L Chloride 108 (H) 98 - 107 mmol/L  
 CO2 25 21 - 32 mmol/L Anion gap 10 7 - 16 mmol/L Glucose 163 (H) 65 - 100 mg/dL BUN 10 8 - 23 MG/DL Creatinine 0.83 0.6 - 1.0 MG/DL  
 GFR est AA >60 >60 ml/min/1.73m2 GFR est non-AA >60 >60 ml/min/1.73m2 Calcium 8.7 8.3 - 10.4 MG/DL Bilirubin, total 0.6 0.2 - 1.1 MG/DL  
 ALT (SGPT) 49 12 - 65 U/L  
 AST (SGOT) 53 (H) 15 - 37 U/L Alk. phosphatase 80 50 - 136 U/L Protein, total 6.7 6.3 - 8.2 g/dL Albumin 3.1 (L) 3.2 - 4.6 g/dL Globulin 3.6 (H) 2.3 - 3.5 g/dL A-G Ratio 0.9 (L) 1.2 - 3.5 TSH 3RD GENERATION Collection Time: 08/30/18  1:30 AM  
Result Value Ref Range TSH 0.275 (L) 0.358 - 3.740 uIU/mL BNP Collection Time: 08/30/18  1:30 AM  
Result Value Ref Range BNP 2169 pg/mL T4, FREE Collection Time: 08/30/18  1:30 AM  
Result Value Ref Range T4, Free 1.7 (H) 0.78 - 1.46 NG/DL URINE MICROSCOPIC Collection Time: 08/30/18  4:51 AM  
Result Value Ref Range WBC 3-5 0 /hpf  
 RBC 0-3 0 /hpf Epithelial cells 0-3 0 /hpf Bacteria 0 0 /hpf Casts 0-3 0 /lpf POC TROPONIN-I Collection Time: 08/30/18  5:26 AM  
Result Value Ref Range Troponin-I (POC) 0.05 0.02 - 0.05 ng/ml TROPONIN I Collection Time: 08/30/18  2:04 PM  
Result Value Ref Range Troponin-I, Qt. 0.06 (H) 0.02 - 0.05 NG/ML Imaging: Xr Chest [facility] Result Date: 8/30/2018 IMPRESSION: Vascular congestion and bibasilar atelectasis. Possible small bilateral effusion. No results found for this visit on 08/30/18. Cultures: All Micro Results None Assessment/Plan:  
 
Principal Problem: 
  Systolic CHF, acute on chronic ([facility] [address] (7/31/2018) - Improved - Continue IV Lasix - Strict I/O. Daily weight. 
- Continue Coreg - Cardiology following Active Problems: 
  Acute respiratory failure with hypoxia and hypercapnia ([facility] [address] (11/29/2015) - Resolved - Now on room air 
- Continue IV Lasix CAD (coronary artery disease) (11/29/2015) - No acute issues - Continue ASA/Plavix - Continue Coreg 
- Continue Lisinopril Hypertension (12/1/2015) - Stable - Continue Coreg 
- Continue Lisinopril Volume overload (7/30/2018) - Continue IV Lasix Dispo - Continue IV Lasix. Monitor BMP. DIET CARDIAC Regular; Consistent Carb 1800kcal 
 
DVT Prophylaxis: Lovenox Signed By: Devi Hidalgo DO August 30, 2018

## 2018-08-30 NOTE — PROGRESS NOTES
08/30/18 1134 Dual Skin Pressure Injury Assessment Dual Skin Pressure Injury Assessment WDL Second Care Provider (Based on [address] [place] [address]) [address] [facility]

## 2018-08-30 NOTE — PROGRESS NOTES
Problem: Mobility Impaired (Adult and Pediatric) Goal: *Acute Goals and Plan of Care (Insert Text) LTG: 
(1.)Ms. Octavia Hutchinson will move from supine to sit and sit to supine, scoot up and down and roll side to side INDEPENDENTLY with bed flat within 7 treatment day(s). (2.)Ms. Octavia Hutchinson will transfer from bed to chair and chair to bed with MODIFIED INDEPENDENCE using the least restrictive device within 7 treatment day(s). (3.)Ms. Octavia Hutchinson will ambulate with MODIFIED INDEPENDENCE for 250+ feet with the least restrictive device within 7 treatment day(s). (4.)Ms. Octavia Hutchinson will perform LE exercises per HEP for 15+ minutes to improve strength and mobility within 7 days. ________________________________________________________________________________________________ PHYSICAL THERAPY: Initial Assessment, PM 8/30/2018 INPATIENT: Hospital Day: 1 [place] / Product Type: Medicare /  
  
NAME/AGE/GENDER: Keisha Pinto is a 80 y.o. female PRIMARY DIAGNOSIS: Acute CHF (congestive heart failure) (HCC) Acute CHF (congestive heart failure) (HCC) [facility]est[facility] ICD-10: Treatment Diagnosis:  
 · Generalized Muscle Weakness (M62.81) · Other abnormalities of gait and mobility (R26.89) Precaution/Allergies: 
Amlodipine; Amoxicillin; and Tetracycline ASSESSMENT:  
 
Ms. Octavia Hutchinson is a 80year old female admitted from home for acute CHF. She lives with two daughters and is mod I at baseline with rollator or cane. Daughters assist with ADLs and pt has home care staff. She presents in supine without complaints and is agreeable to assessment. Functionally she appears close to baseline. Performs bed mobility independently, transfers with supervision. Ambulates 100 ft in hallway with SBA and min cueing for gait safety/walker management. Returned to room and up to chair for a moment then requesting back to bed.  O2 sats 94% on room air, HR 78 bpm. Cisco Toribio would benefit from a few additional therapy sessions to maximize independence with mobility for discharge home. Recommend home health PT vs none pending progress. This section established at most recent assessment PROBLEM LIST (Impairments causing functional limitations): 1. Decreased Strength 2. Decreased ADL/Functional Activities 3. Decreased Transfer Abilities 4. Decreased Ambulation Ability/Technique 5. Decreased Balance 6. Decreased Activity Tolerance INTERVENTIONS PLANNED: (Benefits and precautions of physical therapy have been discussed with the patient.) 1. Balance Exercise 2. Bed Mobility 3. Gait Training 4. Home Exercise Program (HEP) 5. Therapeutic Activites 6. Therapeutic Exercise/Strengthening 7. Transfer Training TREATMENT PLAN: Frequency/Duration: 3 times a week for duration of hospital stay Rehabilitation Potential For Stated Goals: Good RECOMMENDED REHABILITATION/EQUIPMENT: (at time of discharge pending progress): Due to the probability of continued deficits (see above) this patient will likely need continued skilled physical therapy after discharge. Equipment:  
? None at this time HISTORY:  
History of Present Injury/Illness (Reason for Referral): 
Per H&P, \"Patient is a 79 y/o female with hx recent admission for ruptured appendicitis and CHF. She was admitted to [facility] for acute appendicitis 7/27 and received a bolus of IVF prior to surgery. She developed acute CHF then and had to be treated for this first. An echocardiogram then showed an EF of 20-30% with moderate diffuse hypokinesis and mild aortic valve stenosis. She had a non ST elevation MI due to supply demand mismatch and is not felt to be a candidate for invasive therapies. Since her discharge after surgery she has had follow up appointments with cardiology Dr Felipe Tang on 8/15 and later with her primary cardiologist Dr Anni Yip.  She is no longer on amiodarone as she is in NSR. Only on lasix as a prn. And she was switched back to metoprolol from carvedilol. She had been fine but yesterday began having shortness of breath. No edema to legas. No change in salt intake or fluids. Today in ED she is in a sinus rhythm but BNP elevated to 2169 but troponin is 0.05. Chest xray shows CHF. She was also hypoxic on RA in the 80s which improved with NC O2. After receiving IV lasix she is feeling better. Will admit for further treatment. \" 
 
Past Medical History/Comorbidities: Ms. Julianne Marshall  has a past medical history of Thyroid cyst (Distant past). Ms. Julianne Marshall  has a past surgical history that includes hx cholecystectomy; hx hysterectomy; and hx heent. Social History/Living Environment:  
Home Environment: Private residence # Steps to Enter: 0 One/Two Story Residence: One story Living Alone: No 
Support Systems: Child(tamra), Family member(s) Patient Expects to be Discharged to[de-identified] Private residence Current DME Used/Available at Home: [employer] , straight, Mik Sicard, rollator Tub or Shower Type: Shower Prior Level of Function/Work/Activity: 
Lives with two daughters. Single story home. Mod I with ambulation using cane or rollator. Daughters drive. Pt denies falls. Daughters assist with ADLs, pt also has home aide during the day for 5-6 hours Number of Personal Factors/Comorbidities that affect the Plan of Care: 1-2: MODERATE COMPLEXITY EXAMINATION:  
Most Recent Physical Functioning:  
Gross Assessment: 
AROM: Within functional limits Strength: Generally decreased, functional 
Coordination: Within functional limits Posture: 
Posture (WDL): Within defined limits Balance: 
Sitting: Intact Standing: Impaired Standing - Static: Good Standing - Dynamic : Fair (+) Bed Mobility: 
Rolling: Independent Supine to Sit: Independent Sit to Supine: Independent Scooting: Independent Wheelchair Mobility: 
  
Transfers: 
Sit to Stand: Supervision Stand to Sit: Supervision Bed to Chair: Supervision Gait: 
  
Base of Support: Narrowed Speed/Ngoc: Pace decreased (<100 feet/min) Step Length: Left shortened;Right shortened Gait Abnormalities: Trunk sway increased;Decreased step clearance Distance (ft): 100 Feet (ft) Assistive Device: Walker, rolling Ambulation - Level of Assistance: Stand-by assistance Interventions: Verbal cues; Safety awareness training Body Structures Involved: 1. Muscles Body Functions Affected: 1. Movement Related Activities and Participation Affected: 1. General Tasks and Demands 2. Mobility 3. Self Care 4. Domestic Life 5. Community, Social and [place] Number of elements that affect the Plan of Care: 4+: HIGH COMPLEXITY CLINICAL PRESENTATION:  
Presentation: Stable and uncomplicated: LOW COMPLEXITY CLINICAL DECISION MAKIN[address] [place] [id number] AM-PAC 6 Clicks Basic Mobility Inpatient Short Form How much difficulty does the patient currently have. .. Unable A Lot A Little None 1. Turning over in bed (including adjusting bedclothes, sheets and blankets)? [] 1   [] 2   [] 3   [x] 4  
2. Sitting down on and standing up from a chair with arms ( e.g., wheelchair, bedside commode, etc.)   [] 1   [] 2   [x] 3   [] 4  
3. Moving from lying on back to sitting on the side of the bed? [] 1   [] 2   [] 3   [x] 4 How much help from another person does the patient currently need. .. Total A Lot A Little None 4. Moving to and from a bed to a chair (including a wheelchair)? [] 1   [] 2   [x] 3   [] 4  
5. Need to walk in hospital room? [] 1   [] 2   [x] 3   [] 4  
6. Climbing 3-5 steps with a railing? [] 1   [] 2   [x] 3   [] 4  
© , Trustees of [address] [place] [id number], under license to [employer]. All rights reserved Score:  Initial: 20 Most Recent: X (Date: -- ) Interpretation of Tool:  Represents activities that are increasingly more difficult (i.e. Bed mobility, Transfers, Gait). Score 24 23 22-20 19-15 14-10 9-7 6 Modifier CH CI CJ CK CL CM CN   
 
? Mobility - Walking and Moving Around:  
  - CURRENT STATUS: CJ - 20%-39% impaired, limited or restricted  - GOAL STATUS: CH - 0% impaired, limited or restricted  - D/C STATUS:  ---------------To be determined--------------- [place] / [place] / Product Type: Medicare /   
 
Medical Necessity:    
· Patient demonstrates good rehab potential due to higher previous functional level. Reason for Services/Other Comments: 
· Patient continues to demonstrate capacity to improve strength, balance, activity tolerance which will increase independence and increase safety. Use of outcome tool(s) and clinical judgement create a POC that gives a: Clear prediction of patient's progress: LOW COMPLEXITY  
  
 
 
 
TREATMENT:  
(In addition to Assessment/Re-Assessment sessions the following treatments were rendered) Pre-treatment Symptoms/Complaints:  \"I would like to get back in bed\" Pain: Initial:  
Pain Intensity 1: 0  Post Session:  0/10 Assessment/Reassessment only, no treatment provided today Braces/Orthotics/Lines/Etc:  
· O2 Device: Room air Treatment/Session Assessment:   
· Response to Treatment:  No complications. 100 ft in hallway with SBA, walker. · Interdisciplinary Collaboration:  
o Physical Therapist 
o Registered Nurse · After treatment position/precautions:  
o Supine in bed 
o Bed/Chair-wheels locked 
o Bed in low position 
o Call light within reach 
o RN notified · Compliance with Program/Exercises: Will assess as treatment progresses. · Recommendations/Intent for next treatment session: \"Next visit will focus on advancements to more challenging activities and reduction in assistance provided\". Total Treatment Duration: PT Patient Time In/Time Out Time In: 5624 Time Out: 1392 Inna Roberts DPT

## 2018-08-30 NOTE — ED TRIAGE NOTES
EMS states \"Patient had surgery 3 weeks ago and had heart failure. She started complaining of chest pain and shortness of breath tonight. Upon arriving she was pale and her breathing was labored and her 02 was 88 on room air. We gave 324 of aspirin and put her on oxygen and she now states she is pain free\"

## 2018-08-30 NOTE — ROUTINE PROCESS
TRANSFER - OUT REPORT: 
Verbal report given to Destiny Long RN on Sarika Martinez  being transferred to 6th floor for routine progression of care Report consisted of patients Situation, Background, Assessment and  
Recommendations(SBAR). Information from the following report(s) ED Summary was reviewed with the receiving nurse. Lines:  
Peripheral IV 08/30/18 Left Antecubital (Active) Site Assessment Clean, dry, & intact 8/30/2018  1:24 AM  
Dressing Status Clean, dry, & intact 8/30/2018  1:24 AM  
   
Peripheral IV 08/30/18 Right Hand (Active) Site Assessment Clean, dry, & intact 8/30/2018  1:25 AM  
Phlebitis Assessment 0 8/30/2018  1:25 AM  
Infiltration Assessment 0 8/30/2018  1:25 AM  
Dressing Status Clean, dry, & intact 8/30/2018  1:25 AM  
  
 
Opportunity for questions and clarification was provided. Patient transported with: 
 O2 @ 2 liters

## 2018-08-30 NOTE — PROGRESS NOTES
TRANSFER - IN REPORT: 
 
Verbal report received from [facility] RN on Nicolas Horton  being received from ED for routine progression of care Report consisted of patients Situation, Background, Assessment and  
Recommendations(SBAR). Information from the following report(s) SBAR, Kardex, ED Summary, Intake/Output, MAR and Cardiac Rhythm NSR was reviewed with the receiving nurse. Opportunity for questions and clarification was provided. Assessment completed upon patients arrival to unit and care assumed.

## 2018-08-30 NOTE — H&P
Hospitalist H&P/Consult Note Admit Date:  2018  1:22 AM  
Name:  Faisal Su Age:  80 y.o. 
:  3/3/1924 MRN:  [id number] PCP:  Pacheco Correa MD 
Treatment Team: Attending Provider: Zulema Ro MD; Primary Nurse: Jarvis Ortega RN 
 
HPI:  
Patient is a 79 y/o female with hx recent admission for ruptured appendicitis and CHF. She was admitted to [facility] for acute appendicitis  and received a bolus of IVF prior to surgery. She developed acute CHF then and had to be treated for this first. An echocardiogram then showed an EF of 20-30% with moderate diffuse hypokinesis and mild aortic valve stenosis. She had a non ST elevation MI due to supply demand mismatch and is not felt to be a candidate for invasive therapies. Since her discharge after surgery she has had follow up appointments with cardiology Dr Evan Tillman on 8/15 and later with her primary cardiologist Dr Caryn Costa. She is no longer on amiodarone as she is in NSR. Only on lasix as a prn. And she was switched back to metoprolol from carvedilol. She had been fine but yesterday began having shortness of breath. No edema to legas. No change in salt intake or fluids. Today in ED she is in a sinus rhythm but BNP elevated to 2169 but troponin is 0.05. Chest xray shows CHF. She was also hypoxic on RA in the 80s which improved with NC O2. After receiving IV lasix she is feeling better. Will admit for further treatment. 10 systems reviewed and negative except as noted in HPI. Past Medical History:  
Diagnosis Date  Thyroid cyst Distant past  
 thyroid cyst removed x40 years ago Past Surgical History:  
Procedure Laterality Date  HX CHOLECYSTECTOMY  HX HEENT    
 thyroglossal cyst  
 HX HYSTERECTOMY Prior to Admission Medications Prescriptions Last Dose Informant Patient Reported? Taking?  
aspirin 81 mg chewable tablet   Yes No  
Sig: Take 81 mg by mouth daily. carvedilol (COREG) 3.125 mg tablet   No No  
Sig: Take 1 Tab by mouth two (2) times daily (with meals). cholecalciferol, vitamin D3, (VITAMIN D3) 2,000 unit tab   Yes No  
Sig: Take  by mouth daily. clopidogrel (PLAVIX) 75 mg tab   No No  
Sig: Take 1 Tab by mouth daily. furosemide (LASIX) 40 mg tablet   No No  
Sig: Take 1 Tab by mouth daily. lisinopril (PRINIVIL, ZESTRIL) 2.5 mg tablet   No No  
Sig: Take 1 Tab by mouth daily. metoprolol tartrate (LOPRESSOR) 25 mg tablet   Yes No  
Sig: Take 25 mg by mouth two (2) times a day. nitroglycerin (NITRODUR) 0.4 mg/hr   Yes No  
Si Patch by TransDERmal route daily. oxyCODONE IR (OXY-IR) 15 mg immediate release tablet   Yes No  
Sig: Take 15 mg by mouth every four (4) hours as needed for Pain.  
pantoprazole (PROTONIX) 40 mg tablet   No No  
Sig: Take 1 Tab by mouth Daily (before breakfast). polyethylene glycol (MIRALAX) 17 gram/dose powder   Yes No  
Sig: Take 17 g by mouth as needed (constipation). potassium chloride (K-DUR, KLOR-CON) 20 mEq tablet   No No  
Sig: Take 1 Tab by mouth daily. pyridoxine (VITAMIN B-6) 100 mg tablet   Yes No  
Sig: Take 100 mg by mouth daily. Takes at [facility] Facility-Administered Medications: None Allergies Allergen Reactions  Amlodipine Hives  Amoxicillin Rash  Tetracycline Rash Social History Substance Use Topics  Smoking status: Never Smoker  Smokeless tobacco: Never Used  Alcohol use No  
  
Family History Problem Relation Age of Onset  Other Father  [facility] Other Mother  Immunization History Administered Date(s) Administered  Influenza Vaccine 10/01/2015  Influenza Vaccine Whole 10/20/2008  TB Skin Test (PPD) Intradermal 2018 Objective:  
Patient Vitals for the past 24 hrs: 
 Temp Pulse Resp BP SpO2  
18 0529 - 91 19 165/85 97 % 18 0500 - 74 28 179/74 97 % 18 0429 - 81 - 165/74 97 % 08/30/18 0400 - 86 23 171/84 97 % 08/30/18 0329 - 81 30 166/81 90 % 08/30/18 0259 - 84 26 161/85 90 % 08/30/18 0229 - 83 (!) 33 168/87 91 % 08/30/18 0200 - 83 - 168/77 90 % 08/30/18 0131 - 92 22 (!) 152/91 93 % 08/30/18 0125 98.7 °F (37.1 °C) 88 20 (!) 147/95 93 % Oxygen Therapy O2 Sat (%): 97 % (08/30/18 0529) Pulse via Oximetry: 94 beats per minute (08/30/18 0529) O2 Device: Room air (08/30/18 0125) Intake/Output Summary (Last 24 hours) at 08/30/18 0533 Last data filed at 08/30/18 0530 Gross per 24 hour Intake                0 ml Output             1300 ml Net            -1300 ml Physical Exam: 
General:    Thin, frail. Alert. Pulsatile R jugular veins Eyes:   Normal sclera. Extraocular movements intact. ENT:  Normocephalic, atraumatic. Moist mucous membranes CV:   RRR. No murmur, rub, or gallop. Lungs:  Bibasilar rales. No retractions Abdomen: Soft, nontender, nondistended. Bowel sounds normal.  
Extremities: Warm and dry. No cyanosis or edema. Neurologic: CN II-XII grossly intact. Sensation intact. Skin:     No rashes or jaundice. No wounds. Psych:  Normal mood and affect. I reviewed the labs, imaging, EKGs, telemetry, and other studies done this admission. Data Review:  
Recent Results (from the past 24 hour(s)) MAGNESIUM Collection Time: 08/30/18  1:30 AM  
Result Value Ref Range Magnesium 2.2 1.8 - 2.4 mg/dL CBC WITH AUTOMATED DIFF Collection Time: 08/30/18  1:30 AM  
Result Value Ref Range WBC 11.4 (H) 4.3 - 11.1 K/uL  
 RBC 3.41 (L) 4.05 - 5.2 M/uL  
 HGB 10.8 (L) 11.7 - 15.4 g/dL HCT 34.4 (L) 35.8 - 46.3 % .9 (H) 79.6 - 97.8 FL  
 MCH 31.7 26.1 - 32.9 PG  
 MCHC 31.4 31.4 - 35.0 g/dL  
 RDW 13.7 % PLATELET 114 145 - 454 K/uL MPV 10.3 9.4 - 12.3 FL ABSOLUTE NRBC 0.00 0.0 - 0.2 K/uL  
 DF AUTOMATED NEUTROPHILS 65 43 - 78 % LYMPHOCYTES 22 13 - 44 %  MONOCYTES 9 4.0 - 12.0 %  
 EOSINOPHILS 2 0.5 - 7.8 % BASOPHILS 1 0.0 - 2.0 % IMMATURE GRANULOCYTES 1 0.0 - 5.0 %  
 ABS. NEUTROPHILS 7.4 1.7 - 8.2 K/UL  
 ABS. LYMPHOCYTES 2.5 0.5 - 4.6 K/UL  
 ABS. MONOCYTES 1.0 0.1 - 1.3 K/UL  
 ABS. EOSINOPHILS 0.3 0.0 - 0.8 K/UL  
 ABS. BASOPHILS 0.1 0.0 - 0.2 K/UL  
 ABS. IMM. GRANS. 0.1 0.0 - 0.5 K/UL METABOLIC PANEL, COMPREHENSIVE Collection Time: 08/30/18  1:30 AM  
Result Value Ref Range Sodium 143 136 - 145 mmol/L Potassium 3.6 3.5 - 5.1 mmol/L Chloride 108 (H) 98 - 107 mmol/L  
 CO2 25 21 - 32 mmol/L Anion gap 10 7 - 16 mmol/L Glucose 163 (H) 65 - 100 mg/dL BUN 10 8 - 23 MG/DL Creatinine 0.83 0.6 - 1.0 MG/DL  
 GFR est AA >60 >60 ml/min/1.73m2 GFR est non-AA >60 >60 ml/min/1.73m2 Calcium 8.7 8.3 - 10.4 MG/DL Bilirubin, total 0.6 0.2 - 1.1 MG/DL  
 ALT (SGPT) 49 12 - 65 U/L  
 AST (SGOT) 53 (H) 15 - 37 U/L Alk. phosphatase 80 50 - 136 U/L Protein, total 6.7 6.3 - 8.2 g/dL Albumin 3.1 (L) 3.2 - 4.6 g/dL Globulin 3.6 (H) 2.3 - 3.5 g/dL A-G Ratio 0.9 (L) 1.2 - 3.5 TSH 3RD GENERATION Collection Time: 08/30/18  1:30 AM  
Result Value Ref Range TSH 0.275 (L) 0.358 - 3.740 uIU/mL BNP Collection Time: 08/30/18  1:30 AM  
Result Value Ref Range BNP 2169 pg/mL URINE MICROSCOPIC Collection Time: 08/30/18  4:51 AM  
Result Value Ref Range WBC 3-5 0 /hpf  
 RBC 0-3 0 /hpf Epithelial cells 0-3 0 /hpf Bacteria 0 0 /hpf Casts 0-3 0 /lpf Imaging Studies: CXR Results  (Last 48 hours) 08/30/18 0153  XR CHEST PORT Final result Impression:  IMPRESSION:  
   
Vascular congestion and bibasilar atelectasis. Possible small bilateral  
effusion. Narrative:  Portable chest xray COMPARISON: July 28, 2018. CLINICAL HISTORY: Shortness of breath. FINDINGS:  
   
There are bibasilar densities, likely atelectasis. . Suggestion of small bilateral pleural effusions. There is vascular congestion. Cardiac silhouette is  
mildly enlarged. Mediastinal contour is within normal limits. Bones are  
osteopenic. CT Results  (Last 48 hours) None Assessment and Plan:  
 
Hospital Problems as of 8/30/2018  Date Reviewed: 8/14/2018 Codes Class Noted - Resolved POA * (Principal)Acute CHF (congestive heart failure) ([facility]) ICD-10-CM: I50.9 ICD-9-CM: 428.0  8/30/2018 - Present Yes Acute respiratory failure with hypoxia and hypercapnia ([facility]) ICD-10-CM: J96.01, J96.02 
ICD-9-CM: 518.81  11/29/2015 - Present Yes Systolic CHF, acute on chronic ([facility]) ICD-10-CM: O96.21 ICD-9-CM: 428.23, 428.0  7/31/2018 - Present Yes Volume overload ICD-10-CM: E87.70 ICD-9-CM: 276.69  7/30/2018 - Present Yes Hypertension (Chronic) ICD-10-CM: I10 
ICD-9-CM: 401.9  12/1/2015 - Present Yes CAD (coronary artery disease) (Chronic) ICD-10-CM: I25.10 ICD-9-CM: 414.00  11/29/2015 - Present Yes PLAN: 
· Admit patient as inpatient to remote telemetry · CHF careset utilized. Supplemental O2 
· Recent echo showed EF 20-30% with moderate diffuse hypokinesis · Diurese with IV lasix 40 mg BID · Serial troponin to r/o ischemia · Daily weights, ins and outs, low salt, fluid restrict · Continue low dose ace inhibitor and beta blocker · Resume carvedilol over metoprolol with low EF 
· Optimize BP control. Some elevation today · On dual antiplatelet therapy with asa and plavix · Check UA for possible source of elevated WBC ct · lovenox for DVT prophylaxis · Discussed with family at bedside Estimated LOS: greater than 2 midnights Signed: 
Brina Barcenas MD

## 2018-08-30 NOTE — ED PROVIDER NOTES
HPI Comments: 26-year-old female with shortness of breath chest pain. Patient brought by EMS with increasing shortness of breath and chest pain tonight. Patient has a history of CHF was recently in the hospital and that  To be diuresed. Patient has had increasing exertional dyspnea and orthopnea. Patient is a 80 y.o. female presenting with shortness of breath. The history is provided by the patient. Shortness of Breath This is a recurrent problem. The current episode started 3 to 5 hours ago. Associated symptoms include orthopnea. Pertinent negatives include no fever, no cough, no wheezing and no leg swelling. It is unknown what precipitated the problem. Treatments tried: ooxygen and aspirin. The treatment provided significant (no pain) relief. Associated medical issues include CAD and heart failure. Past Medical History:  
Diagnosis Date  Thyroid cyst Distant past  
 thyroid cyst removed x40 years ago Past Surgical History:  
Procedure Laterality Date  HX CHOLECYSTECTOMY  HX HEENT    
 thyroglossal cyst  
 HX HYSTERECTOMY Family History:  
Problem Relation Age of Onset  Other Father  Sharmaine Other Mother  Social History Social History  Marital status:  Spouse name: N/A  
 Number of children: N/A  
 Years of education: N/A Occupational History  Not on file. Social History Main Topics  Smoking status: Never Smoker  Smokeless tobacco: Never Used  Alcohol use No  
 Drug use: No  
 Sexual activity: Not on file Other Topics Concern  Not on file Social History Narrative , lives with  and 2 daughters. Pt did work as  at one time. 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 ALLERGIES: Amlodipine; Amoxicillin; and Tetracycline Review of Systems Constitutional: Negative. Negative for activity change and fever. HENT: Negative. Eyes: Negative. Respiratory: Positive for shortness of breath. Negative for cough and wheezing. Cardiovascular: Positive for orthopnea. Negative for leg swelling. Gastrointestinal: Negative. Genitourinary: Negative. Musculoskeletal: Negative. Skin: Negative. Neurological: Negative. Psychiatric/Behavioral: Negative. All other systems reviewed and are negative. There were no vitals filed for this visit. Physical Exam  
Constitutional: She is oriented to person, place, and time. She appears well-developed and well-nourished. No distress. HENT:  
Head: Normocephalic and atraumatic. Right Ear: External ear normal.  
Left Ear: External ear normal.  
Nose: Nose normal.  
Mouth/Throat: Oropharynx is clear and moist. No oropharyngeal exudate. Eyes: Conjunctivae and EOM are normal. Pupils are equal, round, and reactive to light. Right eye exhibits no discharge. Left eye exhibits no discharge. No scleral icterus. Neck: Normal range of motion. Neck supple. No JVD present. No tracheal deviation present. Cardiovascular: Normal rate, regular rhythm and intact distal pulses. Pulmonary/Chest: No stridor. She is in respiratory distress. She has no wheezes. She has rales. She exhibits no tenderness. Abdominal: Soft. Bowel sounds are normal. She exhibits no distension and no mass. There is no tenderness. Musculoskeletal: Normal range of motion. She exhibits no edema or tenderness. Neurological: She is alert and oriented to person, place, and time. No cranial nerve deficit. Skin: Skin is warm and dry. No rash noted. She is not diaphoretic. No erythema. There is pallor. Psychiatric: She has a normal mood and affect. Her behavior is normal. Thought content normal.  
Nursing note and vitals reviewed. MDM Number of Diagnoses or Management Options Acute on chronic systolic congestive heart failure ([address]): established and worsening Diagnosis management comments: patient was given Lasix in the  ER diuresed 3 times for a total of 900 cc. Patient was slightly better but still became hypoxic with any movement such as going to the bedside commode. Amount and/or Complexity of Data Reviewed Clinical lab tests: ordered and reviewed Tests in the radiology section of CPT®: ordered and reviewed Tests in the medicine section of CPT®: ordered and reviewed ED Course Procedures

## 2018-08-30 NOTE — PROGRESS NOTES
Inpatient Case Management Referral 
RRAT 18 - HRRP/High Risk for readmission Patient has had 3 hospitalizations this year (2018), admitted now for Acute sCHF - on chronic, volume overload, Acute respiratory Failure, Hypoxia, Hypercapnia, CAD, HTN Current with [facility] Plan - follow for discharge with inpatient CM Link with [facility] at discharge

## 2018-08-30 NOTE — PROGRESS NOTES
1. Patient will complete lower body bathing and dressing with set-up and adaptive equipment as needed. 2. Patient will complete toileting with MOD I.  
3. Patient will tolerate 23 minutes of OT treatment with 3-4 rest breaks to increase activity tolerance for ADLs. 4. Patient will complete functional transfers with MOD I and adaptive equipment as needed. 5. Patient will complete functional mobility for household distances with MOD I and adaptive equipment as needed. - PROGRESSING Timeframe: 7 visits OCCUPATIONAL THERAPY: Initial Assessment, Daily Note, Treatment Day: Day of Assessment and PM 8/30/2018 INPATIENT: Hospital Day: 1 [place] / Product Type: Medicare /  
  
NAME/AGE/GENDER: Simon Borden is a 80 y.o. female PRIMARY DIAGNOSIS:  Acute CHF (congestive heart failure) ([facility]) Acute CHF (congestive heart failure) ([facility]) Acute CHF (congestive heart failure) ([facility] [address] ICD-10: Treatment Diagnosis:  
 · Generalized Muscle Weakness (M62.81) Precautions/Allergies: 
   Amlodipine; Amoxicillin; and Tetracycline ASSESSMENT:  
 
Ms. Shyla Hassan presents for acute congestive heart failure with increasing SOB. Upon arrival, pt supine in bed and agreeable OT evaluation. Pt is alert and oriented x 4. Pt reports living with 2 daughters in a 1-story home with 0 steps to enters and w/c ramp entry. Pt reports requiring assistance for bathing and dressing ADLs, however is able to complete toileting, self-grooming, and feeding independently. Pt reports MOD I for functional mobility; pt states she uses a cane for community mobility and RW for household mobility. Pt endorses no falls. Today, pt performed rolling bed mobility with SBA and supine to sit transfer to edge of bed with min A. Pt completed sit to stand with CGA.  Pt ambulated ~100 ft with CGA x RW; at end of ambulation, pt reported increasing SOB and was returned to supine in bed. Pt left supine in bed, with needs met and call light within reach. At this time, pt is functioning below baseline for ADLs and functional mobility. Pt would benefit from skilled OT services to address OT goals and plan of care. This section established at most recent assessment PROBLEM LIST (Impairments causing functional limitations): 1. Decreased Strength 2. Decreased ADL/Functional Activities 3. Decreased Transfer Abilities 4. Decreased Ambulation Ability/Technique 5. Decreased Activity Tolerance 6. Increased Shortness of Breath INTERVENTIONS PLANNED: (Benefits and precautions of occupational therapy have been discussed with the patient.) 1. Activities of daily living training 2. Adaptive equipment training 3. Balance training 4. Clothing management 5. Community reintergration 6. Donning&doffing training 7. Re-evaluation 8. Therapeutic activity 9. Therapeutic exercise TREATMENT PLAN: Frequency/Duration: Follow patient 3x/week to address above goals. Rehabilitation Potential For Stated Goals: Good RECOMMENDED REHABILITATION/EQUIPMENT: (at time of discharge pending progress): Due to the probability of continued deficits (see above) this patient will not likely need continued skilled occupational therapy after discharge. Equipment:  
? None at this time OCCUPATIONAL PROFILE AND HISTORY:  
History of Present Injury/Illness (Reason for Referral): 
See H&P Past Medical History/Comorbidities: Ms. Yoana Hensley  has a past medical history of Thyroid cyst (Distant past). Ms. Yoana Hensley  has a past surgical history that includes hx cholecystectomy; hx hysterectomy; and hx heent. Social History/Living Environment:  
Home Environment: Private residence # Steps to Enter: 0 One/Two Story Residence: One story Living Alone: No 
Support Systems: Child(tamra), Family member(s) Patient Expects to be Discharged to[de-identified] Private residence Current DME Used/Available at Home: [employer] Valdes, straight, [employer] [place], rollator Tub or Shower Type: Shower Prior Level of Function/Work/Activity: 
Assistance required for bathing and dressing; independent with toileting, feeding, and self-grooming; MOD I with use of cane for community mobility and RW for functional mobility. Personal Factors:   
      Sex:  female Age:  80 y.o. Number of Personal Factors/Comorbidities that affect the Plan of Care: Brief history (0):  LOW COMPLEXITY ASSESSMENT OF OCCUPATIONAL PERFORMANCE[de-identified]  
Activities of Daily Living:  
Basic ADLs (From Assessment) Complex ADLs (From Assessment) Feeding: Independent Oral Facial Hygiene/Grooming: Independent Bathing: Minimum assistance Upper Body Dressing: Independent Lower Body Dressing: Minimum assistance Toileting: Minimum assistance Instrumental ADL Meal Preparation: Maximum assistance Homemaking: Maximum assistance Grooming/Bathing/Dressing Activities of Daily Living Bed/Mat Mobility Rolling: Independent Supine to Sit: Independent Sit to Supine: Independent Sit to Stand: Supervision Bed to Chair: Supervision Scooting: Independent Most Recent Physical Functioning:  
Gross Assessment: 
  
         
  
Posture: 
Posture (WDL): Within defined limits Balance: 
Sitting: Intact Standing: Impaired Standing - Static: Good Standing - Dynamic : Fair (+) Bed Mobility: 
Rolling: Independent Supine to Sit: Independent Sit to Supine: Independent Scooting: Independent Wheelchair Mobility: 
  
Transfers: 
Sit to Stand: Supervision Stand to Sit: Supervision Bed to Chair: Supervision Patient Vitals for the past 6 hrs: 
 BP SpO2 Pulse 08/30/18 0931 173/84 95 % 84  
08/30/18 1016 113/54 - 64  
08/30/18 1018 113/54 97 % 62  
08/30/18 1029 125/62 96 % (!) 58  
08/30/18 1059 138/64 96 % 66  
08/30/18 1207 134/70 93 % 77  
08/30/18 1442 - 94 % 78 Mental Status Neurologic State: Alert Orientation Level: Oriented X4 Physical Skills Involved: 
1. Balance 2. Strength 3. Activity Tolerance Cognitive Skills Affected (resulting in the inability to perform in a timely and safe manner): 1. none Psychosocial Skills Affected: 1. Habits/Routines 2. Environmental Adaptation Number of elements that affect the Plan of Care: 3-5:  MODERATE COMPLEXITY CLINICAL DECISION MAKIN[address] [place] [address] AM-PAC 6 Clicks Daily Activity Inpatient Short Form How much help from another person does the patient currently need. .. Total A Lot A Little None 1. Putting on and taking off regular lower body clothing? [] 1   [] 2   [x] 3   [] 4  
2. Bathing (including washing, rinsing, drying)? [] 1   [] 2   [x] 3   [] 4  
3. Toileting, which includes using toilet, bedpan or urinal?   [] 1   [] 2   [x] 3   [] 4  
4. Putting on and taking off regular upper body clothing? [] 1   [] 2   [] 3   [x] 4  
5. Taking care of personal grooming such as brushing teeth? [] 1   [] 2   [] 3   [x] 4  
6. Eating meals? [] 1   [] 2   [] 3   [x] 4  
© , Trustees of [address] [place] [address], under license to [employer]. All rights reserved Score:  Initial: 21 Most Recent: X (Date: -- ) Interpretation of Tool:  Represents activities that are increasingly more difficult (i.e. Bed mobility, Transfers, Gait). Score 24 23 22-20 19-15 14-10 9-7 6 Modifier CH CI CJ CK CL CM CN   
 
? Self Care:  
  - CURRENT STATUS: CJ - 20%-39% impaired, limited or restricted  - GOAL STATUS: CI - 1%-19% impaired, limited or restricted  - D/C STATUS:  ---------------To be determined--------------- [place] / [place] / Product Type: Medicare /   
 
Medical Necessity:    
· Patient is expected to demonstrate progress in strength, balance, coordination and functional technique to increase independence with ADLs and functional mobility. Vic Mejía Reason for Services/Other Comments: 
· Patient continues to require skilled intervention due to patient unable to attend/participate in therapy as expected. Use of outcome tool(s) and clinical judgement create a POC that gives a: LOW COMPLEXITY  
 
 
 
TREATMENT:  
(In addition to Assessment/Re-Assessment sessions the following treatments were rendered) Pre-treatment Symptoms/Complaints:  \"I have just been getting very short of breath lately. \" 
Pain: Initial:  
Pain Intensity 1: 0 /10 Post Session:  same Therapeutic Activity: (    10 minutes): Therapeutic activities including Bed transfers and Ambulation on level ground to improve mobility, strength, balance and activity tolerance. Required no Verbal cues; Safety awareness training to promote static and dynamic balance in standing. Completed rolling bed mobility with SBA and supine to sit transfer with min A. Ambulated ~100 ft with CGA x RW. Braces/Orthotics/Lines/Etc:  
· O2 Device: Room air Treatment/Session Assessment:   
· Response to Treatment:  Good participation · Interdisciplinary Collaboration:  
o Occupational Therapist 
o Registered Nurse · After treatment position/precautions:  
o Supine in bed 
o Bed/Chair-wheels locked 
o Bed in low position 
o Call light within reach 
o RN notified · Compliance with Program/Exercises: Will assess as treatment progresses. · Recommendations/Intent for next treatment session: \"Next visit will focus on advancements to more challenging activities and reduction in assistance provided\". Total Treatment Duration: OT Patient Time In/Time Out Time In: 2128 Time Out: 1438 Charlanne Bumpers, OT

## 2018-08-31 VITALS
RESPIRATION RATE: 18 BRPM | DIASTOLIC BLOOD PRESSURE: 56 MMHG | WEIGHT: 100 LBS | TEMPERATURE: 97.8 F | OXYGEN SATURATION: 96 % | BODY MASS INDEX: 17.07 KG/M2 | SYSTOLIC BLOOD PRESSURE: 120 MMHG | HEART RATE: 88 BPM | HEIGHT: 64 IN

## 2018-08-31 PROBLEM — E87.70 VOLUME OVERLOAD: Status: RESOLVED | Noted: 2018-07-30 | Resolved: 2018-08-31

## 2018-08-31 PROBLEM — I50.23 SYSTOLIC CHF, ACUTE ON CHRONIC (HCC): Status: RESOLVED | Noted: 2018-07-31 | Resolved: 2018-08-31

## 2018-08-31 PROBLEM — I48.91 ATRIAL FIBRILLATION (HCC): Status: ACTIVE | Noted: 2018-08-31

## 2018-08-31 LAB
ALBUMIN SERPL-MCNC: 3.3 G/DL (ref 3.2–4.6)
ALBUMIN/GLOB SERPL: 1 {RATIO} (ref 1.2–3.5)
ALP SERPL-CCNC: 71 U/L (ref 50–136)
ALT SERPL-CCNC: 37 U/L (ref 12–65)
ANION GAP SERPL CALC-SCNC: 10 MMOL/L (ref 7–16)
AST SERPL-CCNC: 21 U/L (ref 15–37)
BILIRUB SERPL-MCNC: 1 MG/DL (ref 0.2–1.1)
BNP SERPL-MCNC: 2053 PG/ML
BUN SERPL-MCNC: 13 MG/DL (ref 8–23)
CALCIUM SERPL-MCNC: 8.8 MG/DL (ref 8.3–10.4)
CHLORIDE SERPL-SCNC: 101 MMOL/L (ref 98–107)
CO2 SERPL-SCNC: 29 MMOL/L (ref 21–32)
CREAT SERPL-MCNC: 0.76 MG/DL (ref 0.6–1)
ERYTHROCYTE [DISTWIDTH] IN BLOOD BY AUTOMATED COUNT: 13.5 %
GLOBULIN SER CALC-MCNC: 3.4 G/DL (ref 2.3–3.5)
GLUCOSE SERPL-MCNC: 117 MG/DL (ref 65–100)
HCT VFR BLD AUTO: 34.2 % (ref 35.8–46.3)
HGB BLD-MCNC: 11.4 G/DL (ref 11.7–15.4)
MAGNESIUM SERPL-MCNC: 2 MG/DL (ref 1.8–2.4)
MCH RBC QN AUTO: 32 PG (ref 26.1–32.9)
MCHC RBC AUTO-ENTMCNC: 33.3 G/DL (ref 31.4–35)
MCV RBC AUTO: 96.1 FL (ref 79.6–97.8)
NRBC # BLD: 0 K/UL (ref 0–0.2)
PLATELET # BLD AUTO: 222 K/UL (ref 150–450)
PMV BLD AUTO: 10.3 FL (ref 9.4–12.3)
POTASSIUM SERPL-SCNC: 3.3 MMOL/L (ref 3.5–5.1)
PROT SERPL-MCNC: 6.7 G/DL (ref 6.3–8.2)
RBC # BLD AUTO: 3.56 M/UL (ref 4.05–5.2)
SODIUM SERPL-SCNC: 140 MMOL/L (ref 136–145)
WBC # BLD AUTO: 9.1 K/UL (ref 4.3–11.1)

## 2018-08-31 PROCEDURE — 77030020263 HC SOL INJ SOD CL0.9% LFCR 1000ML

## 2018-08-31 PROCEDURE — 83735 ASSAY OF MAGNESIUM: CPT

## 2018-08-31 PROCEDURE — 74011250636 HC RX REV CODE- 250/636: Performed by: HOSPITALIST

## 2018-08-31 PROCEDURE — 85027 COMPLETE CBC AUTOMATED: CPT

## 2018-08-31 PROCEDURE — 80053 COMPREHEN METABOLIC PANEL: CPT

## 2018-08-31 PROCEDURE — 74011250637 HC RX REV CODE- 250/637: Performed by: NURSE PRACTITIONER

## 2018-08-31 PROCEDURE — 83880 ASSAY OF NATRIURETIC PEPTIDE: CPT

## 2018-08-31 PROCEDURE — 36415 COLL VENOUS BLD VENIPUNCTURE: CPT

## 2018-08-31 PROCEDURE — 74011250636 HC RX REV CODE- 250/636: Performed by: INTERNAL MEDICINE

## 2018-08-31 PROCEDURE — 74011250637 HC RX REV CODE- 250/637: Performed by: HOSPITALIST

## 2018-08-31 PROCEDURE — 74011250637 HC RX REV CODE- 250/637: Performed by: INTERNAL MEDICINE

## 2018-08-31 RX ORDER — CLOPIDOGREL BISULFATE 75 MG/1
75 TABLET ORAL DAILY
Qty: 30 TAB | Refills: 0 | Status: SHIPPED | OUTPATIENT
Start: 2018-08-31 | End: 2018-09-30

## 2018-08-31 RX ORDER — AMIODARONE HYDROCHLORIDE 200 MG/1
200 TABLET ORAL 2 TIMES DAILY
Qty: 30 TAB | Refills: 0 | Status: SHIPPED | OUTPATIENT
Start: 2018-08-31 | End: 2018-09-30

## 2018-08-31 RX ORDER — LISINOPRIL 2.5 MG/1
2.5 TABLET ORAL DAILY
Qty: 30 TAB | Refills: 0 | Status: SHIPPED | OUTPATIENT
Start: 2018-08-31 | End: 2018-09-30

## 2018-08-31 RX ORDER — CARVEDILOL 6.25 MG/1
6.25 TABLET ORAL 2 TIMES DAILY WITH MEALS
Qty: 60 TAB | Refills: 0 | Status: SHIPPED | OUTPATIENT
Start: 2018-08-31 | End: 2018-09-30

## 2018-08-31 RX ORDER — FUROSEMIDE 40 MG/1
60 TABLET ORAL
Qty: 30 TAB | Refills: 0 | Status: ON HOLD | OUTPATIENT
Start: 2018-08-31 | End: 2018-09-28 | Stop reason: CLARIF

## 2018-08-31 RX ORDER — POTASSIUM CHLORIDE 14.9 MG/ML
20 INJECTION INTRAVENOUS
Status: COMPLETED | OUTPATIENT
Start: 2018-08-31 | End: 2018-08-31

## 2018-08-31 RX ORDER — CARVEDILOL 6.25 MG/1
6.25 TABLET ORAL 2 TIMES DAILY WITH MEALS
Status: DISCONTINUED | OUTPATIENT
Start: 2018-08-31 | End: 2018-08-31 | Stop reason: HOSPADM

## 2018-08-31 RX ADMIN — CLOPIDOGREL BISULFATE 75 MG: 75 TABLET ORAL at 08:48

## 2018-08-31 RX ADMIN — VITAMIN D, TAB 1000IU (100/BT) 2000 UNITS: 25 TAB at 08:47

## 2018-08-31 RX ADMIN — PANTOPRAZOLE SODIUM 40 MG: 40 TABLET, DELAYED RELEASE ORAL at 05:17

## 2018-08-31 RX ADMIN — CARVEDILOL 6.25 MG: 6.25 TABLET, FILM COATED ORAL at 08:47

## 2018-08-31 RX ADMIN — ASPIRIN 81 MG 81 MG: 81 TABLET ORAL at 08:47

## 2018-08-31 RX ADMIN — FUROSEMIDE 40 MG: 10 INJECTION, SOLUTION INTRAMUSCULAR; INTRAVENOUS at 08:48

## 2018-08-31 RX ADMIN — POTASSIUM CHLORIDE 20 MEQ: 200 INJECTION, SOLUTION INTRAVENOUS at 08:46

## 2018-08-31 RX ADMIN — ENOXAPARIN SODIUM 30 MG: 100 INJECTION SUBCUTANEOUS at 08:48

## 2018-08-31 RX ADMIN — OXYCODONE HYDROCHLORIDE 15 MG: 15 TABLET ORAL at 10:11

## 2018-08-31 RX ADMIN — AMIODARONE HYDROCHLORIDE 200 MG: 200 TABLET ORAL at 08:48

## 2018-08-31 RX ADMIN — LISINOPRIL 2.5 MG: 5 TABLET ORAL at 08:48

## 2018-08-31 RX ADMIN — POTASSIUM CHLORIDE 20 MEQ: 20 TABLET, EXTENDED RELEASE ORAL at 08:48

## 2018-08-31 RX ADMIN — Medication 100 MG: at 08:47

## 2018-08-31 RX ADMIN — POTASSIUM CHLORIDE 20 MEQ: 200 INJECTION, SOLUTION INTRAVENOUS at 12:00

## 2018-08-31 NOTE — PROGRESS NOTES
Chart screened by  for discharge planning. No needs identified at this time. Please consult  if any new issues arise. Patient decline [facility] services and health . Care Management Interventions PCP Verified by CM: Yes Mode of Transport at Discharge: Other (see comment) Transition of Care Consult (CM Consult): Discharge Planning Discharge Durable Medical Equipment: No 
Physical Therapy Consult: No 
Occupational Therapy Consult: No 
Current Support Network: Own Home (lives with 2 daughters) Confirm Follow Up Transport: Family Plan discussed with Pt/Family/Caregiver: Yes Freedom of Choice Offered: Yes Discharge Location Discharge Placement: Home

## 2018-08-31 NOTE — PHYSICIAN ADVISORY
Letter of Determination: Inpatient Status Appropriate This patient was originally hospitalized as Inpatient Status on 8/30/2018 for exacerbation of congestive heart failure. This patient is appropriate for Inpatient Admission in accordance with CMS regulation Section 43 .3. Specifically, patient's stay is expected to be more than Two Midnights and was medically necessary. The patient's stay was medically necessary based on extreme advanced age, brain natriuretic peptide of 2169 pg/ml, and troponin-I of 0.06 ng/ml. Consistent with CMS guidelines, patient meets for inpatient status. The patient improved more rapidly than anticipated and was stable for discharge after only one midnight. It is our recommendation that this patient's hospitalization status should be INPATIENT status.  
  
The final decision regarding the patient's hospitalization status depends on the attending physician's judgement. Sachin Santiago MD, EPIFANIO, Physician Advisor [address] [facility].

## 2018-08-31 NOTE — DISCHARGE INSTRUCTIONS
DISCHARGE SUMMARY from Nurse    PATIENT INSTRUCTIONS:    After general anesthesia or intravenous sedation, for 24 hours or while taking prescription Narcotics:  · Limit your activities  · Do not drive and operate hazardous machinery  · Do not make important personal or business decisions  · Do  not drink alcoholic beverages  · If you have not urinated within 8 hours after discharge, please contact your surgeon on call. Report the following to your surgeon:  · Excessive pain, swelling, redness or odor of or around the surgical area  · Temperature over 100.5  · Nausea and vomiting lasting longer than 4 hours or if unable to take medications  · Any signs of decreased circulation or nerve impairment to extremity: change in color, persistent  numbness, tingling, coldness or increase pain  · Any questions    What to do at Home:  Recommended activity: Activity as tolerated    If you experience any of the following symptoms worsening shortness of breath, swelling in arms or legs, or weight gain, please follow up with your primary care physician    *  Please give a list of your current medications to your Primary Care Provider. *  Please update this list whenever your medications are discontinued, doses are      changed, or new medications (including over-the-counter products) are added. *  Please carry medication information at all times in case of emergency situations. These are general instructions for a healthy lifestyle:    No smoking/ No tobacco products/ Avoid exposure to second hand smoke  Surgeon General's Warning:  Quitting smoking now greatly reduces serious risk to your health.     Obesity, smoking, and sedentary lifestyle greatly increases your risk for illness    A healthy diet, regular physical exercise & weight monitoring are important for maintaining a healthy lifestyle    You may be retaining fluid if you have a history of heart failure or if you experience any of the following symptoms: Weight gain of 3 pounds or more overnight or 5 pounds in a week, increased swelling in our hands or feet or shortness of breath while lying flat in bed. Please call your doctor as soon as you notice any of these symptoms; do not wait until your next office visit. Recognize signs and symptoms of STROKE:    F-face looks uneven    A-arms unable to move or move unevenly    S-speech slurred or non-existent    T-time-call 911 as soon as signs and symptoms begin-DO NOT go       Back to bed or wait to see if you get better-TIME IS BRAIN. Warning Signs of HEART ATTACK     Call 911 if you have these symptoms:   Chest discomfort. Most heart attacks involve discomfort in the center of the chest that lasts more than a few minutes, or that goes away and comes back. It can feel like uncomfortable pressure, squeezing, fullness, or pain.  Discomfort in other areas of the upper body. Symptoms can include pain or discomfort in one or both arms, the back, neck, jaw, or stomach.  Shortness of breath with or without chest discomfort.  Other signs may include breaking out in a cold sweat, nausea, or lightheadedness. Don't wait more than five minutes to call 911 - MINUTES MATTER! Fast action can save your life. Calling 911 is almost always the fastest way to get lifesaving treatment. Emergency Medical Services staff can begin treatment when they arrive -- up to an hour sooner than if someone gets to the hospital by car. The discharge information has been reviewed with the patient. The patient verbalized understanding. Discharge medications reviewed with the patient and appropriate educational materials and side effects teaching were provided. ___________________________________________________________________________________________________________________________________     Heart Failure: Care Instructions  Your Care Instructions    Heart failure occurs when your heart does not pump as much blood as the body needs. Failure does not mean that the heart has stopped pumping but rather that it is not pumping as well as it should. Over time, this causes fluid buildup in your lungs and other parts of your body. Fluid buildup can cause shortness of breath, fatigue, swollen ankles, and other problems. By taking medicines regularly, reducing sodium (salt) in your diet, checking your weight every day, and making lifestyle changes, you can feel better and live longer. Follow-up care is a key part of your treatment and safety. Be sure to make and go to all appointments, and call your doctor if you are having problems. It's also a good idea to know your test results and keep a list of the medicines you take. How can you care for yourself at home? Medicines    · Be safe with medicines. Take your medicines exactly as prescribed. Call your doctor if you think you are having a problem with your medicine.     · Do not take any vitamins, over-the-counter medicine, or herbal products without talking to your doctor first. Cyrena Hedge not take ibuprofen (Advil or Motrin) and naproxen (Aleve) without talking to your doctor first. They could make your heart failure worse.     · You may be taking some of the following medicine. ¨ Beta-blockers can slow heart rate, decrease blood pressure, and improve your condition. Taking a beta-blocker may lower your chance of needing to be hospitalized. ¨ Angiotensin-converting enzyme inhibitors (ACEIs) reduce the heart's workload, lower blood pressure, and reduce swelling. Taking an ACEI may lower your chance of needing to be hospitalized again. ¨ Angiotensin II receptor blockers (ARBs) work like ACEIs. Your doctor may prescribe them instead of ACEIs. ¨ Diuretics, also called water pills, reduce swelling. ¨ Potassium supplements replace this important mineral, which is sometimes lost with diuretics.   ¨ Aspirin and other blood thinners prevent blood clots, which can cause a stroke or heart attack.    You will get more details on the specific medicines your doctor prescribes. Diet    · Your doctor may suggest that you limit sodium to 2,000 milligrams (mg) a day or less. That is less than 1 teaspoon of salt a day, including all the salt you eat in cooking or in packaged foods. People get most of their sodium from processed foods. Fast food and restaurant meals also tend to be very high in sodium.     · Ask your doctor how much liquid you can drink each day. You may have to limit liquids.    Weight    · Weigh yourself without clothing at the same time each day. Record your weight. Call your doctor if you have a sudden weight gain, such as more than 2 to 3 pounds in a day or 5 pounds in a week. (Your doctor may suggest a different range of weight gain.) A sudden weight gain may mean that your heart failure is getting worse.    Activity level    · Start light exercise (if your doctor says it is okay). Even if you can only do a small amount, exercise will help you get stronger, have more energy, and manage your weight and your stress. Walking is an easy way to get exercise. Start out by walking a little more than you did before. Bit by bit, increase the amount you walk.     · When you exercise, watch for signs that your heart is working too hard. You are pushing yourself too hard if you cannot talk while you are exercising. If you become short of breath or dizzy or have chest pain, stop, sit down, and rest.     · If you feel \"wiped out\" the day after you exercise, walk slower or for a shorter distance until you can work up to a better pace.     · Get enough rest at night. Sleeping with 1 or 2 pillows under your upper body and head may help you breathe easier.    Lifestyle changes    · Do not smoke. Smoking can make a heart condition worse. If you need help quitting, talk to your doctor about stop-smoking programs and medicines. These can increase your chances of quitting for good.  Quitting smoking may be the most important step you can take to protect your heart.     · Limit alcohol to 2 drinks a day for men and 1 drink a day for women. Too much alcohol can cause health problems.     · Avoid getting sick from colds and the flu. Get a pneumococcal vaccine shot. If you have had one before, ask your doctor whether you need another dose. Get a flu shot each year. If you must be around people with colds or the flu, wash your hands often. When should you call for help? Call 911 if you have symptoms of sudden heart failure such as:    · You have severe trouble breathing.     · You cough up pink, foamy mucus.     · You have a new irregular or rapid heartbeat.    Call your doctor now or seek immediate medical care if:    · You have new or increased shortness of breath.     · You are dizzy or lightheaded, or you feel like you may faint.     · You have sudden weight gain, such as more than 2 to 3 pounds in a day or 5 pounds in a week. (Your doctor may suggest a different range of weight gain.)     · You have increased swelling in your legs, ankles, or feet.     · You are suddenly so tired or weak that you cannot do your usual activities.    Watch closely for changes in your health, and be sure to contact your doctor if you develop new symptoms. Where can you learn more? Go to http://dinora-ellyn.info/. Enter H367 in the search box to learn more about \"Heart Failure: Care Instructions. \"  Current as of: May 10, 2017  Content Version: 11.7  © 6621-2746 [employer]. Care instructions adapted under license by [employer] (which disclaims liability or warranty for this information). If you have questions about a medical condition or this instruction, always ask your healthcare professional. [place] [address] any warranty or liability for your use of this information.        Limiting Sodium and Fluids With Heart Failure: Care Instructions  Your Care Instructions    Sodium causes your body to hold on to extra water. This may cause your heart failure symptoms to get worse. Limiting sodium may help you feel better and lower your risk of having to go to the hospital.  People get most of their sodium from processed foods. Fast food and restaurant meals also tend to be very high in sodium. Your doctor may suggest that you limit sodium to 2,000 milligrams (mg) a day or less. That is less than 1 teaspoon of salt a day, including all the salt you eat in cooked or packaged foods. Usually, you have to limit the amount of liquids you drink only if your heart failure is severe. Limiting sodium alone often is enough to help your body get rid of extra fluids. However, your doctor may tell you to limit your fluid intake to a set amount each day. Follow-up care is a key part of your treatment and safety. Be sure to make and go to all appointments, and call your doctor if you are having problems. It's also a good idea to know your test results and keep a list of the medicines you take. How can you care for yourself at home? Read food labels  · Read food labels on cans and food packages. The labels tell you how much sodium is in each serving. Make sure that you look at the serving size. If you eat more than the serving size, you have eaten more sodium than is listed for one serving. · Food labels also tell you the Percent Daily Value. If the Percent Daily Value says 50%, it means that you will get at least 50% of all the sodium you need for the entire day in one serving. Choose products with low Percent Daily Values for sodium. · Be aware that sodium can come in forms other than salt, including monosodium glutamate (MSG), sodium citrate, and sodium bicarbonate (baking soda). MSG is often added to Asian food. You can sometimes ask for food without MSG or salt.   Buy low-sodium foods  · Buy foods that are labeled \"unsalted\" (no salt added), \"sodium-free\" (less than 5 mg of sodium per serving), or \"low-sodium\" (less than 140 mg of sodium per serving). A food labeled \"light sodium\" has less than half of the full-sodium version of that food. Foods labeled \"reduced-sodium\" may still have too much sodium. · Buy fresh vegetables or plain, frozen vegetables. Buy low-sodium versions of canned vegetables, soups, and other canned goods. Prepare low-sodium meals  · Use less salt each day when cooking. Reducing salt in this way will help you adjust to the taste. Do not add salt after cooking. Take the salt shaker off the table. · Flavor your food with garlic, lemon juice, onion, vinegar, herbs, and spices instead of salt. Do not use soy sauce, steak sauce, onion salt, garlic salt, mustard, or ketchup on your food. · Make your own salad dressings, sauces, and ketchup without adding salt. · Use less salt (or none) when recipes call for it. You can often use half the salt a recipe calls for without losing flavor. Other dishes like rice, pasta, and grains do not need added salt. · Rinse canned vegetables. This removes some-but not all-of the salt. · Avoid water that has a naturally high sodium content or that has been treated with water softeners, which add sodium. Call your local water company to find out the sodium content of your water supply. If you buy bottled water, read the label and choose a sodium-free brand. Avoid high-sodium foods, such as:  · Smoked, cured, salted, and canned meat, fish, and poultry. · Ham, courtney, hot dogs, and luncheon meats. · Regular, hard, and processed cheese and regular peanut butter. · Crackers with salted tops. · Frozen prepared meals. · Canned and dried soups, broths, and bouillon, unless labeled sodium-free or low-sodium. · Canned vegetables, unless labeled sodium-free or low-sodium. · Salted snack foods such as chips and pretzels. · [place] fries, pizza, tacos, and other fast foods.   · Pickles, olives, ketchup, and other condiments, especially soy sauce, unless labeled sodium-free or low-sodium. If you cannot cook for yourself  · Have family members or friends help you, or have someone cook low-sodium meals. · Check with your local senior nutrition program to find out where meals are served and whether they offer a low-sodium option. You can often find these programs through your local health department or hospital.  · Have meals delivered to your home. Most [facility] have a Meals on JILL Sinha. These programs provide one hot meal a day for older adults, delivered to their homes. Ask whether these meals are low-sodium. Let them know that you are on a low-sodium diet. Limiting fluid intake  · Find a method that works for you. You might simply write down how much you drink every time you do. Some people keep a container filled with the amount of fluid allowed for that day. If they drink from a source other than the container, then they pour out that amount. · Measure your regular drinking glasses to find out how much fluid each one holds. Once you know this, you will not have to measure every time. · Besides water, milk, juices, and other drinks, some foods have a lot of fluid. Count any foods that will melt (such as ice cream or gelatin dessert) or liquid foods (such as soup) as part of your fluid intake for the day. Where can you learn more? Go to http://dinora-ellyn.info/. Enter A166 in the search box to learn more about \"Limiting Sodium and Fluids With Heart Failure: Care Instructions. \"  Current as of: December 6, 2017  Content Version: 11.7  © 0233-0241 [employer]. Care instructions adapted under license by [employer] (which disclaims liability or warranty for this information). If you have questions about a medical condition or this instruction, always ask your healthcare professional. [address] any warranty or liability for your use of this information.

## 2018-08-31 NOTE — PROGRESS NOTES
Primary diagnosis of CHF. Patient eligible for the Transitions of Care Wellness program.  This program includes a Health  that could provide in-home visits, phone calls after discharge, and a 24 hour HelpLine in addition to [facility].. Reach out to [facility] Management regarding plan for follow up.

## 2018-08-31 NOTE — PROGRESS NOTES
[facility] CARDIOLOGY PROGRESS NOTE 
 
8/31/2018 6:54 AM 
 
Admit Date: 8/30/2018 Admit Diagnosis: Acute CHF (congestive heart failure) ([facility] Subjective:  
Stable overnight without angina, CHF, or palpitations. Vitals stable and controlled. No other complaints overnight. Tolerating meds well. Breathing back to normal, rhythm normal clinically, lying flat comfortably without orthopnea, no JVD or crackles on exam.  
 
 
Objective:  
  
Vitals:  
 08/30/18 1536 08/30/18 2017 08/30/18 2316 08/31/18 0425 BP: 159/88 146/71 133/70 142/82 Pulse: 75 81 70 76 Resp: 22 20 20 19 Temp: 98 °F (36.7 °C) 97.9 °F (36.6 °C) 97.7 °F (36.5 °C) 97.6 °F (36.4 °C) SpO2: 94% 94% 93% 97% Weight:    45.4 kg (100 lb) Height:      
 
 
Physical Exam: 
Neck- supple, no JVD at 60 deg CV- regular rate and rhythm no MRG Lung- clear bilaterally Abd- soft, nontender, nondistended Ext- no edema Skin- warm and dry Data Review:  
Recent Labs 08/31/18 
 1542  08/30/18 
 0130 NA  140  143  
K  3.3*  3.6 MG  2.0  2.2 BUN  13  10 CREA  0.76  0.83 GLU  117*  163* WBC  9.1  11.4* HGB  11.4*  10.8* HCT  34.2*  34.4*  
PLT  222  244 Assessment and Plan:  
 
Principal Problem: 
  Acute CHF (congestive heart failure) ([facility] (8/30/2018)-  acute on chronic systolic HF- continue coreg, ACE-I and diuretics, clinically euvolemic and back to normal today. See below.  
  
Active Problems: 
  Acute respiratory failure with hypoxia and hypercapnia (HCC) (11/29/2015)- per primary team  
  
  CAD (coronary artery disease) (11/29/2015)- with recent NSTEMI July 2018. Will continue asa, plavix, BB, ACE-I. No active angina sx. No aggressive measures given age.  
  
  Hypertension (12/1/2015)- controlled 
  
  Volume overload (7/30/2018)- see above 
  
  Systolic CHF, acute on chronic ([facility] (7/31/2018)- see above.  
  
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  Started amiodarone 200 mg bid then transition to 200 mg daily at d/c. See below 
  
Hypokalemia- replete IV today,  Labs in 5-7 days. Replete K with IV this AM 
Continue IV lasix x 1 dose this AM then convert to po dosing, increase po lasix to 60mg qam at discharge Check BMP, Mg, BNP on Tuesday or Wednesday of next week Increase coreg to 6.25mg BID today Continue amiodarone 200mg daily for now Stay hydrated but avoid salt, daily weight, daily po fluid intake monitoring, discussed with daughter She will need quick follow up with Dr. Mi Frank in a week or less to prevent rapid readmission 
  
We will be on standby and follow from a distance. Please call if any further assistance is needed or if any new questions arise. Thanks for the consult. Anup Mcfarlane MD 
[facility] Pager [phone]

## 2018-08-31 NOTE — PROGRESS NOTES
Hourly rounds done. Pt c/o pain, medicated per MAR. Denies nausea, vomiting. Measurable output 625 mL, yellow/straw/clear. No BM this shift. Daughter at bedside. All needs met at this time.

## 2018-08-31 NOTE — PROGRESS NOTES
Discharge instructions and prescriptions given and reviewed with pt and daughter, verbalizes understanding, pt receiving IV K+ bolus, then will discharge home.

## 2018-08-31 NOTE — DISCHARGE SUMMARY
Hospitalist Discharge Summary     Admit Date:  2018  1:22 AM   Name:  Elsi Hoover   Age:  80 y.o.  :  3/3/1924   MRN:  [id number]   PCP:  Delano Bourgeois MD  Treatment Team: Attending Provider: Zulema Dominguez MD; Consulting Provider: Donna Castro MD; Hospitalist: Samule Delcid MD    Problem List for this Hospitalization:  Hospital Problems as of 2018  Date Reviewed: 2018          Codes Class Noted - Resolved POA    Atrial fibrillation ([facility] ICD-10-CM: I48.91  ICD-9-CM: 427.31  2018 - Present Unknown        Acute CHF (congestive heart failure) ([facility] ICD-10-CM: I50.9  ICD-9-CM: 428.0  2018 - Present         Hypokalemia ICD-10-CM: E87.6  ICD-9-CM: 276.8  2018 - Present Yes        Hypertension (Chronic) ICD-10-CM: I10  ICD-9-CM: 401.9  2015 - Present Yes        CAD (coronary artery disease) (Chronic) ICD-10-CM: I25.10  ICD-9-CM: 414.00  2015 - Present Yes        * (Principal)RESOLVED: Systolic CHF, acute on chronic ([facility] ICD-10-CM: I50.23  ICD-9-CM: 428.23, 428.0  2018 - 2018 Yes        RESOLVED: Volume overload ICD-10-CM: E87.70  ICD-9-CM: 276.69  2018 - 2018 Yes        RESOLVED: Acute respiratory failure with hypoxia and hypercapnia (HCC) ICD-10-CM: J96.01, J96.02  ICD-9-CM: 518.81  2015 - 2018 Yes                Admission HPI from 2018:    Patient is a 81 y/o female with hx recent admission for ruptured appendicitis and CHF. She was admitted to [facility] for acute appendicitis  and received a bolus of IVF prior to surgery. She developed acute CHF then and had to be treated for this first. An echocardiogram then showed an EF of 20-30% with moderate diffuse hypokinesis and mild aortic valve stenosis. She had a non ST elevation MI due to supply demand mismatch and is not felt to be a candidate for invasive therapies.  Since her discharge after surgery she has had follow up appointments with cardiology Dr Melita Negron on 8/15 and later with her primary cardiologist Dr Onur Isaac. She is no longer on amiodarone as she is in NSR. Only on lasix as a prn. And she was switched back to metoprolol from carvedilol. She had been fine but yesterday began having shortness of breath. No edema to legas. No change in salt intake or fluids. Today in ED she is in a sinus rhythm but BNP elevated to 2169 but troponin is 0.05. Chest xray shows CHF. She was also hypoxic on RA in the 80s which improved with NC O2. After receiving IV lasix she is feeling better. Will admit for further treatment. Hospital Course:  Ms. Whitley Linda is a very nice 79 y/o WF recently admitted to [facility] for ruptured appendicitis. Pre-operatively she was given IVFs and went into acute CHF. This was treated and she underwent surgery and was discharged. She did well at home. Per daughter, she had not had any weight changes or peripheral edema. Patient then became progressively SOB and presented to the hospital, [facility], on 8/30. BNP elevated and CXR showed vascular congestion. She was also in AFib. She was admitted for IV diuresis. Cardiology was consulted. She was started back on PO amiodarone. Metoprolol was changed to Coreg. She had excellent urine output and weight on discharge was down 3-4kg from admission. She is ambulating, tolerating her diet, breathing has improved, satting normal on room air and lungs are clear. She will be given one more dose of IV Lasix and IV potassium this morning and then will be discharged home, where she lives with her daughter. PO Lasix dose will be increased to 60mg each morning. Salt and fluid restriction discussed as well as warning signs for acute CHF. Patient is to have follow up with her primary cardiologist, Dr. Onur Isaac, ASAP, preferably within 1 week of discharge. Follow up instructions and discharge meds at bottom of this note. Plan was discussed with patient, daughter, nurse. All questions answered.   Her hospital course was otherwise unremarkable and she is stable at time of discharge. Diagnostic Imaging/Tests:   Xr Chest Port    Result Date: 8/30/2018  Portable chest xray  COMPARISON: July 28, 2018. CLINICAL HISTORY: Shortness of breath. FINDINGS: There are bibasilar densities, likely atelectasis. . Suggestion of small bilateral pleural effusions. There is vascular congestion. Cardiac silhouette is mildly enlarged. Mediastinal contour is within normal limits. Bones are osteopenic. IMPRESSION: Vascular congestion and bibasilar atelectasis. Possible small bilateral effusion. Echocardiogram results:  No results found for this visit on 08/30/18.       All Micro Results     None          Labs: Results:       BMP, Mg, Phos Recent Labs      08/31/18   0552  08/30/18   0130   NA  140  143   K  3.3*  3.6   CL  101  108*   CO2  29  25   AGAP  10  10   BUN  13  10   CREA  0.76  0.83   CA  8.8  8.7   GLU  117*  163*   MG  2.0  2.2      CBC Recent Labs      08/31/18   0552  08/30/18   0130   WBC  9.1  11.4*   RBC  3.56*  3.41*   HGB  11.4*  10.8*   HCT  34.2*  34.4*   PLT  222  244   GRANS   --   65   LYMPH   --   22   EOS   --   2   MONOS   --   9   BASOS   --   1   IG   --   1   ANEU   --   7.4   ABL   --   2.5   ESTEFANI   --   0.3   ABM   --   1.0   ABB   --   0.1   AIG   --   0.1      LFT Recent Labs      08/31/18   0552  08/30/18   0130   SGOT  21  53*   ALT  37  49   AP  71  80   TP  6.7  6.7   ALB  3.3  3.1*   GLOB  3.4  3.6*   AGRAT  1.0*  0.9*      Cardiac Testing Lab Results   Component Value Date/Time    BNP 2053 08/31/2018 05:52 AM    BNP 2169 08/30/2018 01:30 AM    BNP 2033 07/29/2018 08:55 AM     11/29/2015 09:55 AM     07/28/2018 03:16 AM    CK - MB 23.9 (H) 07/28/2018 03:16 AM    CK-MB Index 13.8 07/28/2018 03:16 AM    Troponin-I, Qt. 0.04 08/30/2018 08:54 PM    Troponin-I, Qt. 0.06 (H) 08/30/2018 02:04 PM    Troponin-I, Qt. 2.96 (HH) 07/28/2018 05:53 PM      Coagulation Tests Lab Results   Component Value Date/Time Prothrombin time 15.1 (H) 07/27/2018 08:15 PM    Prothrombin time 11.4 11/29/2015 09:55 AM    INR 1.2 07/27/2018 08:15 PM    INR 1.1 11/29/2015 09:55 AM    aPTT 92.5 (HH) 07/31/2018 03:29 AM    aPTT 50.9 (H) 07/30/2018 07:10 PM    aPTT 79.9 (H) 07/30/2018 07:47 AM      A1c No results found for: HBA1C, HGBE8, [id number]   Lipid Panel Lab Results   Component Value Date/Time    Cholesterol, total 151 11/21/2008 05:07 AM    HDL Cholesterol 38 (L) 11/21/2008 05:07 AM    LDL, calculated 82.6 11/21/2008 05:07 AM    VLDL, calculated 30.4 (H) 11/21/2008 05:07 AM    Triglyceride 152 (H) 11/21/2008 05:07 AM    CHOL/HDL Ratio 4.0 11/21/2008 05:07 AM      Thyroid Panel Lab Results   Component Value Date/Time    TSH 0.275 (L) 08/30/2018 01:30 AM    TSH 0.057 (L) 07/28/2018 03:16 AM    T4, Total 7.5 07/28/2018 03:16 AM    T4, Free 1.7 (H) 08/30/2018 01:30 AM        Most Recent UA Lab Results   Component Value Date/Time    Color YELLOW 07/27/2018 11:23 PM    Appearance CLEAR 07/27/2018 11:23 PM    Specific gravity 1.007 07/27/2018 11:23 PM    pH (UA) 6.0 07/27/2018 11:23 PM    Protein 30 (A) 07/27/2018 11:23 PM    Glucose 100 07/27/2018 11:23 PM    Ketone NEGATIVE  07/27/2018 11:23 PM    Bilirubin NEGATIVE  07/27/2018 11:23 PM    Blood TRACE (A) 07/27/2018 11:23 PM    Urobilinogen 0.2 07/27/2018 11:23 PM    Nitrites NEGATIVE  07/27/2018 11:23 PM    Leukocyte Esterase NEGATIVE  07/27/2018 11:23 PM        Allergies   Allergen Reactions    Amlodipine Hives    Amoxicillin Rash    Tetracycline Rash     Immunization History   Administered Date(s) Administered    Influenza Vaccine 10/01/2015    Influenza Vaccine Whole 10/20/2008    TB Skin Test (PPD) Intradermal 07/27/2018       All Labs from Last 24 Hrs:  Recent Results (from the past 24 hour(s))   TROPONIN I    Collection Time: 08/30/18  2:04 PM   Result Value Ref Range    Troponin-I, Qt. 0.06 (H) 0.02 - 0.05 NG/ML   TROPONIN I    Collection Time: 08/30/18  8:54 PM   Result Value Ref Range    Troponin-I, Qt. 0.04 0.02 - 0.05 NG/ML   BNP    Collection Time: 08/31/18  5:52 AM   Result Value Ref Range    BNP 2053 pg/mL   CBC W/O DIFF    Collection Time: 08/31/18  5:52 AM   Result Value Ref Range    WBC 9.1 4.3 - 11.1 K/uL    RBC 3.56 (L) 4.05 - 5.2 M/uL    HGB 11.4 (L) 11.7 - 15.4 g/dL    HCT 34.2 (L) 35.8 - 46.3 %    MCV 96.1 79.6 - 97.8 FL    MCH 32.0 26.1 - 32.9 PG    MCHC 33.3 31.4 - 35.0 g/dL    RDW 13.5 %    PLATELET 428 517 - 871 K/uL    MPV 10.3 9.4 - 12.3 FL    ABSOLUTE NRBC 0.00 0.0 - 0.2 K/uL   METABOLIC PANEL, COMPREHENSIVE    Collection Time: 08/31/18  5:52 AM   Result Value Ref Range    Sodium 140 136 - 145 mmol/L    Potassium 3.3 (L) 3.5 - 5.1 mmol/L    Chloride 101 98 - 107 mmol/L    CO2 29 21 - 32 mmol/L    Anion gap 10 7 - 16 mmol/L    Glucose 117 (H) 65 - 100 mg/dL    BUN 13 8 - 23 MG/DL    Creatinine 0.76 0.6 - 1.0 MG/DL    GFR est AA >60 >60 ml/min/1.73m2    GFR est non-AA >60 >60 ml/min/1.73m2    Calcium 8.8 8.3 - 10.4 MG/DL    Bilirubin, total 1.0 0.2 - 1.1 MG/DL    ALT (SGPT) 37 12 - 65 U/L    AST (SGOT) 21 15 - 37 U/L    Alk.  phosphatase 71 50 - 136 U/L    Protein, total 6.7 6.3 - 8.2 g/dL    Albumin 3.3 3.2 - 4.6 g/dL    Globulin 3.4 2.3 - 3.5 g/dL    A-G Ratio 1.0 (L) 1.2 - 3.5     MAGNESIUM    Collection Time: 08/31/18  5:52 AM   Result Value Ref Range    Magnesium 2.0 1.8 - 2.4 mg/dL       Current Med List in Hospital:   Current Facility-Administered Medications   Medication Dose Route Frequency    potassium chloride 20 mEq in 100 ml IVPB  20 mEq IntraVENous Q2H    carvedilol (COREG) tablet 6.25 mg  6.25 mg Oral BID WITH MEALS    cholecalciferol (VITAMIN D3) tablet 2,000 Units  2,000 Units Oral DAILY    aspirin chewable tablet 81 mg  81 mg Oral DAILY    oxyCODONE IR (ROXICODONE) tablet 15 mg  15 mg Oral Q4H PRN    pyridoxine (vitamin B6) (VITAMIN B-6) tablet 100 mg  100 mg Oral DAILY    lisinopril (PRINIVIL, ZESTRIL) tablet 2.5 mg  2.5 mg Oral DAILY    clopidogrel (PLAVIX) tablet 75 mg  75 mg Oral DAILY    pantoprazole (PROTONIX) tablet 40 mg  40 mg Oral ACB    potassium chloride (K-DUR, KLOR-CON) SR tablet 20 mEq  20 mEq Oral DAILY    nitroglycerin (NITRODUR) 0.4 mg/hr patch 1 Patch  1 Patch TransDERmal DAILY    polyethylene glycol (MIRALAX) packet 17 g  17 g Oral DAILY PRN    ondansetron (ZOFRAN) injection 4 mg  4 mg IntraVENous Q6H PRN    acetaminophen (TYLENOL) tablet 650 mg  650 mg Oral Q6H PRN    furosemide (LASIX) injection 40 mg  40 mg IntraVENous BID    enoxaparin (LOVENOX) injection 30 mg  30 mg SubCUTAneous Q24H    docusate sodium (COLACE) capsule 100 mg  100 mg Oral BID PRN    amiodarone (CORDARONE) tablet 200 mg  200 mg Oral BID       Discharge Exam:  Patient Vitals for the past 24 hrs:   Temp Pulse Resp BP SpO2   08/31/18 0425 97.6 °F (36.4 °C) 76 19 142/82 97 %   08/30/18 2316 97.7 °F (36.5 °C) 70 20 133/70 93 %   08/30/18 2017 97.9 °F (36.6 °C) 81 20 146/71 94 %   08/30/18 1536 98 °F (36.7 °C) 75 22 159/88 94 %   08/30/18 1442 - 78 - - 94 %   08/30/18 1207 98 °F (36.7 °C) 77 24 134/70 93 %   08/30/18 1059 - 66 30 138/64 96 %   08/30/18 1029 - (!) 58 (!) 58 125/62 96 %   08/30/18 1018 - 62 23 113/54 97 %   08/30/18 1016 - 64 - 113/54 -   08/30/18 0931 - 84 (!) 48 173/84 95 %   08/30/18 0900 - 80 22 169/90 98 %   08/30/18 0843 - 68 - 158/78 -   08/30/18 0829 - 78 25 158/78 97 %     Oxygen Therapy  O2 Sat (%): 97 % (08/31/18 0425)  Pulse via Oximetry: 54 beats per minute (08/30/18 1059)  O2 Device: Room air (08/30/18 1442)    Intake/Output Summary (Last 24 hours) at 08/31/18 0818  Last data filed at 08/30/18 2219   Gross per 24 hour   Intake              345 ml   Output             1725 ml   Net            -1380 ml       General:    Underweight. Alert. Hard of hearing. Eyes:   Normal sclera. Extraocular movements intact. ENT:  Normocephalic, atraumatic. Moist mucous membranes  CV:   Irregularly irregular, ectopy.   No murmur, rub, or gallop. Lungs:  Clear to auscultation bilaterally. No wheezing, rhonchi, or rales. Abdomen: Soft, nontender, nondistended. Bowel sounds normal.   Extremities: Warm and dry. No cyanosis or edema. Neurologic: CN II-XII grossly intact. Sensation intact. Skin:     No rashes or jaundice. Psych:  Normal mood and affect. Discharge Info:   Current Discharge Medication List      START taking these medications    Details   amiodarone (CORDARONE) 200 mg tablet Take 1 Tab by mouth two (2) times a day. Qty: 30 Tab, Refills: 0         CONTINUE these medications which have CHANGED    Details   carvedilol (COREG) 6.25 mg tablet Take 1 Tab by mouth two (2) times daily (with meals) for 30 days. Qty: 60 Tab, Refills: 0      !! clopidogrel (PLAVIX) 75 mg tab Take 1 Tab by mouth daily for 30 days. Qty: 30 Tab, Refills: 0      furosemide (LASIX) 40 mg tablet Take 1.5 Tabs by mouth daily (after breakfast) for 30 days. Indications: Pulmonary Edema due to Chronic Heart Failure  Qty: 30 Tab, Refills: 0      lisinopril (PRINIVIL, ZESTRIL) 2.5 mg tablet Take 1 Tab by mouth daily for 30 days. Qty: 30 Tab, Refills: 0       !! - Potential duplicate medications found. Please discuss with provider. CONTINUE these medications which have NOT CHANGED    Details   nitroglycerin (NITRODUR) 0.4 mg/hr 1 Patch by TransDERmal route daily. polyethylene glycol (MIRALAX) 17 gram/dose powder Take 17 g by mouth as needed (constipation). !! clopidogrel (PLAVIX) 75 mg tab Take 1 Tab by mouth daily. Qty: 30 Tab, Refills: 0      pantoprazole (PROTONIX) 40 mg tablet Take 1 Tab by mouth Daily (before breakfast). Qty: 30 Tab, Refills: 0      potassium chloride (K-DUR, KLOR-CON) 20 mEq tablet Take 1 Tab by mouth daily. Qty: 30 Tab, Refills: 0      cholecalciferol, vitamin D3, (VITAMIN D3) 2,000 unit tab Take  by mouth daily. aspirin 81 mg chewable tablet Take 81 mg by mouth daily.       oxyCODONE IR (OXY-IR) 15 mg immediate release tablet Take 15 mg by mouth every four (4) hours as needed for Pain.      pyridoxine (VITAMIN B-6) 100 mg tablet Take 100 mg by mouth daily. Takes at [facility]       ! ! - Potential duplicate medications found. Please discuss with provider. STOP taking these medications       metoprolol tartrate (LOPRESSOR) 25 mg tablet Comments:   Reason for Stopping:                 Disposition: home with daughter  Activity: Activity as tolerated  Diet: DIET CARDIAC Regular; Consistent Carb 1800kcal    Follow-up Appointments   Procedures    FOLLOW UP VISIT Appointment in: Other ([facility]) Dr. Mi Frank in 1 week PCP in 1 week     Dr. Mi Frank in 1 week  PCP in 1 week     Standing Status:   Standing     Number of Occurrences:   1     Order Specific Question:   Appointment in     Answer: Other (Specify)         Follow-up Information     Follow up With Details Comments Contact Info    Amira Lechuga MD   [address]  [phone]      Vangie Collado MD Schedule an appointment as soon as possible for a visit in 1 week  [address] [place] [address] Rd  [phone]            Time spent in patient discharge planning and coordination 35 minutes.     Signed:  Amy Marie MD

## 2018-09-03 ENCOUNTER — PATIENT OUTREACH (OUTPATIENT)
Dept: CASE MANAGEMENT | Age: 83
End: 2018-09-03

## 2018-09-03 NOTE — PROGRESS NOTES
This note will not be viewable in [address] Transition of Care Discharge Follow-up Questionnaire Date/Time of Call: 
 9/3/18 1:45pm  
What was the patient hospitalized for? Systolic Chf, Acute On Chronic Does the patient understand his/her diagnosis and/or treatment and what happened during the hospitalization? Yes, spoke with patients daughter, Brody Resendiz, she states her understanding of diagnosis and treatment; and is agreeable to call Patient is doing well, tires easily Did the patient receive discharge instructions? Yes  
CM Assessed Risk for Readmission:  
 
 
Patient stated Risk for Readmission:  
 
 moderate d/t diagnosis; SOB 
 
 
not being able to breathe Review any discharge instructions (see discharge instructions/AVS in [facility]). Ask patient if they understand these. Do they have any questions? Reviewed, understanding is stated, no questions at this time Were home services ordered (nursing, PT, OT, ST, etc.)? No 
  
If so, has the first visit occurred? If not, why? (Assist with coordination of services if necessary.) 
 N/A Was any DME ordered? No 
  
If so, has it been received? If not, why?  (Assist patient in obtaining DME orders &/or equipment if necessary.) N/A Complete a review of all medications (new, continued and discontinued meds per the D/C instructions and medication tab in [facility]). Completed Start:  
amiodarone 200 mg tablet Change: 
carvedilol 6.25 mg tablet bid 
furosemide 40 mg tablet 1 ½ (=60mg) daily Stop: 
metoprolol tartrate 25 mg tablet Were all new prescriptions filled? If not, why?  (Assist patient in obtaining medications if necessary  escalate for CCM &/or SW if ongoing issues are verbalized by pt or anticipated) Yes Clemetine Grounds states, they did not get a prescription for potassium, which patient had been previously prescribed and completed then was restarted in the hospital.  She indicates they will discuss at follow up Thursday with Dr. Antonette Maya Does the patient understand the purpose and dosing instructions for all medications? (If patient has questions, provide explanation and education.) Yes, states understanding of medications Does the patient have any problems in performing ADLs? (If patient is unable to perform ADLs  what is the limiting factor(s)? Do they have a support system that can assist? If no support system is present, discuss possible assistance that they may be able to obtain. Escalate for CCM/SW if ongoing issues are verbalized by pt or anticipated) Independent with ADLs, daughters live with patient and assist if needed Does the patient have all follow-up appointments scheduled? 7 day f/up with PCP?  
(f/up with PCP may be w/in 14 days if patient has a f/up with their specialist w/in 7 days) 7-14 day f/up with specialist?  
(or per discharge instructions) If f/up has not been made  what actions has the care coordinator made to accomplish this? Has transportation been arranged? Yes Essence Swan MD On 9/6/2018 at 9:00  
[address] [facility] [address] and Antonette Maya [facility] [address] 
[phone] Tru Garcia MD- Melva Choudhary is calling tomorrow to schedule follow up   
[facility] [address] [phone] Yes, no transportation issues Any other questions or concerns expressed by the patient? No further questions or concerns at this time. Melva Choudhary states gratitude for follow up. Contact information for [facility] was given, instructed to call with questions or concerns. Schedule next appointment with JUAN A MUNOZ Coordinator or refer to RN Case Manager/ per the workflow guidelines. When is care coordinators next follow-up call scheduled? If referred for CCM  what RN care manager was the referral assigned? Discussed referral to RN CCM, declined at this time. Lydia Read indicates they feel they are well prepared and have follow up in place. 7 to 10 days [facility] Call Completed By: Bruna Mirza LPN Community Care Coordinator

## 2018-09-12 ENCOUNTER — PATIENT OUTREACH (OUTPATIENT)
Dept: CASE MANAGEMENT | Age: 83
End: 2018-09-12

## 2018-09-12 NOTE — PROGRESS NOTES
This note will not be viewable in [address] Transitions of Care  Follow up Outreach Note Outreach type Phone call: spoke with patients daughter, Mabel Hidalgo Home visit:  
Date/Time of Outreach: 9/12/18 12:25pm  
 
Has patient attended PCP or specialist follow-up appointments since last contact? What was outcome of appointment? When is next follow-up scheduled? Patient had follow up with pcp and cardiologist and is doing very well. Next appointments not indicated Review medications. Any medication changes since last outreach? Does patient have any questions or issues related to their medications? No med changes indicated Home health active? If yes  any issue? Progress? No  
 
Referrals needed? 
(CM, SW, HH, etc. ) No   
Other issues/Miscellaneous? (Transportation, access to meals, ability to perform ADLs, adequate caregiver support, etc.) No new needs or concerns identified at this time. Mabel Hidalgo stated her gratitude for follow up. Next Outreach Scheduled? Graduation from program? 
 2 weeks Community Care Coordinator will continue to follow per workflow guidelines due to high risk for readmission at discharge. Next Steps/Goals (if applicable): N/A Outreach completed by: 
 Mckay Bartlett LPN Community Care Coordinator

## 2018-09-26 ENCOUNTER — PATIENT OUTREACH (OUTPATIENT)
Dept: CASE MANAGEMENT | Age: 83
End: 2018-09-26

## 2018-09-26 NOTE — PROGRESS NOTES
This note will not be viewable in [address] Attempted to reach for outreach follow up without success. Left message. Patient was progressing well at last contact with no needs identified. Patient will be graduated from [facility] program at this time.

## 2018-09-27 ENCOUNTER — APPOINTMENT (OUTPATIENT)
Dept: GENERAL RADIOLOGY | Age: 83
End: 2018-09-27
Attending: EMERGENCY MEDICINE
Payer: MEDICARE

## 2018-09-27 ENCOUNTER — HOSPITAL ENCOUNTER (OUTPATIENT)
Age: 83
Setting detail: OBSERVATION
Discharge: HOME | End: 2018-09-30
Attending: EMERGENCY MEDICINE | Admitting: FAMILY MEDICINE
Payer: MEDICARE

## 2018-09-27 DIAGNOSIS — I50.9 ACUTE ON CHRONIC CONGESTIVE HEART FAILURE, UNSPECIFIED HEART FAILURE TYPE (HCC): Primary | ICD-10-CM

## 2018-09-27 DIAGNOSIS — I48.19 PERSISTENT ATRIAL FIBRILLATION (HCC): ICD-10-CM

## 2018-09-27 LAB
ALBUMIN SERPL-MCNC: 3.5 G/DL (ref 3.2–4.6)
ALBUMIN/GLOB SERPL: 0.9 {RATIO} (ref 1.2–3.5)
ALP SERPL-CCNC: 78 U/L (ref 50–136)
ALT SERPL-CCNC: 30 U/L (ref 12–65)
ANION GAP SERPL CALC-SCNC: 8 MMOL/L (ref 7–16)
AST SERPL-CCNC: 32 U/L (ref 15–37)
BASOPHILS # BLD: 0.1 K/UL (ref 0–0.2)
BASOPHILS NFR BLD: 1 % (ref 0–2)
BILIRUB SERPL-MCNC: 0.8 MG/DL (ref 0.2–1.1)
BUN SERPL-MCNC: 9 MG/DL (ref 8–23)
CALCIUM SERPL-MCNC: 8.4 MG/DL (ref 8.3–10.4)
CHLORIDE SERPL-SCNC: 104 MMOL/L (ref 98–107)
CO2 SERPL-SCNC: 27 MMOL/L (ref 21–32)
CREAT SERPL-MCNC: 0.89 MG/DL (ref 0.6–1)
DIFFERENTIAL METHOD BLD: ABNORMAL
EOSINOPHIL # BLD: 0.2 K/UL (ref 0–0.8)
EOSINOPHIL NFR BLD: 1 % (ref 0.5–7.8)
ERYTHROCYTE [DISTWIDTH] IN BLOOD BY AUTOMATED COUNT: 14 %
GLOBULIN SER CALC-MCNC: 3.7 G/DL (ref 2.3–3.5)
GLUCOSE SERPL-MCNC: 176 MG/DL (ref 65–100)
HCT VFR BLD AUTO: 34.1 % (ref 35.8–46.3)
HGB BLD-MCNC: 11 G/DL (ref 11.7–15.4)
IMM GRANULOCYTES # BLD: 0.1 K/UL (ref 0–0.5)
IMM GRANULOCYTES NFR BLD AUTO: 1 % (ref 0–5)
LYMPHOCYTES # BLD: 1 K/UL (ref 0.5–4.6)
LYMPHOCYTES NFR BLD: 8 % (ref 13–44)
MCH RBC QN AUTO: 32.1 PG (ref 26.1–32.9)
MCHC RBC AUTO-ENTMCNC: 32.3 G/DL (ref 31.4–35)
MCV RBC AUTO: 99.4 FL (ref 79.6–97.8)
MONOCYTES # BLD: 0.7 K/UL (ref 0.1–1.3)
MONOCYTES NFR BLD: 6 % (ref 4–12)
NEUTS SEG # BLD: 11 K/UL (ref 1.7–8.2)
NEUTS SEG NFR BLD: 84 % (ref 43–78)
NRBC # BLD: 0 K/UL (ref 0–0.2)
PLATELET # BLD AUTO: 239 K/UL (ref 150–450)
PMV BLD AUTO: 9.7 FL (ref 9.4–12.3)
POTASSIUM SERPL-SCNC: 3.6 MMOL/L (ref 3.5–5.1)
PROT SERPL-MCNC: 7.2 G/DL (ref 6.3–8.2)
RBC # BLD AUTO: 3.43 M/UL (ref 4.05–5.2)
SODIUM SERPL-SCNC: 139 MMOL/L (ref 136–145)
TROPONIN I SERPL-MCNC: 0.04 NG/ML (ref 0.02–0.05)
WBC # BLD AUTO: 13 K/UL (ref 4.3–11.1)

## 2018-09-27 PROCEDURE — 71045 X-RAY EXAM CHEST 1 VIEW: CPT

## 2018-09-27 PROCEDURE — 84484 ASSAY OF TROPONIN QUANT: CPT

## 2018-09-27 PROCEDURE — 93005 ELECTROCARDIOGRAM TRACING: CPT | Performed by: EMERGENCY MEDICINE

## 2018-09-27 PROCEDURE — 83880 ASSAY OF NATRIURETIC PEPTIDE: CPT

## 2018-09-27 PROCEDURE — 80053 COMPREHEN METABOLIC PANEL: CPT

## 2018-09-27 PROCEDURE — 85025 COMPLETE CBC W/AUTO DIFF WBC: CPT

## 2018-09-27 PROCEDURE — 99285 EMERGENCY DEPT VISIT HI MDM: CPT | Performed by: EMERGENCY MEDICINE

## 2018-09-27 NOTE — IP AVS SNAPSHOT
[address] [facility] 
 
 
 [address] [facility] 322 [facility], [place] 
[phone] Patient: Dave Engel MRN: [id number] LKU:[id number] About your hospitalization You were admitted on:  September 28, 2018 You last received care in the:  [facility] 7 MED SURG You were discharged on:  September 30, 2018 Why you were hospitalized Your primary diagnosis was:  Systolic Chf, Acute On Chronic (Hcc) Your diagnoses also included:  Acute Exacerbation Of Chf (Congestive Heart Failure) (Hcc), Atrial Fibrillation (Hcc), Acute Respiratory Failure With Hypoxia And Hypercapnia (Hcc), Hypertension, Cad (Coronary Artery Disease) Follow-up Information Follow up With Details Comments Contact Info Dr. Chawla Breath an appointment as soon as possible for a visit  within 1 week of Discharge. Navigator will call you on Monday Address: [address] [facility], [place], [address]  Hours: Closed · Opens 8AM Mon Phone: [phone] Criss Medellin MD   [address] [facility] [address] and [address] [place] [place] [address] [phone] [facility] CARDIOLOGY [place] OFFICE Go in 1 week Call Monday to make an appointment to be seen in 1 week [address] [place] [id number] [facility] 
[phone] Discharge Orders None A check armida indicates which time of day the medication should be taken. My Medications START taking these medications Instructions Each Dose to Equal  
 Morning Noon Evening Bedtime  
 furosemide 40 mg tablet Commonly known as:  LASIX Your last dose was: This morning Your next dose is:  Tomorrow Morning Take 1 Tab by mouth daily. 40 mg CHANGE how you take these medications Instructions Each Dose to Equal  
 Morning Noon Evening Bedtime  
 amiodarone 100 mg tablet Commonly known as:  Diandra Coventry Start taking on:  10/1/2018 What changed: - medication strength 
- how much to take - when to take this Your last dose was: This morning Your next dose is:  Tomorrow Morning Take 1 Tab by mouth daily. 100 mg  
    
  
   
   
   
  
 aspirin 81 mg chewable tablet Start taking on:  10/1/2018 What changed:  how much to take Your last dose was: This morning Your next dose is:  Tomorrow Morning Take 2 Tabs by mouth daily. 162 mg CONTINUE taking these medications Instructions Each Dose to Equal  
 Morning Noon Evening Bedtime  
 carvedilol 6.25 mg tablet Commonly known as:  Omar Blankenship Your last dose was: This morning Your next dose is: This evening with dinner Take 1 Tab by mouth two (2) times daily (with meals) for 30 days. 6.25 mg  
    
  
   
   
  
   
  
 clopidogrel 75 mg Tab Commonly known as:  PLAVIX Notes to Patient:  Resume home schedule Take 1 Tab by mouth daily for 30 days. 75 mg  
    
   
   
   
  
 lisinopril 2.5 mg tablet Commonly known as:  Eulas Peeling Your last dose was: This morning Your next dose is:  Tomorrow Morning Take 1 Tab by mouth daily for 30 days. 2.5 mg  
    
  
   
   
   
  
 pantoprazole 40 mg tablet Commonly known as:  PROTONIX Your last dose was: This morning before breakfast  
   
 Take 1 Tab by mouth Daily (before breakfast). 40 mg  
    
  
   
   
   
  
 VITAMIN D3 2,000 unit Tab Generic drug:  cholecalciferol (vitamin D3) Notes to Patient:  Resume home schedule Take 1,000 Int'l Units by mouth daily. 1000 Int'l Units ASK your doctor about these medications Instructions Each Dose to Equal  
 Morning Noon Evening Bedtime  
 nitroglycerin 0.4 mg/hr Commonly known as:  NITRODUR  
   
 1 Patch by TransDERmal route daily. 1 Patch  
    
   
   
   
  
 oxyCODONE IR 15 mg immediate release tablet Commonly known as:  OXY-IR  
 Your last dose was: This morning at 6:26AM  
   
 Take 15 mg by mouth every four (4) hours as needed for Pain. 15 mg  
    
   
   
   
  
 polyethylene glycol 17 gram/dose powder Commonly known as:  Marcos Gonzales Take 17 g by mouth as needed (constipation). 17 g  
    
   
   
   
  
 VITAMIN B-6 100 mg tablet Generic drug:  pyridoxine (vitamin B6) Take 100 mg by mouth daily. Takes at [address] [facility]  
 100 mg Where to Get Your Medications These medications were sent to [facility] [address] [facility]  [place] [address] Phone:  [phone]  
  amiodarone 100 mg tablet  
 aspirin 81 mg chewable tablet  
 furosemide 40 mg tablet Opioid Education Prescription Opioids: What You Need to Know: 
 
Prescription opioids can be used to help relieve moderate-to-severe pain and are often prescribed following a surgery or injury, or for certain health conditions. These medications can be an important part of treatment but also come with serious risks. Opioids are strong pain medicines. Examples include hydrocodone, oxycodone, fentanyl, and morphine. Heroin is an example of an illegal opioid. It is important to work with your health care provider to make sure you are getting the safest, most effective care. WHAT ARE THE RISKS AND SIDE EFFECTS OF OPIOID USE? Prescription opioids carry serious risks of addiction and overdose, especially with prolonged use. An opioid overdose, often marked by slow breathing, can cause sudden death. The use of prescription opioids can have a number of side effects as well, even when taken as directed. · Tolerance-meaning you might need to take more of a medication for the same pain relief · Physical dependence-meaning you have symptoms of withdrawal when the medication is stopped.   Withdrawal symptoms can include nausea, sweating, chills, diarrhea, stomach cramps, and muscle aches. Withdrawal can last up to several weeks, depending on which drug you took and how long you took it. · Increased sensitivity to pain · Constipation · Nausea, vomiting, and dry mouth · Sleepiness and dizziness · Confusion · Depression · Low levels of testosterone that can result in lower sex drive, energy, and strength · Itching and sweating RISKS ARE GREATER WITH:      
· History of drug misuse, substance use disorder, or overdose · Mental health conditions (such as depression or anxiety) · Sleep apnea · Older age (72 years or older) · Pregnancy Avoid alcohol while taking prescription opioids. Also, unless specifically advised by your health care provider, medications to avoid include: · Benzodiazepines (such as Xanax or Valium) · Muscle relaxants (such as Soma or Flexeril) · Hypnotics (such as Ambien or Lunesta) · Other prescription opioids KNOW YOUR OPTIONS Talk to your health care provider about ways to manage your pain that don't involve prescription opioids. Some of these options may actually work better and have fewer risks and side effects. Consult your physician before adding or stopping any medications, treatments, or physical activity. Options may include: 
· Pain relievers such as acetaminophen, ibuprofen, and naproxen · Some medications that are also used for depression or seizures · Physical therapy and exercise · Counseling to help patients learn how to cope better with triggers of pain and stress. · Application of heat or cold compress · Massage therapy · Relaxation techniques Be Informed Make sure you know the name of your medication, how much and how often to take it, and its potential risks & side effects. IF YOU ARE PRESCRIBED OPIOIDS FOR PAIN: 
· Never take opioids in greater amounts or more often than prescribed. Remember the goal is not to be pain-free but to manage your pain at a tolerable level. · Follow up with your primary care provider to: · Work together to create a plan on how to manage your pain. · Talk about ways to help manage your pain that don't involve prescription opioids. · Talk about any and all concerns and side effects. · Help prevent misuse and abuse. · Never sell or share prescription opioids · Help prevent misuse and abuse. · Store prescription opioids in a secure place and out of reach of others (this may include visitors, children, friends, and family). · Safely dispose of unused/unwanted prescription opioids: Find your community drug take-back program or your pharmacy mail-back program, or flush them down the toilet, following guidance from the Food and Drug Administration (www.fda.gov/Drugs/ResourcesForYou). · Visit www.cdc.gov/drugoverdose to learn about the risks of opioid abuse and overdose. · If you believe you may be struggling with addiction, tell your health care provider and ask for guidance or call [employer] at 5-999-367-XFWX. Discharge Instructions Please take all your medications as prescribed. Follow-up with [address] [place] [address] [address] Cardiology in one week. Return to the hospital if your symptoms with shortness of breath worsens. Heart Failure: Care Instructions Your Care Instructions Heart failure occurs when your heart does not pump as much blood as the body needs. Failure does not mean that the heart has stopped pumping but rather that it is not pumping as well as it should. Over time, this causes fluid buildup in your lungs and other parts of your body. Fluid buildup can cause shortness of breath, fatigue, swollen ankles, and other problems. By taking medicines regularly, reducing sodium (salt) in your diet, checking your weight every day, and making lifestyle changes, you can feel better and live longer. Follow-up care is a key part of your treatment and safety. Be sure to make and go to all appointments, and call your doctor if you are having problems. It's also a good idea to know your test results and keep a list of the medicines you take. How can you care for yourself at home? Medicines 
  · Be safe with medicines. Take your medicines exactly as prescribed. Call your doctor if you think you are having a problem with your medicine.  
  · Do not take any vitamins, over-the-counter medicine, or herbal products without talking to your doctor first. Pierre Gabino not take ibuprofen (Advil or Motrin) and naproxen (Aleve) without talking to your doctor first. They could make your heart failure worse.  
  · You may be taking some of the following medicine. ¨ Beta-blockers can slow heart rate, decrease blood pressure, and improve your condition. Taking a beta-blocker may lower your chance of needing to be hospitalized. ¨ Angiotensin-converting enzyme inhibitors (ACEIs) reduce the heart's workload, lower blood pressure, and reduce swelling. Taking an ACEI may lower your chance of needing to be hospitalized again. ¨ Angiotensin II receptor blockers (ARBs) work like ACEIs. Your doctor may prescribe them instead of ACEIs. ¨ Diuretics, also called water pills, reduce swelling. ¨ Potassium supplements replace this important mineral, which is sometimes lost with diuretics. ¨ Aspirin and other blood thinners prevent blood clots, which can cause a stroke or heart attack.  
 You will get more details on the specific medicines your doctor prescribes. Diet 
  · Your doctor may suggest that you limit sodium to 2,000 milligrams (mg) a day or less. That is less than 1 teaspoon of salt a day, including all the salt you eat in cooking or in packaged foods. People get most of their sodium from processed foods. Fast food and restaurant meals also tend to be very high in sodium.   · Ask your doctor how much liquid you can drink each day. You may have to limit liquids.  
 Weight 
  · Weigh yourself without clothing at the same time each day. Record your weight. Call your doctor if you have a sudden weight gain, such as more than 2 to 3 pounds in a day or 5 pounds in a week. (Your doctor may suggest a different range of weight gain.) A sudden weight gain may mean that your heart failure is getting worse.  
 Activity level 
  · Start light exercise (if your doctor says it is okay). Even if you can only do a small amount, exercise will help you get stronger, have more energy, and manage your weight and your stress. Walking is an easy way to get exercise. Start out by walking a little more than you did before. Bit by bit, increase the amount you walk.  
  · When you exercise, watch for signs that your heart is working too hard. You are pushing yourself too hard if you cannot talk while you are exercising. If you become short of breath or dizzy or have chest pain, stop, sit down, and rest.  
  · If you feel \"wiped out\" the day after you exercise, walk slower or for a shorter distance until you can work up to a better pace.  
  · Get enough rest at night. Sleeping with 1 or 2 pillows under your upper body and head may help you breathe easier.  
 Lifestyle changes 
  · Do not smoke. Smoking can make a heart condition worse. If you need help quitting, talk to your doctor about stop-smoking programs and medicines. These can increase your chances of quitting for good. Quitting smoking may be the most important step you can take to protect your heart.  
  · Limit alcohol to 2 drinks a day for men and 1 drink a day for women. Too much alcohol can cause health problems.  
  · Avoid getting sick from colds and the flu. Get a pneumococcal vaccine shot. If you have had one before, ask your doctor whether you need another dose. Get a flu shot each year.  If you must be around people with colds or the flu, wash your hands often. When should you call for help? Call 911 if you have symptoms of sudden heart failure such as: 
  · You have severe trouble breathing.  
  · You cough up pink, foamy mucus.  
  · You have a new irregular or rapid heartbeat.  
 Call your doctor now or seek immediate medical care if: 
  · You have new or increased shortness of breath.  
  · You are dizzy or lightheaded, or you feel like you may faint.  
  · You have sudden weight gain, such as more than 2 to 3 pounds in a day or 5 pounds in a week. (Your doctor may suggest a different range of weight gain.)  
  · You have increased swelling in your legs, ankles, or feet.  
  · You are suddenly so tired or weak that you cannot do your usual activities.  
 Watch closely for changes in your health, and be sure to contact your doctor if you develop new symptoms. Where can you learn more? Go to http://dinora-ellyn.info/. Enter P170 in the search box to learn more about \"Heart Failure: Care Instructions. \" Current as of: May 10, 2017 Content Version: 11.7 © 5837-8859 [employer]. Care instructions adapted under license by [employer] (which disclaims liability or warranty for this information). If you have questions about a medical condition or this instruction, always ask your healthcare professional. [address] any warranty or liability for your use of this information. DISCHARGE SUMMARY from Nurse PATIENT INSTRUCTIONS: 
 
 
F-face looks uneven A-arms unable to move or move unevenly S-speech slurred or non-existent T-time-call 911 as soon as signs and symptoms begin-DO NOT go Back to bed or wait to see if you get better-TIME IS BRAIN. Warning Signs of HEART ATTACK Call 911 if you have these symptoms: 
? Chest discomfort.  Most heart attacks involve discomfort in the center of the chest that lasts more than a few minutes, or that goes away and comes back. It can feel like uncomfortable pressure, squeezing, fullness, or pain. ? Discomfort in other areas of the upper body. Symptoms can include pain or discomfort in one or both arms, the back, neck, jaw, or stomach. ? Shortness of breath with or without chest discomfort. ? Other signs may include breaking out in a cold sweat, nausea, or lightheadedness. Don't wait more than five minutes to call [address]! Fast action can save your life. Calling 911 is almost always the fastest way to get lifesaving treatment. Emergency Medical Services staff can begin treatment when they arrive  up to an hour sooner than if someone gets to the hospital by car. The discharge information has been reviewed with the patient. The patient verbalized understanding. Discharge medications reviewed with the patient and appropriate educational materials and side effects teaching were provided. ___________________________________________________________________________________________________________________________________ BABADUhart Announcement We are excited to announce that we are making your provider's discharge notes available to you in Bumpr. You will see these notes when they are completed and signed by the physician that discharged you from your recent hospital stay. If you have any questions or concerns about any information you see in kontoblickt, please call the Health Information Department where you were seen or reach out to your Primary Care Provider for more information about your plan of care. Introducing [facility]! Dear Min: 
Thank you for requesting a Bumpr account. Our records indicate that you already have an active Bumpr account. You can access your account anytime at [email or web address] Did you know that you can access your hospital and ER discharge instructions at any time in [employer]t? You can also review all of your test results from your hospital stay or ER visit. Additional Information If you have questions, please visit the Frequently Asked Questions section of the [employer] website at [email or web address] Remember, [employer] is NOT to be used for urgent needs. For medical emergencies, dial 911. Now available from your iPhone and Android! Introducing Basim Bridges As a Annemarie Pazis patient, I wanted to make you aware of our electronic visit tool called Basim Cyrus. Annemarie Ontiveros 24/7 allows you to connect within minutes with a medical provider 24 hours a day, seven days a week via a mobile device or tablet or logging into a secure website from your computer. You can access Basim Cyrus from anywhere in the [place]. A virtual visit might be right for you when you have a simple condition and feel like you just dont want to get out of bed, or cant get away from work for an appointment, when your regular Annemarie Ontiveros provider is not available (evenings, weekends or holidays), or when youre out of town and need minor care. Electronic visits cost only $49 and if the Annemarie Ontiveros 24/7 provider determines a prescription is needed to treat your condition, one can be electronically transmitted to a nearby pharmacy*. Please take a moment to enroll today if you have not already done so. The enrollment process is free and takes just a few minutes. To enroll, please download the Annemarie Weston 24/7 winter to your tablet or phone, or visit [email or web address] to enroll on your computer. And, as an [address] [place] [address] patient with a [employer] account, the results of your visits will be scanned into your electronic medical record and your primary care provider will be able to view the scanned results.    
We urge you to continue to see your regular Annemarie Ontiveros provider for your ongoing medical care. And while your primary care provider may not be the one available when you seek a Basim Valdesfin virtual visit, the peace of mind you get from getting a real diagnosis real time can be priceless. For more information on Basim Valdesfin, view our Frequently Asked Questions (FAQs) at [email or web address]. Sincerely, 
 
Michael Childress MD 
Chief Medical Officer Wade Mas *:  certain medications cannot be prescribed via Basim Riteshluis felipefin Providers Seen During Your Hospitalization Provider Specialty Primary office phone Angela Donnelly MD Emergency Medicine [phone] Pacheco Gordon MD [facility] [phone] Immunizations Administered for This Admission Name Date Influenza Vaccine (Quad) PF 9/30/2018 TB Skin Test (PPD) Intradermal 9/29/2018 Your Primary Care Physician (PCP) Primary Care Physician Office Phone Office Fax Atilio Uribe [phone] You are allergic to the following Allergen Reactions Amlodipine Hives Amoxicillin Rash Tetracycline Rash Recent Documentation Height Weight BMI Smoking Status 1.626 m 49.4 kg 18.71 kg/m2 Never Smoker Emergency Contacts Name Discharge Info Relation Home Work Mobile Sandra Mayo DISCHARGE CAREGIVER [3] Daughter [21] [phone] 6 [facility] CAREGIVER [3] Daughter [21] [phone] Patient Belongings The following personal items are in your possession at time of discharge: 
                   Clothing: At bedside Please provide this summary of care documentation to your next provider. Signatures-by signing, you are acknowledging that this After Visit Summary has been reviewed with you and you have received a copy. Patient Signature:  ____________________________________________________________ Date:  ____________________________________________________________  
  
Gaylia Cumber Provider Signature:  ____________________________________________________________ Date:  ____________________________________________________________

## 2018-09-28 PROBLEM — I50.23 SYSTOLIC CHF, ACUTE ON CHRONIC (HCC): Status: ACTIVE | Noted: 2018-09-28

## 2018-09-28 PROBLEM — I50.9 ACUTE CHF (CONGESTIVE HEART FAILURE) (HCC): Status: RESOLVED | Noted: 2018-08-30 | Resolved: 2018-09-28

## 2018-09-28 PROBLEM — I50.9 ACUTE EXACERBATION OF CHF (CONGESTIVE HEART FAILURE) (HCC): Status: ACTIVE | Noted: 2018-09-28

## 2018-09-28 PROBLEM — K35.80 ACUTE APPENDICITIS: Status: RESOLVED | Noted: 2018-07-27 | Resolved: 2018-09-28

## 2018-09-28 PROBLEM — I24.8 DEMAND ISCHEMIA (HCC): Status: RESOLVED | Noted: 2018-07-30 | Resolved: 2018-09-28

## 2018-09-28 PROBLEM — A41.9 SEPSIS (HCC): Status: RESOLVED | Noted: 2018-07-28 | Resolved: 2018-09-28

## 2018-09-28 PROBLEM — K35.80 APPENDICITIS, ACUTE: Status: RESOLVED | Noted: 2018-07-29 | Resolved: 2018-09-28

## 2018-09-28 PROBLEM — E87.6 HYPOKALEMIA: Status: RESOLVED | Noted: 2018-07-27 | Resolved: 2018-09-28

## 2018-09-28 LAB
ANION GAP SERPL CALC-SCNC: 10 MMOL/L (ref 7–16)
ATRIAL RATE: 80 BPM
BACTERIA URNS QL MICRO: 0 /HPF
BASOPHILS # BLD: 0.1 K/UL (ref 0–0.2)
BASOPHILS NFR BLD: 1 % (ref 0–2)
BNP SERPL-MCNC: 1460 PG/ML
BUN SERPL-MCNC: 10 MG/DL (ref 8–23)
CALCIUM SERPL-MCNC: 8.2 MG/DL (ref 8.3–10.4)
CALCULATED P AXIS, ECG09: 98 DEGREES
CALCULATED R AXIS, ECG10: -77 DEGREES
CALCULATED T AXIS, ECG11: 91 DEGREES
CASTS URNS QL MICRO: NORMAL /LPF
CHLORIDE SERPL-SCNC: 106 MMOL/L (ref 98–107)
CO2 SERPL-SCNC: 27 MMOL/L (ref 21–32)
CREAT SERPL-MCNC: 0.91 MG/DL (ref 0.6–1)
DIAGNOSIS, 93000: NORMAL
DIFFERENTIAL METHOD BLD: ABNORMAL
EOSINOPHIL # BLD: 0 K/UL (ref 0–0.8)
EOSINOPHIL NFR BLD: 0 % (ref 0.5–7.8)
EPI CELLS #/AREA URNS HPF: NORMAL /HPF
ERYTHROCYTE [DISTWIDTH] IN BLOOD BY AUTOMATED COUNT: 14 %
GLUCOSE SERPL-MCNC: 121 MG/DL (ref 65–100)
HCT VFR BLD AUTO: 32.2 % (ref 35.8–46.3)
HGB BLD-MCNC: 10.1 G/DL (ref 11.7–15.4)
IMM GRANULOCYTES # BLD: 0 K/UL (ref 0–0.5)
IMM GRANULOCYTES NFR BLD AUTO: 0 % (ref 0–5)
LACTATE BLD-SCNC: 0.6 MMOL/L (ref 0.5–1.9)
LYMPHOCYTES # BLD: 1.4 K/UL (ref 0.5–4.6)
LYMPHOCYTES NFR BLD: 15 % (ref 13–44)
MCH RBC QN AUTO: 32.1 PG (ref 26.1–32.9)
MCHC RBC AUTO-ENTMCNC: 31.4 G/DL (ref 31.4–35)
MCV RBC AUTO: 102.2 FL (ref 79.6–97.8)
MONOCYTES # BLD: 0.8 K/UL (ref 0.1–1.3)
MONOCYTES NFR BLD: 9 % (ref 4–12)
NEUTS SEG # BLD: 6.6 K/UL (ref 1.7–8.2)
NEUTS SEG NFR BLD: 75 % (ref 43–78)
NRBC # BLD: 0 K/UL (ref 0–0.2)
P-R INTERVAL, ECG05: 216 MS
PLATELET # BLD AUTO: 207 K/UL (ref 150–450)
PMV BLD AUTO: 10 FL (ref 9.4–12.3)
POTASSIUM SERPL-SCNC: 3.1 MMOL/L (ref 3.5–5.1)
Q-T INTERVAL, ECG07: 452 MS
QRS DURATION, ECG06: 158 MS
QTC CALCULATION (BEZET), ECG08: 521 MS
RBC # BLD AUTO: 3.15 M/UL (ref 4.05–5.2)
RBC #/AREA URNS HPF: NORMAL /HPF
SODIUM SERPL-SCNC: 143 MMOL/L (ref 136–145)
TROPONIN I BLD-MCNC: 0.08 NG/ML (ref 0.02–0.05)
VENTRICULAR RATE, ECG03: 80 BPM
WBC # BLD AUTO: 8.9 K/UL (ref 4.3–11.1)
WBC URNS QL MICRO: NORMAL /HPF

## 2018-09-28 PROCEDURE — 74011250636 HC RX REV CODE- 250/636: Performed by: FAMILY MEDICINE

## 2018-09-28 PROCEDURE — 99218 HC RM OBSERVATION: CPT

## 2018-09-28 PROCEDURE — 36415 COLL VENOUS BLD VENIPUNCTURE: CPT

## 2018-09-28 PROCEDURE — C8929 TTE W OR WO FOL WCON,DOPPLER: HCPCS

## 2018-09-28 PROCEDURE — 94760 N-INVAS EAR/PLS OXIMETRY 1: CPT

## 2018-09-28 PROCEDURE — 74011000250 HC RX REV CODE- 250: Performed by: FAMILY MEDICINE

## 2018-09-28 PROCEDURE — 96376 TX/PRO/DX INJ SAME DRUG ADON: CPT

## 2018-09-28 PROCEDURE — 80048 BASIC METABOLIC PNL TOTAL CA: CPT

## 2018-09-28 PROCEDURE — 96372 THER/PROPH/DIAG INJ SC/IM: CPT

## 2018-09-28 PROCEDURE — 74011250637 HC RX REV CODE- 250/637: Performed by: FAMILY MEDICINE

## 2018-09-28 PROCEDURE — 77030019605

## 2018-09-28 PROCEDURE — 83605 ASSAY OF LACTIC ACID: CPT

## 2018-09-28 PROCEDURE — 96374 THER/PROPH/DIAG INJ IV PUSH: CPT

## 2018-09-28 PROCEDURE — 84484 ASSAY OF TROPONIN QUANT: CPT

## 2018-09-28 PROCEDURE — 85025 COMPLETE CBC W/AUTO DIFF WBC: CPT

## 2018-09-28 PROCEDURE — 77010033678 HC OXYGEN DAILY

## 2018-09-28 PROCEDURE — 74011250636 HC RX REV CODE- 250/636: Performed by: EMERGENCY MEDICINE

## 2018-09-28 PROCEDURE — 81015 MICROSCOPIC EXAM OF URINE: CPT

## 2018-09-28 RX ORDER — LISINOPRIL 5 MG/1
5 TABLET ORAL DAILY
Status: DISCONTINUED | OUTPATIENT
Start: 2018-09-29 | End: 2018-09-30 | Stop reason: HOSPADM

## 2018-09-28 RX ORDER — ACETAMINOPHEN 325 MG/1
650 TABLET ORAL
Status: DISCONTINUED | OUTPATIENT
Start: 2018-09-28 | End: 2018-09-30 | Stop reason: HOSPADM

## 2018-09-28 RX ORDER — LISINOPRIL 5 MG/1
2.5 TABLET ORAL DAILY
Status: DISCONTINUED | OUTPATIENT
Start: 2018-09-28 | End: 2018-09-28

## 2018-09-28 RX ORDER — POLYETHYLENE GLYCOL 3350 17 G/17G
17 POWDER, FOR SOLUTION ORAL AS NEEDED
Status: DISCONTINUED | OUTPATIENT
Start: 2018-09-28 | End: 2018-09-30 | Stop reason: HOSPADM

## 2018-09-28 RX ORDER — OXYCODONE HYDROCHLORIDE 15 MG/1
15 TABLET ORAL
Status: DISCONTINUED | OUTPATIENT
Start: 2018-09-28 | End: 2018-09-30 | Stop reason: HOSPADM

## 2018-09-28 RX ORDER — ADHESIVE BANDAGE
30 BANDAGE TOPICAL DAILY PRN
Status: DISCONTINUED | OUTPATIENT
Start: 2018-09-28 | End: 2018-09-30 | Stop reason: HOSPADM

## 2018-09-28 RX ORDER — CLOPIDOGREL BISULFATE 75 MG/1
75 TABLET ORAL DAILY
Status: DISCONTINUED | OUTPATIENT
Start: 2018-09-28 | End: 2018-09-28

## 2018-09-28 RX ORDER — AMIODARONE HYDROCHLORIDE 200 MG/1
200 TABLET ORAL DAILY
Status: DISCONTINUED | OUTPATIENT
Start: 2018-09-29 | End: 2018-09-29

## 2018-09-28 RX ORDER — FUROSEMIDE 10 MG/ML
40 INJECTION INTRAMUSCULAR; INTRAVENOUS
Status: COMPLETED | OUTPATIENT
Start: 2018-09-28 | End: 2018-09-28

## 2018-09-28 RX ORDER — SODIUM CHLORIDE 0.9 % (FLUSH) 0.9 %
5-10 SYRINGE (ML) INJECTION EVERY 8 HOURS
Status: DISCONTINUED | OUTPATIENT
Start: 2018-09-28 | End: 2018-09-30 | Stop reason: HOSPADM

## 2018-09-28 RX ORDER — CARVEDILOL 6.25 MG/1
6.25 TABLET ORAL 2 TIMES DAILY WITH MEALS
Status: DISCONTINUED | OUTPATIENT
Start: 2018-09-28 | End: 2018-09-30 | Stop reason: HOSPADM

## 2018-09-28 RX ORDER — ENOXAPARIN SODIUM 100 MG/ML
30 INJECTION SUBCUTANEOUS EVERY 24 HOURS
Status: DISCONTINUED | OUTPATIENT
Start: 2018-09-28 | End: 2018-09-30 | Stop reason: HOSPADM

## 2018-09-28 RX ORDER — PANTOPRAZOLE SODIUM 40 MG/1
40 TABLET, DELAYED RELEASE ORAL
Status: DISCONTINUED | OUTPATIENT
Start: 2018-09-28 | End: 2018-09-30 | Stop reason: HOSPADM

## 2018-09-28 RX ORDER — AMIODARONE HYDROCHLORIDE 200 MG/1
200 TABLET ORAL 2 TIMES DAILY
Status: DISCONTINUED | OUTPATIENT
Start: 2018-09-28 | End: 2018-09-28

## 2018-09-28 RX ORDER — GUAIFENESIN 100 MG/5ML
81 LIQUID (ML) ORAL DAILY
Status: DISCONTINUED | OUTPATIENT
Start: 2018-09-28 | End: 2018-09-29

## 2018-09-28 RX ORDER — ONDANSETRON 2 MG/ML
4 INJECTION INTRAMUSCULAR; INTRAVENOUS
Status: DISCONTINUED | OUTPATIENT
Start: 2018-09-28 | End: 2018-09-30 | Stop reason: HOSPADM

## 2018-09-28 RX ORDER — FUROSEMIDE 10 MG/ML
60 INJECTION INTRAMUSCULAR; INTRAVENOUS EVERY 12 HOURS
Status: DISCONTINUED | OUTPATIENT
Start: 2018-09-28 | End: 2018-09-29

## 2018-09-28 RX ORDER — SODIUM CHLORIDE 0.9 % (FLUSH) 0.9 %
5-10 SYRINGE (ML) INJECTION AS NEEDED
Status: DISCONTINUED | OUTPATIENT
Start: 2018-09-28 | End: 2018-09-30 | Stop reason: HOSPADM

## 2018-09-28 RX ORDER — SPIRONOLACTONE 25 MG/1
25 TABLET ORAL DAILY
Status: DISCONTINUED | OUTPATIENT
Start: 2018-09-29 | End: 2018-09-30 | Stop reason: HOSPADM

## 2018-09-28 RX ADMIN — OXYCODONE HYDROCHLORIDE 15 MG: 15 TABLET ORAL at 23:26

## 2018-09-28 RX ADMIN — ENOXAPARIN SODIUM 30 MG: 100 INJECTION SUBCUTANEOUS at 08:58

## 2018-09-28 RX ADMIN — FUROSEMIDE 60 MG: 10 INJECTION, SOLUTION INTRAMUSCULAR; INTRAVENOUS at 08:59

## 2018-09-28 RX ADMIN — Medication 10 ML: at 23:27

## 2018-09-28 RX ADMIN — Medication 10 ML: at 05:45

## 2018-09-28 RX ADMIN — PERFLUTREN 1 ML: 6.52 INJECTION, SUSPENSION INTRAVENOUS at 14:00

## 2018-09-28 RX ADMIN — CARVEDILOL 6.25 MG: 6.25 TABLET, FILM COATED ORAL at 17:27

## 2018-09-28 RX ADMIN — LISINOPRIL 2.5 MG: 5 TABLET ORAL at 08:51

## 2018-09-28 RX ADMIN — AMIODARONE HYDROCHLORIDE 100 MG: 200 TABLET ORAL at 08:51

## 2018-09-28 RX ADMIN — FUROSEMIDE 40 MG: 10 INJECTION, SOLUTION INTRAMUSCULAR; INTRAVENOUS at 01:59

## 2018-09-28 RX ADMIN — ASPIRIN 81 MG: 81 TABLET, CHEWABLE ORAL at 08:52

## 2018-09-28 RX ADMIN — CARVEDILOL 6.25 MG: 6.25 TABLET, FILM COATED ORAL at 08:52

## 2018-09-28 RX ADMIN — Medication 5 ML: at 17:28

## 2018-09-28 RX ADMIN — CLOPIDOGREL BISULFATE 75 MG: 75 TABLET ORAL at 08:52

## 2018-09-28 RX ADMIN — FUROSEMIDE 60 MG: 10 INJECTION, SOLUTION INTRAMUSCULAR; INTRAVENOUS at 23:26

## 2018-09-28 RX ADMIN — OXYCODONE HYDROCHLORIDE 15 MG: 15 TABLET ORAL at 10:57

## 2018-09-28 RX ADMIN — PANTOPRAZOLE SODIUM 40 MG: 40 TABLET, DELAYED RELEASE ORAL at 05:44

## 2018-09-28 NOTE — PROGRESS NOTES
Spoke with daughter Jade Ku regarding discharge planning. Patient sleeping in bed. Patient lives with two daughters Jade Ku and Shanel Gardner in own one story home with ramp and one strep entry Daughters and friend Janneth Griffith provide all of her support and assistance. Janneth Griffith assists with patients care on Mon-Friday from 9am-5pm but her hours are very flexible and she just recently was only working from 1pm-5pm.   
Daughter states patient is independent with all ADL's except they assist her with dressing. Patient ambulates with rolling walker or cane. Family provides all of her transportation. PCP- Dr. Kenrick Resendiz and last seen approx 3-4 months ago. DME- cane, walker Daughter states patient has never been on home oxygen. Has had previous [place] services with [facility], operated [facility]. Denies having any problems affording Rx. PT eval pending. Cardiology consult pending. Discussed STR vs HH upon discharge. Daughter states their preference would be to go home with [place]. CM reached out to the CHF navigator for follow up upon discharge as well. CM will continue to follow. Care Management Interventions PCP Verified by CM: Yes Mode of Transport at Discharge: Other (see comment) (family) Transition of Care Consult (CM Consult): Discharge Planning Physical Therapy Consult: Yes Current Support Network: Relative's Home Confirm Follow Up Transport: Family Plan discussed with Pt/Family/Caregiver: Yes Freedom of Choice Offered: Yes Discharge Location Discharge Placement: Home with home health

## 2018-09-28 NOTE — PROGRESS NOTES
TRANSFER - IN REPORT: 
 
Verbal report received from [facility] on [address]  being received from ED for ordered procedure Report consisted of patients Situation, Background, Assessment and  
Recommendations(SBAR). Information from the following report(s) ED Summary was reviewed with the receiving nurse. Opportunity for questions and clarification was provided. Assessment completed upon patients arrival to unit and care assumed.

## 2018-09-28 NOTE — CONSULTS
[address] Cardiology Consult                Date of  Admission: 9/27/2018 11:30 PM     Primary Care Physician: Dr. Miguel Sanchez  Primary Cardiologist: Dr. Paty Espinoza  Referring Physician: Hospitalist   Consulting Physician: Dr. Christina Jenkins    CC/Reason for consult: CHF exacerbation      Lencho Blal is a 80 y.o. female with prior h/o chronic sHF, PAF, CAD/NSTEMI s/p PCI to LAD in 2008. Recent admission to [address] with acute appendicitis 7/27 and received a bolus of IVF prior to surgery. She developed acute CHF then and had to be treated for this first. An echocardiogram then showed an EF of 20-30% with moderate diffuse hypokinesis and mild aortic valve stenosis. She had a non ST elevation MI due to supply demand mismatch and is not felt to be a candidate for invasive therapies. She also had PAF requiring IV amiodarone then transitioned to po prior to d/c. At f/u appt with Dr. Paty Espinoza 8/15/18, her amiodarone was stopped d/t being back in NSR. She was suppose to see her regular cardiologist Dr. Mayi Nielsen post appt with Dr. Stefan Nielsen and her BB was changed to metoprolol. Then back here last month with A. Fib and elevated BNP requiring IV lasix and restarting amiodarone. Patient presented to ED at [facility] with increased SOB and weight gain. She was only taking lasix PRN for weight gain and recently saw Dr. Mayi Nielsen and her amiodarone was decreased to 100 mg daily. . She received IV lasix in ED and hospitalist admitted for CHF and consulted cardiology. This am still with some SOB. She is voiding a lot per dgt at bedside. She denies any chest pain.      Diagnosis    CAD (coronary artery disease)    Hypertension    Respiratory failure with hypoxia and hypercapnia (HCC)    Atrial fibrillation (HCC)    Acute exacerbation of CHF (congestive heart failure) ([facility] [address]       Past Medical History:   Diagnosis Date    Thyroid cyst Distant past    thyroid cyst removed x40 years ago      Past Surgical History:   Procedure Laterality Date    HX CHOLECYSTECTOMY      HX HEENT      thyroglossal cyst    HX HYSTERECTOMY       Allergies   Allergen Reactions    Amlodipine Hives    Amoxicillin Rash    Tetracycline Rash      Family History   Problem Relation Age of Onset    Other Father          Other Mother              Current Facility-Administered Medications   Medication Dose Route Frequency    furosemide (LASIX) injection 60 mg  60 mg IntraVENous Q12H    amiodarone (CORDARONE) tablet 200 mg  200 mg Oral BID    aspirin chewable tablet 81 mg  81 mg Oral DAILY    carvedilol (COREG) tablet 6.25 mg  6.25 mg Oral BID WITH MEALS    clopidogrel (PLAVIX) tablet 75 mg  75 mg Oral DAILY    lisinopril (PRINIVIL, ZESTRIL) tablet 2.5 mg  2.5 mg Oral DAILY    polyethylene glycol (MIRALAX) packet 17 g  17 g Oral PRN    pantoprazole (PROTONIX) tablet 40 mg  40 mg Oral ACB    oxyCODONE IR (ROXICODONE) tablet 15 mg  15 mg Oral Q4H PRN    sodium chloride (NS) flush 5-10 mL  5-10 mL IntraVENous Q8H    sodium chloride (NS) flush 5-10 mL  5-10 mL IntraVENous PRN    acetaminophen (TYLENOL) tablet 650 mg  650 mg Oral Q4H PRN    ondansetron (ZOFRAN) injection 4 mg  4 mg IntraVENous Q4H PRN    magnesium hydroxide (MILK OF MAGNESIA) 400 mg/5 mL oral suspension 30 mL  30 mL Oral DAILY PRN    enoxaparin (LOVENOX) injection 30 mg  30 mg SubCUTAneous Q24H    influenza vaccine 2018- (6 mos+)(PF) (FLUARIX QUAD/FLULAVAL QUAD) injection 0.5 mL  0.5 mL IntraMUSCular PRIOR TO DISCHARGE       Review of Systems   Constitution: Positive for weight gain. Negative for diaphoresis, weakness and malaise/fatigue. HENT: Negative for congestion. Cardiovascular: Positive for dyspnea on exertion. Negative for chest pain, claudication, cyanosis, irregular heartbeat, leg swelling, near-syncope, orthopnea, palpitations, paroxysmal nocturnal dyspnea and syncope. Respiratory: Positive for shortness of breath. Negative for cough and wheezing. Endocrine: Negative for cold intolerance and heat intolerance. Hematologic/Lymphatic: Does not bruise/bleed easily. Skin: Negative for nail changes. Neurological: Negative for dizziness and headaches. Physical Exam  Vitals:    09/28/18 0138 09/28/18 0159 09/28/18 0450 09/28/18 0727   BP: 153/70 156/70 165/73 150/76   Pulse: 65 64 66 61   Resp: 20 16 16 18   Temp:   97.8 °F (36.6 °C) 97.7 °F (36.5 °C)   SpO2: 94% 96% 99% 100%   Weight:       Height:           Physical Exam:  General: Well Developed, Well Nourished, No Acute Distress  HEENT: pupils equal and round, no abnormalities noted  Neck: supple, no JVD, no carotid bruits  Heart: S1S2 with RRR without murmurs or gallops  Lungs: few crackles in bases  Abd: soft, nontender, nondistended, with good bowel sounds  Ext: warm, no edema, calves supple/nontender, pulses 2+ bilaterally  Skin: warm and dry  Psychiatric: Normal mood and affect  Neurologic: Alert and oriented X 3    Cardiographics    Telemetry: SR  ECG: SR  Echocardiogram: July 2018 showed an EF of 20-30% with moderate diffuse hypokinesis and mild aortic valve stenosis. Labs:   Recent Labs      09/28/18   0544  09/27/18   2232   NA  143  139   K  3.1*  3.6   BUN  10  9   CREA  0.91  0.89   GLU  121*  176*   WBC  8.9  13.0*   HGB  10.1*  11.0*   HCT  32.2*  34.1*   PLT  207  239        Assessment/Plan:     Assessment:      Principal Problem:    Respiratory failure with hypoxia and hypercapnia (HCC) (7/29/2018)- likely secondary to CHF. See below    Active Problems:    CAD (coronary artery disease) (11/29/2015)- no active angina sx; recent NSTEMI July 2018. Will continue asa, plavix, BB, ACE-I.  Dgt/pt opt to continue medical therapy. No aggressive measures given age. Hypertension (12/1/2015)- slightly elevated; will follow. May need to increase ACE-I      Atrial fibrillation ([address] (8/31/2018)- remains in SR; continue amiodarone. No AC in past just asa per pt/family.        Acute exacerbation of CHF (congestive heart failure) ([facility] [address] (9/28/2018)- see below      Systolic CHF, acute on chronic ([facility] [address] (9/28/2018)- last echo July 2018 showed EF of 20-30% with moderate diffuse hypokinesis and mild aortic valve stenosis. Will continue Coreg but increase lisinopril. Will also continue IV lasix with daily labs. Will discuss with Dr. Hakeem Sexton adding aldactone. Thank you very much for this referral. We appreciate the opportunity to participate in this patient's care. We will follow along with above stated plan.     Pati Juárez, FABIAN  Consulting MD: Dr. Hakeem Sexton

## 2018-09-28 NOTE — ED PROVIDER NOTES
HPI Comments: corazon is a 70-year-old female history of atrial fibrillation and congestive heart failure. Ejection fraction 20-30 percent. She is brought in by her daughters tonight for increased shortness of breath over the past day. She takes Lasix as needed. She denies any significant chest pain, abdominal pain or leg swelling. Daughters gave her 60 mg of Lasix prior to coming to the emergency department she is a small amount of urine. no recent fevers, cough or vomiting Patient is a 80 y.o. female presenting with shortness of breath. The history is provided by the patient (the daughters). No  was used. Shortness of Breath Associated symptoms include chest pain. Pertinent negatives include no fever, no headaches, no sore throat, no cough, no abdominal pain and no leg swelling. Past Medical History:  
Diagnosis Date  Thyroid cyst Distant past  
 thyroid cyst removed x40 years ago Past Surgical History:  
Procedure Laterality Date  HX CHOLECYSTECTOMY  HX HEENT    
 thyroglossal cyst  
 HX HYSTERECTOMY Family History:  
Problem Relation Age of Onset  Other Father  [address] Other Mother  Social History Social History  Marital status:  Spouse name: N/A  
 Number of children: N/A  
 Years of education: N/A Occupational History  Not on file. Social History Main Topics  Smoking status: Never Smoker  Smokeless tobacco: Never Used  Alcohol use No  
 Drug use: No  
 Sexual activity: Not on file Other Topics Concern  Not on file Social History Narrative , lives with  and 2 daughters. Pt did work as  at one time. 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 ALLERGIES: Amlodipine; Amoxicillin; and Tetracycline Review of Systems Constitutional: Negative for fatigue and fever. HENT: Negative for sore throat. Respiratory: Positive for shortness of breath. Negative for cough and chest tightness. Cardiovascular: Positive for chest pain. Negative for leg swelling. Gastrointestinal: Negative for abdominal pain. Genitourinary: Negative for dysuria. Musculoskeletal: Negative for back pain. Neurological: Negative for syncope and headaches. Psychiatric/Behavioral: Negative for confusion. Vitals:  
 09/27/18 2222 BP: 150/79 Pulse: 81 Resp: 20 Temp: 98.3 °F (36.8 °C) SpO2: 98% Weight: 49.4 kg (109 lb) Height: 5' 4\" (1.626 m) Physical Exam  
Constitutional: She is oriented to person, place, and time. She appears well-developed and well-nourished. No distress. HENT:  
Head: Normocephalic and atraumatic. Eyes: Conjunctivae and EOM are normal. Pupils are equal, round, and reactive to light. Neck: Normal range of motion. Neck supple. Cardiovascular: Normal rate, regular rhythm and normal heart sounds. Pulmonary/Chest: Effort normal and breath sounds normal. No respiratory distress. She has no wheezes. She has no rales. Mild crackles noted bilaterally Abdominal: Soft. She exhibits no distension. There is no tenderness. There is no rebound. Musculoskeletal: Normal range of motion. She exhibits no edema or tenderness. Neurological: She is alert and oriented to person, place, and time. Skin: Skin is warm and dry. No rash noted. She is not diaphoretic. Psychiatric: She has a normal mood and affect. Her behavior is normal.  
Vitals reviewed. MDM Number of Diagnoses or Management Options Acute on chronic congestive heart failure, unspecified heart failure type [facility]): new and requires workup Diagnosis management comments: Patient improved with diuresis here in the emergency department. Her troponin went up slightly. EKG at baseline. room air oxygen saturations hovering around 89 and 90%.    We will admit to the hospitalist for  Further evaluation and treatment. Family and patient in agreement with plan. Rodolfo Kaur MD; 9/28/2018 @6:50 AM Voice dictation software was used during the making of this note. This software is not perfect and grammatical and other typographical errors may be present. This note has not been proofread for errors. 
=================================================================== Amount and/or Complexity of Data Reviewed Clinical lab tests: ordered and reviewed (Results for orders placed or performed during the hospital encounter of 09/27/18 
-CBC WITH AUTOMATED DIFF Result                                            Value                         Ref Range WBC                                               13.0 (H)                      4.3 - 11.1 K/uL           
     RBC                                               3.43 (L)                      4.05 - 5.2 M/uL HGB                                               11.0 (L)                      11.7 - 15.4 g/dL HCT                                               34.1 (L)                      35.8 - 46.3 % MCV                                               99.4 (H)                      79.6 - 97.8 FL            
     MCH                                               32.1                          26.1 - 32.9 PG            
     MCHC                                              32.3                          31.4 - 35.0 g/dL RDW                                               14.0                          % PLATELET                                          239                           150 - 450 K/uL MPV                                               9.7                           9.4 - 12.3 FL      ABSOLUTE NRBC                                     0.00 0.0 - 0.2 K/uL            
     DF                                                AUTOMATED NEUTROPHILS                                       84 (H)                        43 - 78 % LYMPHOCYTES                                       8 (L)                         13 - 44 % MONOCYTES                                         6                             4.0 - 12.0 % EOSINOPHILS                                       1                             0.5 - 7.8 % BASOPHILS                                         1                             0.0 - 2.0 % IMMATURE GRANULOCYTES                             1                             0.0 - 5.0 %               
     ABS. NEUTROPHILS                                  11.0 (H)                      1.7 - 8.2 K/UL            
     ABS. LYMPHOCYTES                                  1.0                           0.5 - 4.6 K/UL            
     ABS. MONOCYTES                                    0.7                           0.1 - 1.3 K/UL            
     ABS. EOSINOPHILS                                  0.2                           0.0 - 0.8 K/UL            
     ABS. BASOPHILS                                    0.1                           0.0 - 0.2 K/UL            
     ABS. IMM. GRANS.                                  0.1                           0.0 - 0.5 K/UL            
-METABOLIC PANEL, COMPREHENSIVE Result                                            Value                         Ref Range Sodium                                            139                           136 - 145 mmol/L Potassium                                         3.6                           3.5 - 5.1 mmol/L      Chloride                                          104 98 - 107 mmol/L           
     CO2                                               27                            21 - 32 mmol/L Anion gap                                         8                             7 - 16 mmol/L Glucose                                           176 (H)                       65 - 100 mg/dL BUN                                               9                             8 - 23 MG/DL Creatinine                                        0.89                          0.6 - 1.0 MG/DL           
     GFR est AA                                        >60                           >60 ml/min/1.73m2 GFR est non-AA                                    >60                           >60 ml/min/1.73m2 Calcium                                           8.4                           8.3 - 10.4 MG/DL Bilirubin, total                                  0.8                           0.2 - 1.1 MG/DL           
     ALT (SGPT)                                        30                            12 - 65 U/L               
     AST (SGOT)                                        32                            15 - 37 U/L Alk. phosphatase                                  78                            50 - 136 U/L Protein, total                                    7.2                           6.3 - 8.2 g/dL Albumin                                           3.5                           3.2 - 4.6 g/dL Globulin                                          3.7 (H)                       2.3 - 3.5 g/dL A-G Ratio                                         0.9 (L)                       1.2 - 3.5                 
-TROPONIN I      Result                                            Value Ref Range Troponin-I, Qt.                                   0.04                          0.02 - 0.05 NG/ML         
-BNP Result                                            Value                         Ref Range BNP                                               1460                          pg/mL                     
-URINE MICROSCOPIC Result                                            Value                         Ref Range WBC                                               5-10                          0 /hpf                    
     RBC                                               0-3                           0 /hpf Epithelial cells                                  0-3                           0 /hpf Bacteria                                          0                             0 /hpf Casts                                             3-5                           0 /lpf                    
-METABOLIC PANEL, BASIC Result                                            Value                         Ref Range Sodium                                            143                           136 - 145 mmol/L Potassium                                         3.1 (L)                       3.5 - 5.1 mmol/L Chloride                                          106                           98 - 107 mmol/L           
     CO2                                               27                            21 - 32 mmol/L Anion gap                                         10                            7 - 16 mmol/L Glucose                                           121 (H)                       65 - 100 mg/dL BUN                                               10                            8 - 23 MG/DL Creatinine                                        0.91                          0.6 - 1.0 MG/DL           
     GFR est AA                                        >60                           >60 ml/min/1.73m2 GFR est non-AA                                    >60                           >60 ml/min/1.73m2 Calcium                                           8.2 (L)                       8.3 - 10.4 MG/DL          
-CBC WITH AUTOMATED DIFF Result                                            Value                         Ref Range WBC                                               8.9                           4.3 - 11.1 K/uL           
     RBC                                               3.15 (L)                      4.05 - 5.2 M/uL HGB                                               10.1 (L)                      11.7 - 15.4 g/dL HCT                                               32.2 (L)                      35.8 - 46.3 % MCV                                               102.2 (H)                     79.6 - 97.8 FL            
     MCH                                               32.1                          26.1 - 32.9 PG            
     MCHC                                              31.4                          31.4 - 35.0 g/dL RDW                                               14.0                          % PLATELET                                          207                           150 - 450 K/uL MPV                                               10.0                          9.4 - 12.3 FL ABSOLUTE NRBC                                     0.00                          0.0 - 0.2 K/uL DF                                                AUTOMATED NEUTROPHILS                                       75                            43 - 78 % LYMPHOCYTES                                       15                            13 - 44 % MONOCYTES                                         9                             4.0 - 12.0 % EOSINOPHILS                                       0 (L)                         0.5 - 7.8 % BASOPHILS                                         1                             0.0 - 2.0 % IMMATURE GRANULOCYTES                             0                             0.0 - 5.0 %               
     ABS. NEUTROPHILS                                  6.6                           1.7 - 8.2 K/UL            
     ABS. LYMPHOCYTES                                  1.4                           0.5 - 4.6 K/UL            
     ABS. MONOCYTES                                    0.8                           0.1 - 1.3 K/UL            
     ABS. EOSINOPHILS                                  0.0                           0.0 - 0.8 K/UL            
     ABS. BASOPHILS                                    0.1                           0.0 - 0.2 K/UL            
     ABS. IMM. GRANS.                                  0.0                           0.0 - 0.5 K/UL            
-POC LACTIC ACID Result                                            Value                         Ref Range Lactic Acid (POC)                                 0.6                           0.5 - 1.9 mmol/L          
-POC TROPONIN-I Result                                            Value                         Ref Range Troponin-I (POC)                                  0. 08 (H)                      0.02 - 0.05 ng/ml         
) Tests in the radiology section of CPT®: ordered and reviewed (Xr Chest [facility] Result Date: 9/28/2018 EXAM:  XR CHEST PORT INDICATION:  chest pain COMPARISON:  8/30/2018 FINDINGS: A portable AP radiograph of the chest was obtained at 2354 hours. The patient is on a cardiac monitor. Diffuse increased interstitial markings with new bilateral pleural effusions. .  The cardiac and mediastinal contours and pulmonary vascularity are normal.  The bones and soft tissues are grossly within normal limits. IMPRESSION: Acute pulmonary edema with bilateral pleural effusions. ) Review and summarize past medical records: yes Discuss the patient with other providers: yes Independent visualization of images, tracings, or specimens: yes Risk of Complications, Morbidity, and/or Mortality Presenting problems: high Diagnostic procedures: moderate Management options: moderate Patient Progress Patient progress: improved ED Course Comment By Time Lab reports to having difficulty with BNP machine. .   We will treat for congestive heart failure and give 40 mg of IV Lasix Camryn Ngo MD 09/28 6804 Procedures

## 2018-09-28 NOTE — PROGRESS NOTES
PT note: Patient getting ECHO this afternoon when therapy attempted. Will check back tomorrow.   
 
Suhail Pringle DPT

## 2018-09-28 NOTE — PROGRESS NOTES
Problem: Falls - Risk of 
Goal: *Absence of Falls Document Debria Check Fall Risk and appropriate interventions in the flowsheet. Outcome: Progressing Towards Goal 
Fall Risk Interventions: 
Mobility Interventions: Bed/chair exit alarm Elimination Interventions: Bed/chair exit alarm, Call light in reach

## 2018-09-28 NOTE — PROGRESS NOTES
In accordance with Medicare guidelines, the Medicare Outpatient Observation Notice was provided to this Medicare patient's daughter. Oral explanation was provided and all questions answered. Document was signed by patient's daughter & placed in the medical record under media tab. Copy provided to patient's daughter. Care manager notified.

## 2018-09-28 NOTE — ED NOTES
TRANSFER - OUT REPORT: 
 
Verbal report given to 7th floor nurse(name) on Valorie Frames  being transferred to 727(unit) for routine progression of care Report consisted of patients Situation, Background, Assessment and  
Recommendations(SBAR). Information from the following report(s) SBAR was reviewed with the receiving nurse. Lines:  
Peripheral IV 09/28/18 Left Antecubital (Active) Site Assessment Clean, dry, & intact 9/28/2018 12:19 AM  
Phlebitis Assessment 0 9/28/2018 12:19 AM  
Infiltration Assessment 0 9/28/2018 12:19 AM  
Dressing Status Clean, dry, & intact 9/28/2018 12:19 AM  
  
 
Opportunity for questions and clarification was provided. Patient transported with: 
 [employer]

## 2018-09-28 NOTE — ED NOTES
Pt given bedside commode, family at bedside, VSS, resp easy, urine sample collected. Pt denies any needs at this time.

## 2018-09-28 NOTE — ROUTINE PROCESS
CHF teaching started post introduction to pt/family; aware of diagnosis. Planner/scale @ BS and will follow. Smoking/ ETOH/Illicit drug use cessation and maintain a healthy weight covered. Pt/family aware that I can not prescribe nor adjust  medications: 15mins Palliative Care score:  OBS Refused ACP on admission Start 2L/D Fluid restriction/ cardiac diet CHF teaching continues to pt/family. Emphasis on taking prescription meds as ordered, to keep F/U appts and to call MD STAT if any of the following occur: ? If you gain 2 lbs in one day or 5 lbs in a week, and short of breath. ? If you can not lay flat without developing short of breath or rapid breathing at night; or if it wakes you up. Develop a cough or wheezing. Dr Alexy Sam; needs appt. Has nnamdie

## 2018-09-28 NOTE — ED TRIAGE NOTES
Pt c/o shob, started feeling weak as well, this started at 1700 tonight. Pt's two daughters in triage and they state they think it's her CHF, they gave her 1 SL Nitro after pt c/o heaviness in the chest, and also 60mg Lasix, which pt takes on a PRN basis per her PCP. Per daughters, pt has not been running a fever, they checked it at 1700 and it was 98.4. Pt is not usually on oxygen, in triage pt in on 4L/O2. Pt currently denies chest pain and shob, denies abd pain.

## 2018-09-28 NOTE — PROGRESS NOTES
09/28/18 0500 Dual Skin Pressure Injury Assessment Dual Skin Pressure Injury Assessment X Second Care Provider (Based on [address] [place] [address]) [facility] Sacrum  Mid  
Skin Integumentary Skin Integumentary (WDL) X Skin Color Ecchymosis (comment) (upper extremities) Skin Condition/Temp Fragile; Warm  
Skin Integrity Intact Turgor Epidermis thin w/ loss of subcut tissue Hair Growth Present Varicosities Absent Wound Prevention and Protection Methods Orientation of Wound Prevention Posterior;Mid  
Location of Wound Prevention Sacrum/Coccyx 
(some pinpoint indention) Dressing Present  No  
Wound Offloading (Prevention Methods) Bed, pressure redistribution/air;Bed, pressure reduction mattress; Foam silicone Sacrum area is red and blanchable. Mid- center of the sacrum area is a pinpoint size indention. Foam dressing was applied.

## 2018-09-28 NOTE — PROGRESS NOTES
Per Ada Castro RN patient and family (daughter Tha Jordan) have agreed to have Health  assistance upon discharge home.

## 2018-09-28 NOTE — H&P
HOSPITALIST INITIAL HISTORY AND PHYSICAL 
 
NAME:  Lencho Ball Age:  80 y.o. 
:   3/3/1924 MRN:   [id number] PCP: Steven Johnson MD 
Consulting MD: Treatment Team: Attending Provider: Rogelio Vazquez MD 
 
CHIEF COMPLAINT: SOB HISTORY OF PRESENT ILLNESS:  
Lencho Ball is a 80 y.o. female with a past medical history of CHF ( type uncertain given hx) who presents to the ER with complaint of SOB since around lunchtime yesterday. She reports feeling much less short of breath now after receiving Lasix in the ER. She denies lower extremity swelling. She also admits to malaise. Denies chest pain, nausea, vomting, fevers, chills . REVIEW OF SYSTEMS: Comprehensive ROS performed and negative except as stated in HPI. Past Medical History:  
Diagnosis Date  Thyroid cyst Distant past  
 thyroid cyst removed x40 years ago Past Surgical History:  
Procedure Laterality Date  HX CHOLECYSTECTOMY  HX HEENT    
 thyroglossal cyst  
 HX HYSTERECTOMY Prior to Admission Medications Prescriptions Last Dose Informant Patient Reported? Taking?  
amiodarone (CORDARONE) 200 mg tablet   No No  
Sig: Take 1 Tab by mouth two (2) times a day. aspirin 81 mg chewable tablet   Yes No  
Sig: Take 81 mg by mouth daily. carvedilol (COREG) 6.25 mg tablet   No No  
Sig: Take 1 Tab by mouth two (2) times daily (with meals) for 30 days. cholecalciferol, vitamin D3, (VITAMIN D3) 2,000 unit tab   Yes No  
Sig: Take  by mouth daily. clopidogrel (PLAVIX) 75 mg tab   No No  
Sig: Take 1 Tab by mouth daily. clopidogrel (PLAVIX) 75 mg tab   No No  
Sig: Take 1 Tab by mouth daily for 30 days. furosemide (LASIX) 40 mg tablet   No No  
Sig: Take 1.5 Tabs by mouth daily (after breakfast) for 30 days. Indications: Pulmonary Edema due to Chronic Heart Failure  
lisinopril (PRINIVIL, ZESTRIL) 2.5 mg tablet   No No  
Sig: Take 1 Tab by mouth daily for 30 days. nitroglycerin (NITRODUR) 0.4 mg/hr   Yes No  
Si Patch by TransDERmal route daily. oxyCODONE IR (OXY-IR) 15 mg immediate release tablet   Yes No  
Sig: Take 15 mg by mouth every four (4) hours as needed for Pain.  
pantoprazole (PROTONIX) 40 mg tablet   No No  
Sig: Take 1 Tab by mouth Daily (before breakfast). polyethylene glycol (MIRALAX) 17 gram/dose powder   Yes No  
Sig: Take 17 g by mouth as needed (constipation). potassium chloride (K-DUR, KLOR-CON) 20 mEq tablet   No No  
Sig: Take 1 Tab by mouth daily. pyridoxine (VITAMIN B-6) 100 mg tablet   Yes No  
Sig: Take 100 mg by mouth daily. Takes at [facility] Facility-Administered Medications: None Allergies Allergen Reactions  Amlodipine Hives  Amoxicillin Rash  Tetracycline Rash FAMILY HISTORY: Reviewed. Negative except Family History Problem Relation Age of Onset  Other Father  [facility] Other Mother  Social History Substance Use Topics  Smoking status: Never Smoker  Smokeless tobacco: Never Used  Alcohol use No  
 
 
 
Objective:  
 
Visit Vitals  /70  Pulse 64  Temp 98.3 °F (36.8 °C)  Resp 16  
 Ht 5' 4\" (1.626 m)  Wt 49.4 kg (109 lb)  SpO2 96%  BMI 18.71 kg/m2 Temp (24hrs), Av.3 °F (36.8 °C), Min:98.3 °F (36.8 °C), Max:98.3 °F (36.8 °C) Oxygen Therapy O2 Sat (%): 96 % (18) Pulse via Oximetry: 65 beats per minute (18) O2 Device: Nasal cannula (18) O2 Flow Rate (L/min): 4 l/min (18) Physical Exam: 
General:    The patient is a very pleasant elderly in no acute distress. Head:   Normocephalic/atraumatic. Eyes:  No palpebral pallor or scleral icterus. ENT:  External auricular and nasal exam within normal limits. Mucous membranes are moist. 
Neck:  Supple, non-tender, no JVD. Lungs:   Clear to auscultation bilaterally without wheezes or crackles. No respiratory distress or accessory muscle use. Heart:   Regular rate and rhythm, without murmurs, rubs, or gallops. Abdomen:   Soft, non-tender, non-distended with normoactive bowel sounds. Genitourinary: No tenderness over the bladder or bilateral CVAs. Extremities: Without clubbing, cyanosis, or edema. Skin:     Normal color, texture, and turgor. No rashes, lesions, or jaundice. Pulses: Radial and dorsalis pedis pulses present 2+ bilaterally. Capillary refill <2s. Neurologic: CN II-XII grossly intact and symmetrical. Very hard of hearing. Moving all four extremities well with no focal deficits. Psychiatric: Pleasant demeanor, appropriate affect. Alert and oriented x 3 Data Review:  
Recent Results (from the past 24 hour(s)) BNP Collection Time: 09/27/18 10:29 PM  
Result Value Ref Range BNP 1460 pg/mL CBC WITH AUTOMATED DIFF Collection Time: 09/27/18 10:32 PM  
Result Value Ref Range WBC 13.0 (H) 4.3 - 11.1 K/uL  
 RBC 3.43 (L) 4.05 - 5.2 M/uL  
 HGB 11.0 (L) 11.7 - 15.4 g/dL HCT 34.1 (L) 35.8 - 46.3 % MCV 99.4 (H) 79.6 - 97.8 FL  
 MCH 32.1 26.1 - 32.9 PG  
 MCHC 32.3 31.4 - 35.0 g/dL  
 RDW 14.0 % PLATELET 141 586 - 867 K/uL MPV 9.7 9.4 - 12.3 FL ABSOLUTE NRBC 0.00 0.0 - 0.2 K/uL  
 DF AUTOMATED NEUTROPHILS 84 (H) 43 - 78 % LYMPHOCYTES 8 (L) 13 - 44 % MONOCYTES 6 4.0 - 12.0 % EOSINOPHILS 1 0.5 - 7.8 % BASOPHILS 1 0.0 - 2.0 % IMMATURE GRANULOCYTES 1 0.0 - 5.0 %  
 ABS. NEUTROPHILS 11.0 (H) 1.7 - 8.2 K/UL  
 ABS. LYMPHOCYTES 1.0 0.5 - 4.6 K/UL  
 ABS. MONOCYTES 0.7 0.1 - 1.3 K/UL  
 ABS. EOSINOPHILS 0.2 0.0 - 0.8 K/UL  
 ABS. BASOPHILS 0.1 0.0 - 0.2 K/UL  
 ABS. IMM. GRANS. 0.1 0.0 - 0.5 K/UL METABOLIC PANEL, COMPREHENSIVE Collection Time: 09/27/18 10:32 PM  
Result Value Ref Range Sodium 139 136 - 145 mmol/L Potassium 3.6 3.5 - 5.1 mmol/L Chloride 104 98 - 107 mmol/L  
 CO2 27 21 - 32 mmol/L  Anion gap 8 7 - 16 mmol/L  
 Glucose 176 (H) 65 - 100 mg/dL BUN 9 8 - 23 MG/DL Creatinine 0.89 0.6 - 1.0 MG/DL  
 GFR est AA >60 >60 ml/min/1.73m2 GFR est non-AA >60 >60 ml/min/1.73m2 Calcium 8.4 8.3 - 10.4 MG/DL Bilirubin, total 0.8 0.2 - 1.1 MG/DL  
 ALT (SGPT) 30 12 - 65 U/L  
 AST (SGOT) 32 15 - 37 U/L Alk. phosphatase 78 50 - 136 U/L Protein, total 7.2 6.3 - 8.2 g/dL Albumin 3.5 3.2 - 4.6 g/dL Globulin 3.7 (H) 2.3 - 3.5 g/dL A-G Ratio 0.9 (L) 1.2 - 3.5    
TROPONIN I Collection Time: 09/27/18 10:32 PM  
Result Value Ref Range Troponin-I, Qt. 0.04 0.02 - 0.05 NG/ML  
POC LACTIC ACID Collection Time: 09/28/18 12:25 AM  
Result Value Ref Range Lactic Acid (POC) 0.6 0.5 - 1.9 mmol/L  
URINE MICROSCOPIC Collection Time: 09/28/18  2:15 AM  
Result Value Ref Range WBC 5-10 0 /hpf  
 RBC 0-3 0 /hpf Epithelial cells 0-3 0 /hpf Bacteria 0 0 /hpf Casts 3-5 0 /lpf POC TROPONIN-I Collection Time: 09/28/18  2:58 AM  
Result Value Ref Range Troponin-I (POC) 0.08 (H) 0.02 - 0.05 ng/ml Imaging Donovan Budd /Studies: Xr Chest [facility] Result Date: 9/28/2018 IMPRESSION: Acute pulmonary edema with bilateral pleural effusions. Assessment and Plan:  
 
Principal Problem: 
  Respiratory failure with [facility] and [facility] (7/29/2018) Still requiring 2 L O2, will continue to treat per below and wean O2 as tolerated. Active Problems: 
  Acute exacerbation of CHF (congestive heart failure) ([facility] (9/28/2018) IV Lasix 60 BID, TTE. Consult cardiology. Atrial fibrillation ([facility] (8/31/2018) Stable, continue home meds. Lovenox CAD (coronary artery disease) (11/29/2015) Stable, continue home meds Hypertension (12/1/2015) Stable, continue home meds DVT Prophylaxis: Lovenox Code Status: FULL CODE Disposition: Observe on telemetry for evaluation and treatment as per above. Anticipated discharge: < 2 midnights Signed By: Charline Oneill MD   
 September 28, 2018

## 2018-09-29 PROBLEM — J96.02 ACUTE RESPIRATORY FAILURE WITH HYPOXIA AND HYPERCAPNIA (HCC): Status: ACTIVE | Noted: 2018-07-29

## 2018-09-29 PROBLEM — J96.01 ACUTE RESPIRATORY FAILURE WITH HYPOXIA AND HYPERCAPNIA (HCC): Status: ACTIVE | Noted: 2018-07-29

## 2018-09-29 PROBLEM — I48.91 ATRIAL FIBRILLATION (HCC): Chronic | Status: ACTIVE | Noted: 2018-08-31

## 2018-09-29 LAB
ANION GAP SERPL CALC-SCNC: 8 MMOL/L (ref 7–16)
BASOPHILS # BLD: 0.1 K/UL (ref 0–0.2)
BASOPHILS NFR BLD: 1 % (ref 0–2)
BUN SERPL-MCNC: 15 MG/DL (ref 8–23)
CALCIUM SERPL-MCNC: 8.3 MG/DL (ref 8.3–10.4)
CHLORIDE SERPL-SCNC: 102 MMOL/L (ref 98–107)
CO2 SERPL-SCNC: 31 MMOL/L (ref 21–32)
CREAT SERPL-MCNC: 0.87 MG/DL (ref 0.6–1)
DIFFERENTIAL METHOD BLD: ABNORMAL
EOSINOPHIL # BLD: 0.3 K/UL (ref 0–0.8)
EOSINOPHIL NFR BLD: 3 % (ref 0.5–7.8)
ERYTHROCYTE [DISTWIDTH] IN BLOOD BY AUTOMATED COUNT: 13.9 %
GLUCOSE SERPL-MCNC: 101 MG/DL (ref 65–100)
HCT VFR BLD AUTO: 31.6 % (ref 35.8–46.3)
HGB BLD-MCNC: 10.4 G/DL (ref 11.7–15.4)
IMM GRANULOCYTES # BLD: 0.1 K/UL (ref 0–0.5)
IMM GRANULOCYTES NFR BLD AUTO: 1 % (ref 0–5)
LYMPHOCYTES # BLD: 1.1 K/UL (ref 0.5–4.6)
LYMPHOCYTES NFR BLD: 13 % (ref 13–44)
MCH RBC QN AUTO: 32.2 PG (ref 26.1–32.9)
MCHC RBC AUTO-ENTMCNC: 32.9 G/DL (ref 31.4–35)
MCV RBC AUTO: 97.8 FL (ref 79.6–97.8)
MONOCYTES # BLD: 0.7 K/UL (ref 0.1–1.3)
MONOCYTES NFR BLD: 10 % (ref 4–12)
NEUTS SEG # BLD: 5.7 K/UL (ref 1.7–8.2)
NEUTS SEG NFR BLD: 72 % (ref 43–78)
NRBC # BLD: 0 K/UL (ref 0–0.2)
PLATELET # BLD AUTO: 195 K/UL (ref 150–450)
PMV BLD AUTO: 9.9 FL (ref 9.4–12.3)
POTASSIUM SERPL-SCNC: 3 MMOL/L (ref 3.5–5.1)
RBC # BLD AUTO: 3.23 M/UL (ref 4.05–5.2)
SODIUM SERPL-SCNC: 141 MMOL/L (ref 136–145)
WBC # BLD AUTO: 7.8 K/UL (ref 4.3–11.1)

## 2018-09-29 PROCEDURE — 74011250637 HC RX REV CODE- 250/637: Performed by: FAMILY MEDICINE

## 2018-09-29 PROCEDURE — 74011250637 HC RX REV CODE- 250/637: Performed by: NURSE PRACTITIONER

## 2018-09-29 PROCEDURE — 74011250637 HC RX REV CODE- 250/637: Performed by: INTERNAL MEDICINE

## 2018-09-29 PROCEDURE — G8988 SELF CARE GOAL STATUS: HCPCS

## 2018-09-29 PROCEDURE — 86580 TB INTRADERMAL TEST: CPT | Performed by: INTERNAL MEDICINE

## 2018-09-29 PROCEDURE — 85025 COMPLETE CBC W/AUTO DIFF WBC: CPT

## 2018-09-29 PROCEDURE — 36415 COLL VENOUS BLD VENIPUNCTURE: CPT

## 2018-09-29 PROCEDURE — 99218 HC RM OBSERVATION: CPT

## 2018-09-29 PROCEDURE — 80048 BASIC METABOLIC PNL TOTAL CA: CPT

## 2018-09-29 PROCEDURE — G8987 SELF CARE CURRENT STATUS: HCPCS

## 2018-09-29 PROCEDURE — G8989 SELF CARE D/C STATUS: HCPCS

## 2018-09-29 PROCEDURE — 74011000302 HC RX REV CODE- 302: Performed by: INTERNAL MEDICINE

## 2018-09-29 PROCEDURE — 96376 TX/PRO/DX INJ SAME DRUG ADON: CPT

## 2018-09-29 PROCEDURE — 96372 THER/PROPH/DIAG INJ SC/IM: CPT

## 2018-09-29 PROCEDURE — 74011250636 HC RX REV CODE- 250/636: Performed by: FAMILY MEDICINE

## 2018-09-29 PROCEDURE — 77010033678 HC OXYGEN DAILY

## 2018-09-29 PROCEDURE — 97165 OT EVAL LOW COMPLEX 30 MIN: CPT

## 2018-09-29 RX ORDER — FUROSEMIDE 40 MG/1
40 TABLET ORAL DAILY
Status: DISCONTINUED | OUTPATIENT
Start: 2018-09-29 | End: 2018-09-30 | Stop reason: HOSPADM

## 2018-09-29 RX ORDER — POTASSIUM CHLORIDE 20 MEQ/1
40 TABLET, EXTENDED RELEASE ORAL 2 TIMES DAILY
Status: DISPENSED | OUTPATIENT
Start: 2018-09-29 | End: 2018-09-30

## 2018-09-29 RX ORDER — POTASSIUM CHLORIDE 20 MEQ/1
40 TABLET, EXTENDED RELEASE ORAL 2 TIMES DAILY
Status: DISCONTINUED | OUTPATIENT
Start: 2018-09-29 | End: 2018-09-29

## 2018-09-29 RX ORDER — GUAIFENESIN 100 MG/5ML
162 LIQUID (ML) ORAL DAILY
Status: DISCONTINUED | OUTPATIENT
Start: 2018-09-30 | End: 2018-09-30 | Stop reason: HOSPADM

## 2018-09-29 RX ORDER — FUROSEMIDE 40 MG/1
40 TABLET ORAL DAILY
Status: DISCONTINUED | OUTPATIENT
Start: 2018-09-30 | End: 2018-09-29 | Stop reason: SDUPTHER

## 2018-09-29 RX ORDER — POTASSIUM CHLORIDE 20 MEQ/1
20 TABLET, EXTENDED RELEASE ORAL DAILY
Status: DISCONTINUED | OUTPATIENT
Start: 2018-10-01 | End: 2018-09-29

## 2018-09-29 RX ORDER — AMIODARONE HYDROCHLORIDE 200 MG/1
100 TABLET ORAL DAILY
Status: DISCONTINUED | OUTPATIENT
Start: 2018-09-30 | End: 2018-09-29 | Stop reason: SDUPTHER

## 2018-09-29 RX ORDER — POTASSIUM CHLORIDE 20 MEQ/1
20 TABLET, EXTENDED RELEASE ORAL DAILY
Status: DISCONTINUED | OUTPATIENT
Start: 2018-09-30 | End: 2018-09-30 | Stop reason: HOSPADM

## 2018-09-29 RX ORDER — AMIODARONE HYDROCHLORIDE 200 MG/1
100 TABLET ORAL DAILY
Status: DISCONTINUED | OUTPATIENT
Start: 2018-09-29 | End: 2018-09-30 | Stop reason: HOSPADM

## 2018-09-29 RX ADMIN — Medication 10 ML: at 16:07

## 2018-09-29 RX ADMIN — TUBERCULIN PURIFIED PROTEIN DERIVATIVE 5 UNITS: 5 INJECTION, SOLUTION INTRADERMAL at 16:08

## 2018-09-29 RX ADMIN — LISINOPRIL 5 MG: 5 TABLET ORAL at 09:17

## 2018-09-29 RX ADMIN — POTASSIUM CHLORIDE 40 MEQ: 20 TABLET, EXTENDED RELEASE ORAL at 17:20

## 2018-09-29 RX ADMIN — SPIRONOLACTONE 25 MG: 25 TABLET ORAL at 09:17

## 2018-09-29 RX ADMIN — ENOXAPARIN SODIUM 30 MG: 100 INJECTION SUBCUTANEOUS at 09:19

## 2018-09-29 RX ADMIN — PANTOPRAZOLE SODIUM 40 MG: 40 TABLET, DELAYED RELEASE ORAL at 06:21

## 2018-09-29 RX ADMIN — Medication 10 ML: at 21:42

## 2018-09-29 RX ADMIN — AMIODARONE HYDROCHLORIDE 100 MG: 200 TABLET ORAL at 12:16

## 2018-09-29 RX ADMIN — FUROSEMIDE 60 MG: 10 INJECTION, SOLUTION INTRAMUSCULAR; INTRAVENOUS at 09:20

## 2018-09-29 RX ADMIN — OXYCODONE HYDROCHLORIDE 15 MG: 15 TABLET ORAL at 21:42

## 2018-09-29 RX ADMIN — FUROSEMIDE 40 MG: 40 TABLET ORAL at 16:07

## 2018-09-29 RX ADMIN — CARVEDILOL 6.25 MG: 6.25 TABLET, FILM COATED ORAL at 16:07

## 2018-09-29 RX ADMIN — CARVEDILOL 6.25 MG: 6.25 TABLET, FILM COATED ORAL at 09:22

## 2018-09-29 RX ADMIN — OXYCODONE HYDROCHLORIDE 15 MG: 15 TABLET ORAL at 10:56

## 2018-09-29 RX ADMIN — ASPIRIN 81 MG: 81 TABLET, CHEWABLE ORAL at 09:17

## 2018-09-29 RX ADMIN — Medication 10 ML: at 06:21

## 2018-09-29 RX ADMIN — OXYCODONE HYDROCHLORIDE 15 MG: 15 TABLET ORAL at 17:20

## 2018-09-29 NOTE — PROGRESS NOTES
[facility] CARDIOLOGY PROGRESS NOTE 
      
 
9/29/2018 10:32 AM 
 
Admit Date: 9/27/2018 Admit Diagnosis: Acute exacerbation of CHF (congestive heart failure) ([facility] Assessment:  
Principal Problem: 
  Respiratory failure with hypoxia and hypercapnia ([facility] (7/29/2018) Active Problems: 
  CAD (coronary artery disease) (11/29/2015) Hypertension (12/1/2015) [facility] (8/31/2018), [id number] = 6+ Acute exacerbation of CHF (congestive heart failure) ([facility] (9/28/2018) Systolic CHF, acute on chronic (HCC) (9/28/2018), EF 30-35% Plan:  
80year old female with ADHF, improved on IV lasix. Remains in NSR but history of persistent AF on amiodarone and coreg. She is not anticoagulated. We will reduce amiodarone back to 100 mg po daily. She's on ASA but there is evidence (ESC guidelines) for  mg po daily if not able to take full OAT for prevention of stroke in AF, I will increase to that. If that's not tolerated well, she can go back to 81 mg po daily. She can continue the coreg. I personally reviewed TTE from 9/28/2018. 
 
-Continue amiodarone, coreg, ASA for AF. -Continue diuresis, will need to switch back to oral medicines prior to discharge to ensure stable UOP. -Delicate mgmt with her given frailty/advanced age.  
-Ensure follow up with outpatient cardiologist.  
 
Thank you for allowing me to participate in the electrophysiologic care of this most pleasant patient. Please feel free to contact me if there are any questions or concerns. Pretty Tsai. Angelica Garcia MD, MS Clinical Cardiac Electrophysiology [facility] Cardiology Subjective: No complaints this AM, no chest pain or shortness of breath. Comfortable, remains in sinus rhythm. Accompanied by daughter. Interval History: (History of pertinent interval events obtained from nursing staff) ROS: 
GEN:  No fever or chills Cardiovascular:  As noted above Pulmonary:  As noted above Neuro: No new focal motor or sensory loss Objective:  
 
Vitals:  
 09/29/18 5387 09/29/18 2858 09/29/18 0912 09/29/18 1332 BP: 138/63 146/64 Pulse: (!) 58 (!) 57  62 Resp: 17 18 Temp: 98.1 °F (36.7 °C) 98.3 °F (36.8 °C) SpO2: 97% 96% 97% Weight:      
Height:      
 
 
Physical Exam: 
Jeny Sebastian, Well Nourished, No Acute Distress, Alert & Oriented x 3, appropriate mood. Frail. Neck- supple, no JVD 
CV- regular rate and rhythm no MRG Lung- clear bilaterally Abd- soft, nontender, nondistended Ext- no edema bilaterally. Skin- warm and dry Current Facility-Administered Medications Medication Dose Route Frequency  [START ON 9/30/2018] amiodarone (CORDARONE) tablet 100 mg  100 mg Oral DAILY  [START ON 9/30/2018] aspirin chewable tablet 162 mg  162 mg Oral DAILY  furosemide (LASIX) injection 60 mg  60 mg IntraVENous Q12H  carvedilol (COREG) tablet 6.25 mg  6.25 mg Oral BID WITH MEALS  polyethylene glycol (MIRALAX) packet 17 g  17 g Oral PRN  pantoprazole (PROTONIX) tablet 40 mg  40 mg Oral ACB  oxyCODONE IR (ROXICODONE) tablet 15 mg  15 mg Oral Q4H PRN  
 sodium chloride (NS) flush 5-10 mL  5-10 mL IntraVENous Q8H  
 sodium chloride (NS) flush 5-10 mL  5-10 mL IntraVENous PRN  
 acetaminophen (TYLENOL) tablet 650 mg  650 mg Oral Q4H PRN  
 ondansetron (ZOFRAN) injection 4 mg  4 mg IntraVENous Q4H PRN  
 magnesium hydroxide (MILK OF MAGNESIA) 400 mg/5 mL oral suspension 30 mL  30 mL Oral DAILY PRN  
 enoxaparin (LOVENOX) injection 30 mg  30 mg SubCUTAneous Q24H  
 influenza vaccine 2018-19 (6 mos+)(PF) (FLUARIX QUAD/FLULAVAL QUAD) injection 0.5 mL  0.5 mL IntraMUSCular PRIOR TO DISCHARGE  lisinopril (PRINIVIL, ZESTRIL) tablet 5 mg  5 mg Oral DAILY  spironolactone (ALDACTONE) tablet 25 mg  25 mg Oral DAILY Data Review:  
Recent Results (from the past 24 hour(s)) METABOLIC PANEL, BASIC  Collection Time: 09/29/18  5:46 AM  
 Result Value Ref Range Sodium 141 136 - 145 mmol/L Potassium 3.0 (L) 3.5 - 5.1 mmol/L Chloride 102 98 - 107 mmol/L  
 CO2 31 21 - 32 mmol/L Anion gap 8 7 - 16 mmol/L Glucose 101 (H) 65 - 100 mg/dL BUN 15 8 - 23 MG/DL Creatinine 0.87 0.6 - 1.0 MG/DL  
 GFR est AA >60 >60 ml/min/1.73m2 GFR est non-AA >60 >60 ml/min/1.73m2 Calcium 8.3 8.3 - 10.4 MG/DL  
CBC WITH AUTOMATED DIFF Collection Time: 09/29/18  5:46 AM  
Result Value Ref Range WBC 7.8 4.3 - 11.1 K/uL  
 RBC 3.23 (L) 4.05 - 5.2 M/uL  
 HGB 10.4 (L) 11.7 - 15.4 g/dL HCT 31.6 (L) 35.8 - 46.3 % MCV 97.8 79.6 - 97.8 FL  
 MCH 32.2 26.1 - 32.9 PG  
 MCHC 32.9 31.4 - 35.0 g/dL  
 RDW 13.9 % PLATELET 508 954 - 630 K/uL MPV 9.9 9.4 - 12.3 FL ABSOLUTE NRBC 0.00 0.0 - 0.2 K/uL  
 DF AUTOMATED NEUTROPHILS 72 43 - 78 % LYMPHOCYTES 13 13 - 44 % MONOCYTES 10 4.0 - 12.0 % EOSINOPHILS 3 0.5 - 7.8 % BASOPHILS 1 0.0 - 2.0 % IMMATURE GRANULOCYTES 1 0.0 - 5.0 %  
 ABS. NEUTROPHILS 5.7 1.7 - 8.2 K/UL  
 ABS. LYMPHOCYTES 1.1 0.5 - 4.6 K/UL  
 ABS. MONOCYTES 0.7 0.1 - 1.3 K/UL  
 ABS. EOSINOPHILS 0.3 0.0 - 0.8 K/UL  
 ABS. BASOPHILS 0.1 0.0 - 0.2 K/UL  
 ABS. IMM. GRANS. 0.1 0.0 - 0.5 K/UL EKG:  (EKG has been independently visualized by me with interpretation below)

## 2018-09-29 NOTE — PROGRESS NOTES
Hospitalist Progress Note Admit Date:  2018 11:30 PM  
Name:  Barb Valdes Age:  80 y.o. 
:  3/3/1924 MRN:  [id number] PCP:  Glenna Hinojosa MD 
Treatment Team: Attending Provider: Shanika Sam MD; Consulting Provider: Rosette Klein MD; Utilization Review: Debra Rainey RN; Care Manager: Keshawn Peacock RN Subjective:  
Pt is a 81 y/o F with CHF who presented from home with worsening SOB. Hypoxic in ER. Was admitted with acute on chronic CHF. Cardiology consulted. Started on lasix 60mg IV BID. 
 
 - pt feelign much better today. Less SOB. No cough, CP Objective:  
Patient Vitals for the past 24 hrs: 
 Temp Pulse Resp BP SpO2  
18 1142 97.7 °F (36.5 °C) 63 17 124/71 96 %  
18 0922 - 62 - - -  
18 - - - - 97 % 18 0739 98.3 °F (36.8 °C) (!) 57 18 146/64 96 %  
18 0445 98.1 °F (36.7 °C) (!) 58 17 138/63 97 % 18 0014 97.9 °F (36.6 °C) (!) 59 17 144/68 96 %  
18 2212 - - - - 98 %  
18 1943 98.8 °F (37.1 °C) 60 17 149/62 98 %  
18 1524 97.8 °F (36.6 °C) (!) 54 17 109/56 95 % Oxygen Therapy O2 Sat (%): 96 % (18 1142) Pulse via Oximetry: 64 beats per minute (18) O2 Device: Nasal cannula (18) O2 Flow Rate (L/min): 2 l/min (18) Intake/Output Summary (Last 24 hours) at 18 1253 Last data filed at 18 1217 Gross per 24 hour Intake              830 ml Output              600 ml Net              230 ml General:    Well nourished. Alert. CV:   RRR. No murmur, rub, or gallop. Lungs:   Diminished bilat Extremities: Warm and dry. No cyanosis or edema. Skin:     No rashes or jaundice. Neuro:  No gross focal deficits Data Review: 
I have reviewed all labs, meds, telemetry events, and studies from the last 24 hours: 
 
Recent Results (from the past 24 hour(s)) METABOLIC PANEL, BASIC  Collection Time: 18  5:46 AM  
 Result Value Ref Range Sodium 141 136 - 145 mmol/L Potassium 3.0 (L) 3.5 - 5.1 mmol/L Chloride 102 98 - 107 mmol/L  
 CO2 31 21 - 32 mmol/L Anion gap 8 7 - 16 mmol/L Glucose 101 (H) 65 - 100 mg/dL BUN 15 8 - 23 MG/DL Creatinine 0.87 0.6 - 1.0 MG/DL  
 GFR est AA >60 >60 ml/min/1.73m2 GFR est non-AA >60 >60 ml/min/1.73m2 Calcium 8.3 8.3 - 10.4 MG/DL  
CBC WITH AUTOMATED DIFF Collection Time: 18  5:46 AM  
Result Value Ref Range WBC 7.8 4.3 - 11.1 K/uL  
 RBC 3.23 (L) 4.05 - 5.2 M/uL  
 HGB 10.4 (L) 11.7 - 15.4 g/dL HCT 31.6 (L) 35.8 - 46.3 % MCV 97.8 79.6 - 97.8 FL  
 MCH 32.2 26.1 - 32.9 PG  
 MCHC 32.9 31.4 - 35.0 g/dL  
 RDW 13.9 % PLATELET 084 341 - 912 K/uL MPV 9.9 9.4 - 12.3 FL ABSOLUTE NRBC 0.00 0.0 - 0.2 K/uL  
 DF AUTOMATED NEUTROPHILS 72 43 - 78 % LYMPHOCYTES 13 13 - 44 % MONOCYTES 10 4.0 - 12.0 % EOSINOPHILS 3 0.5 - 7.8 % BASOPHILS 1 0.0 - 2.0 % IMMATURE GRANULOCYTES 1 0.0 - 5.0 %  
 ABS. NEUTROPHILS 5.7 1.7 - 8.2 K/UL  
 ABS. LYMPHOCYTES 1.1 0.5 - 4.6 K/UL  
 ABS. MONOCYTES 0.7 0.1 - 1.3 K/UL  
 ABS. EOSINOPHILS 0.3 0.0 - 0.8 K/UL  
 ABS. BASOPHILS 0.1 0.0 - 0.2 K/UL  
 ABS. IMM. GRANS. 0.1 0.0 - 0.5 K/UL All Micro Results None Results for orders placed or performed during the hospital encounter of 18  
2D ECHO COMPLETE ADULT (TTE) W OR WO CONTR Narrative [place] [facility] [address] [place], [address] [facility] 
[phone] Transthoracic Echocardiogram 
2D, M-mode, Doppler, and Color Doppler Patient: Tali Lemos 
MR #: [id number] : 03-Mar-1924 Age: 80 years Gender: Female Study date: 28-Sep-2018 Account #: [de-identified] Height: 63.8 in 
Weight: 108.7 lb 
BSA: 1.51 mï¾² Status:Routine Location: 727 BP: 165/ 73 Allergies: AMLODIPINE, AMOXICILLIN, TETRACYCLINE Sonographer:  Antonio Magallanes RDCS Group:  [facility] Cardiology Referring Physician:  Aleksey James MD 
Reading Physician:  Slim Valdes MD [facility] INDICATIONS: CHF. PROCEDURE: This was a routine study. A transthoracic echocardiogram was 
performed. The study included complete 2D imaging, M-mode, complete spectral 
Doppler, and color Doppler. Intravenous contrast (Definity) was administered. Echocardiographic views were limited by poor acoustic window availability. Image quality was adequate. LEFT VENTRICLE: Size was normal. Systolic function was normal. Ejection 
fraction was estimated in the range of 30 % to 35 %. There was hypokinesis of 
the basal-mid anteroseptal and basal-mid inferoseptal wall(s). Wall thickness 
was normal. The E/e' ratio was 11.7. There was Grade I Diastolic Dysfunction. RIGHT VENTRICLE: The size was normal. Systolic function was normal. The 
tricuspid jet envelope definition was inadequate for estimation of RV  
systolic 
pressure. LEFT ATRIUM: The atrium was markedly dilated. RIGHT ATRIUM: The atrium was moderately dilated. SYSTEMIC VEINS: IVC: The inferior vena cava was normal in size and course. AORTIC VALVE: The valve was trileaflet. Leaflets exhibited mild to moderate 
sclerosis. There was no evidence for stenosis. There was no insufficiency. MITRAL VALVE: Valve structure was normal. There was no evidence for stenosis. There was moderate regurgitation. TRICUSPID VALVE: The valve structure was normal. There was no evidence for 
stenosis. There was mild regurgitation. PULMONIC VALVE: The valve structure was normal. There was no evidence for 
stenosis. There was moderate regurgitation. PERICARDIUM: There was no pericardial effusion. AORTA: The root exhibited normal size. SUMMARY: 
 
-  Left ventricle: Systolic function was normal. Ejection fraction was 
estimated in the range of 30 % to 35 %. There was hypokinesis of the  
basal-mid 
anteroseptal and basal-mid inferoseptal wall(s). -  Left atrium: The atrium was markedly dilated. -  Right atrium: The atrium was moderately dilated. -  Mitral valve: There was moderate regurgitation. 
 
-  Tricuspid valve: There was mild regurgitation. 
 
-  Pulmonic valve: There was moderate regurgitation. SYSTEM MEASUREMENT TABLES 
 
2D mode AoR Diam (2D): 3 cm 
LA Dimension (2D): 4.4 cm Left Atrium Systolic Volume Index; Method of Disks, Biplane; 2D mode;: 50.5 
ml/m2 IVS/LVPW (2D): 1 IVSd (2D): 1 cm LVIDd (2D): 4.8 cm LVIDs (2D): 3.7 cm 
LVOT Area (2D): 3.1 cm2 LVPWd (2D): 1 cm RVIDd (2D): 2.8 cm Tissue Doppler Imaging LV Peak Early [place]ra Tissue Brian; Lateral MA (TDI): 3.9 cm/s LV Peak Early [place]ra Tissue Brian; Medial MA (TDI): 3.5 cm/s Unspecified Scan Mode Peak Grad; Mean; Antegrade Flow: 6 mm[Hg] Vmax; Antegrade Flow: 117 cm/s LVOT Diam: 2 cm 
MV Peak Brian/LV Peak Tissue Brian E-Wave; Lateral MA: 11.1 MV Peak Brian/LV Peak Tissue Brian E-Wave; Medial MA: 12.3 MV E/A: 0.7 MV Peak E Brian; Mean; Antegrade Flow: 42.8 cm/s Prepared and signed by 
 
Slim Valdes MD [facility] Signed 28-Sep-2018 17:24:03 Current Meds: 
Current Facility-Administered Medications Medication Dose Route Frequency  [START ON 9/30/2018] aspirin chewable tablet 162 mg  162 mg Oral DAILY  amiodarone (CORDARONE) tablet 100 mg  100 mg Oral DAILY  [START ON 9/30/2018] potassium chloride (K-DUR, KLOR-CON) SR tablet 20 mEq  20 mEq Oral DAILY  potassium chloride (K-DUR, KLOR-CON) SR tablet 40 mEq  40 mEq Oral BID  furosemide (LASIX) injection 60 mg  60 mg IntraVENous Q12H  carvedilol (COREG) tablet 6.25 mg  6.25 mg Oral BID WITH MEALS  polyethylene glycol (MIRALAX) packet 17 g  17 g Oral PRN  pantoprazole (PROTONIX) tablet 40 mg  40 mg Oral ACB  oxyCODONE IR (ROXICODONE) tablet 15 mg  15 mg Oral Q4H PRN  
 sodium chloride (NS) flush 5-10 mL  5-10 mL IntraVENous Q8H  
 sodium chloride (NS) flush 5-10 mL  5-10 mL IntraVENous PRN  
  acetaminophen (TYLENOL) tablet 650 mg  650 mg Oral Q4H PRN  
 ondansetron (ZOFRAN) injection 4 mg  4 mg IntraVENous Q4H PRN  
 magnesium hydroxide (MILK OF MAGNESIA) 400 mg/5 mL oral suspension 30 mL  30 mL Oral DAILY PRN  
 enoxaparin (LOVENOX) injection 30 mg  30 mg SubCUTAneous Q24H  
 influenza vaccine 2018-19 (6 mos+)(PF) (FLUARIX QUAD/FLULAVAL QUAD) injection 0.5 mL  0.5 mL IntraMUSCular PRIOR TO DISCHARGE  lisinopril (PRINIVIL, ZESTRIL) tablet 5 mg  5 mg Oral DAILY  spironolactone (ALDACTONE) tablet 25 mg  25 mg Oral DAILY Other Studies (last 24 hours): No results found. Assessment and Plan:  
 
Hospital Problems as of 9/29/2018  Date Reviewed: 8/14/2018 Codes Class Noted - Resolved POA Acute exacerbation of CHF (congestive heart failure) (HCC) ICD-10-CM: I50.9 ICD-9-CM: 428.0  9/28/2018 - Present Yes * (Principal)Systolic CHF, acute on chronic (HCC) ICD-10-CM: I50.23 ICD-9-CM: 428.23, 428.0  9/28/2018 - Present Yes Atrial fibrillation (HCC) (Chronic) ICD-10-CM: I48.91 
ICD-9-CM: 427.31  8/31/2018 - Present Yes Acute respiratory failure with hypoxia and hypercapnia (HCC) ICD-10-CM: J96.01, J96.02 
ICD-9-CM: 518.81  7/29/2018 - Present Yes Hypertension (Chronic) ICD-10-CM: I10 
ICD-9-CM: 401.9  12/1/2015 - Present Yes CAD (coronary artery disease) (Chronic) ICD-10-CM: I25.10 ICD-9-CM: 414.00  11/29/2015 - Present Yes Plan: CHF with resp failure-  
· Cont IV diuresis. Monitor I/Os. If weaned off oxygen I think we can switch to PO lasix tonight · Give potassium while on lasix. Additional as needed. Check Mg · Wean oxygen prn · Cardiology following · Cont coreg, aldactone, lisinopril · Echo reviewed, EF 30-35%, G1DD Afib · On ASA, amiodarone, coreg per cardiology DC planning/Dispo: May need placement. Went home with daughter last admission. PPD/PT/OT ordered. Diet:  DIET CARDIAC 
DVT ppx:  lovenox Signed: Wilfredo Rockwell MD

## 2018-09-29 NOTE — PROGRESS NOTES
Problem: Self Care Deficits Care Plan (Adult) Goal: *Acute Goals and Plan of Care (Insert Text) OCCUPATIONAL THERAPY: Initial Assessment and Discharge 9/29/2018 OBSERVATION: Hospital Day: 3 [place] / Product Type: Medicare /  
  
NAME/AGE/GENDER: Chris Britt is a 80 y.o. female PRIMARY DIAGNOSIS:  Acute exacerbation of CHF (congestive heart failure) (HCC) Systolic CHF, acute on chronic (HCC) Systolic CHF, acute on chronic ([facility] ICD-10: Treatment Diagnosis:  
 · Generalized Muscle Weakness (M62.81) Precautions/Allergies: 
   Amlodipine; Amoxicillin; and Tetracycline ASSESSMENT:  
 
Ms. Emiliano Reza presents for the above. Recently taken off of oxygen and on room air. Oxygen saturation 93% while taking rest break walking in [place]. She is at her functional baseline in terms of ADL. No further need for acute occupational therapy at this time. This section established at most recent assessment PROBLEM LIST (Impairments causing functional limitations): 1. Decreased Activity Tolerance INTERVENTIONS PLANNED: (Benefits and precautions of occupational therapy have been discussed with the patient.) 1. Therapeutic activity TREATMENT PLAN: Frequency/Duration: Follow patient for today's session only. Rehabilitation Potential For Stated Goals: N/A  
 
RECOMMENDED REHABILITATION/EQUIPMENT: (at time of discharge pending progress): Due to the probability of continued deficits (see above) this patient will not likely need continued skilled occupational therapy after discharge. Equipment:  
? None at this time OCCUPATIONAL PROFILE AND HISTORY:  
History of Present Injury/Illness (Reason for Referral): Today's session only. Past Medical History/Comorbidities: Ms. Emiliano Reza  has a past medical history of Thyroid cyst (Distant past). Ms. Emiliano Reza  has a past surgical history that includes hx cholecystectomy; hx hysterectomy; and hx heent. Social History/Living Environment:  
Home Environment: Private residence One/Two Story Residence: One story Living Alone: No 
Support Systems: Family member(s), Child(tamra) Patient Expects to be Discharged to[de-identified] Private residence Current DME Used/Available at Home: Emily Briseno Prior Level of Function/Work/Activity: 
Requires assistance for bathing/dressing. Lives with 2 daughters who are very supportive. Number of Personal Factors/Comorbidities that affect the Plan of Care: Brief history (0):  LOW COMPLEXITY ASSESSMENT OF OCCUPATIONAL PERFORMANCE[de-identified]  
Activities of Daily Living:  
Basic ADLs (From Assessment) Complex ADLs (From Assessment) Feeding: Setup Oral Facial Hygiene/Grooming: Setup Bathing: Minimum assistance Upper Body Dressing: Setup Lower Body Dressing: Minimum assistance Toileting: Minimum assistance Grooming/Bathing/Dressing Activities of Daily Living Cognitive Retraining Safety/Judgement: Awareness of environment Lower Body Dressing Assistance Slip on Shoes Without Back: Stand-by assistance Bed/Mat Mobility Supine to Sit: Modified independent Sit to Stand: Modified independent Scooting: Modified independent Most Recent Physical Functioning:  
Gross Assessment: 
AROM: Generally decreased, functional 
Strength: Generally decreased, functional 
         
  
Posture: 
  
Balance: 
Sitting: Intact Standing: Impaired Standing - Static: Constant support Standing - Dynamic : Fair Bed Mobility: 
Supine to Sit: Modified independent Scooting: Modified independent Wheelchair Mobility: 
  
Transfers: 
Sit to Stand: Modified independent Stand to Sit: Modified independent Patient Vitals for the past 6 hrs: 
 BP BP Patient Position SpO2 O2 Flow Rate (L/min) Pulse  
09/29/18 0912 - - 97 % 2 l/min -  
09/29/18 0922 - - - - 62  
09/29/18 1142 124/71 At rest 96 % - 63  
09/29/18 1300 - During activity; At rest 93 % - -  
 18 1448 119/70 Sitting 96 % - 61 Mental Status Neurologic State: Alert Orientation Level: Oriented to person, Oriented to place Cognition: Appropriate decision making Perception: Appears intact Perseveration: No perseveration noted Safety/Judgement: Awareness of environment Physical Skills Involved: 1. Range of Motion 2. Balance 3. Strength 4. Activity Tolerance Cognitive Skills Affected (resulting in the inability to perform in a timely and safe manner): 1. None noted Psychosocial Skills Affected: 1. Habits/Routines 2. Environmental Adaptation 3. Social Interaction 4. Social Roles Number of elements that affect the Plan of Care: 1-3:  LOW COMPLEXITY CLINICAL DECISION MAKIN[address] [place] [address] AM-PAC 6 Clicks Daily Activity Inpatient Short Form How much help from another person does the patient currently need. .. Total A Lot A Little None 1. Putting on and taking off regular lower body clothing? [] 1   [x] 2   [] 3   [] 4  
2. Bathing (including washing, rinsing, drying)? [] 1   [x] 2   [] 3   [] 4  
3. Toileting, which includes using toilet, bedpan or urinal?   [] 1   [] 2   [x] 3   [] 4  
4. Putting on and taking off regular upper body clothing? [] 1   [] 2   [x] 3   [] 4  
5. Taking care of personal grooming such as brushing teeth? [] 1   [] 2   [x] 3   [] 4  
6. Eating meals? [] 1   [] 2   [x] 3   [] 4  
© , Trustees of [address] [place] [address], under license to [employer]. All rights reserved Score:  Initial: 16 Most Recent: X (Date: -- ) Interpretation of Tool:  Represents activities that are increasingly more difficult (i.e. Bed mobility, Transfers, Gait). Score 24 23 22-20 19-15 14-10 9-7 6 Modifier CH CI CJ CK CL CM CN   
 
? Self Care:  
  - CURRENT STATUS: CK - 40%-59% impaired, limited or restricted  - GOAL STATUS: CK - 40%-59% impaired, limited or restricted  - D/C STATUS:  CK - 40%-59% impaired, limited or restricted [place] / [place] / Product Type: Medicare /   
 
Medical Necessity: · N/A. Reason for Services/Other Comments: · N/A. Use of outcome tool(s) and clinical judgement create a POC that gives a: LOW COMPLEXITY  
 
 
 
TREATMENT:  
(In addition to Assessment/Re-Assessment sessions the following treatments were rendered) Pre-treatment Symptoms/Complaints:   
Pain: Initial:  
Pain Intensity 1: 0  Post Session:  same Assessment/Reassessment only, no treatment provided today Braces/Orthotics/Lines/Etc:  
· IV 
· O2 Device: Room air Treatment/Session Assessment:   
Response to Treatment:  No treatment rendered. Assessment only. · Interdisciplinary Collaboration:  
o Occupational Therapist 
o Registered Nurse · After treatment position/precautions:  
o Up in chair 
o Bed/Chair-wheels locked · Compliance with Program/Exercises: compliant today. · Recommendations/Intent for next treatment session: \"Next visit will focus on N/A\". Total Treatment Duration: OT Patient Time In/Time Out Time In: 5917 Time Out: 1440 MARCO ANTONIO White, OTR/L

## 2018-09-29 NOTE — PROGRESS NOTES
Patient as well as daughter state that patient has been told she cannot have an MRI due to stents. Fracture center coordinator notified, states they will do a bone scan in three days instead.

## 2018-09-29 NOTE — ROUTINE PROCESS
CHF teaching completed, verbalize emphasis on monitoring self and report to MD: 
? If you gain 2 lbs in one day or 5 lbs in a week, and short of breath. ? If you can not lay flat without developing short of breath or rapid breathing at night; or if it wakes you up. Develop a cough or wheezing. ? If you notice swollen hands/feet/ankles or stomach with a bloated/ full feeling. ? If you are  more confused or mentally fuzzy or dizzy. ? If you notice a rapid or change in your heart rate. ? If you become more exhausted all the time and unable to do the same level of activity without stopping to catch your breath. Drink no more than 8 cups a day in 8 oz. cups. Limit Cola Drinks. Your Heart can not handle any more. Stay away from salt (limit anything with salt or sodium in it). Limit to 250mg per serving. Exercise needs to be started with your Doctors approval. 
Reduce stress; Call myself or Provider if assistance is needed. Pass post test via teach back, will make self available post DC ,if an questions arise. Diabetic teaching completed. Planner/scale @ BS:  60 mins total 
 
 
Will attempt to set up F/U appt with Dr Murl Frankel, if can not on [facility]

## 2018-09-30 ENCOUNTER — HOME HEALTH ADMISSION (OUTPATIENT)
Dept: HOME HEALTH SERVICES | Facility: HOME HEALTH | Age: 83
End: 2018-09-30

## 2018-09-30 VITALS
BODY MASS INDEX: 18.61 KG/M2 | TEMPERATURE: 97.9 F | HEART RATE: 83 BPM | DIASTOLIC BLOOD PRESSURE: 62 MMHG | RESPIRATION RATE: 19 BRPM | HEIGHT: 64 IN | OXYGEN SATURATION: 98 % | WEIGHT: 109 LBS | SYSTOLIC BLOOD PRESSURE: 143 MMHG

## 2018-09-30 LAB
ANION GAP SERPL CALC-SCNC: 7 MMOL/L (ref 7–16)
BUN SERPL-MCNC: 16 MG/DL (ref 8–23)
CALCIUM SERPL-MCNC: 8.3 MG/DL (ref 8.3–10.4)
CHLORIDE SERPL-SCNC: 104 MMOL/L (ref 98–107)
CO2 SERPL-SCNC: 31 MMOL/L (ref 21–32)
CREAT SERPL-MCNC: 0.91 MG/DL (ref 0.6–1)
GLUCOSE SERPL-MCNC: 88 MG/DL (ref 65–100)
MAGNESIUM SERPL-MCNC: 2 MG/DL (ref 1.8–2.4)
POTASSIUM SERPL-SCNC: 3.4 MMOL/L (ref 3.5–5.1)
SODIUM SERPL-SCNC: 142 MMOL/L (ref 136–145)

## 2018-09-30 PROCEDURE — 74011250637 HC RX REV CODE- 250/637: Performed by: NURSE PRACTITIONER

## 2018-09-30 PROCEDURE — 96372 THER/PROPH/DIAG INJ SC/IM: CPT

## 2018-09-30 PROCEDURE — 74011250636 HC RX REV CODE- 250/636: Performed by: FAMILY MEDICINE

## 2018-09-30 PROCEDURE — 80048 BASIC METABOLIC PNL TOTAL CA: CPT

## 2018-09-30 PROCEDURE — 90686 IIV4 VACC NO PRSV 0.5 ML IM: CPT | Performed by: FAMILY MEDICINE

## 2018-09-30 PROCEDURE — 74011250637 HC RX REV CODE- 250/637: Performed by: FAMILY MEDICINE

## 2018-09-30 PROCEDURE — 74011250637 HC RX REV CODE- 250/637: Performed by: INTERNAL MEDICINE

## 2018-09-30 PROCEDURE — G8979 MOBILITY GOAL STATUS: HCPCS

## 2018-09-30 PROCEDURE — 97161 PT EVAL LOW COMPLEX 20 MIN: CPT

## 2018-09-30 PROCEDURE — G8980 MOBILITY D/C STATUS: HCPCS

## 2018-09-30 PROCEDURE — G8978 MOBILITY CURRENT STATUS: HCPCS

## 2018-09-30 PROCEDURE — 99218 HC RM OBSERVATION: CPT

## 2018-09-30 PROCEDURE — 36415 COLL VENOUS BLD VENIPUNCTURE: CPT

## 2018-09-30 PROCEDURE — 83735 ASSAY OF MAGNESIUM: CPT

## 2018-09-30 PROCEDURE — 90471 IMMUNIZATION ADMIN: CPT

## 2018-09-30 RX ORDER — GUAIFENESIN 100 MG/5ML
162 LIQUID (ML) ORAL DAILY
Qty: 60 TAB | Refills: 0 | Status: SHIPPED | OUTPATIENT
Start: 2018-10-01 | End: 2019-05-13 | Stop reason: ALTCHOICE

## 2018-09-30 RX ORDER — AMIODARONE HYDROCHLORIDE 100 MG/1
100 TABLET ORAL DAILY
Qty: 30 TAB | Refills: 0 | Status: SHIPPED | OUTPATIENT
Start: 2018-10-01 | End: 2018-12-26 | Stop reason: SDUPTHER

## 2018-09-30 RX ORDER — FUROSEMIDE 40 MG/1
40 TABLET ORAL DAILY
Qty: 30 TAB | Refills: 0 | Status: SHIPPED | OUTPATIENT
Start: 2018-09-30 | End: 2019-08-12

## 2018-09-30 RX ADMIN — FUROSEMIDE 40 MG: 40 TABLET ORAL at 08:43

## 2018-09-30 RX ADMIN — CARVEDILOL 6.25 MG: 6.25 TABLET, FILM COATED ORAL at 08:43

## 2018-09-30 RX ADMIN — ASPIRIN 81 MG 162 MG: 81 TABLET ORAL at 08:43

## 2018-09-30 RX ADMIN — INFLUENZA VIRUS VACCINE 0.5 ML: 15; 15; 15; 15 SUSPENSION INTRAMUSCULAR at 15:00

## 2018-09-30 RX ADMIN — Medication 10 ML: at 06:34

## 2018-09-30 RX ADMIN — PANTOPRAZOLE SODIUM 40 MG: 40 TABLET, DELAYED RELEASE ORAL at 06:26

## 2018-09-30 RX ADMIN — LISINOPRIL 5 MG: 5 TABLET ORAL at 08:42

## 2018-09-30 RX ADMIN — SPIRONOLACTONE 25 MG: 25 TABLET ORAL at 08:43

## 2018-09-30 RX ADMIN — ENOXAPARIN SODIUM 30 MG: 100 INJECTION SUBCUTANEOUS at 08:47

## 2018-09-30 RX ADMIN — AMIODARONE HYDROCHLORIDE 100 MG: 200 TABLET ORAL at 08:43

## 2018-09-30 RX ADMIN — POTASSIUM CHLORIDE 20 MEQ: 20 TABLET, EXTENDED RELEASE ORAL at 08:43

## 2018-09-30 RX ADMIN — OXYCODONE HYDROCHLORIDE 15 MG: 15 TABLET ORAL at 06:26

## 2018-09-30 NOTE — PROGRESS NOTES
Problem: Falls - Risk of 
Goal: *Absence of Falls Document April Tuan Fall Risk and appropriate interventions in the flowsheet. Outcome: Progressing Towards Goal 
Fall Risk Interventions: 
Mobility Interventions: Bed/chair exit alarm, Patient to call before getting OOB Medication Interventions: Bed/chair exit alarm, Patient to call before getting OOB Elimination Interventions: Call light in reach, Patient to call for help with toileting needs

## 2018-09-30 NOTE — PROGRESS NOTES
[facility] CARDIOLOGY PROGRESS NOTE 
      
 
9/30/2018 10:32 AM 
 
Admit Date: 9/27/2018 Admit Diagnosis: Acute exacerbation of CHF (congestive heart failure) ([facility] Assessment:  
Principal Problem: 
  Respiratory failure with hypoxia and hypercapnia ([facility] (7/29/2018) Active Problems: 
  CAD (coronary artery disease) (11/29/2015) Hypertension (12/1/2015) Atrial fibrillation ([facility] (8/31/2018), TRMAZ1ETHc = 6+ Acute exacerbation of CHF (congestive heart failure) ([facility] (9/28/2018) Systolic CHF, acute on chronic (HCC) (9/28/2018), EF 30-35% Plan:  
80year old female with ADHF, improved on IV lasix. Remains in NSR but history of persistent AF on amiodarone and coreg. She is not anticoagulated. We will reduce amiodarone back to 100 mg po daily. She's on ASA but there is evidence (ESC guidelines) for  mg po daily if not able to take full OAT for prevention of stroke in AF, I will increase to that. If that's not tolerated well, she can go back to 81 mg po daily. She can continue the coreg. I personally reviewed TTE from 9/28/2018. 
 
-Continue amiodarone, coreg, ASA for AF. -Replete K/Mg 
-Continue diuresis, will need to switch back to oral medicines prior to discharge to ensure stable UOP. -Delicate mgmt with her given frailty/advanced age.  
-Ensure follow up with outpatient cardiologist.  
-Paula Carlisle to discharge from cards perspective. Thank you for allowing me to participate in the electrophysiologic care of this most pleasant patient. Please feel free to contact me if there are any questions or concerns. Reyna Mak. Amaris Chua MD, MS Clinical Cardiac Electrophysiology [facility] Cardiology Subjective: No complaints this AM, no chest pain or shortness of breath. Comfortable, remains in sinus rhythm. Accompanied by daughter. Interval History: (History of pertinent interval events obtained from nursing staff) ROS: 
GEN:  No fever or chills Cardiovascular:  As noted above Pulmonary:  As noted above Neuro:  No new focal motor or sensory loss Objective:  
 
Vitals:  
 09/29/18 1952 09/29/18 2235 09/30/18 1690 09/30/18 1360 BP: 145/67 145/69 144/65 138/67 Pulse: 61 63 (!) 58 63 Resp: 18 18 18 18 Temp: 98.1 °F (36.7 °C) 98.5 °F (36.9 °C) 98.5 °F (36.9 °C) 98.7 °F (37.1 °C) SpO2: 92% 94% 94% 92% Weight:      
Height:      
 
 
Physical Exam: 
[place], Well Nourished, No Acute Distress, Alert & Oriented x 3, appropriate mood. Frail. Neck- supple, no JVD 
CV- regular rate and rhythm no MRG Lung- clear bilaterally Abd- soft, nontender, nondistended Ext- no edema bilaterally. Skin- warm and dry Current Facility-Administered Medications Medication Dose Route Frequency  aspirin chewable tablet 162 mg  162 mg Oral DAILY  amiodarone (CORDARONE) tablet 100 mg  100 mg Oral DAILY  potassium chloride (K-DUR, KLOR-CON) SR tablet 20 mEq  20 mEq Oral DAILY  potassium chloride (K-DUR, KLOR-CON) SR tablet 40 mEq  40 mEq Oral BID  tuberculin injection 5 Units  5 Units IntraDERMal ONCE  
 furosemide (LASIX) tablet 40 mg  40 mg Oral DAILY  carvedilol (COREG) tablet 6.25 mg  6.25 mg Oral BID WITH MEALS  polyethylene glycol (MIRALAX) packet 17 g  17 g Oral PRN  pantoprazole (PROTONIX) tablet 40 mg  40 mg Oral ACB  oxyCODONE IR (ROXICODONE) tablet 15 mg  15 mg Oral Q4H PRN  
 sodium chloride (NS) flush 5-10 mL  5-10 mL IntraVENous Q8H  
 sodium chloride (NS) flush 5-10 mL  5-10 mL IntraVENous PRN  
 acetaminophen (TYLENOL) tablet 650 mg  650 mg Oral Q4H PRN  
 ondansetron (ZOFRAN) injection 4 mg  4 mg IntraVENous Q4H PRN  
 magnesium hydroxide (MILK OF MAGNESIA) 400 mg/5 mL oral suspension 30 mL  30 mL Oral DAILY PRN  
 enoxaparin (LOVENOX) injection 30 mg  30 mg SubCUTAneous Q24H  
 influenza vaccine 2018-19 (6 mos+)(PF) (FLUARIX QUAD/FLULAVAL QUAD) injection 0.5 mL  0.5 mL IntraMUSCular PRIOR TO DISCHARGE  lisinopril (PRINIVIL, ZESTRIL) tablet 5 mg  5 mg Oral DAILY  spironolactone (ALDACTONE) tablet 25 mg  25 mg Oral DAILY Data Review:  
Recent Results (from the past 24 hour(s)) METABOLIC PANEL, BASIC Collection Time: 09/30/18  5:13 AM  
Result Value Ref Range Sodium 142 136 - 145 mmol/L Potassium 3.4 (L) 3.5 - 5.1 mmol/L Chloride 104 98 - 107 mmol/L  
 CO2 31 21 - 32 mmol/L Anion gap 7 7 - 16 mmol/L Glucose 88 65 - 100 mg/dL BUN 16 8 - 23 MG/DL Creatinine 0.91 0.6 - 1.0 MG/DL  
 GFR est AA >60 >60 ml/min/1.73m2 GFR est non-AA >60 >60 ml/min/1.73m2 Calcium 8.3 8.3 - 10.4 MG/DL MAGNESIUM Collection Time: 09/30/18  5:13 AM  
Result Value Ref Range Magnesium 2.0 1.8 - 2.4 mg/dL EKG:  (EKG has been independently visualized by me with interpretation below): NSR with FDAVB and LBBB with QRSd > 150 msec.

## 2018-09-30 NOTE — PROGRESS NOTES
[address] [facility] Face to Face Encounter Patients Name: Alvin Linder    YOB: 1924 Ordering Physician:Dr. Emy Gray Primary Diagnosis: Acute exacerbation of CHF (congestive heart failure) ([facility] Date of Face to Face:   9/30/2018 Face to Face Encounter findings are related to primary reason for home care:   yes. 1. I certify that the patient needs intermittent care as follows: skilled nursing care:  skilled observation/assessment, patient education 
physical therapy: strengthening 
occupational therapy:  ADL safety (ie. cooking, bathing, dressing) 2. I certify that this patient is homebound, that is: 1) patient requires the use of a walker device, special transportation, or assistance of another to leave the home; or 2) patient's condition makes leaving the home medically contraindicated; and 3) patient has a normal inability to leave the home and leaving the home requires considerable and taxing effort. Patient may leave the home for infrequent and short duration for medical reasons, and occasional absences for non-medical reasons. Homebound status is due to the following functional limitations: Patient with strength deficits limiting the performance of all ADL's without caregiver assistance or the use of an assistive device. 3. I certify that this patient is under my care and that I, or a nurse practitioner or  [id number], or clinical nurse specialist, or certified nurse midwife, working with me, had a Face-to-Face Encounter that meets the physician Face-to-Face Encounter requirements. The following are the clinical findings from the [address] [place] [address] encounter that support the need for skilled services and is a summary of the encounter: [facility] See [facility] summary Sharon Trevino 9/30/2018 THE FOLLOWING TO BE COMPLETED BY THE COMMUNITY PHYSICIAN: 
 
 I concur with the findings described above from the F2F encounter that this patient is homebound and in need of a skilled service. Certifying Physician: _____________________________________ Printed Certifying Physician Name: _____________________________________ Date: _________________

## 2018-09-30 NOTE — PROGRESS NOTES
Care Management Interventions PCP Verified by CM: Yes (Dr. Rudolph Branch) Mode of Transport at Discharge: Other (see comment) (Family) Transition of Care Consult (CM Consult): Discharge Planning, [facility]) [address]: Yes Physical Therapy Consult: Yes Occupational Therapy Consult: Yes Speech Therapy Consult: No 
Current Support Network: Own Home Confirm Follow Up Transport: Family Plan discussed with Pt/Family/Caregiver: Yes Freedom of Choice Offered: Yes [employer] Information Provided?: No 
Discharge Location Discharge Placement: Home with home health:  Patient being d/c home today with [facility] [place] and [address] [place] [address] will assist patient as well. Patient daughters are agreeable with the d/c plan and daughter will be transporting patient back home.

## 2018-09-30 NOTE — DISCHARGE SUMMARY
Please take all your medications as prescribed. Follow-up with [facility] Cardiology in one week. Return to the hospital if your symptoms with shortness of breath worsens.

## 2018-09-30 NOTE — PROGRESS NOTES
Problem: Falls - Risk of 
Goal: *Absence of Falls Document [place] Yosvany Fall Risk and appropriate interventions in the flowsheet. Outcome: Progressing Towards Goal 
Fall Risk Interventions: 
Mobility Interventions: Bed/chair exit alarm, Communicate number of staff needed for ambulation/transfer, Patient to call before getting OOB, Strengthening exercises (ROM-active/passive) Medication Interventions: Bed/chair exit alarm, Patient to call before getting OOB, Teach patient to arise slowly Elimination Interventions: Bed/chair exit alarm, Call light in reach, Patient to call for help with toileting needs

## 2018-09-30 NOTE — PROGRESS NOTES
Subjective Pt is a 79 y/o F with CHF who presented from home with worsening SOB. Hypoxic in ER. Was admitted with acute on chronic CHF. Cardiology consulted. Started on lasix 60mg IV BID. Pt states she is feeling much better today. Has been off oxygen last night. Is currently on RA. Temp:  [97.7 °F (36.5 °C)-98.8 °F (37.1 °C)] Pulse (Heart Rate):  Vikki Potts BP: (109-165)/(56-79) Resp Rate:  [16-20] O2 Sat (%):  [92 %-100 %] Weight:  [49.4 kg (109 lb)] 09/28 1901 - 09/30 0700 In: 535 [P.O.:535] Out: 1600 [Urine:1600] Objective: 
General Appearance:  Comfortable, well-appearing and in no acute distress. Vital signs: (most recent): Blood pressure 123/69, pulse (!) 55, temperature 98.1 °F (36.7 °C), resp. rate 19, height 5' 4\" (1.626 m), weight 49.4 kg (109 lb), SpO2 93 %. Lungs:  Normal effort. Breath sounds clear to auscultation. Heart: Normal rate. Regular rhythm. Extremities: Normal range of motion. Neurological: Patient is alert and oriented to person, place and time. Skin:  Warm. Abdomen: Abdomen is soft. Bowel sounds are normal.   There is no abdominal tenderness. Pulses: Distal pulses are intact. Principal Problem: 
  Systolic CHF, acute on chronic ([address] (9/28/2018) Active Problems: 
  CAD (coronary artery disease) (11/29/2015) Hypertension (12/1/2015) Acute respiratory failure with hypoxia and hypercapnia ([address] (7/29/2018) Atrial fibrillation ([address] (8/31/2018) Acute exacerbation of CHF (congestive heart failure) ([address] (9/28/2018) Assessment & Plan Acute Shortness of breath r/t CHF: Pt stable with RA 93%. Diuresis IV was given. Cardiology was consulted to see pt.

## 2018-09-30 NOTE — DISCHARGE INSTRUCTIONS
Please take all your medications as prescribed. Follow-up with [facility] Cardiology in one week. Return to the hospital if your symptoms with shortness of breath worsens. Heart Failure: Care Instructions  Your Care Instructions    Heart failure occurs when your heart does not pump as much blood as the body needs. Failure does not mean that the heart has stopped pumping but rather that it is not pumping as well as it should. Over time, this causes fluid buildup in your lungs and other parts of your body. Fluid buildup can cause shortness of breath, fatigue, swollen ankles, and other problems. By taking medicines regularly, reducing sodium (salt) in your diet, checking your weight every day, and making lifestyle changes, you can feel better and live longer. Follow-up care is a key part of your treatment and safety. Be sure to make and go to all appointments, and call your doctor if you are having problems. It's also a good idea to know your test results and keep a list of the medicines you take. How can you care for yourself at home? Medicines    · Be safe with medicines. Take your medicines exactly as prescribed. Call your doctor if you think you are having a problem with your medicine.     · Do not take any vitamins, over-the-counter medicine, or herbal products without talking to your doctor first. Stanley Lepeford not take ibuprofen (Advil or Motrin) and naproxen (Aleve) without talking to your doctor first. They could make your heart failure worse.     · You may be taking some of the following medicine. ¨ Beta-blockers can slow heart rate, decrease blood pressure, and improve your condition. Taking a beta-blocker may lower your chance of needing to be hospitalized. ¨ Angiotensin-converting enzyme inhibitors (ACEIs) reduce the heart's workload, lower blood pressure, and reduce swelling. Taking an ACEI may lower your chance of needing to be hospitalized again. ¨ Angiotensin II receptor blockers (ARBs) work like ACEIs. Your doctor may prescribe them instead of ACEIs. ¨ Diuretics, also called water pills, reduce swelling. ¨ Potassium supplements replace this important mineral, which is sometimes lost with diuretics. ¨ Aspirin and other blood thinners prevent blood clots, which can cause a stroke or heart attack.    You will get more details on the specific medicines your doctor prescribes. Diet    · Your doctor may suggest that you limit sodium to 2,000 milligrams (mg) a day or less. That is less than 1 teaspoon of salt a day, including all the salt you eat in cooking or in packaged foods. People get most of their sodium from processed foods. Fast food and restaurant meals also tend to be very high in sodium.     · Ask your doctor how much liquid you can drink each day. You may have to limit liquids.    Weight    · Weigh yourself without clothing at the same time each day. Record your weight. Call your doctor if you have a sudden weight gain, such as more than 2 to 3 pounds in a day or 5 pounds in a week. (Your doctor may suggest a different range of weight gain.) A sudden weight gain may mean that your heart failure is getting worse.    Activity level    · Start light exercise (if your doctor says it is okay). Even if you can only do a small amount, exercise will help you get stronger, have more energy, and manage your weight and your stress. Walking is an easy way to get exercise. Start out by walking a little more than you did before. Bit by bit, increase the amount you walk.     · When you exercise, watch for signs that your heart is working too hard. You are pushing yourself too hard if you cannot talk while you are exercising. If you become short of breath or dizzy or have chest pain, stop, sit down, and rest.     · If you feel \"wiped out\" the day after you exercise, walk slower or for a shorter distance until you can work up to a better pace.     · Get enough rest at night.  Sleeping with 1 or 2 pillows under your upper body and head may help you breathe easier.    Lifestyle changes    · Do not smoke. Smoking can make a heart condition worse. If you need help quitting, talk to your doctor about stop-smoking programs and medicines. These can increase your chances of quitting for good. Quitting smoking may be the most important step you can take to protect your heart.     · Limit alcohol to 2 drinks a day for men and 1 drink a day for women. Too much alcohol can cause health problems.     · Avoid getting sick from colds and the flu. Get a pneumococcal vaccine shot. If you have had one before, ask your doctor whether you need another dose. Get a flu shot each year. If you must be around people with colds or the flu, wash your hands often. When should you call for help? Call 911 if you have symptoms of sudden heart failure such as:    · You have severe trouble breathing.     · You cough up pink, foamy mucus.     · You have a new irregular or rapid heartbeat.    Call your doctor now or seek immediate medical care if:    · You have new or increased shortness of breath.     · You are dizzy or lightheaded, or you feel like you may faint.     · You have sudden weight gain, such as more than 2 to 3 pounds in a day or 5 pounds in a week. (Your doctor may suggest a different range of weight gain.)     · You have increased swelling in your legs, ankles, or feet.     · You are suddenly so tired or weak that you cannot do your usual activities.    Watch closely for changes in your health, and be sure to contact your doctor if you develop new symptoms. Where can you learn more? Go to http://dinora-ellyn.info/. Enter S630 in the search box to learn more about \"Heart Failure: Care Instructions. \"  Current as of: May 10, 2017  Content Version: 11.7  © 5834-2620 [employer]. Care instructions adapted under license by [employer] (which disclaims liability or warranty for this information).  If you have questions about a medical condition or this instruction, always ask your healthcare professional. [address] any warranty or liability for your use of this information. DISCHARGE SUMMARY from Nurse    PATIENT INSTRUCTIONS:    After general anesthesia or intravenous sedation, for 24 hours or while taking prescription Narcotics:  · Limit your activities  · Do not drive and operate hazardous machinery  · Do not make important personal or business decisions  · Do  not drink alcoholic beverages  · If you have not urinated within 8 hours after discharge, please contact your surgeon on call. Report the following to your surgeon:  · Excessive pain, swelling, redness or odor of or around the surgical area  · Temperature over 100.5  · Nausea and vomiting lasting longer than 4 hours or if unable to take medications  · Any signs of decreased circulation or nerve impairment to extremity: change in color, persistent  numbness, tingling, coldness or increase pain  · Any questions      *  Please give a list of your current medications to your Primary Care Provider. *  Please update this list whenever your medications are discontinued, doses are      changed, or new medications (including over-the-counter products) are added. *  Please carry medication information at all times in case of emergency situations. These are general instructions for a healthy lifestyle:    No smoking/ No tobacco products/ Avoid exposure to second hand smoke  Surgeon General's Warning:  Quitting smoking now greatly reduces serious risk to your health.     Obesity, smoking, and sedentary lifestyle greatly increases your risk for illness    A healthy diet, regular physical exercise & weight monitoring are important for maintaining a healthy lifestyle    You may be retaining fluid if you have a history of heart failure or if you experience any of the following symptoms:  Weight gain of 3 pounds or more overnight or 5 pounds in a week, increased swelling in our hands or feet or shortness of breath while lying flat in bed. Please call your doctor as soon as you notice any of these symptoms; do not wait until your next office visit. Recognize signs and symptoms of STROKE:    F-face looks uneven    A-arms unable to move or move unevenly    S-speech slurred or non-existent    T-time-call 911 as soon as signs and symptoms begin-DO NOT go       Back to bed or wait to see if you get better-TIME IS BRAIN. Warning Signs of HEART ATTACK     Call 911 if you have these symptoms:   Chest discomfort. Most heart attacks involve discomfort in the center of the chest that lasts more than a few minutes, or that goes away and comes back. It can feel like uncomfortable pressure, squeezing, fullness, or pain.  Discomfort in other areas of the upper body. Symptoms can include pain or discomfort in one or both arms, the back, neck, jaw, or stomach.  Shortness of breath with or without chest discomfort.  Other signs may include breaking out in a cold sweat, nausea, or lightheadedness. Don't wait more than five minutes to call 911 - MINUTES MATTER! Fast action can save your life. Calling 911 is almost always the fastest way to get lifesaving treatment. Emergency Medical Services staff can begin treatment when they arrive -- up to an hour sooner than if someone gets to the hospital by car. The discharge information has been reviewed with the patient. The patient verbalized understanding. Discharge medications reviewed with the patient and appropriate educational materials and side effects teaching were provided.   ___________________________________________________________________________________________________________________________________

## 2018-09-30 NOTE — PROGRESS NOTES
Problem: Mobility Impaired (Adult and Pediatric) Goal: *Acute Goals and Plan of Care (Insert Text) No goals. At baseline. Sherren Erps Culumovic, PT 
 
 
PHYSICAL THERAPY: Initial Assessment, Discharge 9/30/2018OBSERVATION: Hospital Day: 4 [place] / [place] / Product Type: Medicare /  
  
NAME/AGE/GENDER: Kandis Israel is a 80 y.o. female PRIMARY DIAGNOSIS: Acute exacerbation of CHF (congestive heart failure) (HCC) Systolic CHF, acute on chronic (HCC) Systolic CHF, acute on chronic ([facility] ICD-10: Treatment Diagnosis: · Difficulty in walking, Not elsewhere classified (R26.2) Precaution/Allergies: 
Amlodipine; Amoxicillin; and Tetracycline ASSESSMENT:  
Ms. Rosalva Brewer presents at baseline function O2 sat 94% prior to gait and 95% after gait in RA. NO SOB. Ms. Rosalva Brewer does not require any further skilled PT services. Left a walker in her room for her to walk with her daughter. RN notified. DC PT This section established at most recent assessment PROBLEM LIST (Impairments causing functional limitations): 1. none INTERVENTIONS PLANNED: (Benefits and precautions of physical therapy have been discussed with the patient.) 1. none TREATMENT PLAN: Frequency/Duration NoneRehabilitation Potential For Stated Goals: NA  
 
RECOMMENDED REHABILITATION/EQUIPMENT: (at time of discharge pending progress): Due to the probability of continued deficits (see above) this patient will not likely need continued skilled physical therapy after discharge. Equipment:  
? None at this time HISTORY:  
History of Present Injury/Illness (Reason for Referral): Kandis Israel is a 80 y.o. female with a past medical history of CHF ( type uncertain given hx) who presents to the ER with complaint of SOB since around lunchtime yesterday. She reports feeling much less short of breath now after receiving Lasix in the ER. She denies lower extremity swelling. She also admits to malaise. Denies chest pain, nausea, vomting, fevers, chills . Past Medical History/Comorbidities: Ms. Trip Hyde  has a past medical history of Thyroid cyst (Distant past). Ms. Trip Hyde  has a past surgical history that includes hx cholecystectomy; hx hysterectomy; and hx heent. Social History/Living Environment:  
Home Environment: Private residence One/Two Story Residence: One story Living Alone: No 
Support Systems: Family member(s) Patient Expects to be Discharged to[de-identified] Private residence Current DME Used/Available at Home: jose antonio [employer] Prior Level of Function/Work/Activity: 
Used rollator independently Got assist with morning ADLS 
age,   
Number of Personal Factors/Comorbidities that affect the Plan of Care: 1-2: MODERATE COMPLEXITY EXAMINATION:  
Most Recent Physical Functioning:  
Gross Assessment: 
AROM: Within functional limits Strength: Generally decreased, functional 
         
  
Posture: 
Posture (WDL): Within defined limits Balance: 
Sitting: Intact Standing: With support Standing - Static: Good Standing - Dynamic : Good Bed Mobility: 
Rolling: Independent Supine to Sit: Modified independent Wheelchair Mobility: 
  
Transfers: 
Sit to Stand: Modified independent Stand to Sit: Modified independent Gait: 
  
Distance (ft): 200 Feet (ft) Assistive Device: Walker, rolling Ambulation - Level of Assistance: Contact guard assistance;Supervision Interventions: Manual cues; Safety awareness training; Tactile cues; Verbal cues Body Structures Involved: 1. None Body Functions Affected: 1. None Activities and Participation Affected: 1. None Number of elements that affect the Plan of Care: 1-2: LOW COMPLEXITY CLINICAL PRESENTATION:  
Presentation: Stable and uncomplicated: LOW COMPLEXITY CLINICAL DECISION MAKING:  
[employer] AM-[employer]AC 6 Clicks Basic Mobility Inpatient Short Form How much difficulty does the patient currently have. ..  Unable A Lot A Little None 1. Turning over in bed (including adjusting bedclothes, sheets and blankets)? [] 1   [] 2   [] 3   [x] 4  
2. Sitting down on and standing up from a chair with arms ( e.g., wheelchair, bedside commode, etc.)   [] 1   [] 2   [x] 3   [] 4  
3. Moving from lying on back to sitting on the side of the bed? [] 1   [] 2   [] 3   [x] 4 How much help from another person does the patient currently need. .. Total A Lot A Little None 4. Moving to and from a bed to a chair (including a wheelchair)? [] 1   [] 2   [x] 3   [] 4  
5. Need to walk in hospital room? [] 1   [] 2   [x] 3   [] 4  
6. Climbing 3-5 steps with a railing? [] 1   [] 2   [x] 3   [] 4  
© 2007, Trustees of [address] [place] [address], under license to [employer]. All rights reserved Score:  Initial: 20 Most Recent: X (Date: -- ) Interpretation of Tool:  Represents activities that are increasingly more difficult (i.e. Bed mobility, Transfers, Gait). Score 24 23 22-20 19-15 14-10 9-7 6 Modifier CH CI CJ CK CL CM CN   
 
? Mobility - Walking and Moving Around:  
  - CURRENT STATUS: CJ - 20%-39% impaired, limited or restricted  - GOAL STATUS: CJ - 20%-39% impaired, limited or restricted  - D/C STATUS:  CJ - 20%-39% impaired, limited or restricted [place] / [place] / Product Type: Medicare /   
 
Medical Necessity:    
· NA. Reason for Services/Other Comments: 
· NA. Use of outcome tool(s) and clinical judgement create a POC that gives a: Clear prediction of patient's progress: LOW COMPLEXITY  
  
 
 
 
TREATMENT:  
(In addition to Assessment/Re-Assessment sessions the following treatments were rendered) Pre-treatment Symptoms/Complaints:  None except anxious to go home. Pain: Initial:  
Pain Intensity 1: 0  Post Session:  None. Assessment/Reassessment only, no treatment provided today Braces/Orthotics/Lines/Etc:  
· O2 Device: Room air Treatment/Session Assessment:   
· Response to Treatment:  good · Interdisciplinary Collaboration:  
o Registered Nurse · After treatment position/precautions:  
o Up in chair 
o Call light within reach 
o RN notified · Compliance with Program/Exercises:NA Recommendations/Intent for next treatment session:  DC Total Treatment Duration: PT Patient Time In/Time Out Time In: 1130 Time Out: 1155 Himanshu Stacy, PT

## 2018-09-30 NOTE — DISCHARGE SUMMARY
Pt has been cleared by Cardiologist to be discharged home. Pt is stable with shortness of breath improved. Had been off oxygen since last night, RA this am 93%. Sitting up in chair and resting quietly.

## 2018-09-30 NOTE — DISCHARGE SUMMARY
Hospitalist Discharge Summary     Patient ID:  Jovanny Hull  [id number]  34 y.o.  3/3/1924  Admit date: 9/27/2018 11:30 PM  Discharge date and time: 9/30/2018  Attending: No att. providers found  PCP:  Ren Jackson MD  Treatment Team: Consulting Provider: Last Cruz MD; Utilization Review: Roopa Cummings RN; Care Manager: Vanessa Lu RN; Charge Nurse: Milana Felipe RN    Principal Diagnosis Systolic CHF, acute on chronic [facility])   Principal Problem:    Systolic CHF, acute on chronic ([facility] (9/28/2018)    Active Problems:    CAD (coronary artery disease) (11/29/2015)      Hypertension (12/1/2015)      Acute respiratory failure with hypoxia and hypercapnia ([facility] (7/29/2018)      Atrial fibrillation ([facility] (8/31/2018)      Acute exacerbation of CHF (congestive heart failure) ([facility] (9/28/2018)         Hospital Course:  Please refer to the admission H&P for details of presentation. In summary, the patient was admitted for shortness of breath related to CHF and was given lasix 60 mg IV. Has been on oxygen 2L till 9/29/30 and was on room air since last night and this morning with O2 sats 93% today. Pt is feeling better today and denies any shortness of breath. Cardiology was consulted for pt and has cleared pt for discharge home with changes to Amiodarone 100 mg PO daily, 162 mg ASA PO daily and continue diuresis PO instead and to have follow-up with Cardiologist in 1 week.      Significant Diagnostic Studies:       Labs: Results:       Chemistry Recent Labs      09/30/18   0513  09/29/18   0546  09/28/18   0544  09/27/18   2232   GLU  88  101*  121*  176*   NA  142  141  143  139   K  3.4*  3.0*  3.1*  3.6   CL  104  102  106  104   CO2  31  31  27  27   BUN  16  15  10  9   CREA  0.91  0.87  0.91  0.89   CA  8.3  8.3  8.2*  8.4   AGAP  7  8  10  8   AP   --    --    --   78   TP   --    --    --   7.2   ALB   --    --    --   3.5   GLOB   --    --    --   3.7*   AGRAT   --    --    --   0.9*      CBC w/Diff Recent Labs      09/29/18   0546  09/28/18   0544  09/27/18   2232   WBC  7.8  8.9  13.0*   RBC  3.23*  3.15*  3.43*   HGB  10.4*  10.1*  11.0*   HCT  31.6*  32.2*  34.1*   PLT  195  207  239   GRANS  72  75  84*   LYMPH  13  15  8*   EOS  3  0*  1      Cardiac Enzymes No results for input(s): CPK, CKND1, YASH in the last 72 hours. No lab exists for component: CKRMB, TROIP   Coagulation No results for input(s): PTP, INR, APTT in the last 72 hours. No lab exists for component: INREXT    Lipid Panel Lab Results   Component Value Date/Time    Cholesterol, total 151 11/21/2008 05:07 AM    HDL Cholesterol 38 (L) 11/21/2008 05:07 AM    LDL, calculated 82.6 11/21/2008 05:07 AM    VLDL, calculated 30.4 (H) 11/21/2008 05:07 AM    Triglyceride 152 (H) 11/21/2008 05:07 AM    CHOL/HDL Ratio 4.0 11/21/2008 05:07 AM      BNP No results for input(s): BNPP in the last 72 hours. Liver Enzymes Recent Labs      09/27/18   2232   TP  7.2   ALB  3.5   AP  78   SGOT  32      Thyroid Studies Lab Results   Component Value Date/Time    T4, Total 7.5 07/28/2018 03:16 AM    TSH 0.275 (L) 08/30/2018 01:30 AM            Discharge Exam:  Visit Vitals    /62 (BP 1 Location: Left arm, BP Patient Position: At rest)    Pulse 83    Temp 97.9 °F (36.6 °C)    Resp 19    Ht 5' 4\" (1.626 m)    Wt 49.4 kg (109 lb)    SpO2 98%    BMI 18.71 kg/m2     General appearance: alert, cooperative, no distress, appears stated age  Lungs: clear to auscultation bilaterally  Heart: regular rate and rhythm, S1, S2 normal, no murmur, click, rub or gallop  Abdomen: soft, non-tender. Bowel sounds normal. No masses,  no organomegaly  Extremities: no cyanosis or edema  Neurologic: Grossly normal    Disposition: stable  Discharge Condition: stable  Patient Instructions:   Discharge Medication List as of 9/30/2018  3:03 PM      START taking these medications    Details   furosemide (LASIX) 40 mg tablet Take 1 Tab by mouth daily. , Normal, Disp-30 Tab, R-0 CONTINUE these medications which have CHANGED    Details   amiodarone (PACERONE) 100 mg tablet Take 1 Tab by mouth daily. , Normal, Disp-30 Tab, R-0      aspirin 81 mg chewable tablet Take 2 Tabs by mouth daily. , Normal, Disp-60 Tab, R-0         CONTINUE these medications which have NOT CHANGED    Details   carvedilol (COREG) 6.25 mg tablet Take 1 Tab by mouth two (2) times daily (with meals) for 30 days. , Print, Disp-60 Tab, R-0      clopidogrel (PLAVIX) 75 mg tab Take 1 Tab by mouth daily for 30 days. , Print, Disp-30 Tab, R-0      lisinopril (PRINIVIL, ZESTRIL) 2.5 mg tablet Take 1 Tab by mouth daily for 30 days. , Print, Disp-30 Tab, R-0      nitroglycerin (NITRODUR) 0.4 mg/hr 1 Patch by TransDERmal route daily. , Historical Med      polyethylene glycol (MIRALAX) 17 gram/dose powder Take 17 g by mouth as needed (constipation). , Historical Med      pantoprazole (PROTONIX) 40 mg tablet Take 1 Tab by mouth Daily (before breakfast). , Print, Disp-30 Tab, R-0      cholecalciferol, vitamin D3, (VITAMIN D3) 2,000 unit tab Take 1,000 Int'l Units by mouth daily. , Historical Med      oxyCODONE IR (OXY-IR) 15 mg immediate release tablet Take 15 mg by mouth every four (4) hours as needed for Pain., Historical Med      pyridoxine (VITAMIN B-6) 100 mg tablet Take 100 mg by mouth daily. Takes at [facility], Historical Med             Activity: as tolerated. Diet: DIET CARDIAC Regular    Follow-up      Follow-up Information     Follow up With Details Pamella Oviedo an appointment as soon as possible for a visit  within 1 week of Discharge.  Navigator will call you on Monday Address: [address], [address]  Hours: Closed · [place]  Phone: [phone]    Danna Ansari MD   [facility] [address]  [phone]      [facility] CARDIOLOGY [place] OFFICE Go in 1 week Call Monday to make an appointment to be seen in 1 week [address] [place] [address] [facility] [address] [phone]            Time spent to discharge patient greater than 30 minutes  Signed:  Palmira Parra NP  9/30/2018  4:19 PM

## 2018-09-30 NOTE — PROGRESS NOTES
Discharge paperwork reviewed with the patient and her daughter. Both verbalize understanding of medications, instructions and follow up appointments. IV DC'd with cath tip intact. Remote telemetry  Box removed and placed in bin at nurses station. Staff notified that instructions are complete and they will call when ready for a w/c.

## 2018-10-02 ENCOUNTER — HOME CARE VISIT (OUTPATIENT)
Dept: HOME HEALTH SERVICES | Facility: HOME HEALTH | Age: 83
End: 2018-10-02

## 2018-10-03 ENCOUNTER — PATIENT OUTREACH (OUTPATIENT)
Dept: RESPIRATORY THERAPY | Age: 83
End: 2018-10-03

## 2018-10-03 VITALS
BODY MASS INDEX: 17.92 KG/M2 | WEIGHT: 104.4 LBS | RESPIRATION RATE: 16 BRPM | OXYGEN SATURATION: 97 % | DIASTOLIC BLOOD PRESSURE: 44 MMHG | SYSTOLIC BLOOD PRESSURE: 102 MMHG | TEMPERATURE: 97.9 F | HEART RATE: 52 BPM

## 2018-10-03 NOTE — PROGRESS NOTES
Home Visit #1- Initial Home Visit Health  arrived at residence to find patient sitting in her easy chair. Patient is alert and oriented in no acute distress. There is no pedal edema. Daughter is available for visit. Patient is very hard of hearing. Patient will follow this patient with in home visit next week. Lung Sounds: Lungs are clear and equal bilaterally. Weight: 104.4 with no change today B/P 102/44 RA sitting. Patient has been marginally hypotensive since hospitalization. DME: Patient has no DME equipment. Follow Up Appointments:  
10/04   1100   Dr Pb Chawla  Cardiologist 
 
Transportation: Family. Explained [facility] transportation benefit. Medications: All on hand and patient is compliant. Daughters manage the patients pill-minder. Education: CHF action plan explained with emphasis on diet, fluid restrictions, trigger avoidance and early interventions for problems. Safety Concerns: None Patient/Family Concerns: Hypotension- To discuss with Dr Pb Chawla at visit. Tasks: None Physical Assessment completed and noted on CHF assessment Form. Help line discussed. Will follow up with pt in one week via home visit. End of Visit. Salma Mitchell, Paramedic Health

## 2018-10-08 ENCOUNTER — PATIENT OUTREACH (OUTPATIENT)
Dept: RESPIRATORY THERAPY | Age: 83
End: 2018-10-08

## 2018-10-08 VITALS
WEIGHT: 107.2 LBS | BODY MASS INDEX: 18.4 KG/M2 | HEART RATE: 53 BPM | RESPIRATION RATE: 16 BRPM | OXYGEN SATURATION: 94 % | TEMPERATURE: 98.5 F | DIASTOLIC BLOOD PRESSURE: 44 MMHG | SYSTOLIC BLOOD PRESSURE: 104 MMHG

## 2018-10-08 NOTE — PROGRESS NOTES
Home Visit #2 Health  arrived at residence to find patient ambulating with her walker to chair. Patient in no distress and is alert and oriented per her norm. Saw Dr. Obdulio Love Friday and he changed patients medicine regimen, requested more frequent HC visits and orthostatic BP's. HC will follow this patient with visits twice weekly. Lung Sounds: Clear Weight 107.2  Increase of 2.8 lbs over 5 days. B/P 110/48 sitting   104/44 standing DME: No issues Follow Up Appointments: None this week Transportation: Family Medications: All medicines are on hand and patient is compliant. Dr. Obdulio Love has ordered Lasix 40 mg every other day and Lasix 20 mg on the alternate days. Also, he has started patient on 10 meq Potassium supplement. Education: Reviewed CHF action plan reviewed with medicine regimen, daily weights and fluid restrictions. Safety Concerns: None Patient/Family Concerns: None Tasks: Orthostatic BP's Physical Assessment completed and noted on CHF assessment Form. Help line discussed. Will follow up with pt in one week via home visit. End of Visit. Melvin Anthony, Paramedic Health

## 2018-10-11 ENCOUNTER — PATIENT OUTREACH (OUTPATIENT)
Dept: RESPIRATORY THERAPY | Age: 83
End: 2018-10-11

## 2018-10-11 VITALS
WEIGHT: 107.4 LBS | BODY MASS INDEX: 18.44 KG/M2 | OXYGEN SATURATION: 95 % | TEMPERATURE: 99.1 F | DIASTOLIC BLOOD PRESSURE: 44 MMHG | SYSTOLIC BLOOD PRESSURE: 102 MMHG | RESPIRATION RATE: 16 BRPM | HEART RATE: 54 BPM

## 2018-10-11 NOTE — PROGRESS NOTES
Home Visit # 3 Health  arrived at residence to find patient resting in her easy chair alert and oriented in no distress. There is no pedal edema noted and patients weight is down with no complaint of shortness of breath. Daughter is at visit and reports that weights crested on the 8th at 108.6 lbs and have been declining since. HC will continue to follow with twice weekly visits with next visit being Monday Oct. 15th. Lung Sounds: Clear to all fields Weight: 107.4 lb B/P 118/44  RA Sitting  102/44  RA Standing DME: None Follow Up Appointments:  
 
Transportation: Family Medications: Confirmed Lasix regimen is 40 mg Monday, Wednesday, & Friday's with 20 mg Sunday, Tuesday, Thursday and Saturdays. All other meds on hand and patient is compliant. Education: Reviewed CHF action plan and fluid challenges (Neither too much or too little). Safety Concerns: None Patient/Family Concerns: None Tasks: Ortho static BP's Physical Assessment completed and noted on CHF assessment Form. Help line discussed. Will follow up with pt in one week via home visit. End of Visit. Bryn Ramsey, Paramedic Health

## 2018-10-15 ENCOUNTER — PATIENT OUTREACH (OUTPATIENT)
Dept: RESPIRATORY THERAPY | Age: 83
End: 2018-10-15

## 2018-10-15 VITALS
WEIGHT: 107.6 LBS | DIASTOLIC BLOOD PRESSURE: 40 MMHG | HEART RATE: 55 BPM | OXYGEN SATURATION: 100 % | BODY MASS INDEX: 18.47 KG/M2 | TEMPERATURE: 98.3 F | SYSTOLIC BLOOD PRESSURE: 104 MMHG | RESPIRATION RATE: 16 BRPM

## 2018-10-15 NOTE — PROGRESS NOTES
Home Visit #4 Health  arrived at residence to find patient sitting in her recliner alert and oriented in no acute distress. Patient felt well enough to attend both services at [facility] yesterday. Patient denies cough and there is no noted edema. Patient states that her abdomen is comfortable. HC will follow this patient on Thursday for follow up. Lung Sounds: Lung sounds are clear to all quadrants. Weight:  107.6 lb 0.2 lb increase from yesterday. Weights have been all in 107 lb. Range. B/P 112/40 RA sitting and 104/40 RA Standing DME: None Follow Up Appointments: Patient has no appointments this week. Transportation: Family Medications: All on hand and patient is compliant. Daughters manage medicines. Ike Mackey confirms they are giving Lasix 40 mg every other day and 20 mg on alternate days. Education: Reviewed CHF action plan and discussed early interventions for any problems. Safety Concerns: None Patient/Family Concerns: None Tasks: Send Dr Corinne Dyer orthostatic BP's. Physical Assessment completed and noted on CHF assessment Form. Help line discussed. Will follow up with pt in one week via home visit. End of Visit. Sandra Hall, Paramedic Health

## 2018-10-18 ENCOUNTER — PATIENT OUTREACH (OUTPATIENT)
Dept: RESPIRATORY THERAPY | Age: 83
End: 2018-10-18

## 2018-10-18 VITALS
OXYGEN SATURATION: 98 % | BODY MASS INDEX: 18.71 KG/M2 | DIASTOLIC BLOOD PRESSURE: 42 MMHG | HEART RATE: 56 BPM | TEMPERATURE: 98.3 F | WEIGHT: 109 LBS | RESPIRATION RATE: 16 BRPM | SYSTOLIC BLOOD PRESSURE: 102 MMHG

## 2018-10-18 NOTE — PROGRESS NOTES
Home Visit # 4 Health  arrived at residence to find patient sitting comfortably in her lift chair. Patient is alert and oriented without complaint. Denies cough shortness of breath or other pertinent negative. Hearing impairment is an issue to questioning the patient. Daughter is at visit. HC will follow again with Monday and Thursday visits next week. Lung Sounds: Clear Weight: 109 lbs. This a .4 lb increase for the day and <2 lb gain since Monday. Edema/ Abd. Distention : No pedal or abdominal edema noted. B/P 112/44 RA Sitting  102/42  RA Standing DME:None Follow Up Appointments: None before next visit Transportation: Family Medications: No changes and patient is compliant. Daughters manage meds Education: Reviewed CHF action plan with emphasis on fluid limits and early intervention. Safety Concerns: None Patient/Family Concerns: None Tasks: Share with Dr. Dane Dunham orthostatic BP's Physical Assessment completed and noted on CHF assessment Form. Help line discussed. Will follow up with pt in one week via home visit. End of Visit. Lance Zeng, Paramedic Health

## 2018-10-22 ENCOUNTER — PATIENT OUTREACH (OUTPATIENT)
Dept: RESPIRATORY THERAPY | Age: 83
End: 2018-10-22

## 2018-10-22 VITALS
TEMPERATURE: 98.4 F | OXYGEN SATURATION: 98 % | DIASTOLIC BLOOD PRESSURE: 40 MMHG | WEIGHT: 108 LBS | BODY MASS INDEX: 18.54 KG/M2 | RESPIRATION RATE: 16 BRPM | SYSTOLIC BLOOD PRESSURE: 106 MMHG | HEART RATE: 58 BPM

## 2018-10-22 NOTE — PROGRESS NOTES
Home Visit #6 Health  arrived at residence to find patient resting in her lift chair alert and oriented in no acute distress. Patient states that she feels well this morning and had a good day yesterday as she attended both morning and evening services Sunday. Daughter to manage her care well. HC will follow again on Thursday with in home visit. Lung Sounds: Equal and clear bilaterally. Weight: 108 lbs Up 1.6 from Saturday but weights have been within two pounds of 107 lb all week. Edema: There is no pedal edema noted and patient denies abdominal distention. Patient did complain of \"Bloated\" feeling on Friday. With relief coming in the evening. Daughters gave an extra 20 mg Lasix on Friday after contacting CHF Navigator [employer]. B/P 114/40  RA Sitting and 106/40 RA Standing Follow Up Appointments:  
10/26  TBD  Dr Pro Munoz for labs only 11/01   1400 Dr Ramo Maya  Cardiology Transportation: Daughters to provide Medications: All medicines are on hand and patient is compliant with daughters managing and stocking her pill minders. Education: Reviewed CHF action plan with emphasis on early interventions and discussed fluid restrictions and intake, DME: None Safety Concerns: None Patient/Family Concerns: None Tasks:none Physical Assessment completed and noted on CHF assessment Form. Help line discussed. Will follow up with pt in one week via home visit. End of Visit. Mitzi Simms, Paramedic Health

## 2018-10-25 ENCOUNTER — PATIENT OUTREACH (OUTPATIENT)
Dept: RESPIRATORY THERAPY | Age: 83
End: 2018-10-25

## 2018-10-25 VITALS
DIASTOLIC BLOOD PRESSURE: 60 MMHG | TEMPERATURE: 97.8 F | WEIGHT: 108.4 LBS | HEART RATE: 58 BPM | BODY MASS INDEX: 18.61 KG/M2 | SYSTOLIC BLOOD PRESSURE: 148 MMHG | RESPIRATION RATE: 20 BRPM | OXYGEN SATURATION: 95 %

## 2018-10-25 NOTE — PROGRESS NOTES
Home Visit # 7 Health  arrived at residence to find sitting in her lift chair. Patient is in some mild to moderate distress this morning. Patient woke around 5:30 am feeling somewhat weak and with increased shortness of breath and per the patients daughter SP02 of 88% on room air, heart rate in the 80's, and BP >611 systolic and 90 dystolic. Patient is normally borderline hypotensive and heart rate in the 50's. Daughter relays that patient was very pale but not diaphoretic this morning. Upon [facility] arrival patient was noted to be pale and stated that she had some shortness of breath still. She was pale but not diaphoretic. There is little to no pedal edema. Abdomen feels tight and bloated per the patient. Weight down today. SP02 95%, BP normal (elevated for this patient) HR 58. Encouraged patient to perform Pursed lip breathing technique with relief of SOB. Color improved shortly afterward. The patient is on a every other day alternating dose of Lasix wit 40 mg and 20 mg. Yesterday was scheduled as a 20 mg dose day. The patients daughter advised that weight was increased yesterday by 1 lb and the abdomen was swollen. A 40 mg Lasix was given, and a 40 mg dose was given this morning. Patient has received 40 mg for 3 consecutive days with no noted increase in urine output. Patient has been drinking approximately 1.5 to 2 liters of fluid daily. HC contacted CHF Navigator Janak York RN who advised to continue with visits with PCP today and restrict fluid to 1L today and tomorrow. Resume normal Lasix administration schedule and to maintain appointment with Dr Ravindra Hernandes. HC will follow again with in home visit Monday morning. Lung Sounds: Clear and equal bilaterally Weight: 108.6 down . 6 lb from previous day. Patient has had an ebb and flow of 2 lbs during this week. Edema:  No abnormal pedal edema noted but abdomen is distended with patient stating that she feels bloated. B/P 148/60 RA Sitting and 132/56 RA Standing Follow Up Appointments:  
10/25  1000 Dr Jerardo Leon for labs. Patient to review symptoms with Dr Erlinda Jacobson 11/01  [facility]  Dr Nino Butterfield Transportation: Family Medications: All on hand and patient is compliant Education: Reviewed CHF action plan with patient and daughter. Discussed diet and fluid restrictions. DME: No Issues Safety Concerns: None Patient/Family Concerns: Weakness and SOB Tasks: Contacted CHF Navigator for directions as noted above. Physical Assessment completed and noted on CHF assessment Form. Help line discussed. Will follow up with pt in one week via home visit. End of Visit. Vinny Julian Paramedic Health

## 2018-10-29 ENCOUNTER — PATIENT OUTREACH (OUTPATIENT)
Dept: RESPIRATORY THERAPY | Age: 83
End: 2018-10-29

## 2018-10-29 VITALS
RESPIRATION RATE: 16 BRPM | OXYGEN SATURATION: 96 % | TEMPERATURE: 98.4 F | DIASTOLIC BLOOD PRESSURE: 58 MMHG | WEIGHT: 107.2 LBS | HEART RATE: 57 BPM | SYSTOLIC BLOOD PRESSURE: 126 MMHG | BODY MASS INDEX: 18.4 KG/M2

## 2018-10-29 NOTE — PROGRESS NOTES
Home Visit #7 Health  arrived at residence to find patient ambulating about the home in no distress. Patient is alert and oriented per her norm. Daughter with patient. patient was seen Thursday by PCP who believed that her episode of shortness of breath with elevation of pulse and BP was related to her back pain. The patient has no complaint of cough dyspnea, or mucus production. HC will see this patient again in home next week. Lung Sounds: Clear and equal bilaterally Weight: 107.2  <1 lb increase from yesterday. Edema: None noted to lower extremities and abdomen is soft non-tender. B/P 126/58 RA Sitting Follow Up Appointments:  
11/01  1000   Dr. Nidia Colby Transportation: Family Medications:On Saturday 10/27 patient was noted to again have had >2 lb overnight weight gain. Daughters contacted Dr. Nidia Colby who advised to give the patient an extra 40 mg Lasix and Sunday her weight was back to her baseline. All other medicines are on hand and patient is compliant with daughters managing her medicines. Education: Reviewed CHF action plan with emphasis noted to fluid restrictions and early interventions. DME: No Issues Safety Concerns: None Patient/Family Concerns: None Tasks: None Physical Assessment completed and noted on CHF assessment Form. Help line discussed. Will follow up with pt in one week via home visit. End of Visit. Mila Shepard Paramedic Health

## 2018-11-05 ENCOUNTER — PATIENT OUTREACH (OUTPATIENT)
Dept: RESPIRATORY THERAPY | Age: 83
End: 2018-11-05

## 2018-11-05 VITALS
DIASTOLIC BLOOD PRESSURE: 52 MMHG | HEART RATE: 56 BPM | OXYGEN SATURATION: 97 % | BODY MASS INDEX: 18.54 KG/M2 | SYSTOLIC BLOOD PRESSURE: 124 MMHG | RESPIRATION RATE: 16 BRPM | WEIGHT: 108 LBS | TEMPERATURE: 98.7 F

## 2019-03-02 ENCOUNTER — TELEPHONE (OUTPATIENT)
Dept: CARDIOLOGY | Age: 84
End: 2019-03-02

## 2019-03-02 ENCOUNTER — APPOINTMENT (OUTPATIENT)
Dept: GENERAL RADIOLOGY | Age: 84
DRG: 308 | End: 2019-03-02
Attending: NURSE PRACTITIONER
Payer: MEDICARE

## 2019-03-02 ENCOUNTER — APPOINTMENT (OUTPATIENT)
Dept: GENERAL RADIOLOGY | Age: 84
DRG: 308 | End: 2019-03-02
Attending: EMERGENCY MEDICINE
Payer: MEDICARE

## 2019-03-02 ENCOUNTER — HOSPITAL ENCOUNTER (INPATIENT)
Age: 84
LOS: 6 days | Discharge: HOME | DRG: 308 | End: 2019-03-08
Attending: EMERGENCY MEDICINE | Admitting: INTERNAL MEDICINE
Payer: MEDICARE

## 2019-03-02 DIAGNOSIS — R91.8 PULMONARY INFILTRATES: ICD-10-CM

## 2019-03-02 DIAGNOSIS — I10 ESSENTIAL HYPERTENSION: Chronic | ICD-10-CM

## 2019-03-02 DIAGNOSIS — I44.2 THIRD DEGREE HEART BLOCK (HCC): Primary | ICD-10-CM

## 2019-03-02 DIAGNOSIS — I48.19 PERSISTENT ATRIAL FIBRILLATION (HCC): Chronic | ICD-10-CM

## 2019-03-02 DIAGNOSIS — I44.2 COMPLETE HEART BLOCK (HCC): ICD-10-CM

## 2019-03-02 DIAGNOSIS — J96.01 ACUTE HYPOXEMIC RESPIRATORY FAILURE (HCC): ICD-10-CM

## 2019-03-02 DIAGNOSIS — I50.23 SYSTOLIC CHF, ACUTE ON CHRONIC (HCC): Chronic | ICD-10-CM

## 2019-03-02 PROBLEM — M48.00 SPINAL STENOSIS: Chronic | Status: ACTIVE | Noted: 2019-03-02

## 2019-03-02 PROBLEM — K21.9 GERD (GASTROESOPHAGEAL REFLUX DISEASE): Chronic | Status: ACTIVE | Noted: 2019-03-02

## 2019-03-02 LAB
ALBUMIN SERPL-MCNC: 3 G/DL (ref 3.2–4.6)
ALBUMIN/GLOB SERPL: 0.9 {RATIO} (ref 1.2–3.5)
ALP SERPL-CCNC: 74 U/L (ref 50–136)
ALT SERPL-CCNC: 29 U/L (ref 12–65)
ANION GAP SERPL CALC-SCNC: 8 MMOL/L (ref 7–16)
AST SERPL-CCNC: 16 U/L (ref 15–37)
BASOPHILS # BLD: 0.1 K/UL (ref 0–0.2)
BASOPHILS NFR BLD: 1 % (ref 0–2)
BILIRUB SERPL-MCNC: 0.3 MG/DL (ref 0.2–1.1)
BUN SERPL-MCNC: 20 MG/DL (ref 8–23)
CALCIUM SERPL-MCNC: 7.9 MG/DL (ref 8.3–10.4)
CHLORIDE SERPL-SCNC: 106 MMOL/L (ref 98–107)
CO2 SERPL-SCNC: 24 MMOL/L (ref 21–32)
CREAT SERPL-MCNC: 1.19 MG/DL (ref 0.6–1)
DIFFERENTIAL METHOD BLD: ABNORMAL
EOSINOPHIL # BLD: 0.2 K/UL (ref 0–0.8)
EOSINOPHIL NFR BLD: 3 % (ref 0.5–7.8)
ERYTHROCYTE [DISTWIDTH] IN BLOOD BY AUTOMATED COUNT: 13.3 % (ref 11.9–14.6)
GLOBULIN SER CALC-MCNC: 3.2 G/DL (ref 2.3–3.5)
GLUCOSE SERPL-MCNC: 157 MG/DL (ref 65–100)
HCT VFR BLD AUTO: 32.2 % (ref 35.8–46.3)
HGB BLD-MCNC: 10.1 G/DL (ref 11.7–15.4)
IMM GRANULOCYTES # BLD AUTO: 0 K/UL (ref 0–0.5)
IMM GRANULOCYTES NFR BLD AUTO: 1 % (ref 0–5)
LYMPHOCYTES # BLD: 1.1 K/UL (ref 0.5–4.6)
LYMPHOCYTES NFR BLD: 15 % (ref 13–44)
MAGNESIUM SERPL-MCNC: 2 MG/DL (ref 1.8–2.4)
MCH RBC QN AUTO: 31.6 PG (ref 26.1–32.9)
MCHC RBC AUTO-ENTMCNC: 31.4 G/DL (ref 31.4–35)
MCV RBC AUTO: 100.6 FL (ref 79.6–97.8)
MONOCYTES # BLD: 0.8 K/UL (ref 0.1–1.3)
MONOCYTES NFR BLD: 11 % (ref 4–12)
NEUTS SEG # BLD: 5.4 K/UL (ref 1.7–8.2)
NEUTS SEG NFR BLD: 71 % (ref 43–78)
NRBC # BLD: 0 K/UL (ref 0–0.2)
PLATELET # BLD AUTO: 213 K/UL (ref 150–450)
PMV BLD AUTO: 10 FL (ref 9.4–12.3)
POTASSIUM SERPL-SCNC: 3.9 MMOL/L (ref 3.5–5.1)
PROT SERPL-MCNC: 6.2 G/DL (ref 6.3–8.2)
RBC # BLD AUTO: 3.2 M/UL (ref 4.05–5.2)
SODIUM SERPL-SCNC: 138 MMOL/L (ref 136–145)
TROPONIN I BLD-MCNC: 0.03 NG/ML (ref 0.02–0.05)
TSH SERPL DL<=0.005 MIU/L-ACNC: 0.01 UIU/ML (ref 0.36–3.74)
WBC # BLD AUTO: 7.7 K/UL (ref 4.3–11.1)

## 2019-03-02 PROCEDURE — 74011250636 HC RX REV CODE- 250/636

## 2019-03-02 PROCEDURE — 77030002888 HC SUT CHRMC J&J -A

## 2019-03-02 PROCEDURE — 77030013687 HC GD NDL BARD -B

## 2019-03-02 PROCEDURE — 65610000001 HC ROOM ICU GENERAL

## 2019-03-02 PROCEDURE — 5A1223Z PERFORMANCE OF CARDIAC PACING, CONTINUOUS: ICD-10-PCS | Performed by: INTERNAL MEDICINE

## 2019-03-02 PROCEDURE — 74011250636 HC RX REV CODE- 250/636: Performed by: INTERNAL MEDICINE

## 2019-03-02 PROCEDURE — 92953 TEMPORARY EXTERNAL PACING: CPT | Performed by: EMERGENCY MEDICINE

## 2019-03-02 PROCEDURE — L1830 KO IMMOB CANVAS LONG PRE OTS: HCPCS

## 2019-03-02 PROCEDURE — 77030005318 HC CATH ELECTRD PACE TMP BARD -C

## 2019-03-02 PROCEDURE — 99152 MOD SED SAME PHYS/QHP 5/>YRS: CPT

## 2019-03-02 PROCEDURE — 84443 ASSAY THYROID STIM HORMONE: CPT

## 2019-03-02 PROCEDURE — 99285 EMERGENCY DEPT VISIT HI MDM: CPT | Performed by: EMERGENCY MEDICINE

## 2019-03-02 PROCEDURE — 80053 COMPREHEN METABOLIC PANEL: CPT

## 2019-03-02 PROCEDURE — 84484 ASSAY OF TROPONIN QUANT: CPT

## 2019-03-02 PROCEDURE — 99153 MOD SED SAME PHYS/QHP EA: CPT

## 2019-03-02 PROCEDURE — 96375 TX/PRO/DX INJ NEW DRUG ADDON: CPT | Performed by: EMERGENCY MEDICINE

## 2019-03-02 PROCEDURE — 77030019605

## 2019-03-02 PROCEDURE — 83735 ASSAY OF MAGNESIUM: CPT

## 2019-03-02 PROCEDURE — 74011250636 HC RX REV CODE- 250/636: Performed by: NURSE PRACTITIONER

## 2019-03-02 PROCEDURE — 96374 THER/PROPH/DIAG INJ IV PUSH: CPT | Performed by: EMERGENCY MEDICINE

## 2019-03-02 PROCEDURE — C1894 INTRO/SHEATH, NON-LASER: HCPCS

## 2019-03-02 PROCEDURE — 93005 ELECTROCARDIOGRAM TRACING: CPT | Performed by: EMERGENCY MEDICINE

## 2019-03-02 PROCEDURE — 33210 INSERT ELECTRD/PM CATH SNGL: CPT

## 2019-03-02 PROCEDURE — 71045 X-RAY EXAM CHEST 1 VIEW: CPT

## 2019-03-02 PROCEDURE — 77030034850

## 2019-03-02 PROCEDURE — 85025 COMPLETE CBC W/AUTO DIFF WBC: CPT

## 2019-03-02 RX ORDER — MORPHINE SULFATE 2 MG/ML
INJECTION, SOLUTION INTRAMUSCULAR; INTRAVENOUS
Status: COMPLETED
Start: 2019-03-02 | End: 2019-03-02

## 2019-03-02 RX ORDER — ONDANSETRON 2 MG/ML
INJECTION INTRAMUSCULAR; INTRAVENOUS
Status: COMPLETED
Start: 2019-03-02 | End: 2019-03-02

## 2019-03-02 RX ORDER — ONDANSETRON 2 MG/ML
4 INJECTION INTRAMUSCULAR; INTRAVENOUS
Status: DISCONTINUED | OUTPATIENT
Start: 2019-03-02 | End: 2019-03-08 | Stop reason: HOSPADM

## 2019-03-02 RX ORDER — LORAZEPAM 2 MG/ML
0.5 INJECTION INTRAMUSCULAR
Status: DISCONTINUED | OUTPATIENT
Start: 2019-03-02 | End: 2019-03-02

## 2019-03-02 RX ORDER — NALOXONE HYDROCHLORIDE 0.4 MG/ML
0.4 INJECTION, SOLUTION INTRAMUSCULAR; INTRAVENOUS; SUBCUTANEOUS AS NEEDED
Status: DISCONTINUED | OUTPATIENT
Start: 2019-03-02 | End: 2019-03-08 | Stop reason: HOSPADM

## 2019-03-02 RX ORDER — MORPHINE SULFATE 2 MG/ML
2 INJECTION, SOLUTION INTRAMUSCULAR; INTRAVENOUS
Status: COMPLETED | OUTPATIENT
Start: 2019-03-02 | End: 2019-03-02

## 2019-03-02 RX ORDER — OXYCODONE HYDROCHLORIDE 15 MG/1
15 TABLET ORAL
Status: DISCONTINUED | OUTPATIENT
Start: 2019-03-02 | End: 2019-03-08 | Stop reason: HOSPADM

## 2019-03-02 RX ORDER — NITROGLYCERIN 0.4 MG/1
0.4 TABLET SUBLINGUAL
Status: DISCONTINUED | OUTPATIENT
Start: 2019-03-02 | End: 2019-03-08 | Stop reason: HOSPADM

## 2019-03-02 RX ORDER — ATROPINE SULFATE 0.1 MG/ML
0.5 INJECTION INTRAVENOUS
Status: COMPLETED | OUTPATIENT
Start: 2019-03-02 | End: 2019-03-02

## 2019-03-02 RX ORDER — PANTOPRAZOLE SODIUM 40 MG/1
40 TABLET, DELAYED RELEASE ORAL
Status: DISCONTINUED | OUTPATIENT
Start: 2019-03-03 | End: 2019-03-08 | Stop reason: HOSPADM

## 2019-03-02 RX ORDER — MORPHINE SULFATE 2 MG/ML
2 INJECTION, SOLUTION INTRAMUSCULAR; INTRAVENOUS
Status: DISCONTINUED | OUTPATIENT
Start: 2019-03-02 | End: 2019-03-08 | Stop reason: HOSPADM

## 2019-03-02 RX ORDER — SODIUM CHLORIDE 0.9 % (FLUSH) 0.9 %
5-40 SYRINGE (ML) INJECTION AS NEEDED
Status: DISCONTINUED | OUTPATIENT
Start: 2019-03-02 | End: 2019-03-08 | Stop reason: HOSPADM

## 2019-03-02 RX ORDER — LIDOCAINE HYDROCHLORIDE 10 MG/ML
3-10 INJECTION INFILTRATION; PERINEURAL
Status: DISCONTINUED | OUTPATIENT
Start: 2019-03-02 | End: 2019-03-02

## 2019-03-02 RX ORDER — SODIUM CHLORIDE 0.9 % (FLUSH) 0.9 %
5-40 SYRINGE (ML) INJECTION EVERY 8 HOURS
Status: DISCONTINUED | OUTPATIENT
Start: 2019-03-02 | End: 2019-03-08 | Stop reason: HOSPADM

## 2019-03-02 RX ORDER — MIDAZOLAM HYDROCHLORIDE 1 MG/ML
.5-2 INJECTION, SOLUTION INTRAMUSCULAR; INTRAVENOUS
Status: DISCONTINUED | OUTPATIENT
Start: 2019-03-02 | End: 2019-03-02

## 2019-03-02 RX ORDER — GUAIFENESIN 100 MG/5ML
162 LIQUID (ML) ORAL DAILY
Status: DISCONTINUED | OUTPATIENT
Start: 2019-03-03 | End: 2019-03-08 | Stop reason: HOSPADM

## 2019-03-02 RX ORDER — ONDANSETRON 2 MG/ML
4 INJECTION INTRAMUSCULAR; INTRAVENOUS
Status: COMPLETED | OUTPATIENT
Start: 2019-03-02 | End: 2019-03-02

## 2019-03-02 RX ORDER — ATROPINE SULFATE 0.1 MG/ML
INJECTION INTRAVENOUS
Status: COMPLETED
Start: 2019-03-02 | End: 2019-03-02

## 2019-03-02 RX ORDER — LORAZEPAM 2 MG/ML
INJECTION INTRAMUSCULAR
Status: DISCONTINUED
Start: 2019-03-02 | End: 2019-03-02

## 2019-03-02 RX ORDER — ACETAMINOPHEN 325 MG/1
650 TABLET ORAL
Status: DISCONTINUED | OUTPATIENT
Start: 2019-03-02 | End: 2019-03-08 | Stop reason: HOSPADM

## 2019-03-02 RX ADMIN — LIDOCAINE HYDROCHLORIDE 10 ML: 10 INJECTION, SOLUTION INFILTRATION; PERINEURAL at 22:04

## 2019-03-02 RX ADMIN — ATROPINE SULFATE 0.5 MG: 0.1 INJECTION INTRAVENOUS at 21:15

## 2019-03-02 RX ADMIN — MORPHINE SULFATE 1 MG: 2 INJECTION, SOLUTION INTRAMUSCULAR; INTRAVENOUS at 21:34

## 2019-03-02 RX ADMIN — MIDAZOLAM HYDROCHLORIDE 1 MG: 1 INJECTION, SOLUTION INTRAMUSCULAR; INTRAVENOUS at 22:02

## 2019-03-02 RX ADMIN — ONDANSETRON 4 MG: 2 INJECTION INTRAMUSCULAR; INTRAVENOUS at 21:34

## 2019-03-02 RX ADMIN — ATROPINE SULFATE 0.5 MG: 0.1 INJECTION PARENTERAL at 21:36

## 2019-03-02 RX ADMIN — ATROPINE SULFATE 0.5 MG: 0.1 INJECTION PARENTERAL at 21:15

## 2019-03-03 ENCOUNTER — APPOINTMENT (OUTPATIENT)
Dept: GENERAL RADIOLOGY | Age: 84
DRG: 308 | End: 2019-03-03
Attending: INTERNAL MEDICINE
Payer: MEDICARE

## 2019-03-03 LAB
ANION GAP SERPL CALC-SCNC: 11 MMOL/L (ref 7–16)
ATRIAL RATE: 49 BPM
ATRIAL RATE: 76 BPM
BUN SERPL-MCNC: 22 MG/DL (ref 8–23)
CALCIUM SERPL-MCNC: 8.2 MG/DL (ref 8.3–10.4)
CALCULATED P AXIS, ECG09: 88 DEGREES
CALCULATED R AXIS, ECG10: -101 DEGREES
CALCULATED R AXIS, ECG10: -84 DEGREES
CALCULATED T AXIS, ECG11: 113 DEGREES
CALCULATED T AXIS, ECG11: 82 DEGREES
CHLORIDE SERPL-SCNC: 105 MMOL/L (ref 98–107)
CO2 SERPL-SCNC: 21 MMOL/L (ref 21–32)
CREAT SERPL-MCNC: 1.46 MG/DL (ref 0.6–1)
DIAGNOSIS, 93000: NORMAL
DIAGNOSIS, 93000: NORMAL
ERYTHROCYTE [DISTWIDTH] IN BLOOD BY AUTOMATED COUNT: 13.2 % (ref 11.9–14.6)
GLUCOSE BLD STRIP.AUTO-MCNC: 110 MG/DL (ref 65–100)
GLUCOSE BLD STRIP.AUTO-MCNC: 143 MG/DL (ref 65–100)
GLUCOSE BLD STRIP.AUTO-MCNC: 94 MG/DL (ref 65–100)
GLUCOSE SERPL-MCNC: 295 MG/DL (ref 65–100)
HCT VFR BLD AUTO: 38.1 % (ref 35.8–46.3)
HGB BLD-MCNC: 12 G/DL (ref 11.7–15.4)
MAGNESIUM SERPL-MCNC: 2.2 MG/DL (ref 1.8–2.4)
MCH RBC QN AUTO: 31.5 PG (ref 26.1–32.9)
MCHC RBC AUTO-ENTMCNC: 31.5 G/DL (ref 31.4–35)
MCV RBC AUTO: 100 FL (ref 79.6–97.8)
NRBC # BLD: 0 K/UL (ref 0–0.2)
P-R INTERVAL, ECG05: 222 MS
PLATELET # BLD AUTO: 317 K/UL (ref 150–450)
PMV BLD AUTO: 10.2 FL (ref 9.4–12.3)
POTASSIUM SERPL-SCNC: 4.6 MMOL/L (ref 3.5–5.1)
Q-T INTERVAL, ECG07: 458 MS
Q-T INTERVAL, ECG07: 540 MS
QRS DURATION, ECG06: 144 MS
QRS DURATION, ECG06: 164 MS
QTC CALCULATION (BEZET), ECG08: 445 MS
QTC CALCULATION (BEZET), ECG08: 515 MS
RBC # BLD AUTO: 3.81 M/UL (ref 4.05–5.2)
SODIUM SERPL-SCNC: 137 MMOL/L (ref 136–145)
TSH SERPL DL<=0.005 MIU/L-ACNC: 0.01 UIU/ML (ref 0.36–3.74)
VENTRICULAR RATE, ECG03: 41 BPM
VENTRICULAR RATE, ECG03: 76 BPM
WBC # BLD AUTO: 18 K/UL (ref 4.3–11.1)

## 2019-03-03 PROCEDURE — 71045 X-RAY EXAM CHEST 1 VIEW: CPT

## 2019-03-03 PROCEDURE — 80048 BASIC METABOLIC PNL TOTAL CA: CPT

## 2019-03-03 PROCEDURE — 74011250636 HC RX REV CODE- 250/636: Performed by: NURSE PRACTITIONER

## 2019-03-03 PROCEDURE — 83735 ASSAY OF MAGNESIUM: CPT

## 2019-03-03 PROCEDURE — 99223 1ST HOSP IP/OBS HIGH 75: CPT | Performed by: INTERNAL MEDICINE

## 2019-03-03 PROCEDURE — 85027 COMPLETE CBC AUTOMATED: CPT

## 2019-03-03 PROCEDURE — 65610000001 HC ROOM ICU GENERAL

## 2019-03-03 PROCEDURE — 82962 GLUCOSE BLOOD TEST: CPT

## 2019-03-03 PROCEDURE — 94660 CPAP INITIATION&MGMT: CPT

## 2019-03-03 PROCEDURE — 74011250636 HC RX REV CODE- 250/636: Performed by: INTERNAL MEDICINE

## 2019-03-03 PROCEDURE — 84443 ASSAY THYROID STIM HORMONE: CPT

## 2019-03-03 PROCEDURE — 87040 BLOOD CULTURE FOR BACTERIA: CPT

## 2019-03-03 PROCEDURE — 93005 ELECTROCARDIOGRAM TRACING: CPT | Performed by: INTERNAL MEDICINE

## 2019-03-03 PROCEDURE — 36415 COLL VENOUS BLD VENIPUNCTURE: CPT

## 2019-03-03 PROCEDURE — 74011250637 HC RX REV CODE- 250/637: Performed by: NURSE PRACTITIONER

## 2019-03-03 RX ORDER — CLINDAMYCIN PHOSPHATE 600 MG/50ML
600 INJECTION INTRAVENOUS EVERY 8 HOURS
Status: DISCONTINUED | OUTPATIENT
Start: 2019-03-03 | End: 2019-03-05

## 2019-03-03 RX ORDER — LEVOFLOXACIN 5 MG/ML
500 INJECTION, SOLUTION INTRAVENOUS
Status: DISCONTINUED | OUTPATIENT
Start: 2019-03-05 | End: 2019-03-07 | Stop reason: DRUGHIGH

## 2019-03-03 RX ORDER — LORAZEPAM 2 MG/ML
0.5 INJECTION INTRAMUSCULAR ONCE
Status: COMPLETED | OUTPATIENT
Start: 2019-03-03 | End: 2019-03-03

## 2019-03-03 RX ORDER — FUROSEMIDE 10 MG/ML
40 INJECTION INTRAMUSCULAR; INTRAVENOUS ONCE
Status: COMPLETED | OUTPATIENT
Start: 2019-03-03 | End: 2019-03-03

## 2019-03-03 RX ORDER — LEVOFLOXACIN 5 MG/ML
750 INJECTION, SOLUTION INTRAVENOUS ONCE
Status: COMPLETED | OUTPATIENT
Start: 2019-03-03 | End: 2019-03-03

## 2019-03-03 RX ORDER — FUROSEMIDE 10 MG/ML
20 INJECTION INTRAMUSCULAR; INTRAVENOUS ONCE
Status: COMPLETED | OUTPATIENT
Start: 2019-03-03 | End: 2019-03-03

## 2019-03-03 RX ADMIN — CLINDAMYCIN PHOSPHATE 600 MG: 600 INJECTION, SOLUTION INTRAVENOUS at 20:03

## 2019-03-03 RX ADMIN — Medication 10 ML: at 05:57

## 2019-03-03 RX ADMIN — OXYCODONE HYDROCHLORIDE 15 MG: 15 TABLET ORAL at 13:36

## 2019-03-03 RX ADMIN — LORAZEPAM 0.5 MG: 2 INJECTION INTRAMUSCULAR; INTRAVENOUS at 03:20

## 2019-03-03 RX ADMIN — LEVOFLOXACIN 750 MG: 5 INJECTION, SOLUTION INTRAVENOUS at 14:40

## 2019-03-03 RX ADMIN — Medication 10 ML: at 01:08

## 2019-03-03 RX ADMIN — CLINDAMYCIN PHOSPHATE 600 MG: 600 INJECTION, SOLUTION INTRAVENOUS at 14:39

## 2019-03-03 RX ADMIN — Medication 10 ML: at 13:36

## 2019-03-03 RX ADMIN — FUROSEMIDE 20 MG: 10 INJECTION, SOLUTION INTRAMUSCULAR; INTRAVENOUS at 03:20

## 2019-03-03 RX ADMIN — FUROSEMIDE 40 MG: 10 INJECTION, SOLUTION INTRAMUSCULAR; INTRAVENOUS at 08:55

## 2019-03-03 RX ADMIN — OXYCODONE HYDROCHLORIDE 15 MG: 15 TABLET ORAL at 17:59

## 2019-03-03 NOTE — ED PROVIDER NOTES
The history is provided by the patient and the EMS personnel. The history is limited by the condition of the patient. Slow Heart Rate This is a new problem. The current episode started 6 to 12 hours ago. The problem has not changed since onset. Duration of episode(s) is 1 day. The problem occurs constantly. The pain is associated with normal activity. Pain location: mid back. The pain is at a severity of 6/10. The quality of the pain is described as sharp. The pain does not radiate. Exacerbated by: nothing. Associated symptoms include back pain, dizziness, malaise/fatigue, nausea, near-syncope and weakness. Pertinent negatives include no abdominal pain, no claudication, no cough, no diaphoresis, no exertional chest pressure, no fever, no headaches, no hemoptysis, no irregular heartbeat, no leg pain, no lower extremity edema, no numbness, no orthopnea, no palpitations, no PND, no shortness of breath, no sputum production and no vomiting. She has tried rest (atropine ×2 via EMS, 0.5 mg) for the symptoms. The treatment provided no relief. Risk factors include postmenopause. Her past medical history does not include aneurysm, cancer, DM, DVT, HTN, PE or CHF. Pertinent negatives include no cardiac catheterization. Past Medical History:  
Diagnosis Date  Thyroid cyst Distant past  
 thyroid cyst removed x40 years ago Past Surgical History:  
Procedure Laterality Date  HX CHOLECYSTECTOMY  HX HEENT    
 thyroglossal cyst  
 HX HYSTERECTOMY Family History:  
Problem Relation Age of Onset  Other Father  24 [facility] Other Mother  Social History Socioeconomic History  Marital status:  Spouse name: Not on file  Number of children: Not on file  Years of education: Not on file  Highest education level: Not on file Social Needs  Financial resource strain: Not on file  Food insecurity - worry: Not on file  Food insecurity - inability: Not on file  Transportation needs - medical: Not on file  Transportation needs - non-medical: Not on file Occupational History  Not on file Tobacco Use  Smoking status: Never Smoker  Smokeless tobacco: Never Used Substance and Sexual Activity  Alcohol use: No  
 Drug use: No  
 Sexual activity: Not on file Other Topics Concern  Not on file Social History Narrative , lives with  and 2 daughters. Pt did work as  at one time. 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 ALLERGIES: Amlodipine; Amoxicillin; and Tetracycline Review of Systems Constitutional: Positive for malaise/fatigue. Negative for diaphoresis and fever. Respiratory: Negative for cough, hemoptysis, sputum production and shortness of breath. Cardiovascular: Positive for near-syncope. Negative for palpitations, orthopnea, claudication and PND. Gastrointestinal: Positive for nausea. Negative for abdominal pain and vomiting. Musculoskeletal: Positive for back pain. Neurological: Positive for dizziness and weakness. Negative for numbness and headaches. All other systems reviewed and are negative. Vitals:  
 03/02/19 2102 03/02/19 2115 BP: 135/84 Pulse: (!) 45 (!) 24 Resp: 16 Temp: 98.3 °F (36.8 °C) SpO2: 100% Weight: 40.8 kg (90 lb) Height: 5' 4\" (1.626 m) Physical Exam  
Constitutional: She is oriented to person, place, and time. She appears well-developed and well-nourished. She appears distressed. HENT:  
Head: Normocephalic and atraumatic. Right Ear: External ear normal.  
Left Ear: External ear normal.  
Mouth/Throat: Oropharynx is clear and moist.  
Eyes: Conjunctivae and EOM are normal. Pupils are equal, round, and reactive to light. Neck: Normal range of motion. Neck supple. Cardiovascular: Regular rhythm, normal heart sounds and intact distal pulses. Bradycardia present. Pulmonary/Chest: Effort normal and breath sounds normal.  
Abdominal: Soft. Bowel sounds are normal. There is no tenderness. Musculoskeletal: Normal range of motion. She exhibits no edema. Neurological: She is alert and oriented to person, place, and time. Skin: Skin is warm and dry. Capillary refill takes less than 2 seconds. Psychiatric: She has a normal mood and affect. Nursing note and vitals reviewed. MDM Number of Diagnoses or Management Options Third degree heart block [facility]): new and requires workup Amount and/or Complexity of Data Reviewed Clinical lab tests: ordered and reviewed Obtain history from someone other than the patient: yes Review and summarize past medical records: yes Discuss the patient with other providers: yes Independent visualization of images, tracings, or specimens: yes Risk of Complications, Morbidity, and/or Mortality Presenting problems: high Diagnostic procedures: minimal 
Management options: high Critical Care Total time providing critical care: (=================================================================== This patient is critically ill and there is a high probability of of imminent or life threatening deterioration in the patient's condition without immediate management. The nature of the patient's clinical problem is: third-degree heart block requiring external pacing to maintain heart rate and blood pressure. I have spent 35 minutes in direct patient care, documentation, review of labs/xrays/old records, discussion with Family, Staff, Colleague, Nursing . The time involved in the performance of separately reportable procedures was not counted toward critical care time. Selma Muro MD; 3/2/2019 @9:47 PM 
=================================================================== 
 
) Patient Progress Patient progress: (Remains critical, currently being paced, awaiting transfer to Cath Lab for temporary pacer) Procedures The patient was observed in the ED. Soon after arrival, patient's heart rate essentially stopped, requiring external pacing to revive the patient. She was unresponsive to atropine IV. Case was discussed with cardiologist, who will take the patient to cath Lab for temporary pacer placement Results Reviewed: 
 
 
Recent Results (from the past 24 hour(s)) CBC WITH AUTOMATED DIFF Collection Time: 03/02/19  9:11 PM  
Result Value Ref Range WBC 7.7 4.3 - 11.1 K/uL  
 RBC 3.20 (L) 4.05 - 5.2 M/uL  
 HGB 10.1 (L) 11.7 - 15.4 g/dL HCT 32.2 (L) 35.8 - 46.3 % .6 (H) 79.6 - 97.8 FL  
 MCH 31.6 26.1 - 32.9 PG  
 MCHC 31.4 31.4 - 35.0 g/dL  
 RDW 13.3 11.9 - 14.6 % PLATELET 160 022 - 495 K/uL MPV 10.0 9.4 - 12.3 FL ABSOLUTE NRBC 0.00 0.0 - 0.2 K/uL  
 DF AUTOMATED NEUTROPHILS 71 43 - 78 % LYMPHOCYTES 15 13 - 44 % MONOCYTES 11 4.0 - 12.0 % EOSINOPHILS 3 0.5 - 7.8 % BASOPHILS 1 0.0 - 2.0 % IMMATURE GRANULOCYTES 1 0.0 - 5.0 %  
 ABS. NEUTROPHILS 5.4 1.7 - 8.2 K/UL  
 ABS. LYMPHOCYTES 1.1 0.5 - 4.6 K/UL  
 ABS. MONOCYTES 0.8 0.1 - 1.3 K/UL  
 ABS. EOSINOPHILS 0.2 0.0 - 0.8 K/UL  
 ABS. BASOPHILS 0.1 0.0 - 0.2 K/UL  
 ABS. IMM. GRANS. 0.0 0.0 - 0.5 K/UL METABOLIC PANEL, COMPREHENSIVE Collection Time: 03/02/19  9:11 PM  
Result Value Ref Range Sodium 138 136 - 145 mmol/L Potassium 3.9 3.5 - 5.1 mmol/L Chloride 106 98 - 107 mmol/L  
 CO2 24 21 - 32 mmol/L Anion gap 8 7 - 16 mmol/L Glucose 157 (H) 65 - 100 mg/dL BUN 20 8 - 23 MG/DL Creatinine 1.19 (H) 0.6 - 1.0 MG/DL  
 GFR est AA 54 (L) >60 ml/min/1.73m2 GFR est non-AA 45 (L) >60 ml/min/1.73m2 Calcium 7.9 (L) 8.3 - 10.4 MG/DL Bilirubin, total 0.3 0.2 - 1.1 MG/DL  
 ALT (SGPT) 29 12 - 65 U/L  
 AST (SGOT) 16 15 - 37 U/L Alk. phosphatase 74 50 - 136 U/L Protein, total 6.2 (L) 6.3 - 8.2 g/dL Albumin 3.0 (L) 3.2 - 4.6 g/dL Globulin 3.2 2.3 - 3.5 g/dL A-G Ratio 0.9 (L) 1.2 - 3.5 POC TROPONIN-I Collection Time: 03/02/19  9:15 PM  
Result Value Ref Range Troponin-I (POC) 0.03 0.02 - 0.05 ng/ml

## 2019-03-03 NOTE — ED TRIAGE NOTES
Patient arrives via [facility] from home. EMS called out for \"breathing problem. \"  EMS found patient to report heaviness on chest and lower abdominal pain. EMS reports heart rate of 20.  bp 138/70s, O2 sats WDL on room air. Patient was given 324 asa, 1 dose nitro, 1mg atropine. Patient denies pain in chest, reports only as heaviness. Patient reports relief of pain from 5 to 2 with nitro; bp dropped to 80 systolic. Heart rate up to 40 after two doses of .0.5 of atropine. Patient presents alert and oriented x4.

## 2019-03-03 NOTE — PROGRESS NOTES
Patient is very tachypneic, RR in the 30's. Placed patient on BIPAP 16/8, Fi02 35% to help decrease WOB. Patient is still tachypneic but seems to tolerate BIPAP well. Will continue to monitor.

## 2019-03-03 NOTE — PROGRESS NOTES
[facility] CARDIOLOGY PROGRESS NOTE 
      
 
3/3/2019 8:12 AM 
 
Admit Date: 3/2/2019 Subjective:  
Patient confused and agitated. Now on Bipap ROS: 
Cardiovascular:  As noted above Objective:  
  
Vitals:  
 03/03/19 0630 03/03/19 0644 03/03/19 0700 03/03/19 0715 BP: 116/57 126/59 120/57 123/56 Pulse: 75 72 68 67 Resp: 28 26 28 26 Temp:      
SpO2: 96% 96% 97% 97% Weight:      
Height:      
 
 
Physical Exam: 
General-No Acute Distress Neck- supple, no JVD 
CV- regular rate and rhythm no MRG Lung- diminished bilaterally Abd- soft, nontender, nondistended Ext- no edema bilaterally. Skin- warm and dry Data Review:  
Recent Labs 03/03/19 
0341 03/02/19 
2111  138  
K 4.6 3.9 MG 2.2 2.0  
BUN 22 20 CREA 1.46* 1.19* * 157* WBC 18.0* 7.7 HGB 12.0 10.1* HCT 38.1 32.2*  
 213 Assessment/Plan:  
 
Principal Problem: 
  Complete heart block ([facility] [address] (3/2/2019) This has resolved and temp PM in place. Off of amiodarone and metoprolol. Monitor for now. If clinically improves will need to discuss a permanent device. Active Problems: 
  CAD (coronary artery disease) (11/29/2015) No angina Hypertension (12/1/2015) This is stable and no ACE with GLENYS - will allow mild HTN for now. Atrial fibrillation ([facility] [address] (8/31/2018) This has converted back to SR - on [address] Systolic CHF, acute on chronic ([address] (9/28/2018) EF 30-35%. Medical therapy help with bradycardia and hypotension. Bradycardia has resolved. Has had no pacing overnight. Off of ACE with GLENYS. Give IV lasix due to pulmonary edema. GERD (gastroesophageal reflux disease) (3/2/2019) On PPI Spinal stenosis (3/2/2019) Chronic Celina Warner MD 
3/3/2019 8:12 AM

## 2019-03-03 NOTE — CONSULTS
CONSULT NOTE    Claudia Castro    3/3/2019    Date of Admission:  3/2/2019    The patient's chart is reviewed and the patient is discussed with the staff. Subjective:     Patient is a 80 y.o.  female seen and evaluated at the request of Dr. Shalonda Arvizu who is in the ICU for complete heart block with temporary transvenous pacemaker and required BIPAP for hypoxemia. She presented overnight with h/o PAF on zenia and BB off anticoagulation and was weak and bradycardic in the ED. She was in complete heart block with HR initially in 30s though overnight HR has improved and has not been paced most of the night. She is currently in NSR with 1st degree heart block appearance on monitor. She had some agitation and confusion overnight. She is sleepy, but awakens to voice and answers questions though it is hard to understand through BIPAP mask and seems to be confused still. She does not have known JACOB or pulmonary disease and never smoked. She does not use CPAP at home. She was given a one time dose of lasix last night with only 350cc urine output overnight. She received some doses of morphine, ativan and versed yesterday for confusion in the ED and then for the procedure in the cath lab. Review of Systems  Review of systems not obtained due to patient factors. Patient Active Problem List   Diagnosis Code    CAD (coronary artery disease) I25.10    Hypertension I10    Acute respiratory failure with hypoxia and hypercapnia ([facility]) J96.01, J96.02    Atrial fibrillation ([facility]) I48.91    Acute exacerbation of CHF (congestive heart failure) ([facility]) E28.8    Systolic CHF, acute on chronic ([facility]) I50.23    GERD (gastroesophageal reflux disease) K21.9    Spinal stenosis M48.00    Complete heart block ([facility]) I44.2     Prior to Admission Medications   Prescriptions Last Dose Informant Patient Reported? Taking?   amiodarone (PACERONE) 100 mg tablet   No No   Sig: Take 1 Tab by mouth daily.    aspirin 81 mg chewable tablet   No No   Sig: Take 2 Tabs by mouth daily. carvedilol (COREG) 6.25 mg tablet   Yes No   Sig: Take  by mouth two (2) times daily (with meals). cholecalciferol, vitamin D3, (VITAMIN D3) 2,000 unit tab   Yes No   Sig: Take 1,000 Int'l Units by mouth daily. clopidogrel (PLAVIX) 75 mg tab   Yes No   Sig: Take  by mouth. furosemide (LASIX) 20 mg tablet   No No   Sig: Take 1 Tab by mouth two (2) times a day. furosemide (LASIX) 40 mg tablet   No No   Sig: Take 1 Tab by mouth daily. Patient taking differently: Take 40 mg by mouth every other day. 20 mg on opposite days   lisinopril (PRINIVIL, ZESTRIL) 2.5 mg tablet   Yes No   Sig: Take  by mouth daily. nitroglycerin (NITRODUR) 0.4 mg/hr   Yes No   Si Patch by TransDERmal route daily. oxyCODONE IR (OXY-IR) 15 mg immediate release tablet   Yes No   Sig: Take 15 mg by mouth every four (4) hours as needed for Pain.   pantoprazole (PROTONIX) 40 mg tablet   No No   Sig: Take 1 Tab by mouth Daily (before breakfast). polyethylene glycol (MIRALAX) 17 gram/dose powder   Yes No   Sig: Take 17 g by mouth as needed (constipation). potassium chloride SR (KLOR-CON 10) 10 mEq tablet   Yes No   Sig: Take  by mouth.   pyridoxine (VITAMIN B-6) 100 mg tablet   Yes No   Sig: Take 100 mg by mouth daily.  Takes at [facility]      Facility-Administered Medications: None     Past Medical History:   Diagnosis Date    Thyroid cyst Distant past    thyroid cyst removed x40 years ago     Past Surgical History:   Procedure Laterality Date    HX CHOLECYSTECTOMY      HX HEENT      thyroglossal cyst    HX HYSTERECTOMY       Social History     Socioeconomic History    Marital status:      Spouse name: Not on file    Number of children: Not on file    Years of education: Not on file    Highest education level: Not on file   Social Needs    Financial resource strain: Not on file    Food insecurity - worry: Not on file    Food insecurity - inability: Not on file   NoDaysOff needs - medical: Not on file   NoDaysOff needs - non-medical: Not on file   Occupational History    Not on file   Tobacco Use    Smoking status: Never Smoker    Smokeless tobacco: Never Used   Substance and Sexual Activity    Alcohol use: No    Drug use: No    Sexual activity: Not on file   Other Topics Concern    Not on file   Social History Narrative    , lives with  and 2 daughters. Pt did work as  at one time. 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       Family History   Problem Relation Age of Onset    Other Father             Other Mother              Allergies   Allergen Reactions    Amlodipine Hives    Amoxicillin Rash    Tetracycline Rash     Current Facility-Administered Medications   Medication Dose Route Frequency    furosemide (LASIX) injection 40 mg  40 mg IntraVENous ONCE    aspirin chewable tablet 162 mg  162 mg Oral DAILY    oxyCODONE IR (OXY-IR) immediate release tablet 15 mg  15 mg Oral Q4H PRN    pantoprazole (PROTONIX) tablet 40 mg  40 mg Oral ACB    sodium chloride (NS) flush 5-40 mL  5-40 mL IntraVENous Q8H    sodium chloride (NS) flush 5-40 mL  5-40 mL IntraVENous PRN    nitroglycerin (NITROSTAT) tablet 0.4 mg  0.4 mg SubLINGual Q5MIN PRN    morphine injection 2 mg  2 mg IntraVENous Q4H PRN    acetaminophen (TYLENOL) tablet 650 mg  650 mg Oral Q4H PRN    naloxone (NARCAN) injection 0.4 mg  0.4 mg IntraVENous PRN    ondansetron (ZOFRAN) injection 4 mg  4 mg IntraVENous Q4H PRN     Objective:     Vitals:    19 0630 19 0644 19 0700 19 0715   BP: 116/57 126/59 120/57 123/56   Pulse: 75 72 68 67   Resp: 28 26 28 26   Temp:       SpO2: 96% 96% 97% 97%   Weight:       Height:         PHYSICAL EXAM     Constitutional:  the patient is well developed and in no acute distress  EENMT:  Sclera clear, pupils equal, oral mucosa moist  Respiratory: clear anteriorly  Cardiovascular:  RRR without M,G,R  Gastrointestinal: soft and non-tender; with positive bowel sounds. Musculoskeletal: warm without cyanosis. There is no lower leg edema. Skin:  no jaundice or rashes, no wounds   Neurologic: no gross neuro deficits     Psychiatric:  Sleepy, moves all extremities, seems still confused    CXR:  Suggestive of pulmonary edema with more RUL infiltrate or edema asymmetric      Recent Labs     03/03/19  0341 03/02/19  2111   WBC 18.0* 7.7   HGB 12.0 10.1*   HCT 38.1 32.2*    213     Recent Labs     03/03/19  0341 03/02/19  2111    138   K 4.6 3.9    106   * 157*   CO2 21 24   BUN 22 20   CREA 1.46* 1.19*   MG 2.2 2.0   CA 8.2* 7.9*   ALB  --  3.0*   TBILI  --  0.3   ALT  --  29   SGOT  --  16     No results for input(s): PH, PCO2, PO2, HCO3 in the last 72 hours. No results for input(s): LCAD, LAC in the last 72 hours. Assessment:  (Medical Decision Making)     Hospital Problems  Date Reviewed: 8/14/2018          Codes Class Noted POA    GERD (gastroesophageal reflux disease) (Chronic) ICD-10-CM: K21.9  ICD-9-CM: 530.81  3/2/2019 Yes        Spinal stenosis (Chronic) ICD-10-CM: M48.00  ICD-9-CM: 724.00  3/2/2019 Yes        * (Principal) Complete heart block (HCC) ICD-10-CM: I44.2  ICD-9-CM: 426.0  3/2/2019 Unknown        Systolic CHF, acute on chronic (HCC) (Chronic) ICD-10-CM: I50.23  ICD-9-CM: 428.23, 428.0  9/28/2018 Yes        Atrial fibrillation (HCC) (Chronic) ICD-10-CM: I48.91  ICD-9-CM: 427.31  8/31/2018 Yes        Hypertension (Chronic) ICD-10-CM: I10  ICD-9-CM: 401.9  12/1/2015 Yes        CAD (coronary artery disease) (Chronic) ICD-10-CM: I25.10  ICD-9-CM: 414.00  11/29/2015 Yes            81 y/o female admitted with presyncope in complete heart block after taking home BB dose, transvenously paced and appears to be in heart failure placed on BIPAP overnight.    Plan:  (Medical Decision Making)     --will repeat CXR this morning given asymmetric infiltrates and leukocytosis  --if CXR worse would consider coverage for aspiration pneumonia  --Lasix 40mg IV x 1 for pulmonary edema  --pacer per cardiology  --would prefer precedex over benzo for delirium, but will have to be cautious with bradycardia  --after lasix consider trial off BIPAP and on NC  --avoid benzos as able  --NPO until more alert  --DVT prophy of choice by primary    DNR/DNI per cardiology discussion with family    More than 50% of the time documented was spent in face-to-face contact with the patient and in the care of the patient on the floor/unit where the patient is located. Thank you very much for this referral.  We appreciate the opportunity to participate in this patient's care. Will follow along with above stated plan.     Ainsley Zendejas MD

## 2019-03-03 NOTE — ED NOTES
Cardiology in the room. Verbal order to hold the morhpine and the ativan. As giving the zofran pt HR dropped to 0. Pacing started on pt. Paced to 55. Pt started having altered mental status.

## 2019-03-03 NOTE — PROGRESS NOTES
Call placed to [facility], NP regarding pt BP. Systolic pressures running mid to high 160's. Per NP, continue to monitor.

## 2019-03-03 NOTE — PROGRESS NOTES
TRANSFER - IN REPORT: 
 
Verbal report received from Cath Lab, RN(name) on Nestor Harness  being received from cath lab(unit) for routine progression of care Report consisted of patients Situation, Background, Assessment and  
Recommendations(SBAR). Information from the following report(s) ED Summary, Procedure Summary, Intake/Output, MAR, Cardiac Rhythm SR and Alarm Parameters  was reviewed with the receiving nurse. Opportunity for questions and clarification was provided. Assessment completed upon patients arrival to unit and care assumed.

## 2019-03-03 NOTE — PROGRESS NOTES
TRANSFER - OUT REPORT: 
 
Temp pacer insertion Dr Immanuel Noble 
RFV 
6 fr sheath sutured in place Versed 1 mg Settings VVI, rate 60, amps 10 Knee immobilizer placed No bleeding/hematoma Verbal report given to Inés(name) on Seymour Fatima  being transferred to ICU(unit) for routine progression of care Report consisted of patients Situation, Background, Assessment and  
Recommendations(SBAR). Information from the following report(s) SBAR and Procedure Summary was reviewed with the receiving nurse. Lines:  
Peripheral IV 03/02/19 Left Forearm (Active) Site Assessment Clean, dry, & intact 3/2/2019  9:05 PM  
Phlebitis Assessment 0 3/2/2019  9:05 PM  
Infiltration Assessment 0 3/2/2019  9:05 PM  
Dressing Status Clean, dry, & intact 3/2/2019  9:05 PM  
Dressing Type Transparent 3/2/2019  9:05 PM  
  
 
Opportunity for questions and clarification was provided.

## 2019-03-03 NOTE — H&P
[facility] Cardiology History & Physical  
  
Date of  Admission: 3/2/2019  8:58 PM  
 
Primary Care Physician: Dr. Laureano Duncan Primary Cardiologist: Dr. Yani Delcid 
Admitting Physician: Dr. Patricia Barrett CC: Complete heart block HPI:  Arnoldo Tyler is a 80 y.o.  female with PMHx of PAF (on Amio and BB, no AC, takes 162mg ASA), HTN, sHF (EF 30-35%), GERD, Spinal stenosis and CAD (PCI to pLAD 2008) who presented to the ED via EMS with c/o weakness and bradycardia. Her daughter had called our answering service earlier in the evening reporting HR had dropped to 36 but had recovered to 46s. States Pt had a busy day with poor PO intake. Had taken BB approx 45 min prior to phone call. Water/food was encouraged and daughter instructed to call EMS or come to ED if HR not improved. Daughter states Pt drank about 18oz of water and HR up to 70s but then started to drop again thus prompting them to call EMS. EMS gave her Atropine with HR initially in the 50s on arrival to ED per RN, but then dropped into the 20s. She was given additional Atropine with minimally improved HR. Cardiology called for urgent evaluation. NP to bedside and Pt initially alert with HR 30s appearing to be a complete heart block. She received the additional Atropine with short improved HR. However, during exam she became listless with eyes rolled back. She then had a long pause and was transcutaneously paced with immediate return of alertness. She was given 1mg Morphine for comfort. She continued to be TC paced. The CCL team was notified of need for emergent temp pacer placement. NP remained at bedside with Pt and she then began to have HR 100s in what appeared to be AFib. TC pacer remained in place. She was the taken to CCL by the cath staff. Past Medical History:  
Diagnosis Date  Thyroid cyst Distant past  
 thyroid cyst removed x40 years ago Past Surgical History:  
Procedure Laterality Date  HX CHOLECYSTECTOMY  HX HEENT    
 thyroglossal cyst  
 HX HYSTERECTOMY Allergies Allergen Reactions  Amlodipine Hives  Amoxicillin Rash  Tetracycline Rash Social History Socioeconomic History  Marital status:  Spouse name: Not on file  Number of children: Not on file  Years of education: Not on file  Highest education level: Not on file Social Needs  Financial resource strain: Not on file  Food insecurity - worry: Not on file  Food insecurity - inability: Not on file  Transportation needs - medical: Not on file  Transportation needs - non-medical: Not on file Occupational History  Not on file Tobacco Use  Smoking status: Never Smoker  Smokeless tobacco: Never Used Substance and Sexual Activity  Alcohol use: No  
 Drug use: No  
 Sexual activity: Not on file Other Topics Concern  Not on file Social History Narrative , lives with  and 2 daughters. Pt did work as  at one time. 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 Family History Problem Relation Age of Onset  Other Father  Xavier Other Mother  Current Facility-Administered Medications Medication Dose Route Frequency  LORazepam (ATIVAN) injection 0.5 mg  0.5 mg IntraVENous NOW Current Outpatient Medications Medication Sig  
 amiodarone (PACERONE) 100 mg tablet Take 1 Tab by mouth daily.  lisinopril (PRINIVIL, ZESTRIL) 2.5 mg tablet Take  by mouth daily.  carvedilol (COREG) 6.25 mg tablet Take  by mouth two (2) times daily (with meals).  clopidogrel (PLAVIX) 75 mg tab Take  by mouth.  potassium chloride SR (KLOR-CON 10) 10 mEq tablet Take  by mouth.  pantoprazole (PROTONIX) 40 mg tablet Take 1 Tab by mouth Daily (before breakfast).  furosemide (LASIX) 20 mg tablet Take 1 Tab by mouth two (2) times a day.  aspirin 81 mg chewable tablet Take 2 Tabs by mouth daily.  furosemide (LASIX) 40 mg tablet Take 1 Tab by mouth daily. (Patient taking differently: Take 40 mg by mouth every other day. 20 mg on opposite days)  nitroglycerin (NITRODUR) 0.4 mg/hr 1 Patch by TransDERmal route daily.  polyethylene glycol (MIRALAX) 17 gram/dose powder Take 17 g by mouth as needed (constipation).  cholecalciferol, vitamin D3, (VITAMIN D3) 2,000 unit tab Take 1,000 Int'l Units by mouth daily.  oxyCODONE IR (OXY-IR) 15 mg immediate release tablet Take 15 mg by mouth every four (4) hours as needed for Pain.  pyridoxine (VITAMIN B-6) 100 mg tablet Take 100 mg by mouth daily. Takes at [facility] Review of Systems Review of Systems Unable to perform ROS: acuity of condition (Limited) Limited in setting of acute HB, Pt reported dizziness and feeling \"faint\", denies CP Subjective:  
 
Visit Vitals /57 Pulse 92 Temp 98.3 °F (36.8 °C) Resp 17 Ht 5' 4\" (1.626 m) Wt 40.8 kg (90 lb) SpO2 100% BMI 15.45 kg/m² Physical Exam  
Constitutional:  
Frail, thin elderly female, noted distressed but alert HENT:  
Head: Normocephalic and atraumatic. Nose: Nose normal.  
Mouth/Throat: Oropharynx is clear and moist.  
Eyes: Conjunctivae and EOM are normal. Pupils are equal, round, and reactive to light. No scleral icterus. Neck: Normal range of motion. Neck supple. No tracheal deviation present. Cardiovascular: S1 normal and S2 normal. Bradycardia present. Exam reveals distant heart sounds. TC pacing initiated Pulmonary/Chest: Effort normal and breath sounds normal. No stridor. No respiratory distress. She has no wheezes. On O2 Abdominal: Soft. Bowel sounds are normal. She exhibits no distension. There is no tenderness. Musculoskeletal: Normal range of motion. She exhibits no edema. Neurological: She is alert. Intermittently confused during episode -- improved with TC pacing Skin: Skin is warm and dry.   
Psychiatric: Affect normal.  
 
 
 Cardiographics Telemetry: Complete HB to TC paced ECG: Compete HB with rate 30s Echocardiogram: 9/2018 SUMMARY: 
-  Left ventricle: Systolic function was normal. Ejection fraction was 
estimated in the range of 30 % to 35 %. There was hypokinesis of the  
basal-mid anteroseptal and basal-mid inferoseptal wall(s). -  Left atrium: The atrium was markedly dilated. -  Right atrium: The atrium was moderately dilated. -  Mitral valve: There was moderate regurgitation. -  Tricuspid valve: There was mild regurgitation. -  Pulmonic valve: There was moderate regurgitation. Labs:  
Recent Results (from the past 24 hour(s)) CBC WITH AUTOMATED DIFF Collection Time: 03/02/19  9:11 PM  
Result Value Ref Range WBC 7.7 4.3 - 11.1 K/uL  
 RBC 3.20 (L) 4.05 - 5.2 M/uL  
 HGB 10.1 (L) 11.7 - 15.4 g/dL HCT 32.2 (L) 35.8 - 46.3 % .6 (H) 79.6 - 97.8 FL  
 MCH 31.6 26.1 - 32.9 PG  
 MCHC 31.4 31.4 - 35.0 g/dL  
 RDW 13.3 11.9 - 14.6 % PLATELET 698 150 - 487 K/uL MPV 10.0 9.4 - 12.3 FL ABSOLUTE NRBC 0.00 0.0 - 0.2 K/uL  
 DF AUTOMATED NEUTROPHILS 71 43 - 78 % LYMPHOCYTES 15 13 - 44 % MONOCYTES 11 4.0 - 12.0 % EOSINOPHILS 3 0.5 - 7.8 % BASOPHILS 1 0.0 - 2.0 % IMMATURE GRANULOCYTES 1 0.0 - 5.0 %  
 ABS. NEUTROPHILS 5.4 1.7 - 8.2 K/UL  
 ABS. LYMPHOCYTES 1.1 0.5 - 4.6 K/UL  
 ABS. MONOCYTES 0.8 0.1 - 1.3 K/UL  
 ABS. EOSINOPHILS 0.2 0.0 - 0.8 K/UL  
 ABS. BASOPHILS 0.1 0.0 - 0.2 K/UL  
 ABS. IMM. GRANS. 0.0 0.0 - 0.5 K/UL METABOLIC PANEL, COMPREHENSIVE Collection Time: 03/02/19  9:11 PM  
Result Value Ref Range Sodium 138 136 - 145 mmol/L Potassium 3.9 3.5 - 5.1 mmol/L Chloride 106 98 - 107 mmol/L  
 CO2 24 21 - 32 mmol/L Anion gap 8 7 - 16 mmol/L Glucose 157 (H) 65 - 100 mg/dL BUN 20 8 - 23 MG/DL Creatinine 1.19 (H) 0.6 - 1.0 MG/DL  
 GFR est AA 54 (L) >60 ml/min/1.73m2 GFR est non-AA 45 (L) >60 ml/min/1.73m2  Calcium 7.9 (L) 8.3 - 10.4 MG/DL  
 Bilirubin, total 0.3 0.2 - 1.1 MG/DL  
 ALT (SGPT) 29 12 - 65 U/L  
 AST (SGOT) 16 15 - 37 U/L Alk. phosphatase 74 50 - 136 U/L Protein, total 6.2 (L) 6.3 - 8.2 g/dL Albumin 3.0 (L) 3.2 - 4.6 g/dL Globulin 3.2 2.3 - 3.5 g/dL A-G Ratio 0.9 (L) 1.2 - 3.5 MAGNESIUM Collection Time: 03/02/19  9:11 PM  
Result Value Ref Range Magnesium 2.0 1.8 - 2.4 mg/dL TSH 3RD GENERATION Collection Time: 03/02/19  9:11 PM  
Result Value Ref Range TSH 0.013 (L) 0.358 - 3.740 uIU/mL  
POC TROPONIN-I Collection Time: 03/02/19  9:15 PM  
Result Value Ref Range Troponin-I (POC) 0.03 0.02 - 0.05 ng/ml Patient has been seen and examined by Dr. Fadi Desouza and he agrees with the following assessment and plan: 
 
 Assessment/Plan:  
  
 Principal Problem: 
  Complete heart block -- to CCL for emergent temp pacer, d/w daughters at bedside. Pt does have DNR but daughters want her to have procedure Active Problems: 
  CAD (coronary artery disease) -- cont home meds as indicated Hypertension -- holding home meds at this time, will re-eval and re-start as indicated Atrial fibrillation -- now in AFib, holding Amio for now, monitor rate on tele Systolic CHF, acute on chronic -- holding home meds at this time, resume when clinically indicated GERD (gastroesophageal reflux disease) --cont PPI Spinal stenosis -- cont home pain meds Soren Tarango NP 
3/2/2019 10:04 PM

## 2019-03-03 NOTE — PROGRESS NOTES
Pt 's. Pt calling out, tachypneic. Crackles heard in all lung fields. Crackles audible from foot of bed. O2 sat mid 80's. Call placed to [facility], NP. Orders received for 20mg of lasix. Pt put on high flow 15L. 's. Osat 90%. Will continue to monitor.

## 2019-03-03 NOTE — PROGRESS NOTES
Pt remains tachypneic, Osat 89-90, on 15L high flow nasal cannula, pt put on CPAP mask 35%. HR back to low 90's. BP stable, Osat 93. Pt resting, less agitated. NP updated on pt status, will continue to monitor patient O2 demands.

## 2019-03-03 NOTE — PROGRESS NOTES
Continued asymmetric infiltrates, WBC 18 this morning, remains confused on BIPAP Will culture, get PCT and cover for pneumonia with aspiration risk with Levaquin and Clinda. Amoxacillin allergy.  
 
Scotty Vickers MD

## 2019-03-03 NOTE — PROGRESS NOTES
Pt to ICU via bed on 2 L NC. Temp pacer sheath in R femoral vein. Site is clean, dry and intact, dressed with Tegaderm, no signs of bleeding or hematoma, knee immobilizer present on R leg. Pacer set at rate of 60. Amps set at 10. Upon skin assessment, there is scattered bruising on upper and lower extremities, sacrum is pink and blanchable. Alevyn applied.

## 2019-03-03 NOTE — PROCEDURES
[facility]  CARDIAC CATH    Name:  Edith Archuleta  MR#:  [id number]  :  1924  ACCOUNT #:  [de-identified]  DATE OF SERVICE:  2019      PRIMARY CARDIOLOGIST:  Arian Diamond MD    PRIMARY CARE:  Nikki Gomze MD    BRIEF HISTORY:  The patient is a 22-year-old female with history of ischemic cardiomyopathy, prior PCI and stenting years ago, has history of paroxysmal atrial fibrillation managed with amiodarone and carvedilol. Ejection fraction is noted to be 30-35%. She is brought to the emergency room with symptomatic bradycardia, was noted to develop complete heart block and near asystole. She was brought urgently to the cardiac catheterization lab for temporary pacemaker implantation. CONSCIOUS SEDATION:  Start time 10:01 p.m., end time 10:25 p.m. MEDICATIONS:  1 mg of Versed. MONITORING RN:  Manohar San    PROCEDURES PERFORMED:  After informed consent, she was sedated with 1 mg of Versed. The right groin was infiltrated with lidocaine under ultrasound guide. Right femoral vein was accessed and a 6-Greek pacing sheath was advanced. A 5-Greek balloon tipped pacing catheter was then advanced to the RV apex with excellent pacing thresholds. This was sheathed and pacing wire was then sutured in place and the patient was transferred to the ICU for monitoring. CONCLUSION:  Successful transvenous pacemaker implantation via right femoral vein. RECOMMENDATIONS:  Consideration for dual-chamber pacing. Thank you for allowing us to participate in the care of this patient. Any questions or concerns, please feel free to contact me.       Dorrine Najjar, MD      [id number]/  D:  2019 22:26  T:  2019 23:33  JOB #:  [id number]  CC:  MD Nikki Sawyer MD

## 2019-03-03 NOTE — PROGRESS NOTES
Call placed to [facility], NP regarding pt increased restlessness and 's sustained. Orders received for 0.5mg of Ativan.

## 2019-03-03 NOTE — PROCEDURES
Brief Cardiac Procedure Note    Patient: Filiberto Chou MRN: [id number]  SSN: [id number]    YOB: 1924  Age: 80 y.o. Sex: female      Date of Procedure: 3/2/2019     Pre-procedure Diagnosis: CHB/Afib    Post-procedure Diagnosis: CHB/Afib    Procedure: Pacemaker Insertion    Brief Description of Procedure: See note    Performed By: Marjan Bailey MD     Assistants: None    Anesthesia: Moderate Sedation    Estimated Blood Loss: Less than 10 mL      Specimens: None    Implants: None    Findings:   5fr Temp pacemaker via RFV    Complications: None    Recommendations: Continue medical therapy.     Signed By: Marjan Bailey MD     March 2, 2019

## 2019-03-03 NOTE — TELEPHONE ENCOUNTER
Pts daughter Mary Chua called to report Pt with increased fatigue, weakness and pain behind her R ear this evening. They took her BP and was 131/47. Gave her her PM dose Coreg and a NTG tab. Reports Pt continued to feel poorly. Checked her HR on home sat monitor and was 36. Repeated and up to 58 and Pt felt better. States Pt had a busy day today with poor PO intake but took lasix and good UOP. States weight down 2 lbs over past coupld days. Took Amio and Coreg in AM as usual.    Encouraged some PO fluids and food. Recheck HR, if remains low or Pt continues to feel poorly will need to go to ED for evaluation. Sandra chow/raul.

## 2019-03-04 PROBLEM — R91.8 PULMONARY INFILTRATES: Status: ACTIVE | Noted: 2019-03-04

## 2019-03-04 LAB
ANION GAP SERPL CALC-SCNC: 8 MMOL/L (ref 7–16)
BUN SERPL-MCNC: 28 MG/DL (ref 8–23)
CALCIUM SERPL-MCNC: 8.5 MG/DL (ref 8.3–10.4)
CHLORIDE SERPL-SCNC: 107 MMOL/L (ref 98–107)
CO2 SERPL-SCNC: 25 MMOL/L (ref 21–32)
CREAT SERPL-MCNC: 1.21 MG/DL (ref 0.6–1)
ERYTHROCYTE [DISTWIDTH] IN BLOOD BY AUTOMATED COUNT: 13.5 % (ref 11.9–14.6)
FLUAV AG NPH QL IA: NEGATIVE
FLUBV AG NPH QL IA: NEGATIVE
GLUCOSE BLD STRIP.AUTO-MCNC: 115 MG/DL (ref 65–100)
GLUCOSE BLD STRIP.AUTO-MCNC: 137 MG/DL (ref 65–100)
GLUCOSE BLD STRIP.AUTO-MCNC: 99 MG/DL (ref 65–100)
GLUCOSE SERPL-MCNC: 98 MG/DL (ref 65–100)
HCT VFR BLD AUTO: 33.5 % (ref 35.8–46.3)
HGB BLD-MCNC: 10.4 G/DL (ref 11.7–15.4)
MAGNESIUM SERPL-MCNC: 2.1 MG/DL (ref 1.8–2.4)
MCH RBC QN AUTO: 31.3 PG (ref 26.1–32.9)
MCHC RBC AUTO-ENTMCNC: 31 G/DL (ref 31.4–35)
MCV RBC AUTO: 100.9 FL (ref 79.6–97.8)
NRBC # BLD: 0 K/UL (ref 0–0.2)
PLATELET # BLD AUTO: 184 K/UL (ref 150–450)
PMV BLD AUTO: 10.3 FL (ref 9.4–12.3)
POTASSIUM SERPL-SCNC: 4.1 MMOL/L (ref 3.5–5.1)
PROCALCITONIN SERPL-MCNC: 0.7 NG/ML
PROCALCITONIN SERPL-MCNC: 0.9 NG/ML
RBC # BLD AUTO: 3.32 M/UL (ref 4.05–5.2)
SODIUM SERPL-SCNC: 140 MMOL/L (ref 136–145)
SPECIMEN SOURCE: NORMAL
WBC # BLD AUTO: 11.7 K/UL (ref 4.3–11.1)

## 2019-03-04 PROCEDURE — 82962 GLUCOSE BLOOD TEST: CPT

## 2019-03-04 PROCEDURE — 84145 PROCALCITONIN (PCT): CPT

## 2019-03-04 PROCEDURE — 77010033678 HC OXYGEN DAILY

## 2019-03-04 PROCEDURE — 74011250637 HC RX REV CODE- 250/637: Performed by: NURSE PRACTITIONER

## 2019-03-04 PROCEDURE — 77030029488 HC ELECTRD PAD DEFIB ZOLL -B

## 2019-03-04 PROCEDURE — 74011250636 HC RX REV CODE- 250/636: Performed by: INTERNAL MEDICINE

## 2019-03-04 PROCEDURE — 36415 COLL VENOUS BLD VENIPUNCTURE: CPT

## 2019-03-04 PROCEDURE — 92610 EVALUATE SWALLOWING FUNCTION: CPT

## 2019-03-04 PROCEDURE — 65660000000 HC RM CCU STEPDOWN

## 2019-03-04 PROCEDURE — 85027 COMPLETE CBC AUTOMATED: CPT

## 2019-03-04 PROCEDURE — 87804 INFLUENZA ASSAY W/OPTIC: CPT

## 2019-03-04 PROCEDURE — 99232 SBSQ HOSP IP/OBS MODERATE 35: CPT | Performed by: INTERNAL MEDICINE

## 2019-03-04 PROCEDURE — 83735 ASSAY OF MAGNESIUM: CPT

## 2019-03-04 PROCEDURE — 80048 BASIC METABOLIC PNL TOTAL CA: CPT

## 2019-03-04 RX ADMIN — CLINDAMYCIN PHOSPHATE 600 MG: 600 INJECTION, SOLUTION INTRAVENOUS at 15:17

## 2019-03-04 RX ADMIN — Medication 10 ML: at 15:20

## 2019-03-04 RX ADMIN — CLINDAMYCIN PHOSPHATE 600 MG: 600 INJECTION, SOLUTION INTRAVENOUS at 21:25

## 2019-03-04 RX ADMIN — CLINDAMYCIN PHOSPHATE 600 MG: 600 INJECTION, SOLUTION INTRAVENOUS at 05:48

## 2019-03-04 RX ADMIN — ASPIRIN 81 MG 162 MG: 81 TABLET ORAL at 09:45

## 2019-03-04 RX ADMIN — OXYCODONE HYDROCHLORIDE 15 MG: 15 TABLET ORAL at 08:11

## 2019-03-04 RX ADMIN — OXYCODONE HYDROCHLORIDE 15 MG: 15 TABLET ORAL at 21:19

## 2019-03-04 RX ADMIN — Medication 10 ML: at 21:20

## 2019-03-04 RX ADMIN — Medication 10 ML: at 05:48

## 2019-03-04 RX ADMIN — PANTOPRAZOLE SODIUM 40 MG: 40 TABLET, DELAYED RELEASE ORAL at 08:11

## 2019-03-04 NOTE — PROGRESS NOTES
Follow up visit with patient and family from [address] [facility] [address] Eulogio Daigle, staff [address], [id number] [facility] [place]  /   [email or web address]

## 2019-03-04 NOTE — PROGRESS NOTES
Report received from Gissel Hackett RN. Pt alert and oriented at this time; resting quietly; denies needs. 4L O2 via n/c; transvenous pacer to right groin at this time. Bed L/L, SR up x3, call light/ personal items within reach. Will continue to monitor.

## 2019-03-04 NOTE — PROGRESS NOTES
Very pleasant lady to visit with Family at bedside Patient is very alert and has wonderful stories about her life Her  and her had a ministry in the [facility] Patient was also a volunteer for our hospital for years Yesterday was her birthday and the staff sang her a birthday song Will follow Estela Cheung, staff [address], [id number] [facility] [place]  /   [email or web address]

## 2019-03-04 NOTE — PROGRESS NOTES
[facility] CARDIOLOGY PROGRESS NOTE 
      
 
3/4/2019 7:58 AM 
 
Admit Date: 3/2/2019 Subjective:  
 
Patient having discomfort lying still. Review of Systems Constitutional: Negative for fever. Cardiovascular: Negative for chest pain. Musculoskeletal: Negative for myalgias. Skin: Negative for rash. Neurological: Negative for dizziness. Objective:  
  
Vitals:  
 03/04/19 0300 03/04/19 0400 03/04/19 0430 03/04/19 0700 BP: 133/64 121/58  131/62 Pulse: 66 62  63 Resp: 24 23  18 Temp:   98 °F (36.7 °C) 98.2 °F (36.8 °C) SpO2: 99% 97%  98% Weight:      
Height:      
 
 
 
Physical Exam  
HENT:  
Head: Normocephalic and atraumatic. Eyes: Conjunctivae are normal. Pupils are equal, round, and reactive to light. Neck: Normal range of motion. No JVD present. Cardiovascular: Normal rate and normal heart sounds. No murmur heard. Pulmonary/Chest: Effort normal. She has no wheezes. Abdominal: She exhibits no distension. Musculoskeletal: She exhibits no edema. Neurological: She is alert. Skin: Skin is warm. She is not diaphoretic. Psychiatric: She has a normal mood and affect. Data Review:  
Recent Labs 03/04/19 
0409 03/03/19 
0927  137  
K 4.1 4.6 MG 2.1 2.2 BUN 28* 22  
CREA 1.21* 1.46* GLU 98 295* WBC 11.7* 18.0* HGB 10.4* 12.0 HCT 33.5* 38.1  317 Intake/Output Summary (Last 24 hours) at 3/4/2019 9683 Last data filed at 3/4/2019 9441 Gross per 24 hour Intake 200 ml Output 1350 ml Net -1150 ml  
 
Current Facility-Administered Medications Medication Dose Route Frequency  insulin regular (NOVOLIN R, HUMULIN R) injection   SubCUTAneous AC&HS  clindamycin (CLEOCIN) 600mg D5W 50mL IVPB (premix)  600 mg IntraVENous Q8H  
 [START ON 3/5/2019] levoFLOXacin (LEVAQUIN) 500 mg in D5W IVPB  500 mg IntraVENous Q48H  
 aspirin chewable tablet 162 mg  162 mg Oral DAILY  oxyCODONE IR (OXY-IR) immediate release tablet 15 mg  15 mg Oral Q4H PRN  pantoprazole (PROTONIX) tablet 40 mg  40 mg Oral ACB  sodium chloride (NS) flush 5-40 mL  5-40 mL IntraVENous Q8H  
 sodium chloride (NS) flush 5-40 mL  5-40 mL IntraVENous PRN  
 nitroglycerin (NITROSTAT) tablet 0.4 mg  0.4 mg SubLINGual Q5MIN PRN  
 morphine injection 2 mg  2 mg IntraVENous Q4H PRN  
 acetaminophen (TYLENOL) tablet 650 mg  650 mg Oral Q4H PRN  
 naloxone (NARCAN) injection 0.4 mg  0.4 mg IntraVENous PRN  
 ondansetron (ZOFRAN) injection 4 mg  4 mg IntraVENous Q4H PRN Assessment/Plan: 1. Complete heart block patient has indications for permanent pacemaker. Due to her age and comorbidities ICD or CRT does not appear appropriate. No pacing the past 24 hours plan to pull temporary wire this morning and place pads. Patient has elevated white count and is being treated for pneumonia so pacemaker will be placed at a later time. continue ICU care for now. 2. Atrial fibrillation anticoagulation discussed in the past patient not a candidate. Holding amio 3. Systolic CHF controlled holding BB Nedra Nova MD 
3/4/2019 7:58 AM

## 2019-03-04 NOTE — PROGRESS NOTES
MD notified of low UOP; BS WNL. States to discontinue BS checks at this time. Will see pt at bedside.

## 2019-03-04 NOTE — PROGRESS NOTES
Pt seen in ICU s/p admission complete heart block. Staff working with pt currently. Spoke with daughters at bedside. Confirms demographics. Would prefer home at d/c. [facility] ok, if needed. Daughters assist pt at home, but mostly independent of ADL's. Temp pacer pulled and MD awaiting to see how pt does, if medication induced. CM to follow and assist with any d/c needs. Care Management Interventions PCP Verified by CM: Cass Dowell as PCP) Mode of Transport at Discharge: Other (see comment) Transition of Care Consult (CM Consult): Discharge Planning Discharge Durable Medical Equipment: (walker, cane, SC, grab bars, BSC over toilet) Current Support Network: Relative's Home(pt lives with 2 daugthers, Waqar Martinez -[phone], has 2 other children are out of state. LW/HCPOA  per daughters, do not see on file currently. ) Confirm Follow Up Transport: Family Plan discussed with Pt/Family/Caregiver: Yes Freedom of Choice Offered: Yes [employer] Information Provided?: (confirms MCR/supplemental - able to get rx) Discharge Location Discharge Placement: Unable to determine at this time

## 2019-03-04 NOTE — PROGRESS NOTES
Bedside, Verbal and Written shift change report given to Wilfrido Ramsey RN (oncoming nurse) by Rita Puckett RN (offgoing nurse). Report included the following information SBAR, Intake/Output, MAR, Recent Results, Cardiac Rhythm SR and Alarm Parameters .

## 2019-03-04 NOTE — INTERDISCIPLINARY ROUNDS
Interdisciplinary team rounds were held 3/4/2019 with the following team members:Care Management, Nursing, Nurse Practitioner, Pastoral Care, Pharmacy, Physician and Respiratory Therapy and the patient and child(tamra). Plan of care discussed. See clinical pathway and/or care plan for interventions and desired outcomes.

## 2019-03-04 NOTE — PROGRESS NOTES
TRANSFER - IN REPORT: 
 
Verbal report received from self(name) on Cydney Arreguin  being received from ICU/CCU(unit) for routine progression of care Report consisted of patients Situation, Background, Assessment and  
Recommendations(SBAR). Information from the following report(s) SBAR, Kardex, [facility] and Recent Results was reviewed with the receiving nurse. Opportunity for questions and clarification was provided. Assessment completed upon patients arrival to unit and care assumed.

## 2019-03-04 NOTE — PROGRESS NOTES
SPEECH PATHOLOGY NOTE: 
 
Speech therapy consult received and appreciated. Attempted to see this AM for dysphagia assessment, but she is currently working with RNs at bedside. Will follow up at later time as she is available Ashley [address] CCC-SLP

## 2019-03-04 NOTE — PROGRESS NOTES
Date of Outreach Update: Laura Blackwell was seen and assessed. Pt lying in bed, alert, smiling. Denies needs or concerns at this time. MEWS Score: 3 (03/04/19 1208) Vitals:  
 03/04/19 0900 03/04/19 1000 03/04/19 1100 03/04/19 1208 BP: 143/67 134/86 124/58 139/63 Pulse: 61 74 65 (!) 59 Resp: 16 (!) 35 (!) 53 (!) 32 Temp:    98.1 °F (36.7 °C) SpO2: 99% 94% Weight:      
Height:      
  
 
 Pain Assessment Pain Intensity 1: 0 (03/04/19 1210) Pain Location 1: Foot Pain Intervention(s) 1: Medication (see MAR) Patient Stated Pain Goal: 0 Previous Outreach assessment has been reviewed. There have been no significant clinical changes since the completion of the last dated Outreach assessment. Will continue to follow up per outreach protocol.  
 
Signed By:   Liz Cortes RN 
  March 4, 2019 2:56 PM

## 2019-03-04 NOTE — PROGRESS NOTES
Care Management Interventions PCP Verified by CM: Juany Loving as PCP) Palliative Care Criteria Met (RRAT>21 & CHF Dx)?: No(RRAT 23 Dx Heart block) Mode of Transport at Discharge: Other (see comment) Transition of Care Consult (CM Consult): Discharge Planning Discharge Durable Medical Equipment: (walker, cane, SC, grab bars, BSC over toilet) Physical Therapy Consult: No 
Occupational Therapy Consult: No 
Speech Therapy Consult: Yes Current Support Network: Relative's Home Confirm Follow Up Transport: Family Plan discussed with Pt/Family/Caregiver: Yes Freedom of Choice Offered: Yes [employer] Information Provided?: (confirms MCR/supplemental - able to get rx) Discharge Location Discharge Placement: Unable to determine at this time Patient transferred from ICU to 3rd floor telemetry. Patient lives with her 2 daughters and independent of ADL's prior to admission. DME includes walker, cane, shower chair, BSC and grab bars. Her family provides needed transportation. She has Medicare and APROOFED and is able to obtain her medications without difficulty. She hopes to return home when medically stable. CM following for d/c.

## 2019-03-04 NOTE — PROGRESS NOTES
TRANSFER - OUT REPORT: 
 
Verbal [facility] for routine progression of care Report consisted of patients Situation, Background, Assessment and  
Recommendations(SBAR). Information from the following report(s) SBAR was reviewed with the receiving nurse. Lines:  
Peripheral IV 03/02/19 Left Forearm (Active) Site Assessment Clean, dry, & intact 3/4/2019 12:12 PM  
Phlebitis Assessment 0 3/4/2019 12:12 PM  
Infiltration Assessment 0 3/4/2019 12:12 PM  
Dressing Status Clean, dry, & intact 3/4/2019 12:12 PM  
Dressing Type Transparent;Tape 3/4/2019 12:12 PM  
Hub Color/Line Status Patent; Flushed;Capped 3/4/2019 12:12 PM  
Alcohol Cap Used No 3/3/2019  7:01 PM  
   
Peripheral IV 03/03/19 Right Antecubital (Active) Site Assessment Clean, dry, & intact 3/4/2019 12:12 PM  
Phlebitis Assessment 0 3/4/2019 12:12 PM  
Infiltration Assessment 0 3/4/2019 12:12 PM  
Dressing Status Clean, dry, & intact 3/4/2019 12:12 PM  
Dressing Type Transparent;Tape 3/4/2019 12:12 PM  
Hub Color/Line Status Patent; Flushed;Capped 3/4/2019 12:12 PM  
Alcohol Cap Used No 3/3/2019  7:01 PM  
  
 
Opportunity for questions and clarification was provided. Patient transported with: 
 O2 @ 4 liters, glasses; bilateral hearing aids. Belongings with daughters at bedside. R groin site with dressing cdi without bruising/ bleeding at this time. Site soft. Allevyn cdi; no pressure injuries at this time.

## 2019-03-04 NOTE — PROGRESS NOTES
Svetlana Patton Admission Date: 3/2/2019 ICU Daily Progress Note: 3/4/2019  
 
81 y/o female admitted with presyncope in complete heart block after taking home BB dose, transvenously paced and appears to be in heart failure placed on BIPAP overnight. Subjective:  
 
Over the past 24 hours: 
Weaned off BiPAP and tolerating High Flow O2. Current Facility-Administered Medications Medication Dose Route Frequency  insulin regular (NOVOLIN R, HUMULIN R) injection   SubCUTAneous AC&HS  clindamycin (CLEOCIN) 600mg D5W 50mL IVPB (premix)  600 mg IntraVENous Q8H  
 [START ON 3/5/2019] levoFLOXacin (LEVAQUIN) 500 mg in D5W IVPB  500 mg IntraVENous Q48H  
 aspirin chewable tablet 162 mg  162 mg Oral DAILY  oxyCODONE IR (OXY-IR) immediate release tablet 15 mg  15 mg Oral Q4H PRN  pantoprazole (PROTONIX) tablet 40 mg  40 mg Oral ACB  sodium chloride (NS) flush 5-40 mL  5-40 mL IntraVENous Q8H  
 sodium chloride (NS) flush 5-40 mL  5-40 mL IntraVENous PRN  
 nitroglycerin (NITROSTAT) tablet 0.4 mg  0.4 mg SubLINGual Q5MIN PRN  
 morphine injection 2 mg  2 mg IntraVENous Q4H PRN  
 acetaminophen (TYLENOL) tablet 650 mg  650 mg Oral Q4H PRN  
 naloxone (NARCAN) injection 0.4 mg  0.4 mg IntraVENous PRN  
 ondansetron (ZOFRAN) injection 4 mg  4 mg IntraVENous Q4H PRN Objective:  
 
Vitals:  
 03/04/19 0900 03/04/19 1000 03/04/19 1100 03/04/19 1208 BP: 143/67 134/86 124/58 139/63 Pulse: 61 74 65 (!) 59 Resp: 16 (!) 35 (!) 53 (!) 32 Temp:    98.1 °F (36.7 °C) SpO2: 99% 94% Weight:      
Height:      
 
Intake and Output:  
03/02 1901 - 03/04 0700 In: 200 [I.V.:200] Out: 1575 [[id number]] 03/04 0701 - 03/04 1900 In: -  
Out: 200 [Urine:200] Physical Exam:         
GEN: acutely ill, HEENT: no alar flaring or epistaxis, oral mucosa moist without cyanosis, NECK:  no JVD, no retractions, no thyromegaly or masses, LUNGS:  CTA on NC 
HEART:  RRR with no M,G,R; 
 ABDOMEN:  soft with no tenderness; positive bowel sounds present EXTREMITIES:  warm with no cyanosis, no lower leg edema SKIN:  no jaundice or ecchymosis NEURO:  A& O X 3 
 
LINES: PIV, rios DRIPS: none NUTRITION: mechanical soft, 2 gm CHEST XRAY:  
 
 
LAB Recent Labs 03/04/19 
0409 03/03/19 
0341 03/02/19 
2111 WBC 11.7* 18.0* 7.7 HGB 10.4* 12.0 10.1* HCT 33.5* 38.1 32.2*  
 317 213 Recent Labs 03/04/19 
0409 03/03/19 
0341 03/02/19 
2111  137 138  
K 4.1 4.6 3.9  105 106 CO2 25 21 24 GLU 98 295* 157* BUN 28* 22 20 CREA 1.21* 1.46* 1.19* MG 2.1 2.2 2.0 Cultures: NTD Assessment:  
 
Patient Active Problem List  
Diagnosis Code  CAD (coronary artery disease) I25.10  Hypertension I10  
 Acute hypoxemic respiratory failure ([facility]) J96.01  
 Atrial fibrillation ([facility]) I48.91  
 Acute exacerbation of CHF (congestive heart failure) ([facility]) I50.9  Systolic CHF, acute on chronic ([facility]) I50.23  GERD (gastroesophageal reflux disease) K21.9  Spinal stenosis M48.00  Complete heart block ([facility]) I44.2  Pulmonary infiltrates R91.8 Plan Complete heart block ([facility] (3/2/2019) Per cardiology: Due to her age and comorbidities ICD or CRT does not appear appropriate. No pacing the past 24 hours plan to pull temporary wire this morning and place pads. Patient has elevated white count and is being treated for pneumonia so pacemaker will be placed at a later time. CAD (coronary artery disease) (11/29/2015) Acute hypoxemic respiratory failure ([address] (7/29/2018) On NC Atrial fibrillation ([address] (8/31/2018) No anticoagulation Systolic CHF, acute on chronic ([facility] (9/28/2018) Ejection fraction was estimated in the range of 30 % to 35 %. -- levaquin D 3, cleocin D 2, de-escalate soon. Repeat PCT in am 
-- continue weaning o2- 04% on 6 lpm 
-- PT/OT, mobilize when able 
-- POC per cardiology Dorinda Farrar, NP-C 
 More than 50% of time documented was spent in face-to-face contact with the patient and in the care of the patient on the floor/unit where the patient is located. Lungs:  CTA B in anterior lung fields. Heart:  RRR with no Murmur/Rubs/Gallops Additional Comments:   
Patient improving clinically. Received one time doses of lasix yesterday. Appears euvolemic today. UOP dropped off, creatinine better. Hold on additional lasix for now. Cont abx and repeat pct in the am. Wean o2 as sats allow. Check flu swab though suspicion is low. Transferring to tele. I have spoken with and examined the patient. I agree with the above assessment and plan as documented.  
 
Janette Russell MD

## 2019-03-04 NOTE — PROGRESS NOTES
Speech language pathology: bedside swallow note: Initial Assessment and Discharge NAME/AGE/GENDER: Nestor  is a 80 y.o. female DATE: 3/4/2019 PRIMARY DIAGNOSIS: [facility] [I44.2] ICD-10: Treatment Diagnosis: R13.11 Oral Dysphagia. INTERDISCIPLINARY COLLABORATION: Registered Nurse PRECAUTIONS/ALLERGIES: Amlodipine; Amoxicillin; and Tetracycline ASSESSMENT:Based on the objective data described below, Ms. Morgan Crow presents with mild oral dysphagia due to missing dentition. Family reports they chop food into small bites at home. They report only \"occasional\" coughing with sweet or acidic foods. Patient presented with thin liquid via cup and straw, puree, and mixed consistencies. Mildly prolonged mastication due to missing dentition, but adequate oral clearing after the swallow. No cough or throat clear observed with liquids or solids. Recommend continue mechanical soft diet/thin liquids. Medications as tolerated. No additional speech therapy indicated at this time. ?????? ? ? This section established at most recent assessment?????????? 
PROBLEM LIST (Impairments causing functional limitations): 1. Mild oral dysphagia REHABILITATION POTENTIAL FOR STATED GOALS: Good PLAN OF CARE:  
Patient will benefit from skilled intervention to address the following impairments. RECOMMENDATIONS AND PLANNED INTERVENTIONS (Benefits and precautions of therapy have been discussed with the patient.): 
· continue prescribed diet MEDICATIONS: 
· With liquid ASPIRATION PRECAUTIONS: 
· Slow rate of intake · Small bites/sips · Upright at 90 degrees during meal 
COMPENSATORY STRATEGIES/MODIFICATIONS INCLUDING: 
· None OTHER RECOMMENDATIONS (including follow up treatment recommendations):  
· Patient education RECOMMENDED DIET MODIFICATIONS DISCUSSED WITH: 
· Nursing · Family · Patient FREQUENCY/DURATION: No further speech therapy indicated at this time as oropharyngeal swallow function is within normal limits. RECOMMENDED REHABILITATION/EQUIPMENT: (at time of discharge pending progress): Due to the probability of continued deficits (see above) this patient will not likely need continued skilled speech therapy after discharge. SUBJECTIVE:  
Pleasant and cooperative. Daughters at bedside. They report patient will occasionally cough with sweets and acidic foods, followed by a sneeze. They deny additional swallowing issues. History of Present Injury/Illness: Ms. Morgan Crow  has a past medical history of Thyroid cyst (Distant past). .  She also  has a past surgical history that includes hx cholecystectomy; hx hysterectomy; and hx heent. Present Symptoms: Mild oral dysphagia Pain Scale 1: Numeric (0 - 10) Pain Intensity 1: 0 Pain Location 1: Foot Pain Intervention(s) 1: Medication (see MAR) Current Medications: No current facility-administered medications on file prior to encounter. Current Outpatient Medications on File Prior to Encounter Medication Sig Dispense Refill  amiodarone (PACERONE) 100 mg tablet Take 1 Tab by mouth daily. 90 Tab 1  
 lisinopril (PRINIVIL, ZESTRIL) 2.5 mg tablet Take  by mouth daily.  carvedilol (COREG) 6.25 mg tablet Take  by mouth two (2) times daily (with meals).  clopidogrel (PLAVIX) 75 mg tab Take  by mouth.  potassium chloride SR (KLOR-CON 10) 10 mEq tablet Take  by mouth.  pantoprazole (PROTONIX) 40 mg tablet Take 1 Tab by mouth Daily (before breakfast). 30 Tab 0  
 furosemide (LASIX) 20 mg tablet Take 1 Tab by mouth two (2) times a day. 180 Tab 3  
 aspirin 81 mg chewable tablet Take 2 Tabs by mouth daily. 60 Tab 0  
 furosemide (LASIX) 40 mg tablet Take 1 Tab by mouth daily. (Patient taking differently: Take 40 mg by mouth every other day. 20 mg on opposite days) 30 Tab 0  
 nitroglycerin (NITRODUR) 0.4 mg/hr 1 Patch by TransDERmal route daily.  polyethylene glycol (MIRALAX) 17 gram/dose powder Take 17 g by mouth as needed (constipation).  cholecalciferol, vitamin D3, (VITAMIN D3) 2,000 unit tab Take 1,000 Int'l Units by mouth daily.  oxyCODONE IR (OXY-IR) 15 mg immediate release tablet Take 15 mg by mouth every four (4) hours as needed for Pain.  pyridoxine (VITAMIN B-6) 100 mg tablet Take 100 mg by mouth daily. [facility] Current Dietary Status: Mechanical soft/thin 
Social History/Home Situation:   
Home Environment: Private residence One/Two Story Residence: One story Living Alone: No 
Support Systems: Child(tamra) Patient Expects to be Discharged to[de-identified] Private residence Current DME Used/Available at Home: Chacha Sexton OBJECTIVE:  
Respiratory Status:  Hi flow nasal cannula  6 l/min Oral Motor Structure/Speech:  Oral-Motor Structure/Motor Speech Labial: No impairment Dentition: Intact Lingual: No impairment Cognitive and Communication Status: 
Neurologic State: Alert Orientation Level: Oriented X4 Cognition: Follows commands Perception: Appears intact Perseveration: No perseveration noted Safety/Judgement: Insight into deficits BEDSIDE SWALLOW EVALUATIONOral Assessment: 
Oral Assessment Labial: No impairment Dentition: Intact Lingual: No impairment P.O. Trials: The patient was given tsp-small bite amounts of the following:  
Consistency Presented: Thin liquid;Puree;Mixed consistency How Presented: Self-fed/presented;Cup/sip;Spoon;Straw;Successive swallows ORAL PHASE: 
Bolus Acceptance: No impairment Bolus Formation/Control: No impairment Propulsion: No impairment Oral Residue: None PHARYNGEAL PHASE: 
  
Laryngeal Elevation: Functional 
Aspiration Signs/Symptoms: None Vocal Quality: No impairment Effective Modifications: None Pharyngeal Phase Characteristics: No impairment, issues, or problems OTHER OBSERVATIONS: 
Rate/bite size: WNL Endurance: WNL Comments: Tool Used: Dysphagia Outcome and Severity Scale (AGUILA) Score Comments Normal Diet  [] 7 With no strategies or extra time needed Functional Swallow  [x] 6 May have mild oral or pharyngeal delay Mild Dysphagia 
  [] 5 Which may require one diet consistency restricted (those who demonstrate penetration which is entirely cleared on MBS would be included) Mild-Moderate Dysphagia  [] 4 With 1-2 diet consistencies restricted Moderate Dysphagia  [] 3 With 2 or more diet consistencies restricted Moderately Severe Dysphagia  [] 2 With partial PO strategies (trials with ST only) Severe Dysphagia  [] 1 With inability to tolerate any PO safely Score:  Initial: 6 Most Recent: X (Date: --) Interpretation of Tool: The Dysphagia Outcome and Severity Scale (AGUILA) is a simple, easy-to-use, 7-point scale developed to systematically rate the functional severity of dysphagia based on objective assessment and make recommendations for diet level, independence level, and type of nutrition. [place] / [place] / Product Type: Medicare /  
 
TREATMENT:  
 (In addition to Assessment/Re-Assessment sessions the following treatments were rendered) Assessment/Reassessment only, no treatment provided today MODALITIES:  
  
  
  
  
  
  
  
  
  
  
    
  
  
  
  
  
  
  
  
   
 
ORAL MOTOR  EXERCISES: 
  
  
  
  
  
  
  
  
  
  
  
  
  
  
  
  
  
  
  
  
  
  
  
  
  
  
    
  
  
  
  
  
  
  
  
  
  
  
  
  
  
  
  
  
  
  
  
  
  
  
  
  
   
 
LARYNGEAL / PHARYNGEAL EXERCISES: 
  
  
  
  
  
  
  
  
  
  
  
  
  
  
  
  
  
  
  
  
  
    
  
  
  
  
  
  
  
  
  
  
  
  
  
  
  
  
  
  
  
   
 
__________________________________________________________________________________________________ Safety: After treatment position/precautions: 
· RN notified · Family at bedside · Upright in Bed Recommendations/Intent for next treatment session No further speech therapy indicated at this time as swallow function is within normal limits. Please re-consult if new concerns arise. Total Treatment Duration: 
Time In: 5503 Time Out: 1118 [address] CCC-SLP

## 2019-03-05 ENCOUNTER — APPOINTMENT (OUTPATIENT)
Dept: GENERAL RADIOLOGY | Age: 84
DRG: 308 | End: 2019-03-05
Attending: INTERNAL MEDICINE
Payer: MEDICARE

## 2019-03-05 LAB
ANION GAP SERPL CALC-SCNC: 6 MMOL/L (ref 7–16)
BUN SERPL-MCNC: 21 MG/DL (ref 8–23)
CALCIUM SERPL-MCNC: 8.1 MG/DL (ref 8.3–10.4)
CHLORIDE SERPL-SCNC: 110 MMOL/L (ref 98–107)
CO2 SERPL-SCNC: 26 MMOL/L (ref 21–32)
CREAT SERPL-MCNC: 0.93 MG/DL (ref 0.6–1)
ERYTHROCYTE [DISTWIDTH] IN BLOOD BY AUTOMATED COUNT: 13.3 % (ref 11.9–14.6)
GLUCOSE BLD STRIP.AUTO-MCNC: 112 MG/DL (ref 65–100)
GLUCOSE BLD STRIP.AUTO-MCNC: 113 MG/DL (ref 65–100)
GLUCOSE BLD STRIP.AUTO-MCNC: 115 MG/DL (ref 65–100)
GLUCOSE BLD STRIP.AUTO-MCNC: 88 MG/DL (ref 65–100)
GLUCOSE SERPL-MCNC: 88 MG/DL (ref 65–100)
HCT VFR BLD AUTO: 29.4 % (ref 35.8–46.3)
HGB BLD-MCNC: 9.4 G/DL (ref 11.7–15.4)
MAGNESIUM SERPL-MCNC: 2.1 MG/DL (ref 1.8–2.4)
MCH RBC QN AUTO: 31.3 PG (ref 26.1–32.9)
MCHC RBC AUTO-ENTMCNC: 32 G/DL (ref 31.4–35)
MCV RBC AUTO: 98 FL (ref 79.6–97.8)
NRBC # BLD: 0 K/UL (ref 0–0.2)
PLATELET # BLD AUTO: 169 K/UL (ref 150–450)
PMV BLD AUTO: 10.3 FL (ref 9.4–12.3)
POTASSIUM SERPL-SCNC: 3.7 MMOL/L (ref 3.5–5.1)
RBC # BLD AUTO: 3 M/UL (ref 4.05–5.2)
SODIUM SERPL-SCNC: 142 MMOL/L (ref 136–145)
WBC # BLD AUTO: 7.3 K/UL (ref 4.3–11.1)

## 2019-03-05 PROCEDURE — 65660000000 HC RM CCU STEPDOWN

## 2019-03-05 PROCEDURE — 71045 X-RAY EXAM CHEST 1 VIEW: CPT

## 2019-03-05 PROCEDURE — 36415 COLL VENOUS BLD VENIPUNCTURE: CPT

## 2019-03-05 PROCEDURE — 82962 GLUCOSE BLOOD TEST: CPT

## 2019-03-05 PROCEDURE — 80048 BASIC METABOLIC PNL TOTAL CA: CPT

## 2019-03-05 PROCEDURE — 85027 COMPLETE CBC AUTOMATED: CPT

## 2019-03-05 PROCEDURE — 99232 SBSQ HOSP IP/OBS MODERATE 35: CPT | Performed by: INTERNAL MEDICINE

## 2019-03-05 PROCEDURE — 74011250636 HC RX REV CODE- 250/636: Performed by: INTERNAL MEDICINE

## 2019-03-05 PROCEDURE — 74011250637 HC RX REV CODE- 250/637: Performed by: NURSE PRACTITIONER

## 2019-03-05 PROCEDURE — 83735 ASSAY OF MAGNESIUM: CPT

## 2019-03-05 RX ADMIN — OXYCODONE HYDROCHLORIDE 15 MG: 15 TABLET ORAL at 14:55

## 2019-03-05 RX ADMIN — CLINDAMYCIN PHOSPHATE 600 MG: 600 INJECTION, SOLUTION INTRAVENOUS at 12:45

## 2019-03-05 RX ADMIN — Medication 10 ML: at 13:30

## 2019-03-05 RX ADMIN — Medication 5 ML: at 04:42

## 2019-03-05 RX ADMIN — OXYCODONE HYDROCHLORIDE 15 MG: 15 TABLET ORAL at 08:57

## 2019-03-05 RX ADMIN — ASPIRIN 81 MG 162 MG: 81 TABLET ORAL at 08:57

## 2019-03-05 RX ADMIN — LEVOFLOXACIN 500 MG: 5 INJECTION, SOLUTION INTRAVENOUS at 13:32

## 2019-03-05 RX ADMIN — Medication 10 ML: at 21:08

## 2019-03-05 RX ADMIN — PANTOPRAZOLE SODIUM 40 MG: 40 TABLET, DELAYED RELEASE ORAL at 08:57

## 2019-03-05 RX ADMIN — CLINDAMYCIN PHOSPHATE 600 MG: 600 INJECTION, SOLUTION INTRAVENOUS at 04:39

## 2019-03-05 RX ADMIN — OXYCODONE HYDROCHLORIDE 15 MG: 15 TABLET ORAL at 18:46

## 2019-03-05 NOTE — PROGRESS NOTES
Problem: Falls - Risk of  Goal: *Absence of Falls  Document Pal Fall Risk and appropriate interventions in the flowsheet. Outcome: Progressing Towards Goal  Fall Risk Interventions:  Mobility Interventions: Bed/chair exit alarm, Communicate number of staff needed for ambulation/transfer, Patient to call before getting OOB    Mentation Interventions: Bed/chair exit alarm    Medication Interventions: Bed/chair exit alarm, Patient to call before getting OOB, Teach patient to arise slowly    Elimination Interventions: Call light in reach, Bed/chair exit alarm, Patient to call for help with toileting needs             Problem: Pressure Injury - Risk of  Goal: *Prevention of pressure injury  Document Ahsan Scale and appropriate interventions in the flowsheet.   Outcome: Progressing Towards Goal  Pressure Injury Interventions:  Sensory Interventions: Keep linens dry and wrinkle-free, Minimize linen layers    Moisture Interventions: Absorbent underpads, Minimize layers    Activity Interventions: Increase time out of bed, Pressure redistribution bed/mattress(bed type)    Mobility Interventions: HOB 30 degrees or less, Pressure redistribution bed/mattress (bed type)    Nutrition Interventions: Document food/fluid/supplement intake    Friction and Shear Interventions: HOB 30 degrees or less, Lift sheet, Minimize layers

## 2019-03-05 NOTE — PROGRESS NOTES
Date of Outreach Update: Leona Shabazz was seen and assessed. MEWS Score: 1 (03/05/19 0459)  Vitals:    03/05/19 0102 03/05/19 0459 03/05/19 0920 03/05/19 1500   BP: 129/53 131/59 141/60 112/49   Pulse: 61 62 67 64   Resp: 18 18 20 20   Temp: 98.2 °F (36.8 °C) 98.5 °F (36.9 °C) 97.8 °F (36.6 °C) 97.2 °F (36.2 °C)   SpO2: 99% 93% 94% 96%   Weight:  46.4 kg (102 lb 6.4 oz)     Height:             Pain Assessment  Pain Intensity 1: 0 (03/05/19 1655)  Pain Location 1: Foot  Pain Intervention(s) 1: Medication (see MAR)  Patient Stated Pain Goal: 0      Previous Outreach assessment has been reviewed. There have been no significant clinical changes since the completion of the last dated Outreach assessment. Will continue to follow up per outreach protocol.     Signed By:   Jessica Joe RN    March 5, 2019 5:49 PM

## 2019-03-05 NOTE — PROGRESS NOTES
.  Critical Care Outreach Nurse Progress Report:    Subjective: In to assess pt secondary to ORP  MEWS Score: 1 (03/05/19 0102)    Vitals:    03/04/19 1846 03/04/19 2119 03/05/19 0102 03/05/19 0459   BP: 123/43 138/59 129/53 131/59   Pulse: 73 71 61 62   Resp: 18 18 18 18   Temp: 99.1 °F (37.3 °C) 99 °F (37.2 °C) 98.2 °F (36.8 °C) 98.5 °F (36.9 °C)   SpO2: 99% 99% 99% 93%   Weight:    46.4 kg (102 lb 6.4 oz)   Height:            Pain Intensity 1: 0 (03/05/19 0420)  Pain Location 1: Foot  Pain Intervention(s) 1: Medication (see MAR)  Patient Stated Pain Goal: 0    Assessment: Pt laying in bed resting quietly. Family at bedside. Right groin CDI, no S/S of hematoma. HR- 76, SpO2- 96% on RA. Breath sounds clear. Plan:  Will continue to monitor per ORP

## 2019-03-05 NOTE — PROGRESS NOTES
Estefani Pena  Admission Date: 3/2/2019             Pulmonary Daily Progress Note: 3/5/2019     79 y/o female admitted with presyncope in complete heart block after taking home BB dose, transvenously paced and appears to be in heart failure placed on BIPAP overnight. Subjective:     Alert, awake, on RA now 96%.      Current Facility-Administered Medications   Medication Dose Route Frequency    insulin regular (NOVOLIN R, HUMULIN R) injection   SubCUTAneous AC&HS    levoFLOXacin (LEVAQUIN) 500 mg in D5W IVPB  500 mg IntraVENous Q48H    aspirin chewable tablet 162 mg  162 mg Oral DAILY    oxyCODONE IR (OXY-IR) immediate release tablet 15 mg  15 mg Oral Q4H PRN    pantoprazole (PROTONIX) tablet 40 mg  40 mg Oral ACB    sodium chloride (NS) flush 5-40 mL  5-40 mL IntraVENous Q8H    sodium chloride (NS) flush 5-40 mL  5-40 mL IntraVENous PRN    nitroglycerin (NITROSTAT) tablet 0.4 mg  0.4 mg SubLINGual Q5MIN PRN    morphine injection 2 mg  2 mg IntraVENous Q4H PRN    acetaminophen (TYLENOL) tablet 650 mg  650 mg Oral Q4H PRN    naloxone (NARCAN) injection 0.4 mg  0.4 mg IntraVENous PRN    ondansetron (ZOFRAN) injection 4 mg  4 mg IntraVENous Q4H PRN     Objective:     Vitals:    03/04/19 2119 03/05/19 0102 03/05/19 0459 03/05/19 0920   BP: 138/59 129/53 131/59 141/60   Pulse: 71 61 62 67   Resp: 18 18 18 20   Temp: 99 °F (37.2 °C) 98.2 °F (36.8 °C) 98.5 °F (36.9 °C) 97.8 °F (36.6 °C)   SpO2: 99% 99% 93% 94%   Weight:   102 lb 6.4 oz (46.4 kg)    Height:         Intake and Output:   03/03 1901 - 03/05 0700  In: 390 [P.O.:390]  Out: 1620 [Urine:1620]  03/05 0701 - 03/05 1900  In: 240 [P.O.:240]  Out: -     Physical Exam:          GEN: acutely ill,   HEENT: no alar flaring or epistaxis, oral mucosa moist without cyanosis,   NECK:  no JVD, no retractions, no thyromegaly or masses,   LUNGS:  CTA on RA  HEART:  RRR with no M,G,R;  ABDOMEN:  soft with no tenderness; positive bowel sounds present  EXTREMITIES: warm with no cyanosis, no lower leg edema  SKIN:  no jaundice or ecchymosis   NEURO:  A& O X 3, calm, pleasant    LINES: PIV, rios  DRIPS: none  NUTRITION: mechanical soft, 2 gm    CHEST XRAY:       LAB  Recent Labs     03/05/19 0445 03/04/19  0409 03/03/19  0341   WBC 7.3 11.7* 18.0*   HGB 9.4* 10.4* 12.0   HCT 29.4* 33.5* 38.1    184 317     Recent Labs     03/05/19 0445 03/04/19  0409 03/03/19  0341    140 137   K 3.7 4.1 4.6   * 107 105   CO2 26 25 21   GLU 88 98 295*   BUN 21 28* 22   CREA 0.93 1.21* 1.46*   MG 2.1 2.1 2.2       Cultures: NTD    Assessment:     Patient Active Problem List   Diagnosis Code    CAD (coronary artery disease) I25.10    Hypertension I10    Acute hypoxemic respiratory failure ([facility]) J96.01    Atrial fibrillation ([facility]) I48.91    Acute exacerbation of CHF (congestive heart failure) ([facility]) O33.0    Systolic CHF, acute on chronic ([facility]) I50.23    GERD (gastroesophageal reflux disease) K21.9    Spinal stenosis M48.00    Complete heart block ([facility]) I44.2    Pulmonary infiltrates R91.8       Plan       Complete heart block ([address] (3/2/2019)  Per cardiology: Due to her age and comorbidities ICD or CRT does not appear appropriate. No pacing the past 24 hours plan to pull temporary wire this morning and place pads. Patient has elevated white count and is being treated for pneumonia so pacemaker will be placed at a later time. CAD (coronary artery disease) (11/29/2015)      Acute hypoxemic respiratory failure ([address] (7/29/2018)  On NC      Atrial fibrillation ([address] (8/31/2018)  No anticoagulation       Systolic CHF, acute on chronic ([address] (9/28/2018)  Ejection fraction was estimated in the range of 30 % to 35 %. -- levaquin dose today and 3/7, stop Clinda  -- additional lasix per cardiology  -- PT/OT, mobilize when able  -- POC per cardiology    Will sign off, call with questions.     More than 50% of time documented was spent in face-to-face contact with the patient and in the care of the patient on the floor/unit where the patient is located.      Ashley Seth MD

## 2019-03-05 NOTE — PROGRESS NOTES
Bedside and Verbal shift change report given to self (oncoming nurse) by Parul Alejandro RN (offgoing nurse). Report included the following information SBAR, Kardex, ED Summary, Procedure Summary, Intake/Output, MAR, Recent Results and Cardiac Rhythm NSR with 1st degree HB.

## 2019-03-05 NOTE — PROGRESS NOTES
Bedside and Verbal shift change report given to self (oncoming nurse) by Cherylin Lennox, RN (offgoing nurse). Report included the following information SBAR, Kardex, Intake/Output, MAR, Recent Results and Cardiac Rhythm NSR.

## 2019-03-05 NOTE — PROGRESS NOTES
Bedside and Verbal shift change report given to Ivan Vargas RN (oncoming nurse) by self (offgoing nurse). Report included the following information SBAR, Kardex, ED Summary, Procedure Summary, Intake/Output, MAR, Recent Results and Cardiac Rhythm SR with 1st degree HB.

## 2019-03-05 NOTE — PROGRESS NOTES
[facility] CARDIOLOGY PROGRESS NOTE           3/5/2019 7:58 AM    Admit Date: 3/2/2019      Subjective:     No events       Review of Systems   Constitutional: Negative for fever. Cardiovascular: Negative for chest pain. Musculoskeletal: Negative for myalgias. Skin: Negative for rash. Neurological: Negative for dizziness. Objective:      Vitals:    03/04/19 1846 03/04/19 2119 03/05/19 0102 03/05/19 0459   BP: 123/43 138/59 129/53 131/59   Pulse: 73 71 61 62   Resp: 18 18 18 18   Temp: 99.1 °F (37.3 °C) 99 °F (37.2 °C) 98.2 °F (36.8 °C) 98.5 °F (36.9 °C)   SpO2: 99% 99% 99% 93%   Weight:    46.4 kg (102 lb 6.4 oz)   Height:             Physical Exam   HENT:   Head: Normocephalic and atraumatic. Eyes: Conjunctivae are normal. Pupils are equal, round, and reactive to light. Neck: Normal range of motion. No JVD present. Cardiovascular: Normal rate and normal heart sounds. No murmur heard. Pulmonary/Chest: Effort normal. She has no wheezes. Abdominal: She exhibits no distension. Musculoskeletal: She exhibits no edema. Neurological: She is alert. Skin: Skin is warm. She is not diaphoretic. Psychiatric: She has a normal mood and affect.        Data Review:   Recent Labs     03/05/19  0445 03/04/19  0409    140   K 3.7 4.1   MG 2.1 2.1   BUN 21 28*   CREA 0.93 1.21*   GLU 88 98   WBC 7.3 11.7*   HGB 9.4* 10.4*   HCT 29.4* 33.5*    184         Intake/Output Summary (Last 24 hours) at 3/5/2019 0802  Last data filed at 3/5/2019 0420  Gross per 24 hour   Intake 390 ml   Output 1145 ml   Net -755 ml     Current Facility-Administered Medications   Medication Dose Route Frequency    insulin regular (NOVOLIN R, HUMULIN R) injection   SubCUTAneous AC&HS    clindamycin (CLEOCIN) 600mg D5W 50mL IVPB (premix)  600 mg IntraVENous Q8H    levoFLOXacin (LEVAQUIN) 500 mg in D5W IVPB  500 mg IntraVENous Q48H    aspirin chewable tablet 162 mg  162 mg Oral DAILY    oxyCODONE IR (OXY-IR) immediate release tablet 15 mg  15 mg Oral Q4H PRN    pantoprazole (PROTONIX) tablet 40 mg  40 mg Oral ACB    sodium chloride (NS) flush 5-40 mL  5-40 mL IntraVENous Q8H    sodium chloride (NS) flush 5-40 mL  5-40 mL IntraVENous PRN    nitroglycerin (NITROSTAT) tablet 0.4 mg  0.4 mg SubLINGual Q5MIN PRN    morphine injection 2 mg  2 mg IntraVENous Q4H PRN    acetaminophen (TYLENOL) tablet 650 mg  650 mg Oral Q4H PRN    naloxone (NARCAN) injection 0.4 mg  0.4 mg IntraVENous PRN    ondansetron (ZOFRAN) injection 4 mg  4 mg IntraVENous Q4H PRN           Assessment/Plan:     1. Complete heart block resolved patient off amiodarone and other rate control medications at this time. She will likely need rate/rythm control in future will continue to evaluated for Pacemaker as pna improves  2. Atrial fibrillation anticoagulation discussed in the past patient not a candidate. Holding amiodarone now   3. Systolic CHF controlled holding BB  4.  PNA seen by jeanie Lynn MD  3/5/2019 7:58 AM

## 2019-03-06 ENCOUNTER — APPOINTMENT (OUTPATIENT)
Dept: GENERAL RADIOLOGY | Age: 84
DRG: 308 | End: 2019-03-06
Attending: NURSE PRACTITIONER
Payer: MEDICARE

## 2019-03-06 LAB
ANION GAP SERPL CALC-SCNC: 9 MMOL/L (ref 7–16)
ARTERIAL PATENCY WRIST A: YES
ATRIAL RATE: 100 BPM
BASE DEFICIT BLD-SCNC: 4 MMOL/L
BDY SITE: ABNORMAL
BODY TEMPERATURE: 98.6
BUN SERPL-MCNC: 22 MG/DL (ref 8–23)
CALCIUM SERPL-MCNC: 8.7 MG/DL (ref 8.3–10.4)
CALCULATED P AXIS, ECG09: 89 DEGREES
CALCULATED R AXIS, ECG10: -91 DEGREES
CALCULATED T AXIS, ECG11: 87 DEGREES
CHLORIDE SERPL-SCNC: 110 MMOL/L (ref 98–107)
CO2 BLD-SCNC: 22 MMOL/L
CO2 SERPL-SCNC: 25 MMOL/L (ref 21–32)
COLLECT TIME,HTIME: 305
CREAT SERPL-MCNC: 1.22 MG/DL (ref 0.6–1)
DIAGNOSIS, 93000: NORMAL
ERYTHROCYTE [DISTWIDTH] IN BLOOD BY AUTOMATED COUNT: 13.3 % (ref 11.9–14.6)
EXHALED MINUTE VOLUME, VE: 12 L/MIN
GAS FLOW.O2 O2 DELIVERY SYS: ABNORMAL L/MIN
GAS FLOW.O2 SETTING OXYMISER: 10 BPM
GLUCOSE BLD STRIP.AUTO-MCNC: 107 MG/DL (ref 65–100)
GLUCOSE BLD STRIP.AUTO-MCNC: 132 MG/DL (ref 65–100)
GLUCOSE BLD STRIP.AUTO-MCNC: 150 MG/DL (ref 65–100)
GLUCOSE BLD STRIP.AUTO-MCNC: 162 MG/DL (ref 65–100)
GLUCOSE BLD STRIP.AUTO-MCNC: 162 MG/DL (ref 65–100)
GLUCOSE SERPL-MCNC: 197 MG/DL (ref 65–100)
HCO3 BLD-SCNC: 20.7 MMOL/L (ref 22–26)
HCT VFR BLD AUTO: 36.9 % (ref 35.8–46.3)
HGB BLD-MCNC: 11.5 G/DL (ref 11.7–15.4)
MAGNESIUM SERPL-MCNC: 2.2 MG/DL (ref 1.8–2.4)
MCH RBC QN AUTO: 31.3 PG (ref 26.1–32.9)
MCHC RBC AUTO-ENTMCNC: 31.2 G/DL (ref 31.4–35)
MCV RBC AUTO: 100.3 FL (ref 79.6–97.8)
NRBC # BLD: 0 K/UL (ref 0–0.2)
O2/TOTAL GAS SETTING VFR VENT: 50 %
P-R INTERVAL, ECG05: 200 MS
PCO2 BLD: 36 MMHG (ref 35–45)
PEEP RESPIRATORY: 8 CMH2O
PH BLD: 7.37 [PH] (ref 7.35–7.45)
PLATELET # BLD AUTO: 214 K/UL (ref 150–450)
PMV BLD AUTO: 10.6 FL (ref 9.4–12.3)
PO2 BLD: 149 MMHG (ref 75–100)
POTASSIUM SERPL-SCNC: 4.2 MMOL/L (ref 3.5–5.1)
Q-T INTERVAL, ECG07: 408 MS
QRS DURATION, ECG06: 152 MS
QTC CALCULATION (BEZET), ECG08: 526 MS
RBC # BLD AUTO: 3.68 M/UL (ref 4.05–5.2)
SAO2 % BLD: 99 % (ref 95–98)
SERVICE CMNT-IMP: ABNORMAL
SODIUM SERPL-SCNC: 144 MMOL/L (ref 136–145)
SPECIMEN TYPE: ABNORMAL
VENTRICULAR RATE, ECG03: 100 BPM
WBC # BLD AUTO: 14.4 K/UL (ref 4.3–11.1)

## 2019-03-06 PROCEDURE — 74011250636 HC RX REV CODE- 250/636: Performed by: NURSE PRACTITIONER

## 2019-03-06 PROCEDURE — 77010033678 HC OXYGEN DAILY

## 2019-03-06 PROCEDURE — 74011636637 HC RX REV CODE- 636/637: Performed by: INTERNAL MEDICINE

## 2019-03-06 PROCEDURE — 74011250637 HC RX REV CODE- 250/637: Performed by: NURSE PRACTITIONER

## 2019-03-06 PROCEDURE — 77030034849

## 2019-03-06 PROCEDURE — 83735 ASSAY OF MAGNESIUM: CPT

## 2019-03-06 PROCEDURE — 77030021668 HC NEB PREFIL KT VYRM -A

## 2019-03-06 PROCEDURE — 65660000000 HC RM CCU STEPDOWN

## 2019-03-06 PROCEDURE — 77030019605

## 2019-03-06 PROCEDURE — 99233 SBSQ HOSP IP/OBS HIGH 50: CPT | Performed by: INTERNAL MEDICINE

## 2019-03-06 PROCEDURE — 94660 CPAP INITIATION&MGMT: CPT

## 2019-03-06 PROCEDURE — 80048 BASIC METABOLIC PNL TOTAL CA: CPT

## 2019-03-06 PROCEDURE — 36415 COLL VENOUS BLD VENIPUNCTURE: CPT

## 2019-03-06 PROCEDURE — 36600 WITHDRAWAL OF ARTERIAL BLOOD: CPT

## 2019-03-06 PROCEDURE — 82962 GLUCOSE BLOOD TEST: CPT

## 2019-03-06 PROCEDURE — 85027 COMPLETE CBC AUTOMATED: CPT

## 2019-03-06 PROCEDURE — 93005 ELECTROCARDIOGRAM TRACING: CPT | Performed by: NURSE PRACTITIONER

## 2019-03-06 PROCEDURE — 94760 N-INVAS EAR/PLS OXIMETRY 1: CPT

## 2019-03-06 PROCEDURE — 82803 BLOOD GASES ANY COMBINATION: CPT

## 2019-03-06 RX ORDER — FUROSEMIDE 20 MG/1
20 TABLET ORAL EVERY OTHER DAY
Status: DISCONTINUED | OUTPATIENT
Start: 2019-03-07 | End: 2019-03-06 | Stop reason: SDUPTHER

## 2019-03-06 RX ORDER — FUROSEMIDE 20 MG/1
20 TABLET ORAL EVERY OTHER DAY
Status: DISCONTINUED | OUTPATIENT
Start: 2019-03-08 | End: 2019-03-08 | Stop reason: HOSPADM

## 2019-03-06 RX ORDER — FUROSEMIDE 40 MG/1
40 TABLET ORAL EVERY OTHER DAY
Status: DISCONTINUED | OUTPATIENT
Start: 2019-03-07 | End: 2019-03-08 | Stop reason: HOSPADM

## 2019-03-06 RX ORDER — FUROSEMIDE 10 MG/ML
INJECTION INTRAMUSCULAR; INTRAVENOUS
Status: ACTIVE
Start: 2019-03-06 | End: 2019-03-06

## 2019-03-06 RX ORDER — FUROSEMIDE 10 MG/ML
40 INJECTION INTRAMUSCULAR; INTRAVENOUS ONCE
Status: COMPLETED | OUTPATIENT
Start: 2019-03-06 | End: 2019-03-06

## 2019-03-06 RX ADMIN — INSULIN HUMAN 2 UNITS: 100 INJECTION, SOLUTION PARENTERAL at 08:16

## 2019-03-06 RX ADMIN — OXYCODONE HYDROCHLORIDE 15 MG: 15 TABLET ORAL at 15:38

## 2019-03-06 RX ADMIN — NITROGLYCERIN 1 INCH: 20 OINTMENT TOPICAL at 01:43

## 2019-03-06 RX ADMIN — NITROGLYCERIN 0.4 MG: 0.4 TABLET, ORALLY DISINTEGRATING SUBLINGUAL at 01:20

## 2019-03-06 RX ADMIN — FUROSEMIDE 40 MG: 10 INJECTION, SOLUTION INTRAMUSCULAR; INTRAVENOUS at 01:29

## 2019-03-06 RX ADMIN — Medication 5 ML: at 22:04

## 2019-03-06 RX ADMIN — NITROGLYCERIN 1 INCH: 20 OINTMENT TOPICAL at 10:08

## 2019-03-06 RX ADMIN — Medication 5 ML: at 08:18

## 2019-03-06 RX ADMIN — Medication 5 ML: at 05:16

## 2019-03-06 RX ADMIN — OXYCODONE HYDROCHLORIDE 15 MG: 15 TABLET ORAL at 19:47

## 2019-03-06 RX ADMIN — NITROGLYCERIN 1 INCH: 20 OINTMENT TOPICAL at 17:48

## 2019-03-06 RX ADMIN — MORPHINE SULFATE 2 MG: 2 INJECTION, SOLUTION INTRAMUSCULAR; INTRAVENOUS at 01:38

## 2019-03-06 RX ADMIN — ASPIRIN 81 MG 162 MG: 81 TABLET ORAL at 08:16

## 2019-03-06 RX ADMIN — PANTOPRAZOLE SODIUM 40 MG: 40 TABLET, DELAYED RELEASE ORAL at 08:16

## 2019-03-06 RX ADMIN — OXYCODONE HYDROCHLORIDE 15 MG: 15 TABLET ORAL at 05:23

## 2019-03-06 NOTE — PROGRESS NOTES
RRT called Dr. Taj Cosme and he was informed about the patient's condition/series of events. Patient is improving on BiPAP and MD agreed to remove BiPAP for trial to see how the patient does. Patient placed on 6 L HFNC and is stable at the time. Will continue to monitor and reassess the need for BiPAP.

## 2019-03-06 NOTE — PROGRESS NOTES
Patient placed on BiPAP per Carlos Alberto Vaca NP order for increased work of breathing and flash pulmonary edema. Patient's oxygen demand has increased and she has become very tachypneic. Coarse, wet crackles heard on auscultation. Patient's breathing improved some on BiPAP mask, but she is still tachypneic. ABG will be drawn for follow up.

## 2019-03-06 NOTE — PROGRESS NOTES
Bedside and Verbal shift change report given to Sudhir Keller RN (oncoming nurse) by self (offgoing nurse).  Report included the following information SBAR, Kardex, ED Summary, Procedure Summary, Intake/Output, MAR, Recent Results and Cardiac Rhythm SR.

## 2019-03-06 NOTE — PROGRESS NOTES
Problem: Falls - Risk of  Goal: *Absence of Falls  Document Pal Fall Risk and appropriate interventions in the flowsheet. Outcome: Progressing Towards Goal  Fall Risk Interventions:  Mobility Interventions: Bed/chair exit alarm, Communicate number of staff needed for ambulation/transfer, Patient to call before getting OOB    Mentation Interventions: Bed/chair exit alarm, Family/sitter at bedside    Medication Interventions: Bed/chair exit alarm, Teach patient to arise slowly, Patient to call before getting OOB    Elimination Interventions: Call light in reach, Bed/chair exit alarm, Patient to call for help with toileting needs             Problem: Pressure Injury - Risk of  Goal: *Prevention of pressure injury  Document Ahsan Scale and appropriate interventions in the flowsheet. Outcome: Progressing Towards Goal  Pressure Injury Interventions:  Sensory Interventions: Check visual cues for pain, Keep linens dry and wrinkle-free, Minimize linen layers    Moisture Interventions: Absorbent underpads    Activity Interventions: Increase time out of bed, Pressure redistribution bed/mattress(bed type)    Mobility Interventions: HOB 30 degrees or less, Pressure redistribution bed/mattress (bed type)    Nutrition Interventions: Document food/fluid/supplement intake    Friction and Shear Interventions: Lift sheet, HOB 30 degrees or less         Pt up out of bed often today to use restroom. Turns self.

## 2019-03-06 NOTE — PROGRESS NOTES
[facility] 79 CRITICAL CARE OUTREACH NURSE PROGRESS REPORT      SUBJECTIVE: Called to assess patient secondary to transfer out of ICU. MEWS Score: 1 (03/06/19 0817)  Vitals:    03/06/19 0817 03/06/19 1008 03/06/19 1456 03/06/19 1810   BP: 126/60 117/55 117/55 110/57   Pulse: 80 72 68 93   Resp: 20  20 18   Temp: 98.6 °F (37 °C)  97.7 °F (36.5 °C) 98.1 °F (36.7 °C)   SpO2: 100%  100% 98%   Weight:       Height:              LAB DATA:    Recent Labs     03/06/19 0445 03/05/19  0445 03/04/19  0409    142 140   K 4.2 3.7 4.1   * 110* 107   CO2 25 26 25   AGAP 9 6* 8   * 88 98   BUN 22 21 28*   CREA 1.22* 0.93 1.21*   GFRAA 53* >60 53*   GFRNA 44* 60* 44*   CA 8.7 8.1* 8.5   MG 2.2 2.1 2.1        Recent Labs     03/06/19 0445 03/05/19  0445 03/04/19  0409   WBC 14.4* 7.3 11.7*   HGB 11.5* 9.4* 10.4*   HCT 36.9 29.4* 33.5*    169 184          OBJECTIVE: On arrival to room, I found patient to be sitting in bed with family at bedside. Pain Assessment  Pain Intensity 1: 2 (03/06/19 1601)  Pain Location 1: Back  Pain Intervention(s) 1: Medication (see MAR)  Patient Stated Pain Goal: 0                                 ASSESSMENT:  Patient is in bed on 6L NC. She voices no complaints or concerns. VSS. Spoke with primary RN and told to call with any questions or concerns. PLAN:  Will continue to round per outreach protocol.

## 2019-03-06 NOTE — PROGRESS NOTES
Followed up with patient and daughter for d/c planning. Current d/c plan is home with home health if needed. Patient lives with her daughters and has needed DME.  CM following for d/c

## 2019-03-06 NOTE — PROGRESS NOTES
No weight to be obtained this morning; Pt placed on BIPAP last night and respiratory status is not stable for pt to do much activity. [place] inserted to decrease ambulation to the bathroom and for accurate output. Charge RN Aretha Power notified. Will obtain weight once deemed appropriate for pt to ambulate.

## 2019-03-06 NOTE — PROGRESS NOTES
[facility]  Admission Date: 3/2/2019             Pulmonary Daily Progress Note: 3/6/2019     81 y/o female admitted with presyncope in complete heart block after taking home BB dose, transvenously paced and appears to be in heart failure placed on BIPAP overnight. Subjective:     Signed off of patient yesterday afternoon as was on RA, breathing comfortably with sat of 96%. She was doing well all night until around 0130 when she had acute onset chest pain, hypertension and subsequent respiratory distress. She was placed on BIPAP, given lasix 40 IV and 1\" nitro paste for presumed flash pulmonary edema. Her BP has improved and dyspnea has improved. She has been taken off BIPAP and placed on 6L NC and is saturating well, appears comfortable with O2 breathing without tachypnea. Daughter is at bedside and states she is doing much better.      Current Facility-Administered Medications   Medication Dose Route Frequency    furosemide (LASIX) 10 mg/mL injection        nitroglycerin (NITROBID) 2 % ointment 1 Inch  1 Inch Topical BID    insulin regular (NOVOLIN R, HUMULIN R) injection   SubCUTAneous AC&HS    levoFLOXacin (LEVAQUIN) 500 mg in D5W IVPB  500 mg IntraVENous Q48H    aspirin chewable tablet 162 mg  162 mg Oral DAILY    oxyCODONE IR (OXY-IR) immediate release tablet 15 mg  15 mg Oral Q4H PRN    pantoprazole (PROTONIX) tablet 40 mg  40 mg Oral ACB    sodium chloride (NS) flush 5-40 mL  5-40 mL IntraVENous Q8H    sodium chloride (NS) flush 5-40 mL  5-40 mL IntraVENous PRN    nitroglycerin (NITROSTAT) tablet 0.4 mg  0.4 mg SubLINGual Q5MIN PRN    morphine injection 2 mg  2 mg IntraVENous Q4H PRN    acetaminophen (TYLENOL) tablet 650 mg  650 mg Oral Q4H PRN    naloxone (NARCAN) injection 0.4 mg  0.4 mg IntraVENous PRN    ondansetron (ZOFRAN) injection 4 mg  4 mg IntraVENous Q4H PRN     Objective:     Vitals:    03/06/19 0227 03/06/19 0229 03/06/19 0402 03/06/19 0445   BP:  163/78 124/62    Pulse:  (!) 115 93    Resp:       Temp:       SpO2: 100% 100% 99% 99%   Weight:       Height:         Intake and Output:   03/04 0701 - 03/05 1900  In: 370 [P.O.:870]  Out: 1870 [Urine:1870]  03/05 1901 - 03/06 0700  In: 100 [P.O.:100]  Out: 900 [Urine:900]    Physical Exam:          GEN: acutely ill,   HEENT: no alar flaring or epistaxis, oral mucosa moist without cyanosis,   NECK:  no JVD, no retractions, no thyromegaly or masses,   LUNGS:  Crackles bilaterally  HEART:  RRR with no M,G,R;  ABDOMEN:  soft with no tenderness; positive bowel sounds present  EXTREMITIES:  warm with no cyanosis, no lower leg edema  SKIN:  no jaundice or ecchymosis   NEURO:  A& O X 3, calm, pleasant    LINES: PIV, rios  DRIPS: none  NUTRITION: mechanical soft, 2 gm    CHEST XRAY: none today  3/4      LAB  Recent Labs     03/05/19  0445 03/04/19  0409   WBC 7.3 11.7*   HGB 9.4* 10.4*   HCT 29.4* 33.5*    184     Recent Labs     03/05/19  0445 03/04/19  0409    140   K 3.7 4.1   * 107   CO2 26 25   GLU 88 98   BUN 21 28*   CREA 0.93 1.21*   MG 2.1 2.1     Cultures: NTD    Assessment:     Patient Active Problem List   Diagnosis Code    CAD (coronary artery disease) I25.10    Hypertension I10    Acute hypoxemic respiratory failure ([facility]) J96.01    Atrial fibrillation ([facility]) I48.91    Acute exacerbation of CHF (congestive heart failure) ([facility]) F74.1    Systolic CHF, acute on chronic ([facility]) I50.23    GERD (gastroesophageal reflux disease) K21.9    Spinal stenosis M48.00    Complete heart block ([facility]) I44.2    Pulmonary infiltrates R91.8       Plan       Complete heart block (HCC) (3/2/2019)  Per cardiology: Due to her age and comorbidities ICD or CRT does not appear appropriate. No pacing the past 24 hours plan to pull temporary wire this morning and place pads. Patient has elevated white count and is being treated for pneumonia so pacemaker will be placed at a later time.       CAD (coronary artery disease) (11/29/2015) Acute hypoxemic respiratory failure ([facility] [address] (7/29/2018)  On NC, wean as tolerated, appears to be tolerating off BIPAP at this time      Atrial fibrillation ([facility] [address] (8/31/2018)  No anticoagulation       Systolic [facility], [facility] (9/28/2018)  Ejection fraction was estimated in the range of 30 % to 35 %. Flash Pulmonary edema: high risk for recurrence, careful control of BP and volume per cardiology      -- one more levaquin dose on 3/7  -- consider scheduled lasix per cardiology (last given at 0130)  -- PT/OT, mobilize when able  -- wean O2, appears stable off BIPAP for now    DNR/DNI    More than 50% of time documented was spent in face-to-face contact with the patient and in the care of the patient on the floor/unit where the patient is located.      Gisela Roberts MD

## 2019-03-06 NOTE — PROGRESS NOTES
[facility] CARDIOLOGY PROGRESS NOTE           3/6/2019 7:58 AM    Admit Date: 3/2/2019      Subjective:     No events       Review of Systems   Constitutional: Negative for fever. Cardiovascular: Negative for chest pain. Musculoskeletal: Negative for myalgias. Skin: Negative for rash. Neurological: Negative for dizziness. Objective:      Vitals:    03/06/19 0402 03/06/19 0445 03/06/19 0512 03/06/19 0817   BP: 124/62  128/59 126/60   Pulse: 93  82 80   Resp:   24 20   Temp:   97.3 °F (36.3 °C) 98.6 °F (37 °C)   SpO2: 99% 99% 100% 100%   Weight:       Height:             Physical Exam   HENT:   Head: Normocephalic and atraumatic. Eyes: Conjunctivae are normal. Pupils are equal, round, and reactive to light. Neck: Normal range of motion. No JVD present. Cardiovascular: Normal rate and normal heart sounds. No murmur heard. Pulmonary/Chest: Effort normal. She has no wheezes. Abdominal: She exhibits no distension. Musculoskeletal: She exhibits no edema. Neurological: She is alert. Skin: Skin is warm. She is not diaphoretic. Psychiatric: She has a normal mood and affect.        Data Review:   Recent Labs     03/06/19  0445 03/05/19  0445    142   K 4.2 3.7   MG 2.2 2.1   BUN 22 21   CREA 1.22* 0.93   * 88   WBC 14.4* 7.3   HGB 11.5* 9.4*   HCT 36.9 29.4*    169         Intake/Output Summary (Last 24 hours) at 3/6/2019 1003  Last data filed at 3/6/2019 0817  Gross per 24 hour   Intake 580 ml   Output 1500 ml   Net -920 ml     Current Facility-Administered Medications   Medication Dose Route Frequency    furosemide (LASIX) 10 mg/mL injection        nitroglycerin (NITROBID) 2 % ointment 1 Inch  1 Inch Topical BID    [START ON 3/7/2019] furosemide (LASIX) tablet 20 mg  20 mg Oral EVERY OTHER DAY    insulin regular (NOVOLIN R, HUMULIN R) injection   SubCUTAneous AC&HS    levoFLOXacin (LEVAQUIN) 500 mg in D5W IVPB  500 mg IntraVENous Q48H    aspirin chewable tablet 162 mg  162 mg Oral DAILY    oxyCODONE IR (OXY-IR) immediate release tablet 15 mg  15 mg Oral Q4H PRN    pantoprazole (PROTONIX) tablet 40 mg  40 mg Oral ACB    sodium chloride (NS) flush 5-40 mL  5-40 mL IntraVENous Q8H    sodium chloride (NS) flush 5-40 mL  5-40 mL IntraVENous PRN    nitroglycerin (NITROSTAT) tablet 0.4 mg  0.4 mg SubLINGual Q5MIN PRN    morphine injection 2 mg  2 mg IntraVENous Q4H PRN    acetaminophen (TYLENOL) tablet 650 mg  650 mg Oral Q4H PRN    naloxone (NARCAN) injection 0.4 mg  0.4 mg IntraVENous PRN    ondansetron (ZOFRAN) injection 4 mg  4 mg IntraVENous Q4H PRN           Assessment/Plan:     1. Complete heart block resolved patient off amiodarone and other rate control medications at this time. She will likely need rate/rythm control in future will continue to evaluated for Pacemaker as pna improves. Last dose scheduled 3/7.   2. Atrial fibrillation anticoagulation discussed in the past patient not a candidate. Holding amiodarone now   3. Systolic CHF Uncontrolled flash pulmonary edema overnight receive IV Lasix. Resume Lasix dosing home dosing alternates between 40 mg and 20 mg every other day. Start 40 mg tomorrow. 4. PNA seen by pulm improving   5. AF no AC holding amiodarone.              Ever Arboleda MD  3/6/2019 7:58 AM

## 2019-03-06 NOTE — PROGRESS NOTES
During pt assessment pt was resting calmly, eyes closed, respirations present, lung sounds clear. When PCT Abhi Hernandez went into the room for vitals and the patient woke up, she complained of 7/10 chest pain, diaphoresis, and difficulty breathing. /78  O2 93%  Placed on 2L NC. Blood sugar checked: 162  Pain increased to 10/10 with increased work of breathing RR 36. BP remaining 170s/100s, HR 120s-130s  Spoke with Deborah Valera, who ordered 40 IV Lasix, insertion of a rios catheter for accurate IOs, morphine for pain as per MAR, and nitro paste for chest.  Otila Dilling came to bedside to assess pt, deeming possible flash pulmonary edema. 1 Nitro sublingual given as well. Pt sounding coarse, with crackles, expiratory wheezing. 200mL urine voided with placement of the rios. Current BP at 0205: 167/100 with  O2 95  Orders received for BIPAP and an ABG. Pt placed on BIPAP 0216. Eagle placed in the room to monitor HR, BP, and O2 sat.

## 2019-03-07 LAB
ANION GAP SERPL CALC-SCNC: 6 MMOL/L (ref 7–16)
BUN SERPL-MCNC: 25 MG/DL (ref 8–23)
CALCIUM SERPL-MCNC: 8.2 MG/DL (ref 8.3–10.4)
CHLORIDE SERPL-SCNC: 109 MMOL/L (ref 98–107)
CO2 SERPL-SCNC: 28 MMOL/L (ref 21–32)
CREAT SERPL-MCNC: 0.95 MG/DL (ref 0.6–1)
ERYTHROCYTE [DISTWIDTH] IN BLOOD BY AUTOMATED COUNT: 13.4 % (ref 11.9–14.6)
GLUCOSE BLD STRIP.AUTO-MCNC: 112 MG/DL (ref 65–100)
GLUCOSE BLD STRIP.AUTO-MCNC: 133 MG/DL (ref 65–100)
GLUCOSE BLD STRIP.AUTO-MCNC: 160 MG/DL (ref 65–100)
GLUCOSE BLD STRIP.AUTO-MCNC: 98 MG/DL (ref 65–100)
GLUCOSE SERPL-MCNC: 104 MG/DL (ref 65–100)
HCT VFR BLD AUTO: 31.4 % (ref 35.8–46.3)
HGB BLD-MCNC: 9.9 G/DL (ref 11.7–15.4)
MAGNESIUM SERPL-MCNC: 2.1 MG/DL (ref 1.8–2.4)
MCH RBC QN AUTO: 30.7 PG (ref 26.1–32.9)
MCHC RBC AUTO-ENTMCNC: 31.5 G/DL (ref 31.4–35)
MCV RBC AUTO: 97.5 FL (ref 79.6–97.8)
NRBC # BLD: 0 K/UL (ref 0–0.2)
PLATELET # BLD AUTO: 181 K/UL (ref 150–450)
PMV BLD AUTO: 10.6 FL (ref 9.4–12.3)
POTASSIUM SERPL-SCNC: 3.8 MMOL/L (ref 3.5–5.1)
RBC # BLD AUTO: 3.22 M/UL (ref 4.05–5.2)
SODIUM SERPL-SCNC: 143 MMOL/L (ref 136–145)
WBC # BLD AUTO: 7.8 K/UL (ref 4.3–11.1)

## 2019-03-07 PROCEDURE — 74011250637 HC RX REV CODE- 250/637: Performed by: NURSE PRACTITIONER

## 2019-03-07 PROCEDURE — 94760 N-INVAS EAR/PLS OXIMETRY 1: CPT

## 2019-03-07 PROCEDURE — 74011636637 HC RX REV CODE- 636/637: Performed by: INTERNAL MEDICINE

## 2019-03-07 PROCEDURE — 74011250637 HC RX REV CODE- 250/637: Performed by: INTERNAL MEDICINE

## 2019-03-07 PROCEDURE — 85027 COMPLETE CBC AUTOMATED: CPT

## 2019-03-07 PROCEDURE — 83735 ASSAY OF MAGNESIUM: CPT

## 2019-03-07 PROCEDURE — 74011250636 HC RX REV CODE- 250/636: Performed by: INTERNAL MEDICINE

## 2019-03-07 PROCEDURE — 65660000000 HC RM CCU STEPDOWN

## 2019-03-07 PROCEDURE — 36415 COLL VENOUS BLD VENIPUNCTURE: CPT

## 2019-03-07 PROCEDURE — 51798 US URINE CAPACITY MEASURE: CPT

## 2019-03-07 PROCEDURE — 99232 SBSQ HOSP IP/OBS MODERATE 35: CPT | Performed by: INTERNAL MEDICINE

## 2019-03-07 PROCEDURE — 82962 GLUCOSE BLOOD TEST: CPT

## 2019-03-07 PROCEDURE — 97161 PT EVAL LOW COMPLEX 20 MIN: CPT

## 2019-03-07 PROCEDURE — BT40ZZZ ULTRASONOGRAPHY OF BLADDER: ICD-10-PCS | Performed by: INTERNAL MEDICINE

## 2019-03-07 PROCEDURE — 80048 BASIC METABOLIC PNL TOTAL CA: CPT

## 2019-03-07 PROCEDURE — 97530 THERAPEUTIC ACTIVITIES: CPT

## 2019-03-07 RX ORDER — LEVOFLOXACIN 500 MG/1
500 TABLET, FILM COATED ORAL ONCE
Status: COMPLETED | OUTPATIENT
Start: 2019-03-07 | End: 2019-03-07

## 2019-03-07 RX ORDER — HEPARIN SODIUM 5000 [USP'U]/ML
5000 INJECTION, SOLUTION INTRAVENOUS; SUBCUTANEOUS EVERY 12 HOURS
Status: DISCONTINUED | OUTPATIENT
Start: 2019-03-07 | End: 2019-03-08 | Stop reason: HOSPADM

## 2019-03-07 RX ADMIN — OXYCODONE HYDROCHLORIDE 15 MG: 15 TABLET ORAL at 17:25

## 2019-03-07 RX ADMIN — INSULIN HUMAN 2 UNITS: 100 INJECTION, SOLUTION PARENTERAL at 17:25

## 2019-03-07 RX ADMIN — OXYCODONE HYDROCHLORIDE 15 MG: 15 TABLET ORAL at 00:24

## 2019-03-07 RX ADMIN — HEPARIN SODIUM 5000 UNITS: 5000 INJECTION INTRAVENOUS; SUBCUTANEOUS at 21:52

## 2019-03-07 RX ADMIN — Medication 10 ML: at 05:21

## 2019-03-07 RX ADMIN — HEPARIN SODIUM 5000 UNITS: 5000 INJECTION INTRAVENOUS; SUBCUTANEOUS at 08:00

## 2019-03-07 RX ADMIN — OXYCODONE HYDROCHLORIDE 15 MG: 15 TABLET ORAL at 22:47

## 2019-03-07 RX ADMIN — Medication 10 ML: at 22:50

## 2019-03-07 RX ADMIN — Medication 10 ML: at 13:21

## 2019-03-07 RX ADMIN — LEVOFLOXACIN 500 MG: 500 TABLET, FILM COATED ORAL at 13:20

## 2019-03-07 RX ADMIN — OXYCODONE HYDROCHLORIDE 15 MG: 15 TABLET ORAL at 13:20

## 2019-03-07 RX ADMIN — OXYCODONE HYDROCHLORIDE 15 MG: 15 TABLET ORAL at 08:41

## 2019-03-07 RX ADMIN — NITROGLYCERIN 1 INCH: 20 OINTMENT TOPICAL at 08:41

## 2019-03-07 RX ADMIN — OXYCODONE HYDROCHLORIDE 15 MG: 15 TABLET ORAL at 04:50

## 2019-03-07 RX ADMIN — FUROSEMIDE 40 MG: 40 TABLET ORAL at 08:00

## 2019-03-07 RX ADMIN — PANTOPRAZOLE SODIUM 40 MG: 40 TABLET, DELAYED RELEASE ORAL at 07:55

## 2019-03-07 RX ADMIN — ASPIRIN 81 MG 162 MG: 81 TABLET ORAL at 08:00

## 2019-03-07 RX ADMIN — NITROGLYCERIN 1 INCH: 20 OINTMENT TOPICAL at 17:25

## 2019-03-07 NOTE — PROGRESS NOTES
[facility] CARDIOLOGY PROGRESS NOTE           3/7/2019 6:57 AM    Admit Date: 3/2/2019    Admit Diagnosis: Complete heart block (HCC) [I44.2]      Subjective:   No complaints this AM, no chest pain or shortness of breath    Interval History: (History of pertinent interval events obtained from nursing staff)  HRs stable this AM    ROS:  GEN:  No fever or chills  Cardiovascular:  As noted above  Pulmonary:  As noted above  Neuro:  No new focal motor or sensory loss      Objective:     Vitals:    03/06/19 2134 03/06/19 2330 03/07/19 0057 03/07/19 0505   BP: 130/69  132/65 139/57   Pulse: 77  79 70   Resp: 18  18 18   Temp: 98 °F (36.7 °C)  97.9 °F (36.6 °C) 97.7 °F (36.5 °C)   SpO2: 100% 100% 100% 100%   Weight:    46.1 kg (101 lb 9.6 oz)   Height:           Physical Exam:  General-thin, frail, No Acute Distress, Alert & Oriented x 3, appropriate mood. Neck- supple, no JVD  CV- regular rate and rhythm no MRG  Lung- clear bilaterally  Abd- soft, nontender, nondistended  Ext- no edema bilaterally.   Skin- warm and dry    Current Facility-Administered Medications   Medication Dose Route Frequency    nitroglycerin (NITROBID) 2 % ointment 1 Inch  1 Inch Topical BID    furosemide (LASIX) tablet 40 mg  40 mg Oral EVERY OTHER DAY    [START ON 3/8/2019] furosemide (LASIX) tablet 20 mg  20 mg Oral EVERY OTHER DAY    insulin regular (NOVOLIN R, HUMULIN R) injection   SubCUTAneous AC&HS    levoFLOXacin (LEVAQUIN) 500 mg in D5W IVPB  500 mg IntraVENous Q48H    aspirin chewable tablet 162 mg  162 mg Oral DAILY    oxyCODONE IR (OXY-IR) immediate release tablet 15 mg  15 mg Oral Q4H PRN    pantoprazole (PROTONIX) tablet 40 mg  40 mg Oral ACB    sodium chloride (NS) flush 5-40 mL  5-40 mL IntraVENous Q8H    sodium chloride (NS) flush 5-40 mL  5-40 mL IntraVENous PRN    nitroglycerin (NITROSTAT) tablet 0.4 mg  0.4 mg SubLINGual Q5MIN PRN    morphine injection 2 mg  2 mg IntraVENous Q4H PRN    acetaminophen (TYLENOL) tablet 650 mg  650 mg Oral Q4H PRN    naloxone (NARCAN) injection 0.4 mg  0.4 mg IntraVENous PRN    ondansetron (ZOFRAN) injection 4 mg  4 mg IntraVENous Q4H PRN     Data Review:   Recent Results (from the past 24 hour(s))   GLUCOSE, POC    Collection Time: 03/06/19 10:34 AM   Result Value Ref Range    Glucose (POC) 150 (H) 65 - 100 mg/dL   GLUCOSE, POC    Collection Time: 03/06/19  4:14 PM   Result Value Ref Range    Glucose (POC) 132 (H) 65 - 100 mg/dL   GLUCOSE, POC    Collection Time: 03/06/19  9:42 PM   Result Value Ref Range    Glucose (POC) 107 (H) 65 - 767 mg/dL   METABOLIC PANEL, BASIC    Collection Time: 03/07/19  3:35 AM   Result Value Ref Range    Sodium 143 136 - 145 mmol/L    Potassium 3.8 3.5 - 5.1 mmol/L    Chloride 109 (H) 98 - 107 mmol/L    CO2 28 21 - 32 mmol/L    Anion gap 6 (L) 7 - 16 mmol/L    Glucose 104 (H) 65 - 100 mg/dL    BUN 25 (H) 8 - 23 MG/DL    Creatinine 0.95 0.6 - 1.0 MG/DL    GFR est AA >60 >60 ml/min/1.73m2    GFR est non-AA 58 (L) >60 ml/min/1.73m2    Calcium 8.2 (L) 8.3 - 10.4 MG/DL   CBC W/O DIFF    Collection Time: 03/07/19  3:35 AM   Result Value Ref Range    WBC 7.8 4.3 - 11.1 K/uL    RBC 3.22 (L) 4.05 - 5.2 M/uL    HGB 9.9 (L) 11.7 - 15.4 g/dL    HCT 31.4 (L) 35.8 - 46.3 %    MCV 97.5 79.6 - 97.8 FL    MCH 30.7 26.1 - 32.9 PG    MCHC 31.5 31.4 - 35.0 g/dL    RDW 13.4 11.9 - 14.6 %    PLATELET 246 687 - 085 K/uL    MPV 10.6 9.4 - 12.3 FL    ABSOLUTE NRBC 0.00 0.0 - 0.2 K/uL   MAGNESIUM    Collection Time: 03/07/19  3:35 AM   Result Value Ref Range    Magnesium 2.1 1.8 - 2.4 mg/dL   GLUCOSE, POC    Collection Time: 03/07/19  6:11 AM   Result Value Ref Range    Glucose (POC) 98 65 - 100 mg/dL       EKG:  (EKG has been independently visualized by me with interpretation below)  Assessment:     Principal Problem:    Complete heart block (HCC) (3/2/2019)    Active Problems:    CAD (coronary artery disease) (11/29/2015)      Hypertension (12/1/2015) Acute hypoxemic respiratory failure (HCC) (7/29/2018)      Atrial fibrillation ([facility] [address] (2/65/1770)      Systolic CHF, acute on chronic (HCC) (9/28/2018)      GERD (gastroesophageal reflux disease) (3/2/2019)      Spinal stenosis (3/2/2019)      Pulmonary infiltrates (3/4/2019)      Plan:   1. High degree AV block: resolved, patient off amiodarone and other rate control medications at this time. She will likely need rate/rythm control in future will continue to evaluated for Pacemaker as pna improves. Currently no urgent indication for PPM.  2. Atrial fibrillation anticoagulation discussed in the past patient not a candidate  3. Systolic CHF, uncontrolled:  flash pulmonary edema this admission, improved. Resume Lasix home dosing alternates between 40 mg and 20 mg every other day. Start 40 mg tomorrow. 4. PNA seen by pulm improving   5. PT evaluation today  6. PPx: gerardo Thompson MD  Cardiology/Electrophysiology

## 2019-03-07 NOTE — PROGRESS NOTES
Bedside and Verbal shift change report given to Eufemia Section, RN (oncoming nurse) by self (offgoing nurse). Report included the following information SBAR, Kardex, MAR and Recent Results.

## 2019-03-07 NOTE — PROGRESS NOTES
[facility]  [facility]: 3[facility]             Daily Progress Note: 3/7/2019    The patient's chart is reviewed and the patient is discussed with the staff. 81 y/o female admitted with presyncope in complete heart block after taking home BB dose, transvenously paced. Stopped beta blocker and amiodarone. We were consulted and treating for pneumonia. She developed flash pulmonary edema on 3/6 and was placed on bipap. Now on daily lasix and blood pressure controlled. Subjective:     Some intermittent dyspnea. Daughter at bedside - asking about PT and oxygen.      Current Facility-Administered Medications   Medication Dose Route Frequency    heparin (porcine) injection 5,000 Units  5,000 Units SubCUTAneous Q12H    levoFLOXacin (LEVAQUIN) tablet 500 mg  500 mg Oral ONCE    nitroglycerin (NITROBID) 2 % ointment 1 Inch  1 Inch Topical BID    furosemide (LASIX) tablet 40 mg  40 mg Oral EVERY OTHER DAY    [START ON 3/8/2019] furosemide (LASIX) tablet 20 mg  20 mg Oral EVERY OTHER DAY    insulin regular (NOVOLIN R, HUMULIN R) injection   SubCUTAneous AC&HS    aspirin chewable tablet 162 mg  162 mg Oral DAILY    oxyCODONE IR (OXY-IR) immediate release tablet 15 mg  15 mg Oral Q4H PRN    pantoprazole (PROTONIX) tablet 40 mg  40 mg Oral ACB    sodium chloride (NS) flush 5-40 mL  5-40 mL IntraVENous Q8H    sodium chloride (NS) flush 5-40 mL  5-40 mL IntraVENous PRN    nitroglycerin (NITROSTAT) tablet 0.4 mg  0.4 mg SubLINGual Q5MIN PRN    morphine injection 2 mg  2 mg IntraVENous Q4H PRN    acetaminophen (TYLENOL) tablet 650 mg  650 mg Oral Q4H PRN    naloxone (NARCAN) injection 0.4 mg  0.4 mg IntraVENous PRN    ondansetron (ZOFRAN) injection 4 mg  4 mg IntraVENous Q4H PRN         Objective:     Vitals:    03/07/19 0505 03/07/19 0800 03/07/19 0841 03/07/19 0900   BP: 139/57 115/56 115/56 105/47   Pulse: 70 65 74 79   Resp: 18   16   Temp: 97.7 °F (36.5 °C)   97.9 °F (36.6 °C)   SpO2: 100%   99%   Weight: 101 lb 9.6 oz (46.1 kg)      Height:         Intake and Output:   03/05 1901 - 03/07 0700  In: 80 [P.O.:580]  Out: 2050 [Urine:2050]  03/07 0701 - 03/07 1900  In: [de-identified] [P.O.:80]  Out: 100 [Urine:100]    Physical Exam:   Constitutional:  the patient is well developed and in no acute distress  HEENT:  Sclera clear, pupils equal, oral mucosa moist  Lungs: clear bilaterally. Wearing 4 liter cannula  Cardiovascular:  RRR without M,G,R  Abd/GI: soft and non-tender; with positive bowel sounds. Ext: warm without cyanosis. There is no lower leg edema. Musculoskeletal: moves all four extremities with equal strength  Skin:  no jaundice or rashes, no wounds   Neuro: no gross neuro deficits   Musculoskeletal: ? can ambulate - not witnessed. No deformity  Psychiatric: Calm. ROS:  Intermittent dyspnea, weak   Lines: peripheral IV, rios    CHEST XRAY:       LAB  Recent Labs     03/07/19 0335 03/06/19 0445 03/05/19 0445   WBC 7.8 14.4* 7.3   HGB 9.9* 11.5* 9.4*   HCT 31.4* 36.9 29.4*    214 169     Recent Labs     03/07/19 0335 03/06/19 0445 03/05/19 0445    144 142   K 3.8 4.2 3.7   * 110* 110*   CO2 28 25 26   * 197* 88   BUN 25* 22 21   CREA 0.95 1.22* 0.93   MG 2.1 2.2 2.1         Assessment:  (Medical Decision Making)     Hospital Problems  Date Reviewed: 8/14/2018          Codes Class Noted POA    Pulmonary infiltrates ICD-10-CM: R91.8  ICD-9-CM: 793.19  3/4/2019 Unknown    Rx for pneumonia and pulmonary edema    GERD (gastroesophageal reflux disease) (Chronic) ICD-10-CM: K21.9  ICD-9-CM: 530.81  3/2/2019 Yes        Spinal stenosis (Chronic) ICD-10-CM: M48.00  ICD-9-CM: 724.00  3/2/2019 Yes        * (Principal) Complete heart block ([address] ICD-10-CM: I44.2  ICD-9-CM: 426.0  3/2/2019 Yes    Resolved. Off amio and beta blocker    Systolic CHF, acute on chronic (HCC) (Chronic) ICD-10-CM: I50.23  ICD-9-CM: 428.23, 428.0  9/28/2018 Yes    Now on daily lasix.  Fluid balance negative    Atrial fibrillation (HCC) (Chronic) ICD-10-CM: I48.91  ICD-9-CM: 427.31  8/31/2018 Yes    Sinus with 1st degree block at present    Acute hypoxemic respiratory failure [facility]) ICD-10-CM: J96.01  ICD-9-CM: 518.81  7/29/2018 Yes    Improved. Currently on 4 liter cannula    Hypertension (Chronic) ICD-10-CM: I10  ICD-9-CM: 401.9  12/1/2015 Yes     now controlled    CAD (coronary artery disease) (Chronic) ICD-10-CM: I25.10  ICD-9-CM: 414.00  11/29/2015 Yes              Plan:  (Medical Decision Making)   1. Oxygen down to 4 liters -wean off  2. Daily lasix with negative balance. Blood pressure controlled  3. PT consult   4. Remove rios  5. Day 7/7 levaquin for pna    Antonio Valerio NP    More than 50% of time documented was spent face-to-face contact with the patient and in the care of the patient on the floor/unit where the patient is located. Lungs:  Mild bibasilar crackles  Heart:  RRR with no Murmur/Rubs/Gallops    Additional Comments:    Diuresing well. Satting 100% on 1L O2 currently. Has some L flank pain. Thinks may be related to positioning in bed. Will try to get OOB to chair and cont to wean off o2 as sats allow. Finish abx today and then stop. I have spoken with and examined the patient. I agree with the above assessment and plan as documented.     Tita Leggett MD

## 2019-03-07 NOTE — PROGRESS NOTES
Problem: Falls - Risk of  Goal: *Absence of Falls  Document Pal Fall Risk and appropriate interventions in the flowsheet. Outcome: Progressing Towards Goal  Fall Risk Interventions:  Mobility Interventions: Bed/chair exit alarm, Communicate number of staff needed for ambulation/transfer, Patient to call before getting OOB, PT Consult for mobility concerns    Mentation Interventions: Bed/chair exit alarm, Door open when patient unattended, Family/sitter at bedside    Medication Interventions: Bed/chair exit alarm, Evaluate medications/consider consulting pharmacy, Patient to call before getting OOB, Teach patient to arise slowly    Elimination Interventions: Bed/chair exit alarm, Call light in reach    History of Falls Interventions: Bed/chair exit alarm, Door open when patient unattended, Investigate reason for fall, Room close to nurse's station        Problem: Pressure Injury - Risk of  Goal: *Prevention of pressure injury  Document Ahsan Scale and appropriate interventions in the flowsheet.   Outcome: Progressing Towards Goal  Pressure Injury Interventions:  Sensory Interventions: Assess changes in LOC    Moisture Interventions: Absorbent underpads    Activity Interventions: Assess need for specialty bed, Increase time out of bed, PT/OT evaluation    Mobility Interventions: HOB 30 degrees or less, Pressure redistribution bed/mattress (bed type), PT/OT evaluation    Nutrition Interventions: Document food/fluid/supplement intake    Friction and Shear Interventions: Apply protective barrier, creams and emollients, Foam dressings/transparent film/skin sealants, Minimize layers

## 2019-03-07 NOTE — PROGRESS NOTES
Problem: Mobility Impaired (Adult and Pediatric)  Goal: *Acute Goals and Plan of Care (Insert Text)  STG:  (1.)Ms. Jarett Joseph will move from supine to sit and sit to supine , scoot up and down and roll side to side with STAND BY ASSIST within 3 treatment day(s). (2.)Ms. Jarett Joseph will transfer from bed to chair and chair to bed with STAND BY ASSIST using the least restrictive device within 3 treatment day(s). (3.)Ms. Jarett Joseph will ambulate with STAND BY ASSIST for 150 feet with the least restrictive device within 3 treatment day(s). (4.)Ms. Jarett Joseph will perform standing static and dynamic balance activities x 15 minutes with STAND BY ASSIST to improve safety within 3 day(s). (5.)Ms. Jarett Joseph will maintain stable vital signs throughout all functional mobility within 3 days. LTG:  (1.)Ms. Jarett Joseph will move from supine to sit and sit to supine , scoot up and down and roll side to side in bed with SUPERVISION within 7 treatment day(s). (2.)Ms. Jarett Joseph will transfer from bed to chair and chair to bed with SUPERVISION using the least restrictive device within 7 treatment day(s). (3.)Ms. Jarett Joseph will ambulate with SUPERVISION for 300 feet with the least restrictive device within 7 treatment day(s). (4.)Ms. Jarett Joseph will perform standing static and dynamic balance activities x 15 minutes with SUPERVISION to improve safety within 7 day(s). ________________________________________________________________________________________________      PHYSICAL THERAPY: Initial Assessment, Daily Note and PM 3/7/2019  INPATIENT: PT Visit Days : 1  [place] / [place] PART A [place] B / Product Type: Medicare /       NAME/AGE/GENDER: Greg Sullivan is a 80 y.o. female   PRIMARY DIAGNOSIS: Complete heart block ([address] [I44.2] Complete heart block ([address] Complete heart block ([address]       ICD-10: Treatment Diagnosis:    · Difficulty in walking, Not elsewhere classified (R26.2)   Precaution/Allergies:  Amlodipine;  Amoxicillin; and Tetracycline      ASSESSMENT:     Ms. Andrews Sosa is a 80 y.o. female admitted for complete heart block. She is supine on contact and agreeable to physical therapy evaluation and treatment. She lives with 2 daughters in a single story home and typically ambulates with a rollator in the home, cane in the community. Daughters assist with ADLs and patient has experienced 1 fall in the past 6 months in the bathroom. Patient is supine on 1 L/min O2 and SpO2 99%, removed for mobility. She required CGA to transfer to sitting, stood with CGA and ambulated within room a short distance with minimal assist and rolling walker. Treatment initiated to include further ambulation with verbal cues for safety and rolling walker negotiation. SpO2 95% throughout ambulation. Improved to CGA with ambulation, SBA at times. She returned to bed post ambulation and SpO2 98% on room air at rest. Notified RN and RT and left on room air with instruction to call if she experiences SOB. Varsha Macias is currently functioning below her baseline and would benefit from skilled PT during acute care stay to maximize safety and independence with functional mobility. This section established at most recent assessment   PROBLEM LIST (Impairments causing functional limitations):  1. Decreased Strength  2. Decreased ADL/Functional Activities  3. Decreased Transfer Abilities  4. Decreased Ambulation Ability/Technique  5. Decreased Balance  6. Increased Pain  7. Decreased Activity Tolerance  8. Decreased Pacing Skills  9. Increased Shortness of Breath  10. Decreased Knowledge of Precautions  11. Decreased [place] with Home Exercise Program   INTERVENTIONS PLANNED: (Benefits and precautions of physical therapy have been discussed with the patient.)  1. Balance Exercise  2. Bed Mobility  3. Family Education  4. Gait Training  5. Group Therapy  6. Home Exercise Program (HEP)  7. Therapeutic Activites  8. Therapeutic Exercise/Strengthening  9.  Transfer Training  10. Patient Education     TREATMENT PLAN: Frequency/Duration: 3 times a week for duration of hospital stay  Rehabilitation Potential For Stated Goals: Excellent     RECOMMENDED REHABILITATION/EQUIPMENT: (at time of discharge pending progress): Due to the probability of continued deficits (see above) this patient will likely need continued skilled physical therapy after discharge. Equipment:    None at this time              HISTORY:   History of Present Injury/Illness (Reason for Referral):  HPI:  Kenya Ledesma is a 80 y.o.  female with PMHx of PAF (on Amio and BB, no AC, takes 162mg ASA), HTN, sHF (EF 30-35%), GERD, Spinal stenosis and CAD (PCI to pLAD 2008) who presented to the ED via EMS with c/o weakness and bradycardia. Her daughter had called our answering service earlier in the evening reporting HR had dropped to 36 but had recovered to 46s. States Pt had a busy day with poor PO intake. Had taken BB approx 45 min prior to phone call. Water/food was encouraged and daughter instructed to call EMS or come to ED if HR not improved. Daughter states Pt drank about 18oz of water and HR up to 70s but then started to drop again thus prompting them to call EMS. EMS gave her Atropine with HR initially in the 50s on arrival to ED per RN, but then dropped into the 20s. She was given additional Atropine with minimally improved HR. Cardiology called for urgent evaluation.      NP to bedside and Pt initially alert with HR 30s appearing to be a complete heart block. She received the additional Atropine with short improved HR. However, during exam she became listless with eyes rolled back. She then had a long pause and was transcutaneously paced with immediate return of alertness. She was given 1mg Morphine for comfort. She continued to be TC paced. The CCL team was notified of need for emergent temp pacer placement.  NP remained at bedside with Pt and she then began to have HR 100s in what appeared to be AFib. TC pacer remained in place. She was the taken to CCL by the cath staff. Past Medical History/Comorbidities:   Ms. Ellyn Rose  has a past medical history of Thyroid cyst (Distant past). Ms. Ellyn Rose  has a past surgical history that includes hx cholecystectomy; hx hysterectomy; and hx heent. Social History/Living Environment:   Home Environment: Private residence  # Steps to Enter: 1  Wheelchair Ramp: Yes  One/Two Story Residence: One story  Living Alone: No  Support Systems: Child(tamra)  Patient Expects to be Discharged to[de-identified] Private residence  Current DME Used/Available at Home: Walker, rollator, Commode, bedside, Shower chair, Grab bars  Prior Level of Function/Work/Activity:  She lives with 2 daughters in a single story home and typically ambulates with a rollator in the home, cane in the community. Daughters assist with ADLs and patient has experienced 1 fall in the past 6 months in the bathroom. Number of Personal Factors/Comorbidities that affect the Plan of Care: 3+: HIGH COMPLEXITY   EXAMINATION:   Most Recent Physical Functioning:   Gross Assessment:  AROM: Generally decreased, functional  PROM: Generally decreased, functional  Strength: Generally decreased, functional  Coordination: Within functional limits  Tone: Normal               Posture:  Posture (WDL): Exceptions to WDL  Posture Assessment: Forward head, Kyphosis, Rounded shoulders  Balance:  Sitting: Intact  Standing: Impaired  Standing - Static: Good  Standing - Dynamic : Fair Bed Mobility:  Rolling: Contact guard assistance  Supine to Sit: Contact guard assistance  Sit to Supine: Minimum assistance  Scooting: Contact guard assistance  Wheelchair Mobility:     Transfers:  Sit to Stand: Contact guard assistance  Stand to Sit: Contact guard assistance  Gait:     Base of Support: Center of gravity altered;Narrowed  Speed/Ngoc: Shuffled; Slow  Gait Abnormalities: Decreased step clearance; Path deviations; Shuffling gait;Trunk sway increased  Distance (ft): 150 Feet (ft)  Assistive Device: Walker, rolling  Ambulation - Level of Assistance: Contact guard assistance  Interventions: Safety awareness training;Manual cues; Verbal cues      Body Structures Involved:  1. Nerves  2. Heart  3. Lungs  4. Muscles Body Functions Affected:  1. Sensory/Pain  2. Cardio  3. Respiratory  4. Neuromusculoskeletal  5. Movement Related Activities and Participation Affected:  1. Mobility  2. Self Care  3. Domestic Life  4. Interpersonal Interactions and Relationships  5. Community, Social and [place]   Number of elements that affect the Plan of Care: 4+: HIGH COMPLEXITY   CLINICAL PRESENTATION:   Presentation: Stable and uncomplicated: LOW COMPLEXITY   CLINICAL DECISION MAKIN [facility] Inpatient Short Form  How much difficulty does the patient currently have. .. Unable A Lot A Little None   1. Turning over in bed (including adjusting bedclothes, sheets and blankets)? [] 1   [] 2   [x] 3   [] 4   2. Sitting down on and standing up from a chair with arms ( e.g., wheelchair, bedside commode, etc.)   [] 1   [] 2   [x] 3   [] 4   3. Moving from lying on back to sitting on the side of the bed? [] 1   [] 2   [x] 3   [] 4   How much help from another person does the patient currently need. .. Total A Lot A Little None   4. Moving to and from a bed to a chair (including a wheelchair)? [] 1   [] 2   [x] 3   [] 4   5. Need to walk in hospital room? [] 1   [] 2   [x] 3   [] 4   6. Climbing 3-5 steps with a railing? [] 1   [x] 2   [] 3   [] 4   © , Trustees of [address] [place] [address] [address] [address], under license to [employer]. All rights reserved      Score:  Initial: 17 Most Recent: X (Date: -- )    Interpretation of Tool:  Represents activities that are increasingly more difficult (i.e. Bed mobility, Transfers, Gait).     Medical Necessity:     · Patient demonstrates excellent rehab potential due to higher previous functional level.  Reason for Services/Other Comments:  · Patient continues to require modification of therapeutic interventions to increase complexity of exercises. Use of outcome tool(s) and clinical judgement create a POC that gives a: Clear prediction of patient's progress: LOW COMPLEXITY            TREATMENT:   (In addition to Assessment/Re-Assessment sessions the following treatments were rendered)   Pre-treatment Symptoms/Complaints:  none  Pain: Initial:   Pain Intensity 1: 7  Pain Location 1: Back  Post Session:  7/10     Therapeutic Activity: (    10 minutes): Therapeutic activities including Ambulation on level ground and standing balance activities to improve mobility, strength, balance and activity tolerance. Required minimal Safety awareness training;Manual cues; Verbal cues to promote static and dynamic balance in standing. Braces/Orthotics/Lines/Etc:   · rios catheter  · O2 Device: Room air  Treatment/Session Assessment:    · Response to Treatment:  Patient maintained SpO2 >95% during ambulation. · Interdisciplinary Collaboration:   o Physical Therapist  o Registered Nurse  o Respiratory Therapist  · After treatment position/precautions:   o Supine in bed  o Bed alarm/tab alert on  o Bed/Chair-wheels locked  o Bed in low position  o Call light within reach  o RN notified  o Family at bedside   · Compliance with Program/Exercises: Will assess as treatment progresses  · Recommendations/Intent for next treatment session: \"Next visit will focus on advancements to more challenging activities and reduction in assistance provided\".   Total Treatment Duration:  PT Patient Time In/Time Out  Time In: 1414  Time Out: [address], DPT

## 2019-03-07 NOTE — PROGRESS NOTES
Bedside and Verbal shift change report given to self (oncoming nurse) by Ayush Betts (offgoing nurse). Report included the following information SBAR, Kardex and Recent Results.

## 2019-03-07 NOTE — PROGRESS NOTES
Verbal bedside report given to Thierno Bo oncoming RN. Patient's situation, background, assessment and recommendations provided. Opportunity for questions provided. Oncoming RN assumed care of patient.

## 2019-03-08 VITALS
SYSTOLIC BLOOD PRESSURE: 118 MMHG | BODY MASS INDEX: 17.38 KG/M2 | TEMPERATURE: 98.5 F | RESPIRATION RATE: 15 BRPM | WEIGHT: 101.8 LBS | HEIGHT: 64 IN | DIASTOLIC BLOOD PRESSURE: 57 MMHG | HEART RATE: 84 BPM | OXYGEN SATURATION: 97 %

## 2019-03-08 LAB
ANION GAP SERPL CALC-SCNC: 7 MMOL/L (ref 7–16)
BACTERIA SPEC CULT: NORMAL
BACTERIA SPEC CULT: NORMAL
BUN SERPL-MCNC: 20 MG/DL (ref 8–23)
CALCIUM SERPL-MCNC: 8.4 MG/DL (ref 8.3–10.4)
CHLORIDE SERPL-SCNC: 108 MMOL/L (ref 98–107)
CO2 SERPL-SCNC: 27 MMOL/L (ref 21–32)
CREAT SERPL-MCNC: 0.94 MG/DL (ref 0.6–1)
ERYTHROCYTE [DISTWIDTH] IN BLOOD BY AUTOMATED COUNT: 13.4 % (ref 11.9–14.6)
GLUCOSE BLD STRIP.AUTO-MCNC: 113 MG/DL (ref 65–100)
GLUCOSE BLD STRIP.AUTO-MCNC: 99 MG/DL (ref 65–100)
GLUCOSE SERPL-MCNC: 90 MG/DL (ref 65–100)
HCT VFR BLD AUTO: 31.2 % (ref 35.8–46.3)
HGB BLD-MCNC: 9.8 G/DL (ref 11.7–15.4)
MAGNESIUM SERPL-MCNC: 2.2 MG/DL (ref 1.8–2.4)
MCH RBC QN AUTO: 30.6 PG (ref 26.1–32.9)
MCHC RBC AUTO-ENTMCNC: 31.4 G/DL (ref 31.4–35)
MCV RBC AUTO: 97.5 FL (ref 79.6–97.8)
NRBC # BLD: 0 K/UL (ref 0–0.2)
PLATELET # BLD AUTO: 179 K/UL (ref 150–450)
PMV BLD AUTO: 10.7 FL (ref 9.4–12.3)
POTASSIUM SERPL-SCNC: 3.9 MMOL/L (ref 3.5–5.1)
RBC # BLD AUTO: 3.2 M/UL (ref 4.05–5.2)
SERVICE CMNT-IMP: NORMAL
SERVICE CMNT-IMP: NORMAL
SODIUM SERPL-SCNC: 142 MMOL/L (ref 136–145)
WBC # BLD AUTO: 7.2 K/UL (ref 4.3–11.1)

## 2019-03-08 PROCEDURE — 82962 GLUCOSE BLOOD TEST: CPT

## 2019-03-08 PROCEDURE — 74011250636 HC RX REV CODE- 250/636: Performed by: INTERNAL MEDICINE

## 2019-03-08 PROCEDURE — 99232 SBSQ HOSP IP/OBS MODERATE 35: CPT | Performed by: INTERNAL MEDICINE

## 2019-03-08 PROCEDURE — 74011250637 HC RX REV CODE- 250/637: Performed by: INTERNAL MEDICINE

## 2019-03-08 PROCEDURE — 85027 COMPLETE CBC AUTOMATED: CPT

## 2019-03-08 PROCEDURE — 74011250637 HC RX REV CODE- 250/637: Performed by: NURSE PRACTITIONER

## 2019-03-08 PROCEDURE — 36415 COLL VENOUS BLD VENIPUNCTURE: CPT

## 2019-03-08 PROCEDURE — 80048 BASIC METABOLIC PNL TOTAL CA: CPT

## 2019-03-08 PROCEDURE — 83735 ASSAY OF MAGNESIUM: CPT

## 2019-03-08 RX ADMIN — FUROSEMIDE 20 MG: 20 TABLET ORAL at 08:21

## 2019-03-08 RX ADMIN — OXYCODONE HYDROCHLORIDE 15 MG: 15 TABLET ORAL at 07:51

## 2019-03-08 RX ADMIN — OXYCODONE HYDROCHLORIDE 15 MG: 15 TABLET ORAL at 12:41

## 2019-03-08 RX ADMIN — HEPARIN SODIUM 5000 UNITS: 5000 INJECTION INTRAVENOUS; SUBCUTANEOUS at 08:23

## 2019-03-08 RX ADMIN — ASPIRIN 81 MG 162 MG: 81 TABLET ORAL at 08:21

## 2019-03-08 RX ADMIN — Medication 10 ML: at 06:14

## 2019-03-08 RX ADMIN — PANTOPRAZOLE SODIUM 40 MG: 40 TABLET, DELAYED RELEASE ORAL at 07:52

## 2019-03-08 RX ADMIN — Medication 10 ML: at 13:56

## 2019-03-08 NOTE — PROGRESS NOTES
Bedside and Verbal shift change report given to self (oncoming nurse) by Hector Vaz (offgoing nurse). Report included the following information SBAR, Kardex and Recent Results.

## 2019-03-08 NOTE — PROGRESS NOTES
Bedside and Verbal shift change report given to carlo (oncoming nurse) by self (offgoing nurse). Report included the following information SBAR, Kardex and Recent Results.

## 2019-03-08 NOTE — PROGRESS NOTES
Care Management Interventions  PCP Verified by CM: Arminda Riddle as PCP)  Palliative Care Criteria Met (RRAT>21 & CHF Dx)?: No(RRAT 23 Dx Heart block)  Mode of Transport at Discharge: Other (see comment)(Daughter)  Transition of Care Consult (CM Consult): Discharge Planning  Discharge Durable Medical Equipment: (walker, cane, [place], grab bars, BSC over toilet)  Physical Therapy Consult: No  Occupational Therapy Consult: No  Speech Therapy Consult: Yes  Current Support Network: Relative's Home  Confirm Follow Up Transport: Family  Plan discussed with Pt/Family/Caregiver: Yes  Freedom of Choice Offered: Yes   Resource Information Provided?: (confirms MCR/supplemental - able to get rx)  Discharge Location  Discharge Placement: Home  Followed up with patient and daughter for d/c planning. Patient declined home health states her daughter will be with her around the clock and does not believe she needs home health. They are aware if needs it after d/c to speak with their PCP and discuss home health. Patient to d/c home with daughter. Voices no concerns or needs for d/c.

## 2019-03-08 NOTE — PROGRESS NOTES
Liam Gamez  Admission Date: 3/2/2019             Daily Progress Note: 3/8/2019    The patient's chart is reviewed and the patient is discussed with the staff. 81 y/o female admitted with presyncope in complete heart block after taking home BB dose, transvenously paced. Stopped beta blocker and amiodarone. We were consulted and treating for pneumonia. She developed flash pulmonary edema on 3/6 and was placed on bipap. Now on daily lasix and blood pressure controlled. Subjective:   Feels well. Is planning on being discharged today. Walked to bathroom on RA. Did not feel short of breath or lightheaded.      Current Facility-Administered Medications   Medication Dose Route Frequency    heparin (porcine) injection 5,000 Units  5,000 Units SubCUTAneous Q12H    nitroglycerin (NITROBID) 2 % ointment 1 Inch  1 Inch Topical BID    furosemide (LASIX) tablet 40 mg  40 mg Oral EVERY OTHER DAY    furosemide (LASIX) tablet 20 mg  20 mg Oral EVERY OTHER DAY    insulin regular (NOVOLIN R, HUMULIN R) injection   SubCUTAneous AC&HS    aspirin chewable tablet 162 mg  162 mg Oral DAILY    oxyCODONE IR (OXY-IR) immediate release tablet 15 mg  15 mg Oral Q4H PRN    pantoprazole (PROTONIX) tablet 40 mg  40 mg Oral ACB    sodium chloride (NS) flush 5-40 mL  5-40 mL IntraVENous Q8H    sodium chloride (NS) flush 5-40 mL  5-40 mL IntraVENous PRN    nitroglycerin (NITROSTAT) tablet 0.4 mg  0.4 mg SubLINGual Q5MIN PRN    morphine injection 2 mg  2 mg IntraVENous Q4H PRN    acetaminophen (TYLENOL) tablet 650 mg  650 mg Oral Q4H PRN    naloxone (NARCAN) injection 0.4 mg  0.4 mg IntraVENous PRN    ondansetron (ZOFRAN) injection 4 mg  4 mg IntraVENous Q4H PRN         Objective:     Vitals:    03/07/19 2033 03/08/19 0047 03/08/19 0450 03/08/19 0830   BP: 110/51 149/67 138/61 121/56   Pulse: 73 77 85 81   Resp: 17 17 17 16   Temp: 98 °F (36.7 °C) 98 °F (36.7 °C) 98.1 °F (36.7 °C) 98.1 °F (36.7 °C)   SpO2: 96% 96% 94% 96%   Weight:   101 lb 12.8 oz (46.2 kg)    Height:         Intake and Output:   03/06 1901 - 03/08 0700  In: 200 [P.O.:200]  Out: 2000 [Urine:2000]  03/08 0701 - 03/08 1900  In: 240 [P.O.:240]  Out: 250 [Urine:250]    Physical Exam:   Constitutional:  the patient is well developed and in no acute distress  HEENT:  Sclera clear, pupils equal, oral mucosa moist  Lungs: clear bilaterally. On RA. Cardiovascular:  RRR without M,G,R  Abd/GI: soft and non-tender; with positive bowel sounds. Ext: warm without cyanosis. There is no lower leg edema. Musculoskeletal: moves all four extremities with equal strength  Skin:  no jaundice or rashes, no wounds   Neuro: no gross neuro deficits   Musculoskeletal: ? can ambulate - not witnessed. No deformity  Psychiatric: Calm. ROS:  Intermittent dyspnea, weak   Lines: peripheral IV, rios    CHEST XRAY:       LAB  Recent Labs     03/08/19 0433 03/07/19  0335 03/06/19  0445   WBC 7.2 7.8 14.4*   HGB 9.8* 9.9* 11.5*   HCT 31.2* 31.4* 36.9    181 214     Recent Labs     03/08/19 0433 03/07/19  0335 03/06/19  0445    143 144   K 3.9 3.8 4.2   * 109* 110*   CO2 27 28 25   GLU 90 104* 197*   BUN 20 25* 22   CREA 0.94 0.95 1.22*   MG 2.2 2.1 2.2         Assessment:  (Medical Decision Making)     Hospital Problems  Date Reviewed: 8/14/2018          Codes Class Noted POA    Pulmonary infiltrates ICD-10-CM: R91.8  ICD-9-CM: 793.19  3/4/2019 Unknown    Rx for pneumonia and pulmonary edema    GERD (gastroesophageal reflux disease) (Chronic) ICD-10-CM: K21.9  ICD-9-CM: 530.81  3/2/2019 Yes        Spinal stenosis (Chronic) ICD-10-CM: M48.00  ICD-9-CM: 724.00  3/2/2019 Yes        * (Principal) Complete heart block ([address] ICD-10-CM: I44.2  ICD-9-CM: 426.0  3/2/2019 Yes    Resolved. Off amio and beta blocker    Systolic CHF, acute on chronic (HCC) (Chronic) ICD-10-CM: I50.23  ICD-9-CM: 428.23, 428.0  9/28/2018 Yes    Now on daily lasix.  Fluid balance negative Atrial fibrillation (HCC) (Chronic) ICD-10-CM: I48.91  ICD-9-CM: 427.31  8/31/2018 Yes    Sinus with 1st degree block at present    Acute hypoxemic respiratory failure [facility]) ICD-10-CM: J96.01  ICD-9-CM: 518.81  7/29/2018 Yes    Improved. Currently on 4 liter cannula    Hypertension (Chronic) ICD-10-CM: I10  ICD-9-CM: 401.9  12/1/2015 Yes     now controlled    CAD (coronary artery disease) (Chronic) ICD-10-CM: I25.10  ICD-9-CM: 414.00  11/29/2015 Yes              Plan:  (Medical Decision Making)   --Weaned to RA doing well  --no pulmonary needs are noted  --continue excellent BP and CHF regimen    More than 50% of time documented was spent face-to-face contact with the patient and in the care of the patient on the floor/unit where the patient is located.      Zia Martinez MD

## 2019-03-08 NOTE — PROGRESS NOTES
Verbal bedside report given to Samantha Ann oncoming RN. Patient's situation, background, assessment and recommendations provided. Opportunity for questions provided. Oncoming RN assumed care of patient.

## 2019-03-08 NOTE — PROGRESS NOTES
[facility] CARDIOLOGY PROGRESS NOTE           3/8/2019 6:58 AM    Admit Date: 3/2/2019    Admit Diagnosis: Complete heart block ([address] [I44.2]      Subjective:   No complaints this AM, no chest pain or shortness of breath    Interval History: (History of pertinent interval events obtained from nursing staff)    ROS:  GEN:  No fever or chills  Cardiovascular:  As noted above  Pulmonary:  As noted above  Neuro:  No new focal motor or sensory loss      Objective:     Vitals:    03/07/19 1725 03/07/19 2033 03/08/19 0047 03/08/19 0450   BP: 118/58 110/51 149/67 138/61   Pulse: 70 73 77 85   Resp:  17 17 17   Temp:  98 °F (36.7 °C) 98 °F (36.7 °C) 98.1 °F (36.7 °C)   SpO2:  96% 96% 94%   Weight:    46.2 kg (101 lb 12.8 oz)   Height:           Physical Exam:  General-thin, frail, No Acute Distress, Alert & Oriented x 3, appropriate mood. Neck- supple, no JVD  CV- regular rate and rhythm no MRG  Lung- clear bilaterally  Abd- soft, nontender, nondistended  Ext- no edema bilaterally.   Skin- warm and dry    Current Facility-Administered Medications   Medication Dose Route Frequency    heparin (porcine) injection 5,000 Units  5,000 Units SubCUTAneous Q12H    nitroglycerin (NITROBID) 2 % ointment 1 Inch  1 Inch Topical BID    furosemide (LASIX) tablet 40 mg  40 mg Oral EVERY OTHER DAY    furosemide (LASIX) tablet 20 mg  20 mg Oral EVERY OTHER DAY    insulin regular (NOVOLIN R, HUMULIN R) injection   SubCUTAneous AC&HS    aspirin chewable tablet 162 mg  162 mg Oral DAILY    oxyCODONE IR (OXY-IR) immediate release tablet 15 mg  15 mg Oral Q4H PRN    pantoprazole (PROTONIX) tablet 40 mg  40 mg Oral ACB    sodium chloride (NS) flush 5-40 mL  5-40 mL IntraVENous Q8H    sodium chloride (NS) flush 5-40 mL  5-40 mL IntraVENous PRN    nitroglycerin (NITROSTAT) tablet 0.4 mg  0.4 mg SubLINGual Q5MIN PRN    morphine injection 2 mg  2 mg IntraVENous Q4H PRN    acetaminophen (TYLENOL) tablet 650 mg  650 mg Oral Q4H PRN    naloxone (NARCAN) injection 0.4 mg  0.4 mg IntraVENous PRN    ondansetron (ZOFRAN) injection 4 mg  4 mg IntraVENous Q4H PRN     Data Review:   Recent Results (from the past 24 hour(s))   GLUCOSE, POC    Collection Time: 03/07/19 11:10 AM   Result Value Ref Range    Glucose (POC) 133 (H) 65 - 100 mg/dL   GLUCOSE, POC    Collection Time: 03/07/19  3:52 PM   Result Value Ref Range    Glucose (POC) 160 (H) 65 - 100 mg/dL   GLUCOSE, POC    Collection Time: 03/07/19  9:02 PM   Result Value Ref Range    Glucose (POC) 112 (H) 65 - 675 mg/dL   METABOLIC PANEL, BASIC    Collection Time: 03/08/19  4:33 AM   Result Value Ref Range    Sodium 142 136 - 145 mmol/L    Potassium 3.9 3.5 - 5.1 mmol/L    Chloride 108 (H) 98 - 107 mmol/L    CO2 27 21 - 32 mmol/L    Anion gap 7 7 - 16 mmol/L    Glucose 90 65 - 100 mg/dL    BUN 20 8 - 23 MG/DL    Creatinine 0.94 0.6 - 1.0 MG/DL    GFR est AA >60 >60 ml/min/1.73m2    GFR est non-AA 59 (L) >60 ml/min/1.73m2    Calcium 8.4 8.3 - 10.4 MG/DL   CBC W/O DIFF    Collection Time: 03/08/19  4:33 AM   Result Value Ref Range    WBC 7.2 4.3 - 11.1 K/uL    RBC 3.20 (L) 4.05 - 5.2 M/uL    HGB 9.8 (L) 11.7 - 15.4 g/dL    HCT 31.2 (L) 35.8 - 46.3 %    MCV 97.5 79.6 - 97.8 FL    MCH 30.6 26.1 - 32.9 PG    MCHC 31.4 31.4 - 35.0 g/dL    RDW 13.4 11.9 - 14.6 %    PLATELET 204 354 - 123 K/uL    MPV 10.7 9.4 - 12.3 FL    ABSOLUTE NRBC 0.00 0.0 - 0.2 K/uL   MAGNESIUM    Collection Time: 03/08/19  4:33 AM   Result Value Ref Range    Magnesium 2.2 1.8 - 2.4 mg/dL   GLUCOSE, POC    Collection Time: 03/08/19  6:29 AM   Result Value Ref Range    Glucose (POC) 99 65 - 100 mg/dL       EKG:  (EKG has been independently visualized by me with interpretation below)  Assessment:     Principal Problem:    Complete heart block (HCC) (3/2/2019)    Active Problems:    CAD (coronary artery disease) (11/29/2015)      Hypertension (12/1/2015)      Acute hypoxemic respiratory failure (HCC) (7/29/2018)      Atrial fibrillation ([facility] [address] (4/35/8378)      Systolic CHF, acute on chronic (HCC) (9/28/2018)      GERD (gastroesophageal reflux disease) (3/2/2019)      Spinal stenosis (3/2/2019)      Pulmonary infiltrates (3/4/2019)      Plan:   1. High degree AV block: resolved, patient off amiodarone and other rate control medications at this time. She will likely need rate/rythm control in future. Currently no urgent indication for PPM.  2. Atrial fibrillation anticoagulation discussed in the past patient not a candidate  3. Systolic CHF, uncontrolled:  flash pulmonary edema this admission, improved.  Resume Lasix home dosing alternates between 40 mg and 20 mg every other day.  volume status stable  4. PNA seen by pulm improving   5. Dispo: home today      Aminta Arana.  Jay GALLEGOS  Cardiology/Electrophysiology

## 2019-03-08 NOTE — DISCHARGE SUMMARY
[facility] Cardiology Discharge Summary     Patient ID:  Jon Russell  [id number]  40 y.o.  3/3/1924    Admit date: 3/2/2019    Discharge date:  03/08/2019    Admitting Physician: Malina Richter MD     Discharge Physician: Adrain Osgood, NP/Dr. Farideh Kendrick    Admission Diagnoses: Complete heart block [facility]) [I44.2]    Discharge Diagnoses:    Diagnosis    Pulmonary infiltrates    GERD (gastroesophageal reflux disease)    Complete heart block ([facility]    Atrial fibrillation ([facility]    Acute hypoxemic respiratory failure ([facility]    Hypertension    CAD (coronary artery disease)       Cardiology Procedures this admission:  Temporary PM insertion  Consults: Pulmonary/Intensive care    Hospital Course: Patient presented to the ER via EMS with complaints of weakness and bradycardia. She was given atropine by EMS for HR of 50, but HR dropped to 20s. She was given additional atropine with minimal improvement. In the ED, EKG showed complete HB in the 30s. With NP at the bedside the patient had a long pause and required transcutaneous pacing. Patient was taken to CCL by Dr. Ofelia Roa for insertion of temporary PM.  Prior to admission patient was on amiodarone and metoprolol for a fib, which was stopped on admission. The night of 3/3 patient had episode of pulmonary edema and was given lasix and placed on BIPAP. CXR also revealed pneumonia of MARY JO. Pulmonary was consulted. Patient was started on abx and weaned of bipap and was transferred to telemetry. Her CHB resolved and temp PM was removed prior to transfer. On the night of 3/5/19 the patient complained of shortness of breath and chest pain. On exam she had +JVD, crackles throughout, and increased work of breathing. BP was elevated at 170-190/100-120s. She was given lasix IVP and placed back on BIPAP. She improved and was weaned off of BIPAP. Her home dose of lasix was resumed, alternating between 40 mg and 20 mg every other day.  Patient remained off of amiodarone and other rate control medications and there was no return of the high degree AV block. There was currently no need for PPM.  At discharge she will remain off of amiodarone and any other rate slowing medications. She is not a candidate for [facility] with the a fib. Patient received complete dosing of levaquin for pneumonia prior to discharge. For maximum medical therapy for CHF, EF of 30-35%,  will continue ACE. No BB due to bradycardia. ACE was held due to GLENYS during admission, which resolved. ACE resumed at discharge, will check BMP in one week. The patient will follow up with [facility] -- Dr. Juan Borges in 7-14 days. Patient has been referred to cardiac rehab. DISPOSITION: The patient is being discharged home in stable condition on a low saturated fat, low cholesterol and low salt diet. The patient is instructed to call the office or return to the ER for immediate evaluation for any shortness of breath or chest pain not relieved by NTG. Discharge Exam:   Visit Vitals  /61 (BP 1 Location: Left arm, BP Patient Position: At rest)   Pulse 85   Temp 98.1 °F (36.7 °C)   Resp 17   Ht 5' 4\" (1.626 m)   Wt 46.2 kg (101 lb 12.8 oz)   SpO2 94%   BMI 17.47 kg/m²     Patient has been seen by Dr. Tawanna Reyes: see his progress note for exam details.     Recent Results (from the past 24 hour(s))   GLUCOSE, POC    Collection Time: 03/07/19 11:10 AM   Result Value Ref Range    Glucose (POC) 133 (H) 65 - 100 mg/dL   GLUCOSE, POC    Collection Time: 03/07/19  3:52 PM   Result Value Ref Range    Glucose (POC) 160 (H) 65 - 100 mg/dL   GLUCOSE, POC    Collection Time: 03/07/19  9:02 PM   Result Value Ref Range    Glucose (POC) 112 (H) 65 - 474 mg/dL   METABOLIC PANEL, BASIC    Collection Time: 03/08/19  4:33 AM   Result Value Ref Range    Sodium 142 136 - 145 mmol/L    Potassium 3.9 3.5 - 5.1 mmol/L    Chloride 108 (H) 98 - 107 mmol/L    CO2 27 21 - 32 mmol/L    Anion gap 7 7 - 16 mmol/L    Glucose 90 65 - 100 mg/dL    BUN 20 8 - 23 MG/DL    Creatinine 0.94 0.6 - 1.0 MG/DL    GFR est AA >60 >60 ml/min/1.73m2    GFR est non-AA 59 (L) >60 ml/min/1.73m2    Calcium 8.4 8.3 - 10.4 MG/DL   CBC W/O DIFF    Collection Time: 03/08/19  4:33 AM   Result Value Ref Range    WBC 7.2 4.3 - 11.1 K/uL    RBC 3.20 (L) 4.05 - 5.2 M/uL    HGB 9.8 (L) 11.7 - 15.4 g/dL    HCT 31.2 (L) 35.8 - 46.3 %    MCV 97.5 79.6 - 97.8 FL    MCH 30.6 26.1 - 32.9 PG    MCHC 31.4 31.4 - 35.0 g/dL    RDW 13.4 11.9 - 14.6 %    PLATELET 015 994 - 096 K/uL    MPV 10.7 9.4 - 12.3 FL    ABSOLUTE NRBC 0.00 0.0 - 0.2 K/uL   MAGNESIUM    Collection Time: 03/08/19  4:33 AM   Result Value Ref Range    Magnesium 2.2 1.8 - 2.4 mg/dL   GLUCOSE, POC    Collection Time: 03/08/19  6:29 AM   Result Value Ref Range    Glucose (POC) 99 65 - 100 mg/dL         Patient Instructions:   Current Discharge Medication List      CONTINUE these medications which have NOT CHANGED    Details   lisinopril (PRINIVIL, ZESTRIL) 2.5 mg tablet Take  by mouth daily. potassium chloride SR (KLOR-CON 10) 10 mEq tablet Take  by mouth.      pantoprazole (PROTONIX) 40 mg tablet Take 1 Tab by mouth Daily (before breakfast). Qty: 30 Tab, Refills: 0      aspirin 81 mg chewable tablet Take 2 Tabs by mouth daily. Qty: 60 Tab, Refills: 0    Associated Diagnoses: Persistent atrial fibrillation (HCC)      furosemide (LASIX) 40 mg tablet Take 1 Tab every other day, take 20 mg on other days  Qty: 30 Tab, Refills: 0      nitroglycerin (NITRODUR) 0.4 mg/hr 1 Patch by TransDERmal route daily. polyethylene glycol (MIRALAX) 17 gram/dose powder Take 17 g by mouth as needed (constipation). cholecalciferol, vitamin D3, (VITAMIN D3) 2,000 unit tab Take 1,000 Int'l Units by mouth daily. oxyCODONE IR (OXY-IR) 15 mg immediate release tablet Take 15 mg by mouth every four (4) hours as needed for Pain.      pyridoxine (VITAMIN B-6) 100 mg tablet Take 100 mg by mouth daily. Takes at [address] taking these medications       amiodarone (PACERONE) 100 mg tablet Comments:   Reason for Stopping:         carvedilol (COREG) 6.25 mg tablet Comments:   Reason for Stopping:         clopidogrel (PLAVIX) 75 mg tab Comments:   Reason for Stopping:                 Signed:  Olga Samuels NP  3/8/2019  8:24 AM

## 2019-03-08 NOTE — PROGRESS NOTES
Verbal bedside report received from Fred Nesbitt, [address] [facility]. Assumed care of patient.

## 2019-03-08 NOTE — DISCHARGE INSTRUCTIONS
Patient Education        Bradycardia: Care Instructions  Your Care Instructions    Bradycardia is a slow heart rate. If your heart beats too slowly, it can't supply your body with enough blood. This can make you weak or dizzy. Or it may make you pass out. Sometimes medicine can cause this problem. If this happens, your doctor may have you adjust one of your medicines. If a medicine is not the problem, your doctor may recommend a pacemaker. It is important to treat bradycardia so that you don't get more serious health problems. Your doctor will want to see you on a routine schedule to make sure that your heartbeat is normal.  Follow-up care is a key part of your treatment and safety. Be sure to make and go to all appointments, and call your doctor if you are having problems. It's also a good idea to know your test results and keep a list of the medicines you take. How can you care for yourself at home? · Take your medicines exactly as prescribed. Call your doctor if you think you are having a problem with your medicine. If your bradycardia is caused by another disease, your doctor will try to treat the disease. If it is caused by heart medicines, he or she will adjust your medicines. · Make lifestyle changes to improve your heart health. ? Get regular exercise. Try for 30 minutes on most days of the week. If you do not have other heart problems, you likely do not have limits on the type or level of activity that you can do. You may want to walk, swim, bike, or do other activities. Ask your doctor what level of exercise is safe for you. ? To control your cholesterol, avoid foods with a lot of fat, saturated fat, or sodium. Try to eat more fiber. And if your doctor says it's okay, get some exercise on most days. ? Do not smoke. Smoking can make your heart condition worse. If you need help quitting, talk to your doctor about stop-smoking programs and medicines.  These can increase your chances of quitting for good.  ? Limit alcohol to 2 drinks a day for men and 1 drink a day for women. Too much alcohol can cause health problems. Pacemaker  If you have a pacemaker, you will get more specific information about it. Be sure to:  · Check your pulse as your doctor tells you. · Have your pacemaker checked as often as your doctor recommends. You may be able to do this over the phone or computer. · Avoid strong magnetic or electrical fields. These include MRIs, welding equipment, and generators. · You will be checked several times right after you get your pacemaker and when it is time to have the battery changed. Batteries last for 5 to 15 years. · You can talk on a cell phone. But keep it 6 inches away from your pacemaker. · Microwaves, TVs, radios, and kitchen and bathroom appliances won't harm you. When should you call for help? Call 911 anytime you think you may need emergency care. For example, call if:    · You have symptoms of sudden heart failure. These may include:  ? Severe trouble breathing. ? A fast or irregular heartbeat. ? Coughing up pink, foamy mucus. ? You passed out.     · You have symptoms of a stroke. These may include:  ? Sudden numbness, tingling, weakness, or loss of movement in your face, arm, or leg, especially on only one side of your body. ? Sudden vision changes. ? Sudden trouble speaking. ? Sudden confusion or trouble understanding simple statements. ? Sudden problems with walking or balance. ? A sudden, severe headache that is different from past headaches.    Call your doctor now or seek immediate medical care if:    · You have new or changed symptoms of heart failure, such as:  ? New or increased shortness of breath. ? New or worse swelling in your legs, ankles, or feet. ? Sudden weight gain, such as more than 2 to 3 pounds in a day or 5 pounds in a week. (Your doctor may suggest a different range of weight gain.)  ? Feeling dizzy or lightheaded or like you may faint. ?  Feeling so tired or weak that you cannot do your usual activities. ? Not sleeping well. Shortness of breath wakes you at night. You need extra pillows to prop yourself up to breathe easier.    Watch closely for changes in your health, and be sure to contact your doctor if:    · You do not get better as expected. Where can you learn more? Go to http://dinora-ellyn.info/. Enter O793 in the search box to learn more about \"Bradycardia: Care Instructions. \"  Current as of: July 22, 2018  Content Version: 11.9  © 6716-1712 [employer]. Care instructions adapted under license by [employer] (which disclaims liability or warranty for this information). If you have questions about a medical condition or this instruction, always ask your healthcare professional. [address] any warranty or liability for your use of this information. Heart Rhythm Problems in Heart Failure: Care Instructions  Your Care Instructions    A heart rhythm problem, or arrhythmia, is a change in the normal rhythm of your heart. Your heart may beat too fast or too slow or beat with an irregular or skipping rhythm. A change in the heart's rhythm may feel like a really strong heartbeat or a fluttering in your chest. A severe heart rhythm problem can keep the body from getting the blood it needs. This can cause shortness of breath, lightheadedness, and fainting. A heart rhythm problem can make your heart failure worse and increase your chance of dying suddenly. You may take medicine to treat your condition. Your doctor may recommend a pacemaker, an implantable cardioverter-defibrillator (ICD), or a procedure called catheter ablation to destroy small parts of the heart that are causing a rhythm problem. Follow-up care is a key part of your treatment and safety. Be sure to make and go to all appointments, and call your doctor if you are having problems.  It's also a good idea to know your test results and keep a list of the medicines you take. How can you care for yourself at home? · Take your medicines exactly as prescribed. Talk to your doctor if you have any problems with your medicines. · If you received a pacemaker or a defibrillator, you will get a fact sheet about it. · Wear a medical alert ID bracelet. You can buy one at most drugstores or order it on the Internet. · Make sure you go to your follow-up appointments. To change your lifestyle  · Do not smoke. · Eat a heart-healthy diet. · Do not drink too much alcohol. Also, get enough sleep, and do not overeat. · Ask your doctor whether you can take over-the-counter medicines (such as decongestants). These can make your heart beat fast.  Be active  · Start light exercise if your doctor says you can. Even a small amount will help you get stronger, have more energy, and manage your stress. · Walk to get exercise easily. Start by walking a little more than you did the day before. Bit by bit, increase the amount you walk. · When you exercise, watch for signs that your heart is working too hard. You are pushing too hard if you cannot talk while you exercise. If you become short of breath or dizzy or have chest pain, sit down and rest.  · If your doctor has not set you up with a cardiac rehabilitation (rehab) program, talk to him or her about whether that is right for you. Cardiac rehab includes exercise, help with diet and lifestyle changes, and emotional support. It may reduce your risk of future heart problems. · Check your pulse daily. Place two fingers on the artery at the palm side of your wrist, in line with your thumb. If your heartbeat seems uneven, talk to your doctor. When should you call for help?   Call 911 if you have symptoms of sudden heart failure, such as:    · You have severe trouble breathing.     · You cough up pink, foamy mucus.     · You have a new irregular or rapid heartbeat.    Call 911 if you have symptoms of a heart attack. These may include:    · Chest pain or pressure, or a strange feeling in the chest.     · Sweating.     · Shortness of breath.     · Nausea or vomiting.     · Pain, pressure, or a strange feeling in the back, neck, jaw, or upper belly or in one or both shoulders or arms.     · Lightheadedness or sudden weakness.     · A fast or irregular heartbeat.    After you call 911, the  may tell you to chew 1 adult-strength or 2 to 4 low-dose aspirin. Wait for an ambulance. Do not try to drive yourself.   Call your doctor now or seek immediate medical care if:    · You have new or increased shortness of breath.     · You are dizzy or lightheaded, or you feel like you may faint.     · You have sudden weight gain, such as more than 2 to 3 pounds in a day or 5 pounds in a week. (Your doctor may suggest a different range of weight gain.)     · You have increased swelling in your legs, ankles, or feet.     · You are suddenly so tired or weak that you cannot do your usual activities.    Watch closely for changes in your health, and be sure to contact your doctor if you develop new symptoms. Where can you learn more? Go to http://dinora-ellyn.info/. Enter M430 in the search box to learn more about \"Heart Rhythm Problems in Heart Failure: Care Instructions. \"  Current as of: July 22, 2018  Content Version: 11.9  © 3742-3801 [employer]. Care instructions adapted under license by [employer] (which disclaims liability or warranty for this information). If you have questions about a medical condition or this instruction, always ask your healthcare professional. [place] [address] any warranty or liability for your use of this information. Learning About Fluid Overload  What is fluid overload? Fluid overload means that your body has too much water.  The extra fluid in your body can raise your blood pressure and force your heart to work harder. It can also make it hard for you to breathe. Most of your body is made up of water. The body uses minerals like sodium and potassium to help organs such as your heart, kidneys, and liver balance how much water you need. For example, the heart pumps blood to move water around the body. And the kidneys work to get rid of the water that the body doesn't need. Health conditions like kidney disease, heart failure, and cirrhosis can cause fluid overload. Other things can cause extra fluid to build up. IV fluids, some medicines, too much salt (sodium) from food, and certain medical treatments can sometimes cause this fluid increase. What are the symptoms? Some of the most common symptoms are:  · Gaining weight over a short period of time. · Swelling in the ankles or legs. · Shortness of breath. How is it treated? The goal of treatment is to remove the extra fluid in your body. Your treatment will depend on the cause. Your doctor may:  · Give you medicines, such as diuretics (also called \"water pills\"). They help your body get rid of the extra fluid. · Restrict your fluid or salt intake. Follow-up care is a key part of your treatment and safety. Be sure to make and go to all appointments, and call your doctor if you are having problems. It's also a good idea to know your test results and keep a list of the medicines you take. Where can you learn more? Go to http://dinora-ellyn.info/. Enter O110 in the search box to learn more about \"Learning About Fluid Overload. \"  Current as of: July 22, 2018  Content Version: 11.9  © 7751-5925 [employer]. Care instructions adapted under license by [employer] (which disclaims liability or warranty for this information).  If you have questions about a medical condition or this instruction, always ask your healthcare professional. [address] any warranty or liability for your use of this information.

## 2019-03-08 NOTE — PROGRESS NOTES
Discharge instructions reviewed with patient. Prescriptions given for lab work and med info sheets provided for all new medications. Opportunity for questions provided. Patient voiced understanding of all discharge instruction.

## 2019-03-11 ENCOUNTER — PATIENT OUTREACH (OUTPATIENT)
Dept: CASE MANAGEMENT | Age: 84
End: 2019-03-11

## 2019-03-11 NOTE — PROGRESS NOTES
This note will not be viewable in [address] 1st Attempt to contact patient for [facility] call, no answer, left  for returned call.  Will attempt to contact patient again within 24 hours

## 2019-03-12 ENCOUNTER — PATIENT OUTREACH (OUTPATIENT)
Dept: CASE MANAGEMENT | Age: 84
End: 2019-03-12

## 2019-03-12 NOTE — PROGRESS NOTES
This note will not be viewable in [address] 2nd attempt to contact patient for [facility] call, no answer, left  for returned call. Will attempt 3rd call within 5 business days.

## 2019-03-13 ENCOUNTER — PATIENT OUTREACH (OUTPATIENT)
Dept: CASE MANAGEMENT | Age: 84
End: 2019-03-13

## 2019-03-13 NOTE — PROGRESS NOTES
This note will not be viewable in [address] Received returned call with  from patient's daughter Adrian Castellanos. She states that the patient is doing fine. They are very grateful for the care that the patient received during her stay. She states that they do not have any needs at this time. She has everything that she needs. Adrian Castellanos states that no returned call is needed and should anything arise they will discuss with the PCP. Episode closed at this time due to response from patient's daughter.

## 2019-04-22 ENCOUNTER — APPOINTMENT (OUTPATIENT)
Dept: GENERAL RADIOLOGY | Age: 84
DRG: 309 | End: 2019-04-22
Attending: EMERGENCY MEDICINE
Payer: MEDICARE

## 2019-04-22 ENCOUNTER — HOSPITAL ENCOUNTER (INPATIENT)
Age: 84
LOS: 1 days | Discharge: HOME | DRG: 309 | End: 2019-04-24
Attending: EMERGENCY MEDICINE | Admitting: INTERNAL MEDICINE
Payer: MEDICARE

## 2019-04-22 DIAGNOSIS — I48.91 ATRIAL FIBRILLATION WITH RVR (HCC): Primary | ICD-10-CM

## 2019-04-22 LAB
ALBUMIN SERPL-MCNC: 3.4 G/DL (ref 3.2–4.6)
ALBUMIN/GLOB SERPL: 1 {RATIO} (ref 1.2–3.5)
ALP SERPL-CCNC: 77 U/L (ref 50–136)
ALT SERPL-CCNC: 28 U/L (ref 12–65)
ANION GAP SERPL CALC-SCNC: 9 MMOL/L (ref 7–16)
AST SERPL-CCNC: 25 U/L (ref 15–37)
BASOPHILS # BLD: 0.1 K/UL (ref 0–0.2)
BASOPHILS NFR BLD: 1 % (ref 0–2)
BILIRUB SERPL-MCNC: 0.3 MG/DL (ref 0.2–1.1)
BUN SERPL-MCNC: 16 MG/DL (ref 8–23)
CALCIUM SERPL-MCNC: 8.7 MG/DL (ref 8.3–10.4)
CHLORIDE SERPL-SCNC: 108 MMOL/L (ref 98–107)
CO2 SERPL-SCNC: 23 MMOL/L (ref 21–32)
CREAT SERPL-MCNC: 1.2 MG/DL (ref 0.6–1)
DIFFERENTIAL METHOD BLD: ABNORMAL
EOSINOPHIL # BLD: 0.2 K/UL (ref 0–0.8)
EOSINOPHIL NFR BLD: 3 % (ref 0.5–7.8)
ERYTHROCYTE [DISTWIDTH] IN BLOOD BY AUTOMATED COUNT: 13.2 % (ref 11.9–14.6)
GLOBULIN SER CALC-MCNC: 3.3 G/DL (ref 2.3–3.5)
GLUCOSE SERPL-MCNC: 224 MG/DL (ref 65–100)
HCT VFR BLD AUTO: 34.6 % (ref 35.8–46.3)
HGB BLD-MCNC: 11 G/DL (ref 11.7–15.4)
IMM GRANULOCYTES # BLD AUTO: 0 K/UL (ref 0–0.5)
IMM GRANULOCYTES NFR BLD AUTO: 0 % (ref 0–5)
LYMPHOCYTES # BLD: 1.4 K/UL (ref 0.5–4.6)
LYMPHOCYTES NFR BLD: 21 % (ref 13–44)
MAGNESIUM SERPL-MCNC: 2 MG/DL (ref 1.8–2.4)
MCH RBC QN AUTO: 30.7 PG (ref 26.1–32.9)
MCHC RBC AUTO-ENTMCNC: 31.8 G/DL (ref 31.4–35)
MCV RBC AUTO: 96.6 FL (ref 79.6–97.8)
MONOCYTES # BLD: 0.7 K/UL (ref 0.1–1.3)
MONOCYTES NFR BLD: 10 % (ref 4–12)
NEUTS SEG # BLD: 4.2 K/UL (ref 1.7–8.2)
NEUTS SEG NFR BLD: 64 % (ref 43–78)
NRBC # BLD: 0 K/UL (ref 0–0.2)
PLATELET # BLD AUTO: 212 K/UL (ref 150–450)
PMV BLD AUTO: 10.9 FL (ref 9.4–12.3)
POTASSIUM SERPL-SCNC: 4.3 MMOL/L (ref 3.5–5.1)
PROT SERPL-MCNC: 6.7 G/DL (ref 6.3–8.2)
RBC # BLD AUTO: 3.58 M/UL (ref 4.05–5.2)
SODIUM SERPL-SCNC: 140 MMOL/L (ref 136–145)
TROPONIN I BLD-MCNC: 0.04 NG/ML (ref 0.02–0.05)
WBC # BLD AUTO: 6.5 K/UL (ref 4.3–11.1)

## 2019-04-22 PROCEDURE — 93005 ELECTROCARDIOGRAM TRACING: CPT | Performed by: INTERNAL MEDICINE

## 2019-04-22 PROCEDURE — 93005 ELECTROCARDIOGRAM TRACING: CPT | Performed by: EMERGENCY MEDICINE

## 2019-04-22 PROCEDURE — 71045 X-RAY EXAM CHEST 1 VIEW: CPT

## 2019-04-22 PROCEDURE — 83735 ASSAY OF MAGNESIUM: CPT

## 2019-04-22 PROCEDURE — 99218 HC RM OBSERVATION: CPT

## 2019-04-22 PROCEDURE — 84484 ASSAY OF TROPONIN QUANT: CPT

## 2019-04-22 PROCEDURE — 96375 TX/PRO/DX INJ NEW DRUG ADDON: CPT | Performed by: EMERGENCY MEDICINE

## 2019-04-22 PROCEDURE — 99285 EMERGENCY DEPT VISIT HI MDM: CPT | Performed by: EMERGENCY MEDICINE

## 2019-04-22 PROCEDURE — 85025 COMPLETE CBC W/AUTO DIFF WBC: CPT

## 2019-04-22 PROCEDURE — 74011250637 HC RX REV CODE- 250/637: Performed by: INTERNAL MEDICINE

## 2019-04-22 PROCEDURE — 80053 COMPREHEN METABOLIC PANEL: CPT

## 2019-04-22 PROCEDURE — 74011000250 HC RX REV CODE- 250: Performed by: EMERGENCY MEDICINE

## 2019-04-22 RX ORDER — DILTIAZEM HYDROCHLORIDE 5 MG/ML
10 INJECTION INTRAVENOUS ONCE
Status: COMPLETED | OUTPATIENT
Start: 2019-04-22 | End: 2019-04-22

## 2019-04-22 RX ORDER — METOPROLOL TARTRATE 25 MG/1
25 TABLET, FILM COATED ORAL
Status: COMPLETED | OUTPATIENT
Start: 2019-04-22 | End: 2019-04-22

## 2019-04-22 RX ADMIN — DILTIAZEM HYDROCHLORIDE 10 MG: 5 INJECTION INTRAVENOUS at 21:32

## 2019-04-22 RX ADMIN — METOPROLOL TARTRATE 25 MG: 25 TABLET ORAL at 22:05

## 2019-04-23 PROBLEM — I50.9 CONGESTIVE CARDIAC FAILURE (HCC): Chronic | Status: ACTIVE | Noted: 2018-09-28

## 2019-04-23 PROBLEM — I49.5 TACHY-BRADY SYNDROME (HCC): Chronic | Status: ACTIVE | Noted: 2019-04-23

## 2019-04-23 LAB
ANION GAP SERPL CALC-SCNC: 5 MMOL/L (ref 7–16)
APTT PPP: 31.8 SEC (ref 24.7–39.8)
APTT PPP: 44.2 SEC (ref 24.7–39.8)
APTT PPP: >200 SEC (ref 24.7–39.8)
ATRIAL RATE: 153 BPM
ATRIAL RATE: 227 BPM
ATRIAL RATE: 74 BPM
BUN SERPL-MCNC: 17 MG/DL (ref 8–23)
CALCIUM SERPL-MCNC: 9.1 MG/DL (ref 8.3–10.4)
CALCULATED P AXIS, ECG09: 91 DEGREES
CALCULATED R AXIS, ECG10: -106 DEGREES
CALCULATED R AXIS, ECG10: -63 DEGREES
CALCULATED R AXIS, ECG10: -92 DEGREES
CALCULATED T AXIS, ECG11: 139 DEGREES
CALCULATED T AXIS, ECG11: 77 DEGREES
CALCULATED T AXIS, ECG11: 80 DEGREES
CHLORIDE SERPL-SCNC: 107 MMOL/L (ref 98–107)
CHOLEST SERPL-MCNC: 139 MG/DL
CO2 SERPL-SCNC: 27 MMOL/L (ref 21–32)
CREAT SERPL-MCNC: 1.09 MG/DL (ref 0.6–1)
DIAGNOSIS, 93000: NORMAL
ERYTHROCYTE [DISTWIDTH] IN BLOOD BY AUTOMATED COUNT: 13.3 % (ref 11.9–14.6)
GLUCOSE SERPL-MCNC: 102 MG/DL (ref 65–100)
HCT VFR BLD AUTO: 33.8 % (ref 35.8–46.3)
HDLC SERPL-MCNC: 47 MG/DL (ref 40–60)
HDLC SERPL: 3 {RATIO}
HGB BLD-MCNC: 10.7 G/DL (ref 11.7–15.4)
LDLC SERPL CALC-MCNC: 85 MG/DL
LIPID PROFILE,FLP: NORMAL
MCH RBC QN AUTO: 30.7 PG (ref 26.1–32.9)
MCHC RBC AUTO-ENTMCNC: 31.7 G/DL (ref 31.4–35)
MCV RBC AUTO: 97.1 FL (ref 79.6–97.8)
NRBC # BLD: 0 K/UL (ref 0–0.2)
P-R INTERVAL, ECG05: 220 MS
PLATELET # BLD AUTO: 216 K/UL (ref 150–450)
PMV BLD AUTO: 10.8 FL (ref 9.4–12.3)
POTASSIUM SERPL-SCNC: 3.9 MMOL/L (ref 3.5–5.1)
Q-T INTERVAL, ECG07: 346 MS
Q-T INTERVAL, ECG07: 394 MS
Q-T INTERVAL, ECG07: 452 MS
QRS DURATION, ECG06: 132 MS
QRS DURATION, ECG06: 148 MS
QRS DURATION, ECG06: 154 MS
QTC CALCULATION (BEZET), ECG08: 476 MS
QTC CALCULATION (BEZET), ECG08: 501 MS
QTC CALCULATION (BEZET), ECG08: 539 MS
RBC # BLD AUTO: 3.48 M/UL (ref 4.05–5.2)
SODIUM SERPL-SCNC: 139 MMOL/L (ref 136–145)
TRIGL SERPL-MCNC: 35 MG/DL (ref 35–150)
VENTRICULAR RATE, ECG03: 146 BPM
VENTRICULAR RATE, ECG03: 74 BPM
VENTRICULAR RATE, ECG03: 88 BPM
VLDLC SERPL CALC-MCNC: 7 MG/DL (ref 6–23)
WBC # BLD AUTO: 8.6 K/UL (ref 4.3–11.1)

## 2019-04-23 PROCEDURE — 96375 TX/PRO/DX INJ NEW DRUG ADDON: CPT | Performed by: EMERGENCY MEDICINE

## 2019-04-23 PROCEDURE — 74011250636 HC RX REV CODE- 250/636: Performed by: NURSE PRACTITIONER

## 2019-04-23 PROCEDURE — 36415 COLL VENOUS BLD VENIPUNCTURE: CPT

## 2019-04-23 PROCEDURE — 77030011256 HC DRSG MEPILEX <16IN NO BORD MOLN -A

## 2019-04-23 PROCEDURE — 74011250637 HC RX REV CODE- 250/637: Performed by: NURSE PRACTITIONER

## 2019-04-23 PROCEDURE — 80061 LIPID PANEL: CPT

## 2019-04-23 PROCEDURE — 85730 THROMBOPLASTIN TIME PARTIAL: CPT

## 2019-04-23 PROCEDURE — 96366 THER/PROPH/DIAG IV INF ADDON: CPT | Performed by: EMERGENCY MEDICINE

## 2019-04-23 PROCEDURE — 74011250636 HC RX REV CODE- 250/636: Performed by: INTERNAL MEDICINE

## 2019-04-23 PROCEDURE — 96365 THER/PROPH/DIAG IV INF INIT: CPT | Performed by: EMERGENCY MEDICINE

## 2019-04-23 PROCEDURE — 99218 HC RM OBSERVATION: CPT

## 2019-04-23 PROCEDURE — 80048 BASIC METABOLIC PNL TOTAL CA: CPT

## 2019-04-23 PROCEDURE — 85027 COMPLETE CBC AUTOMATED: CPT

## 2019-04-23 PROCEDURE — 93005 ELECTROCARDIOGRAM TRACING: CPT | Performed by: INTERNAL MEDICINE

## 2019-04-23 RX ORDER — SODIUM CHLORIDE 0.9 % (FLUSH) 0.9 %
5-40 SYRINGE (ML) INJECTION EVERY 8 HOURS
Status: DISCONTINUED | OUTPATIENT
Start: 2019-04-23 | End: 2019-04-24 | Stop reason: HOSPADM

## 2019-04-23 RX ORDER — HEPARIN SODIUM 5000 [USP'U]/100ML
12-25 INJECTION, SOLUTION INTRAVENOUS
Status: DISCONTINUED | OUTPATIENT
Start: 2019-04-23 | End: 2019-04-23

## 2019-04-23 RX ORDER — NITROGLYCERIN 0.4 MG/1
0.4 TABLET SUBLINGUAL
Status: DISCONTINUED | OUTPATIENT
Start: 2019-04-23 | End: 2019-04-24 | Stop reason: HOSPADM

## 2019-04-23 RX ORDER — LISINOPRIL 5 MG/1
2.5 TABLET ORAL DAILY
Status: DISCONTINUED | OUTPATIENT
Start: 2019-04-23 | End: 2019-04-24 | Stop reason: HOSPADM

## 2019-04-23 RX ORDER — ONDANSETRON 2 MG/ML
4 INJECTION INTRAMUSCULAR; INTRAVENOUS
Status: DISCONTINUED | OUTPATIENT
Start: 2019-04-23 | End: 2019-04-24 | Stop reason: HOSPADM

## 2019-04-23 RX ORDER — ACETAMINOPHEN 325 MG/1
650 TABLET ORAL
Status: DISCONTINUED | OUTPATIENT
Start: 2019-04-23 | End: 2019-04-24 | Stop reason: HOSPADM

## 2019-04-23 RX ORDER — POLYETHYLENE GLYCOL 3350 17 G/17G
17 POWDER, FOR SOLUTION ORAL AS NEEDED
Status: DISCONTINUED | OUTPATIENT
Start: 2019-04-23 | End: 2019-04-24 | Stop reason: HOSPADM

## 2019-04-23 RX ORDER — FUROSEMIDE 10 MG/ML
40 INJECTION INTRAMUSCULAR; INTRAVENOUS ONCE
Status: COMPLETED | OUTPATIENT
Start: 2019-04-23 | End: 2019-04-23

## 2019-04-23 RX ORDER — HEPARIN SODIUM 5000 [USP'U]/ML
5000 INJECTION, SOLUTION INTRAVENOUS; SUBCUTANEOUS ONCE
Status: COMPLETED | OUTPATIENT
Start: 2019-04-23 | End: 2019-04-23

## 2019-04-23 RX ORDER — HYDROCODONE BITARTRATE AND ACETAMINOPHEN 5; 325 MG/1; MG/1
1 TABLET ORAL
Status: DISCONTINUED | OUTPATIENT
Start: 2019-04-23 | End: 2019-04-24 | Stop reason: HOSPADM

## 2019-04-23 RX ORDER — METOPROLOL TARTRATE 25 MG/1
25 TABLET, FILM COATED ORAL EVERY 12 HOURS
Status: DISCONTINUED | OUTPATIENT
Start: 2019-04-23 | End: 2019-04-24 | Stop reason: HOSPADM

## 2019-04-23 RX ORDER — GUAIFENESIN 100 MG/5ML
81 LIQUID (ML) ORAL DAILY
Status: DISCONTINUED | OUTPATIENT
Start: 2019-04-23 | End: 2019-04-24 | Stop reason: HOSPADM

## 2019-04-23 RX ORDER — FUROSEMIDE 40 MG/1
40 TABLET ORAL EVERY OTHER DAY
Status: DISCONTINUED | OUTPATIENT
Start: 2019-04-23 | End: 2019-04-24 | Stop reason: HOSPADM

## 2019-04-23 RX ORDER — PANTOPRAZOLE SODIUM 40 MG/1
40 TABLET, DELAYED RELEASE ORAL
Status: DISCONTINUED | OUTPATIENT
Start: 2019-04-23 | End: 2019-04-24 | Stop reason: HOSPADM

## 2019-04-23 RX ORDER — SODIUM CHLORIDE 0.9 % (FLUSH) 0.9 %
5-40 SYRINGE (ML) INJECTION AS NEEDED
Status: DISCONTINUED | OUTPATIENT
Start: 2019-04-23 | End: 2019-04-24 | Stop reason: HOSPADM

## 2019-04-23 RX ORDER — MORPHINE SULFATE 2 MG/ML
2 INJECTION, SOLUTION INTRAMUSCULAR; INTRAVENOUS
Status: DISCONTINUED | OUTPATIENT
Start: 2019-04-23 | End: 2019-04-24 | Stop reason: HOSPADM

## 2019-04-23 RX ORDER — POTASSIUM CHLORIDE 750 MG/1
10 TABLET, EXTENDED RELEASE ORAL DAILY
Status: DISCONTINUED | OUTPATIENT
Start: 2019-04-23 | End: 2019-04-24 | Stop reason: HOSPADM

## 2019-04-23 RX ADMIN — HYDROCODONE BITARTRATE AND ACETAMINOPHEN 1 TABLET: 5; 325 TABLET ORAL at 07:35

## 2019-04-23 RX ADMIN — METOPROLOL TARTRATE 25 MG: 25 TABLET ORAL at 22:37

## 2019-04-23 RX ADMIN — HEPARIN SODIUM 12 UNITS/KG/HR: 5000 INJECTION, SOLUTION INTRAVENOUS at 02:00

## 2019-04-23 RX ADMIN — METOPROLOL TARTRATE 25 MG: 25 TABLET ORAL at 09:15

## 2019-04-23 RX ADMIN — ASPIRIN 81 MG 81 MG: 81 TABLET ORAL at 09:16

## 2019-04-23 RX ADMIN — FUROSEMIDE 40 MG: 10 INJECTION, SOLUTION INTRAMUSCULAR; INTRAVENOUS at 09:15

## 2019-04-23 RX ADMIN — HYDROCODONE BITARTRATE AND ACETAMINOPHEN 1 TABLET: 5; 325 TABLET ORAL at 02:34

## 2019-04-23 RX ADMIN — HYDROCODONE BITARTRATE AND ACETAMINOPHEN 1 TABLET: 5; 325 TABLET ORAL at 22:43

## 2019-04-23 RX ADMIN — LISINOPRIL 2.5 MG: 5 TABLET ORAL at 09:16

## 2019-04-23 RX ADMIN — PANTOPRAZOLE SODIUM 40 MG: 40 TABLET, DELAYED RELEASE ORAL at 07:35

## 2019-04-23 RX ADMIN — Medication 5 ML: at 14:45

## 2019-04-23 RX ADMIN — HEPARIN SODIUM 5000 UNITS: 5000 INJECTION INTRAVENOUS; SUBCUTANEOUS at 02:00

## 2019-04-23 RX ADMIN — HEPARIN SODIUM 9 UNITS/KG/HR: 5000 INJECTION, SOLUTION INTRAVENOUS at 10:33

## 2019-04-23 RX ADMIN — POTASSIUM CHLORIDE 10 MEQ: 750 TABLET, EXTENDED RELEASE ORAL at 09:16

## 2019-04-23 RX ADMIN — HYDROCODONE BITARTRATE AND ACETAMINOPHEN 1 TABLET: 5; 325 TABLET ORAL at 11:51

## 2019-04-23 RX ADMIN — Medication 10 ML: at 22:37

## 2019-04-23 NOTE — PROGRESS NOTES
[facility] CARDIOLOGY PROGRESS NOTE 
      
 
4/23/2019 6:00 PM 
 
Admit Date: 4/22/2019 Subjective:  
Patient back in SR and PACs. She is asymptomatic and no bradycardia. ROS: 
Cardiovascular:  As noted above Objective:  
  
Vitals:  
 04/23/19 1045 04/23/19 1048 04/23/19 1121 04/23/19 1156 BP:  128/59 146/58 152/71 Pulse: 62 64 Resp: 21 18 18 18 Temp:  98.2 °F (36.8 °C) 98.1 °F (36.7 °C) 97.3 °F (36.3 °C) SpO2: 99% 94% 99% 98% Weight:   46.4 kg (102 lb 3.2 oz) Height:   5' 0.4\" (1.534 m) Physical Exam: 
General-No Acute Distress Neck- supple, no JVD 
CV- regular rate and rhythm no MRG Lung- clear bilaterally Abd- soft, nontender, nondistended Ext- no edema bilaterally. Skin- warm and dry Data Review:  
Recent Labs  
  04/23/19 
0419 04/22/19 
2043  140  
K 3.9 4.3 MG  --  2.0  
BUN 17 16 CREA 1.09* 1.20* * 224* WBC 8.6 6.5 HGB 10.7* 11.0*  
HCT 33.8* 34.6*  212 CHOL 139  --   
LDLC 85  --   
HDL 47  --   
 
 
Assessment/Plan:  
 
Principal Problem: 
  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 Active Problems: 
  Atrial fibrillation ([address] (8/31/2018) In SR again and history of CHB. Back on metoprolol. Monitor for additional bradycardia. CAD (coronary artery disease) (11/29/2015) No angina. Hypertension (12/1/2015) This is stable Congestive cardiac failure ([address] (9/28/2018) EF 20% and volume status is stable.    
 
 
 
 
Timoteo Duncan MD 
4/23/2019 6:00 PM

## 2019-04-23 NOTE — PROGRESS NOTES
Initial visit to assess pt's spiritual needs. Feeling today? Much better Receiving good care?  yes Needs from Spiritual Care:  Not now. Pt's  was a  for 48 yrs. Her  & Confucianist & family all provide her spiritual care & support while in the hospital. 
 
Ministry of presence & prayer to demonstrate caring & concern, convey emotional & spiritual support.  
 
Chaplain Ingris Simpson MDiv,ThM,PhD

## 2019-04-23 NOTE — ED NOTES
Pt resting comfortalby in bed. Family at bedside. Pt complains of overall body discomfort. Given Norco. States takes pain medications at home for same. Pt was up to commode to have BM. Tolerated well.

## 2019-04-23 NOTE — PROGRESS NOTES
TRANSFER - IN REPORT: 
 
Verbal report received from [place], [place] on Elsi Hoover being received from  ER for routine progression of care. Report consisted of patients Situation, Background, Assessment and Recommendations(SBAR). Information from the following report(s) SBAR, Kardex, MAR, Recent Results and Cardiac Rhythm afib  was reviewed. Opportunity for questions and clarification was provided. Assessment completed upon patients arrival to unit and care assumed. Patient received to room 328. Patient connected to monitor and assessment completed. Plan of care reviewed. Patient oriented to room and call light. Patient aware to use call light to communicate any chest pain or needs. Admission skin assessment completed with second RN and reveals the following: Skin thin and fragile. Slight redness on lower sacrum, blanches. Allevyn applied for protection. Two skin tears upper posterior of right thigh. Trace edema bilateral ankles. Mepilex applied for protection. Heels intact. No pressure injury.

## 2019-04-23 NOTE — PROGRESS NOTES
Bedside and Verbal shift change report given to Vitaly Simpson RN (oncoming nurse) by self and Marc Lin RN (offgoing nurse). Report included the following information SBAR, Kardex, MAR and Cardiac Rhythm A fib converted to NSR.

## 2019-04-23 NOTE — PROGRESS NOTES
Problem: Afib Pathway: Day 1 Goal: *Stable cardiac rhythm Outcome: Progressing Towards Goal 
Pt to converted to NSR upon arrival to unit and maintained. Problem: Falls - Risk of 
Goal: *Absence of Falls Description Document Catherene Bunting Fall Risk and appropriate interventions in the flowsheet. Outcome: Progressing Towards Goal 
Bed alarm place and explained to pt and daughters. All verbalized understanding.

## 2019-04-23 NOTE — PROGRESS NOTES
Pt has Advance Directives according to daughters are on file. Daughters Yeimy Caballero and Frantz Blanco are both POA's. Pt states she is a DNR status. No compressions or intubation. Called Aaron Portillo, NP and code status changed per pt's wishes.

## 2019-04-23 NOTE — ED PROVIDER NOTES
Patient was brought to the ER for evaluation when noted to be tachycardic and a blood pressure check at home this evening. Patient denies any sensation of tachycardia and does have a history of atrial fibrillation. She previously been on Coreg and amiodarone both of which were stopped several months ago. She does take aspirin daily but is on no other anticoagulation. She denies any chest pain or shortness of breath or lightheadedness. She's had no recent illnesses. The history is provided by the patient and a relative. Rapid Heart Rate This is a recurrent problem. The current episode started 3 to 5 hours ago. The problem occurs constantly. The problem has not changed since onset. Pertinent negatives include no chest pain, no abdominal pain, no headaches and no shortness of breath. Nothing aggravates the symptoms. Nothing relieves the symptoms. She has tried nothing for the symptoms. The treatment provided no relief. Past Medical History:  
Diagnosis Date  Thyroid cyst Distant past  
 thyroid cyst removed x40 years ago Past Surgical History:  
Procedure Laterality Date  HX CHOLECYSTECTOMY  HX HEENT    
 thyroglossal cyst  
 HX HYSTERECTOMY Family History:  
Problem Relation Age of Onset  Other Father  [place] Other Mother  Social History Socioeconomic History  Marital status:  Spouse name: Not on file  Number of children: Not on file  Years of education: Not on file  Highest education level: Not on file Occupational History  Not on file Social Needs  Financial resource strain: Not on file  Food insecurity:  
  Worry: Not on file Inability: Not on file  Transportation needs:  
  Medical: Not on file Non-medical: Not on file Tobacco Use  Smoking status: Never Smoker  Smokeless tobacco: Never Used Substance and Sexual Activity  Alcohol use: No  
 Drug use:  No  
  Sexual activity: Not on file Lifestyle  Physical activity:  
  Days per week: Not on file Minutes per session: Not on file  Stress: Not on file Relationships  Social connections:  
  Talks on phone: Not on file Gets together: Not on file Attends [employer] service: Not on file Active member of club or organization: Not on file Attends meetings of clubs or organizations: Not on file Relationship status: Not on file  Intimate partner violence:  
  Fear of current or ex partner: Not on file Emotionally abused: Not on file Physically abused: Not on file Forced sexual activity: Not on file Other Topics Concern  Not on file Social History Narrative , lives with  and 2 daughters. Pt did work as  at one time. 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 ALLERGIES: Amlodipine; Amoxicillin; and Tetracycline Review of Systems Respiratory: Negative for shortness of breath. Cardiovascular: Negative for chest pain. Gastrointestinal: Negative for abdominal pain. Neurological: Negative for headaches. All other systems reviewed and are negative. Vitals:  
 04/22/19 2024 04/22/19 2050 04/22/19 2056 BP: 108/65 101/55 Pulse: (!) 142 (!) 135 Resp: 18 18 Temp: 98.1 °F (36.7 °C) SpO2: 96% 94% 94% Weight: 47.6 kg (105 lb) Height: 5' 4\" (1.626 m) Physical Exam  
Constitutional: She is oriented to person, place, and time. She appears well-developed and well-nourished. No distress. HENT:  
Head: Normocephalic and atraumatic. Eyes: Pupils are equal, round, and reactive to light. Conjunctivae and EOM are normal.  
Neck: Normal range of motion. Neck supple. Cardiovascular: Murmur heard. Tachycardic rate with an irregularly irregular rhythm Pulmonary/Chest: Effort normal and breath sounds normal.  
Abdominal: Soft.  Bowel sounds are normal.  
 Musculoskeletal: Normal range of motion. Neurological: She is alert and oriented to person, place, and time. Skin: Skin is warm and dry. Capillary refill takes less than 2 seconds. She is not diaphoretic. Psychiatric: She has a normal mood and affect. Her behavior is normal.  
Nursing note and vitals reviewed. MDM Number of Diagnoses or Management Options Amount and/or Complexity of Data Reviewed Clinical lab tests: ordered and reviewed Tests in the radiology section of CPT®: ordered and reviewed Review and summarize past medical records: yes Discuss the patient with other providers: yes Independent visualization of images, tracings, or specimens: yes (EKG at 2023: Atrial fibrillation with rapid ventricular response 146 bpm nonspecific interventricular conduction delay is noted. No significant morphological change from previous EKG. ) Risk of Complications, Morbidity, and/or Mortality Presenting problems: high Diagnostic procedures: high Management options: high Patient Progress Patient progress: stable Procedures

## 2019-04-23 NOTE — H&P
[address] [place] [address] 
HISTORY AND PHYSICAL Name:  Abagail Kussmaul 
MR#:  [id number] :  [id number] ACCOUNT #:  [de-identified] ADMIT DATE:  2019 CHIEF COMPLAINT:  Tachycardia. HISTORY OF PRESENT ILLNESSES:  The patient is a very pleasant 42-year-old female with a history of hypertension, AFib with RVR and subsequent complete heart block requiring temporary pacemaker on amiodarone which was discontinued, coronary artery disease, chronic systolic congestive heart failure with an ejection fraction 20% who presents with AFib with RVR. Her daughters check her heart rate consistently, they did not check it yesterday but every other heart rate has been in the 60s, heart rate on being checked today was 133 and she brought to the emergency department. EKG showing AFib with RVR with left bundle branch block. She has had some mild dizziness but reports no other new complaints over the last few days. She has no chest pain, shortness of breath, orthopnea, PND or syncope. PAST MEDICAL HISTORY:  As noted above. SOCIAL HISTORY:  She does not smoke. FAMILY HISTORY:  Negative for premature coronary artery disease. ALLERGIES:  INCLUDE AMLODIPINE, AMOXICILLIN AND TETRACYCLINE. REVIEW OF SYSTEMS:  General:  No fevers or chills. HEENT:  Head:  No headache. Nose:  No rhinorrhea. Respiratory:  No cough. Gastrointestinal:  No nausea, vomiting or diarrhea. Genitourinary:  No dysuria. Endocrine:  No temperature intolerance. Musculoskeletal:  No myalgias or arthritis. Skin:  No rashes. Eyes:  No visual changes. Neurologic:  No CVA symptoms. MEDICATIONS:  Include Prinivil 2.5 mg a day, Protonix 40 mg a day, aspirin 81 mg a day, Lasix 40 mg a day, 0.4 Nitro-Dur patch daily. PHYSICAL EXAMINATION: 
GENERAL:  A well-developed, well-nourished female in acute distress, alert and oriented x3 with appropriate mood and affect. VITAL SIGNS:  Heart rates was 142, blood pressure 108/65. HEENT:  Sclerae are clear. CARDIOVASCULAR:  S1, S2 is regular. LUNGS:  Clear. ABDOMEN:  Soft. EXTREMITIES:  No edema. SKIN:  Warm and dry. NECK:  Supple. Trachea midline. EKG as noted above. PERTINENT LABS:  White count 6.5, troponin 0.04, hemoglobin is 11, platelets 251, potassium 4.3, creatinine 1.2. 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 I have discussed options. She adamantly does not want a pacemaker. Since her heart rate is elevated, we will try beta blocker and hopes that leaving her in atrial fibrillation, she will have an increased rate to be able to tolerate some AV modesta blockade. We will start heparin for now in case she does ultimately need a pacemaker but does not have any history of falls and will be a candidate for oral anticoagulation. Further therapy and decision will be made after seeing her response to beta blockers overnight. Violeta Uribe MD 
 
 
[id number] 
D:  04/22/2019 22:11 
T:  04/22/2019 22:13 
JOB #:  [id number]

## 2019-04-23 NOTE — ED TRIAGE NOTES
Pt arrives in care of family with c/o weakness and rapid heart rate in 140s at home. Heart rate 140s on arrival to triage. Denies chest pain. Reports shortness of breath and dizziness. Admitted 3/2019 for complete heart block. Family reports taken off coreg, amiodarone and blood thinner at that time. Followed by dr Glenn Schumacher, scheduled appt wednesday

## 2019-04-24 VITALS
HEIGHT: 60 IN | TEMPERATURE: 98.5 F | RESPIRATION RATE: 17 BRPM | OXYGEN SATURATION: 95 % | SYSTOLIC BLOOD PRESSURE: 147 MMHG | HEART RATE: 71 BPM | DIASTOLIC BLOOD PRESSURE: 58 MMHG | BODY MASS INDEX: 19.75 KG/M2 | WEIGHT: 100.6 LBS

## 2019-04-24 PROBLEM — I48.91 ATRIAL FIBRILLATION WITH RAPID VENTRICULAR RESPONSE (HCC): Status: ACTIVE | Noted: 2019-04-24

## 2019-04-24 LAB
ANION GAP SERPL CALC-SCNC: 7 MMOL/L (ref 7–16)
APPEARANCE UR: CLEAR
BACTERIA URNS QL MICRO: 0 /HPF
BILIRUB UR QL: NEGATIVE
BUN SERPL-MCNC: 19 MG/DL (ref 8–23)
CALCIUM SERPL-MCNC: 9.2 MG/DL (ref 8.3–10.4)
CASTS URNS QL MICRO: ABNORMAL /LPF
CHLORIDE SERPL-SCNC: 109 MMOL/L (ref 98–107)
CO2 SERPL-SCNC: 25 MMOL/L (ref 21–32)
COLOR UR: YELLOW
CREAT SERPL-MCNC: 1.03 MG/DL (ref 0.6–1)
EPI CELLS #/AREA URNS HPF: ABNORMAL /HPF
ERYTHROCYTE [DISTWIDTH] IN BLOOD BY AUTOMATED COUNT: 13.1 % (ref 11.9–14.6)
GLUCOSE SERPL-MCNC: 124 MG/DL (ref 65–100)
GLUCOSE UR STRIP.AUTO-MCNC: NEGATIVE MG/DL
HCT VFR BLD AUTO: 37 % (ref 35.8–46.3)
HGB BLD-MCNC: 11.9 G/DL (ref 11.7–15.4)
HGB UR QL STRIP: ABNORMAL
KETONES UR QL STRIP.AUTO: NEGATIVE MG/DL
LEUKOCYTE ESTERASE UR QL STRIP.AUTO: ABNORMAL
MCH RBC QN AUTO: 31.7 PG (ref 26.1–32.9)
MCHC RBC AUTO-ENTMCNC: 32.2 G/DL (ref 31.4–35)
MCV RBC AUTO: 98.7 FL (ref 79.6–97.8)
NITRITE UR QL STRIP.AUTO: NEGATIVE
NRBC # BLD: 0 K/UL (ref 0–0.2)
PH UR STRIP: 6.5 [PH] (ref 5–9)
PLATELET # BLD AUTO: 248 K/UL (ref 150–450)
PMV BLD AUTO: 10.8 FL (ref 9.4–12.3)
POTASSIUM SERPL-SCNC: 4 MMOL/L (ref 3.5–5.1)
PROT UR STRIP-MCNC: NEGATIVE MG/DL
RBC # BLD AUTO: 3.75 M/UL (ref 4.05–5.2)
RBC #/AREA URNS HPF: ABNORMAL /HPF
SODIUM SERPL-SCNC: 141 MMOL/L (ref 136–145)
SP GR UR REFRACTOMETRY: 1.01 (ref 1–1.02)
UA: UC IF INDICATED,UAUC: ABNORMAL
UROBILINOGEN UR QL STRIP.AUTO: 1 EU/DL (ref 0.2–1)
WBC # BLD AUTO: 12.4 K/UL (ref 4.3–11.1)
WBC URNS QL MICRO: ABNORMAL /HPF

## 2019-04-24 PROCEDURE — 85027 COMPLETE CBC AUTOMATED: CPT

## 2019-04-24 PROCEDURE — 74011250637 HC RX REV CODE- 250/637: Performed by: NURSE PRACTITIONER

## 2019-04-24 PROCEDURE — 81001 URINALYSIS AUTO W/SCOPE: CPT

## 2019-04-24 PROCEDURE — 65660000000 HC RM CCU STEPDOWN

## 2019-04-24 PROCEDURE — 80048 BASIC METABOLIC PNL TOTAL CA: CPT

## 2019-04-24 PROCEDURE — 99218 HC RM OBSERVATION: CPT

## 2019-04-24 PROCEDURE — 74011250636 HC RX REV CODE- 250/636: Performed by: NURSE PRACTITIONER

## 2019-04-24 PROCEDURE — 36415 COLL VENOUS BLD VENIPUNCTURE: CPT

## 2019-04-24 RX ORDER — METOPROLOL TARTRATE 25 MG/1
25 TABLET, FILM COATED ORAL EVERY 12 HOURS
Qty: 60 TAB | Refills: 11 | Status: SHIPPED | OUTPATIENT
Start: 2019-04-24 | End: 2020-08-24 | Stop reason: SDUPTHER

## 2019-04-24 RX ADMIN — HYDROCODONE BITARTRATE AND ACETAMINOPHEN 1 TABLET: 5; 325 TABLET ORAL at 08:14

## 2019-04-24 RX ADMIN — METOPROLOL TARTRATE 25 MG: 25 TABLET ORAL at 08:15

## 2019-04-24 RX ADMIN — LISINOPRIL 2.5 MG: 5 TABLET ORAL at 08:15

## 2019-04-24 RX ADMIN — MORPHINE SULFATE 2 MG: 2 INJECTION, SOLUTION INTRAMUSCULAR; INTRAVENOUS at 05:27

## 2019-04-24 RX ADMIN — Medication 10 ML: at 05:27

## 2019-04-24 RX ADMIN — POTASSIUM CHLORIDE 10 MEQ: 750 TABLET, EXTENDED RELEASE ORAL at 08:14

## 2019-04-24 RX ADMIN — PANTOPRAZOLE SODIUM 40 MG: 40 TABLET, DELAYED RELEASE ORAL at 08:15

## 2019-04-24 RX ADMIN — ASPIRIN 81 MG 81 MG: 81 TABLET ORAL at 08:15

## 2019-04-24 NOTE — PROGRESS NOTES
Pt daughter called RN into room because pt was complaining of feeling short of breath. Lung sounds clear but diminished. Pt had been 96-99% on Room Air throughout the night. 3L NC applied, sats up to 94%. Pt complaining of pain in knees, legs, and in back. Cannot describe the pain but does deal with chronic pain in these areas. Morphine given as per MAR. After morphine administered, pt complained of arm pain in arm where morphine administered. Pt's BP elevated, will monitor if morphine brings BP down. Daughter at bedside very attentive and will call with any pt complaints or changes. Checked on pt at [facility]. Pt resting in bed with eyes closed, unlabored respirations present. No distress noted.

## 2019-04-24 NOTE — DISCHARGE INSTRUCTIONS
Patient Education        Atrial Fibrillation: Care Instructions  Your Care Instructions    Atrial fibrillation is an irregular and often fast heartbeat. Treating this condition is important for several reasons. It can cause blood clots, which can travel from your heart to your brain and cause a stroke. If you have a fast heartbeat, you may feel lightheaded, dizzy, and weak. An irregular heartbeat can also increase your risk for heart failure. Atrial fibrillation is often the result of another heart condition, such as high blood pressure or coronary artery disease. Making changes to improve your heart condition will help you stay healthy and active. Follow-up care is a key part of your treatment and safety. Be sure to make and go to all appointments, and call your doctor if you are having problems. It's also a good idea to know your test results and keep a list of the medicines you take. How can you care for yourself at home? Medicines    · Take your medicines exactly as prescribed. Call your doctor if you think you are having a problem with your medicine. You will get more details on the specific medicines your doctor prescribes.     · If your doctor has given you a blood thinner to prevent a stroke, be sure you get instructions about how to take your medicine safely. Blood thinners can cause serious bleeding problems.     · Do not take any vitamins, over-the-counter drugs, or herbal products without talking to your doctor first.    Lifestyle changes    · Do not smoke. Smoking can increase your chance of a stroke and heart attack. If you need help quitting, talk to your doctor about stop-smoking programs and medicines. These can increase your chances of quitting for good.     · Eat a heart-healthy diet.     · Stay at a healthy weight. Lose weight if you need to.     · Limit alcohol to 2 drinks a day for men and 1 drink a day for women. Too much alcohol can cause health problems.     · Avoid colds and flu.  Get a pneumococcal vaccine shot. If you have had one before, ask your doctor whether you need another dose. Get a flu shot every year. If you must be around people with colds or flu, wash your hands often. Activity    · If your doctor recommends it, get more exercise. Walking is a good choice. Bit by bit, increase the amount you walk every day. Try for at least 30 minutes on most days of the week. You also may want to swim, bike, or do other activities. Your doctor may suggest that you join a cardiac rehabilitation program so that you can have help increasing your physical activity safely.     · Start light exercise if your doctor says it is okay. Even a small amount will help you get stronger, have more energy, and manage stress. Walking is an easy way to get exercise. Start out by walking a little more than you did in the hospital. Gradually increase the amount you walk.     · When you exercise, watch for signs that your heart is working too hard. You are pushing too hard if you cannot talk while you are exercising. If you become short of breath or dizzy or have chest pain, sit down and rest immediately.     · Check your pulse regularly. Place two fingers on the artery at the palm side of your wrist, in line with your thumb. If your heartbeat seems uneven or fast, talk to your doctor. When should you call for help? Call 911 anytime you think you may need emergency care. For example, call if:    · You have symptoms of a heart attack. These may include:  ? Chest pain or pressure, or a strange feeling in the chest.  ? Sweating. ? Shortness of breath. ? Nausea or vomiting. ? Pain, pressure, or a strange feeling in the back, neck, jaw, or upper belly or in one or both shoulders or arms. ? Lightheadedness or sudden weakness. ? A fast or irregular heartbeat. After you call 911, the  may tell you to chew 1 adult-strength or 2 to 4 low-dose aspirin. Wait for an ambulance.  Do not try to drive yourself.     · You have symptoms of a stroke. These may include:  ? Sudden numbness, tingling, weakness, or loss of movement in your face, arm, or leg, especially on only one side of your body. ? Sudden vision changes. ? Sudden trouble speaking. ? Sudden confusion or trouble understanding simple statements. ? Sudden problems with walking or balance. ? A sudden, severe headache that is different from past headaches.     · You passed out (lost consciousness).    Call your doctor now or seek immediate medical care if:    · You have new or increased shortness of breath.     · You feel dizzy or lightheaded, or you feel like you may faint.     · Your heart rate becomes irregular.     · You can feel your heart flutter in your chest or skip heartbeats. Tell your doctor if these symptoms are new or worse.    Watch closely for changes in your health, and be sure to contact your doctor if you have any problems. Where can you learn more? Go to http://dinora-ellyn.info/. Enter U020 in the search box to learn more about \"Atrial Fibrillation: Care Instructions. \"  Current as of: July 22, 2018  Content Version: 11.9  © 8014-2167 [employer]. Care instructions adapted under license by [employer] (which disclaims liability or warranty for this information). If you have questions about a medical condition or this instruction, always ask your healthcare professional. [address] any warranty or liability for your use of this information. Patient Education      Apixaban (Eliquis) - (By mouth)   Why this medicine is used:   Treats and prevents blood clots.   Contact a nurse or doctor right away if you have:  · Sudden or severe headache  · Back pain, numbness, tingling, weakness in your legs or feet  · Loss of bladder or bowel control  · Bloody vomit or vomit that looks like coffee grounds; bloody or black, tarry stools  · Bleeding that does not stop or bruises that do not heal     Common side effects:  · Minor bleeding or bruising  © 2017 [facility] Information is for End User's use only and may not be sold, redistributed or otherwise used for commercial purposes. Patient Education      Metoprolol/Hydrochlorothiazide (Dutoprol, Lopressor HCT) - (By mouth)   Why this medicine is used:   Treats high blood pressure. Contact a nurse or doctor right away if you have:  · Blistering, peeling, red skin rash  · Uneven heartbeat  · Rapid weight gain; swelling in your hands, ankles, or feet  · Lightheadedness, dizziness, fainting  · Dry mouth, increased thirst, muscle cramps, nausea or vomiting     Common side effects:  · Depression  · Mild dizziness, headache, tiredness  · Mild diarrhea, constipation, nausea  © 2017 [facility] Information is for End User's use only and may not be sold, redistributed or otherwise used for commercial purposes.

## 2019-04-24 NOTE — PROGRESS NOTES
Bedside and Verbal shift change report given to Minerva Sharma RN 
(oncoming nurse) by self (offgoing nurse).  Report included the following information SBAR, Kardex, Intake/Output, MAR, Recent Results and Cardiac Rhythm SR.

## 2019-04-24 NOTE — PROGRESS NOTES
IV and heart monitor removed prior to discharge. Discharge instructions reviewed with patient by Edilia Shen RN. Pt verbalized understanding of d/c instructions.

## 2019-04-24 NOTE — PROGRESS NOTES
Problem: Afib Pathway: Day 1 Goal: Activity/Safety Outcome: Progressing Towards Goal 
Goal: Diagnostic Test/Procedures Outcome: Progressing Towards Goal 
Goal: Nutrition/Diet Outcome: Progressing Towards Goal 
Goal: Discharge Planning Outcome: Progressing Towards Goal 
Goal: Medications Outcome: Progressing Towards Goal 
Goal: Respiratory Outcome: Progressing Towards Goal 
  
Problem: Falls - Risk of 
Goal: *Absence of Falls Description Document Lorenza Tejada Fall Risk and appropriate interventions in the flowsheet.  
Outcome: Progressing Towards Goal

## 2019-04-24 NOTE — DISCHARGE SUMMARY
[facility] Cardiology Discharge Summary     Patient ID:  Josie Magana  [id number]  76 y.o.  3/3/1924    Admit date: 4/22/2019    Discharge date and time:  4/24/2019    Admitting Physician: Ayden Young MD     Discharge Physician: Dr. Lora Gerber    Admission Diagnoses: Atrial fibrillation with rapid ventricular response [facility]) [I48.91]    Discharge Diagnoses:    Diagnosis    Tachy-smaira syndrome ([facility]    Complete heart block, history of --amiodarone stopped    [facility]    Hypertension    CAD (coronary artery disease)       Cardiology Procedures this admission: none  Consults: None    Hospital Course: Patient presented to the ER with complaints of elevated HR. Patient was found to be in AFIB with RVR on arrival to the ER. She was given 10mg IV Cardizem which improved HR. Review of records showed that patient had previously taken amiodarone in the past, but this was stopped during a recent admission due to CHB. Patient was admitted to telemetry for continued care. At the time of admission, patient was started on oral lopressor for rate control. After admission, patient converted to SR with controlled rate. She was also started on IV heparin for anticoagulation. The morning of discharge, patient's HR was stable and she was up feeling well without any complaints of CP, SOB or palpitations. With reports of trouble urinating and elevated WBC a UA was checked that was negative for infection. Patient was seen and examined by Dr. Lora Gerber and determined stable and ready for discharge. Pt was instructed on the importance of taking lopressor and Eliquis without missing a dose. Patient will follow up with [facility] Cardiology -- Dr. Brando Encinas in 2-4 weeks. DISPOSITION: The patient is being discharged home in stable condition on a low saturated fat, low cholesterol and low salt diet. Patient is instructed to advance activities as tolerated limited to fatigue or shortness of breath.  Patient is instructed to call office or return to ER for immediate evaluation of any shortness of breath, palpitations or chest pain. Discharge Exam: Patient has been seen by Dr. Angela Mendoza: see his progress note for exam details. Visit Vitals  /90 (BP 1 Location: Right arm, BP Patient Position: At rest)   Pulse 88   Temp 98.2 °F (36.8 °C)   Resp 16   Ht 5' 0.4\" (1.534 m)   Wt 45.6 kg (100 lb 9.6 oz)   SpO2 94%   Breastfeeding?  No   BMI 19.39 kg/m²       Recent Results (from the past 24 hour(s))   EKG, 12 LEAD, INITIAL    Collection Time: 04/23/19 12:38 PM   Result Value Ref Range    Ventricular Rate 74 BPM    Atrial Rate 74 BPM    P-R Interval 220 ms    QRS Duration 154 ms    Q-T Interval 452 ms    QTC Calculation (Bezet) 501 ms    Calculated P Axis 91 degrees    Calculated R Axis -63 degrees    Calculated T Axis 139 degrees    Diagnosis       Sinus rhythm with sinus arrhythmia with 1st degree A-V block  Left axis deviation  Left bundle branch block  Abnormal ECG  When compared with ECG of 22-APR-2019 21:39,  Sinus rhythm has replaced Atrial fibrillation  T wave inversion more evident in Lateral leads  Confirmed by [facility]  MD ()GENA ([id number]) on 4/23/2019 3:45:49 PM     PTT    Collection Time: 04/23/19  4:59 PM   Result Value Ref Range    aPTT 44.2 (H) 24.7 - 45.0 SEC   METABOLIC PANEL, BASIC    Collection Time: 04/24/19  3:46 AM   Result Value Ref Range    Sodium 141 136 - 145 mmol/L    Potassium 4.0 3.5 - 5.1 mmol/L    Chloride 109 (H) 98 - 107 mmol/L    CO2 25 21 - 32 mmol/L    Anion gap 7 7 - 16 mmol/L    Glucose 124 (H) 65 - 100 mg/dL    BUN 19 8 - 23 MG/DL    Creatinine 1.03 (H) 0.6 - 1.0 MG/DL    GFR est AA >60 >60 ml/min/1.73m2    GFR est non-AA 53 (L) >60 ml/min/1.73m2    Calcium 9.2 8.3 - 10.4 MG/DL   CBC W/O DIFF    Collection Time: 04/24/19  3:46 AM   Result Value Ref Range    WBC 12.4 (H) 4.3 - 11.1 K/uL    RBC 3.75 (L) 4.05 - 5.2 M/uL    HGB 11.9 11.7 - 15.4 g/dL    HCT 37.0 35.8 - 46.3 % MCV 98.7 (H) 79.6 - 97.8 FL    MCH 31.7 26.1 - 32.9 PG    MCHC 32.2 31.4 - 35.0 g/dL    RDW 13.1 11.9 - 14.6 %    PLATELET 042 141 - 770 K/uL    MPV 10.8 9.4 - 12.3 FL    ABSOLUTE NRBC 0.00 0.0 - 0.2 K/uL         Patient Instructions:     Current Discharge Medication List      START taking these medications    Details   apixaban (ELIQUIS) 2.5 mg tablet Take 1 Tab by mouth two (2) times a day. Qty: 60 Tab, Refills: 11      metoprolol tartrate (LOPRESSOR) 25 mg tablet Take 1 Tab by mouth every twelve (12) hours. Qty: 60 Tab, Refills: 11         CONTINUE these medications which have NOT CHANGED    Details   pantoprazole (PROTONIX) 40 mg tablet Take 1 Tab by mouth Daily (before breakfast). Qty: 30 Tab, Refills: 3      lisinopril (PRINIVIL, ZESTRIL) 2.5 mg tablet Take  by mouth daily. potassium chloride SR (KLOR-CON 10) 10 mEq tablet Take  by mouth. aspirin 81 mg chewable tablet Take 2 Tabs by mouth daily. Qty: 60 Tab, Refills: 0    Associated Diagnoses: Persistent atrial fibrillation (HCC)      furosemide (LASIX) 40 mg tablet Take 1 Tab by mouth daily. Qty: 30 Tab, Refills: 0      nitroglycerin (NITRODUR) 0.4 mg/hr 1 Patch by TransDERmal route daily. polyethylene glycol (MIRALAX) 17 gram/dose powder Take 17 g by mouth as needed (constipation). cholecalciferol, vitamin D3, (VITAMIN D3) 2,000 unit tab Take 1,000 Int'l Units by mouth daily. oxyCODONE IR (OXY-IR) 15 mg immediate release tablet Take 15 mg by mouth every four (4) hours as needed for Pain.      pyridoxine (VITAMIN B-6) 100 mg tablet Take 100 mg by mouth daily.  Takes at [facility], NP  8:04 AM  04/24/19  Fanny Brady MD

## 2019-04-24 NOTE — PHYSICIAN ADVISORY
Letter of Determination: Upgrade from Observation to Inpatient Status This patient was originally hospitalized as Outpatient Status with Observation Services on 4/22/2019 for tachy-samira syndrome. This patient now meets for Inpatient Admission in accordance with CMS regulation Section 43 .3. Specifically, patient's stay is now over Two Midnights and was medically necessary. The patient's stay was medically necessary based on extreme advanced age, ejection fraction of 20%, patient decline of pacemaker placement, pulse to 142 beats per minute, and treatment with intravenous heparin. Consistent with CMS guidelines, patient meets for inpatient status. It is our recommendation that this patient's hospitalization status should be upgraded from OBSERVATION to INPATIENT status.  
  
The final decision regarding the patient's hospitalization status depends on the attending physician's judgement. Edilberto Sotomayor MD, EPIFANIO, Physician Advisor [address] [place] [address].

## 2019-04-24 NOTE — PROGRESS NOTES
Care Management Interventions PCP Verified by CM: Yes Last Visit to PCP: 11/11/18 Mode of Transport at Discharge: Other (see comment)(Daughter [place] [phone]) Transition of Care Consult (CM Consult): Discharge Planning Discharge Durable Medical Equipment: No 
Physical Therapy Consult: No 
Occupational Therapy Consult: No 
Speech Therapy Consult: No 
Current Support Network: Own Home, Lives with Spouse Confirm Follow Up Transport: Family Plan discussed with Pt/Family/Caregiver: Yes Freedom of Choice Offered: Yes Discharge Location Discharge Placement: Home Pt admitted to 3rd floor [facility] for Afib. CM met with pt to discuss CM needs & DCP. Pt is A&Ox4. Pt is partially dependent at home with all ADLS. Pt lives with daughter. Pt has walker, cane, transport chair, SC, BSC; no further DME needs. Pt has no difficulty with obtaining medications. Pt relies on daughter for transport. Pt declined HH. DCP home with family. No further needs noted. CM to continue to monitor.

## 2019-04-24 NOTE — PROGRESS NOTES
[facility] CARDIOLOGY PROGRESS NOTE 
      
 
4/24/2019 Admit Date: 4/22/2019 Subjective:  
Patient back in SR and PACs. She is asymptomatic and no bradycardia. ROS: 
Cardiovascular:  As noted above Objective:  
  
Vitals:  
 04/24/19 7629 04/24/19 0535 04/24/19 0540 04/24/19 0602 BP: 173/88 178/89 177/90 Pulse: 78 95 88 Resp: 18 20 18 16 Temp: 98.2 °F (36.8 °C) SpO2: 96% 90% 94% Weight: 45.6 kg (100 lb 9.6 oz) Height:      
 
 
Physical Exam: 
General-No Acute Distress Neck- supple, no JVD 
CV- regular rate and rhythm no MRG Lung- clear bilaterally Abd- soft, nontender, nondistended Ext- no edema bilaterally. Skin- warm and dry Data Review:  
Recent Labs  
  04/24/19 
0346 04/23/19 
0419 04/22/19 
2043  139 140  
K 4.0 3.9 4.3 MG  --   --  2.0  
BUN 19 17 16 CREA 1.03* 1.09* 1.20* * 102* 224* WBC 12.4* 8.6 6.5 HGB 11.9 10.7* 11.0*  
HCT 37.0 33.8* 34.6*  216 212 CHOL  --  139  --   
LDLC  --  85  --   
HDL  --  47  --   
 
 
Assessment/Plan:  
 
Principal Problem: 
  Tachy-samira syndrome ([address] (4/23/2019) Now in SR and on metoprolol and no bradycardia. Should be stable to DC home. Active Problems: 
  Atrial fibrillation ([address] (8/31/2018) In SR and history of CHB. Back on metoprolol. Monitor for additional bradycardia. Start Eliquis 2.5mg BID 
 
  CAD (coronary artery disease) (11/29/2015) No angina. Hypertension (12/1/2015) This is stable Congestive cardiac failure ([address] (9/28/2018) EF 20% and volume status is stable. Medical therapy Check UA and DC home. Close follow up with Dr Steve Vaughn MD 
4/24/2019

## 2019-04-24 NOTE — PROGRESS NOTES
Bedside and Verbal shift change report given to self (oncoming nurse) by Magdaleno Powers RN (offgoing nurse). Report included the following information SBAR, Kardex, MAR, Recent Results and Cardiac Rhythm NSR.

## 2019-04-24 NOTE — PROGRESS NOTES
Bedside and Verbal shift change report given to self (oncoming nurse) by Luep Zhang RN (offgoing nurse).  Report included the following information SBAR, Kardex, ED Summary, Intake/Output, MAR, Recent Results and Cardiac Rhythm SR.

## 2019-04-25 ENCOUNTER — PATIENT OUTREACH (OUTPATIENT)
Dept: CASE MANAGEMENT | Age: 84
End: 2019-04-25

## 2019-04-25 NOTE — PROGRESS NOTES
This note will not be viewable in [address] Second [employer] outreach attempt made to patient's home number was unsuccessful. Left message to return call. Will attempt third outreach within 5 business days.

## 2019-04-25 NOTE — PROGRESS NOTES
This note will not be viewable in [address] Initial [employer] outreach attempt to patient's home number was unsuccessful. Left message to return call. Will attempt second outreach within 24 hours.

## 2019-04-26 ENCOUNTER — PATIENT OUTREACH (OUTPATIENT)
Dept: CASE MANAGEMENT | Age: 84
End: 2019-04-26

## 2019-07-06 NOTE — ANESTHESIA PREPROCEDURE EVALUATION
Anesthetic History No history of anesthetic complications Review of Systems / Medical History Patient summary reviewed and pertinent labs reviewed Pulmonary Within defined limits Neuro/Psych Within defined limits Cardiovascular Hypertension: well controlled Past MI (2008), CAD and cardiac stents Pertinent negatives: Angina: 2008. Exercise tolerance: <4 METS: Walker/cane Comments: SR with PACs ECHO 2015--EF 40-45% with LAE Now only on bASA  
GI/Hepatic/Renal 
Within defined limits Endo/Other Hypothyroidism: well controlled Other Findings Comments: presbycusis Physical Exam 
 
Airway Mallampati: I 
TM Distance: 4 - 6 cm Neck ROM: normal range of motion Mouth opening: Normal 
 
 Cardiovascular Regular rate and rhythm,  S1 and S2 normal,  no murmur, click, rub, or gallop Rhythm: regular Rate: normal 
 
 
 
 Dental 
No notable dental hx Pulmonary Breath sounds clear to auscultation Abdominal 
GI exam deferred Other Findings Anesthetic Plan ASA: 3 Anesthesia type: general 
 
 
 
 
Induction: Intravenous Anesthetic plan and risks discussed with: Patient Will give bASA 
NPO since 2pm; Dr. Shawna Hernandez does not feel this is emergent.   Will plan 8 pm   
 

(3) slightly limited

## 2019-10-15 ENCOUNTER — HOSPITAL ENCOUNTER (EMERGENCY)
Age: 84
Discharge: HOME | End: 2019-10-15
Attending: EMERGENCY MEDICINE
Payer: MEDICARE

## 2019-10-15 VITALS
HEIGHT: 60 IN | WEIGHT: 104 LBS | DIASTOLIC BLOOD PRESSURE: 57 MMHG | BODY MASS INDEX: 20.42 KG/M2 | SYSTOLIC BLOOD PRESSURE: 129 MMHG | HEART RATE: 59 BPM | RESPIRATION RATE: 16 BRPM | TEMPERATURE: 98 F | OXYGEN SATURATION: 98 %

## 2019-10-15 DIAGNOSIS — I48.91 ATRIAL FIBRILLATION, UNSPECIFIED TYPE (HCC): Primary | ICD-10-CM

## 2019-10-15 LAB
ALBUMIN SERPL-MCNC: 3.1 G/DL (ref 3.2–4.6)
ALBUMIN/GLOB SERPL: 0.9 {RATIO} (ref 1.2–3.5)
ALP SERPL-CCNC: 71 U/L (ref 50–136)
ALT SERPL-CCNC: 18 U/L (ref 12–65)
ANION GAP SERPL CALC-SCNC: 6 MMOL/L (ref 7–16)
AST SERPL-CCNC: 15 U/L (ref 15–37)
ATRIAL RATE: 67 BPM
BASOPHILS # BLD: 0.1 K/UL (ref 0–0.2)
BASOPHILS NFR BLD: 1 % (ref 0–2)
BILIRUB SERPL-MCNC: 0.5 MG/DL (ref 0.2–1.1)
BUN SERPL-MCNC: 16 MG/DL (ref 8–23)
CALCIUM SERPL-MCNC: 8.2 MG/DL (ref 8.3–10.4)
CALCULATED P AXIS, ECG09: 90 DEGREES
CALCULATED R AXIS, ECG10: -82 DEGREES
CALCULATED T AXIS, ECG11: 93 DEGREES
CHLORIDE SERPL-SCNC: 105 MMOL/L (ref 98–107)
CO2 SERPL-SCNC: 28 MMOL/L (ref 21–32)
CREAT SERPL-MCNC: 0.93 MG/DL (ref 0.6–1)
DIAGNOSIS, 93000: NORMAL
DIFFERENTIAL METHOD BLD: ABNORMAL
EOSINOPHIL # BLD: 0.2 K/UL (ref 0–0.8)
EOSINOPHIL NFR BLD: 4 % (ref 0.5–7.8)
ERYTHROCYTE [DISTWIDTH] IN BLOOD BY AUTOMATED COUNT: 13.2 % (ref 11.9–14.6)
GLOBULIN SER CALC-MCNC: 3.3 G/DL (ref 2.3–3.5)
GLUCOSE SERPL-MCNC: 180 MG/DL (ref 65–100)
HCT VFR BLD AUTO: 32.8 % (ref 35.8–46.3)
HGB BLD-MCNC: 10.6 G/DL (ref 11.7–15.4)
IMM GRANULOCYTES # BLD AUTO: 0 K/UL (ref 0–0.5)
IMM GRANULOCYTES NFR BLD AUTO: 1 % (ref 0–5)
LYMPHOCYTES # BLD: 1.1 K/UL (ref 0.5–4.6)
LYMPHOCYTES NFR BLD: 18 % (ref 13–44)
MCH RBC QN AUTO: 31.7 PG (ref 26.1–32.9)
MCHC RBC AUTO-ENTMCNC: 32.3 G/DL (ref 31.4–35)
MCV RBC AUTO: 98.2 FL (ref 79.6–97.8)
MONOCYTES # BLD: 0.5 K/UL (ref 0.1–1.3)
MONOCYTES NFR BLD: 9 % (ref 4–12)
NEUTS SEG # BLD: 4.2 K/UL (ref 1.7–8.2)
NEUTS SEG NFR BLD: 68 % (ref 43–78)
NRBC # BLD: 0 K/UL (ref 0–0.2)
P-R INTERVAL, ECG05: 212 MS
PLATELET # BLD AUTO: 194 K/UL (ref 150–450)
PMV BLD AUTO: 10.6 FL (ref 9.4–12.3)
POTASSIUM SERPL-SCNC: 4.1 MMOL/L (ref 3.5–5.1)
PROT SERPL-MCNC: 6.4 G/DL (ref 6.3–8.2)
Q-T INTERVAL, ECG07: 444 MS
QRS DURATION, ECG06: 142 MS
QTC CALCULATION (BEZET), ECG08: 469 MS
RBC # BLD AUTO: 3.34 M/UL (ref 4.05–5.2)
SODIUM SERPL-SCNC: 139 MMOL/L (ref 136–145)
TROPONIN I SERPL-MCNC: 0.02 NG/ML (ref 0.02–0.05)
VENTRICULAR RATE, ECG03: 67 BPM
WBC # BLD AUTO: 6.2 K/UL (ref 4.3–11.1)

## 2019-10-15 PROCEDURE — 99285 EMERGENCY DEPT VISIT HI MDM: CPT | Performed by: EMERGENCY MEDICINE

## 2019-10-15 PROCEDURE — 93005 ELECTROCARDIOGRAM TRACING: CPT | Performed by: EMERGENCY MEDICINE

## 2019-10-15 PROCEDURE — 85025 COMPLETE CBC W/AUTO DIFF WBC: CPT

## 2019-10-15 PROCEDURE — 80053 COMPREHEN METABOLIC PANEL: CPT

## 2019-10-15 PROCEDURE — 84484 ASSAY OF TROPONIN QUANT: CPT

## 2019-10-15 NOTE — ED PROVIDER NOTES
72-year-old white female history of chronic A. fib treated with Eliquis and metoprolol had general weakness and sensation that she was going to pass out this morning. Family checked the pulse ox and found her heart rate to be in the 160s. O2 saturation mid 90s. They did treat her with extra dose of metoprolol around 715 when there was no change within approximately 5 hours they were advised to come to the emergency department. EMS found her with atrial fibrillation in the 140s and shortly after leaving the house to come to the hospital patient spontaneously converted to sinus rhythm. At the time of my evaluation, she states that she feels fine and would like to go home. No chest pain or shortness of breath. The history is provided by the patient and a relative. Irregular Heart Beat    Pertinent negatives include no chest pain, no abdominal pain, no nausea, no vomiting, no headaches, no back pain, no cough and no shortness of breath.         Past Medical History:   Diagnosis Date    Thyroid cyst Distant past    thyroid cyst removed x40 years ago       Past Surgical History:   Procedure Laterality Date    HX CHOLECYSTECTOMY      HX HEENT      thyroglossal cyst    HX HYSTERECTOMY           Family History:   Problem Relation Age of Onset    Other Father             Other Mother                Social History     Socioeconomic History    Marital status:      Spouse name: Not on file    Number of children: Not on file    Years of education: Not on file    Highest education level: Not on file   Occupational History    Not on file   Social Needs    Financial resource strain: Not on file    Food insecurity:     Worry: Not on file     Inability: Not on file    Transportation needs:     Medical: Not on file     Non-medical: Not on file   Tobacco Use    Smoking status: Never Smoker    Smokeless tobacco: Never Used   Substance and Sexual Activity    Alcohol use: No    Drug use: No    Sexual activity: Not on file   Lifestyle    Physical activity:     Days per week: Not on file     Minutes per session: Not on file    Stress: Not on file   Relationships    Social connections:     Talks on phone: Not on file     Gets together: Not on file     Attends Taoist service: Not on file     Active member of club or organization: Not on file     Attends meetings of clubs or organizations: Not on file     Relationship status: Not on file    Intimate partner violence:     Fear of current or ex partner: Not on file     Emotionally abused: Not on file     Physically abused: Not on file     Forced sexual activity: Not on file   Other Topics Concern    Not on file   Social History Narrative    , lives with  and 2 daughters. Pt did work as  at one time. 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 ALLERGIES: Amiodarone; Amlodipine; Amoxicillin; and Tetracycline    Review of Systems   HENT: Negative for congestion. Respiratory: Negative for cough and shortness of breath. Cardiovascular: Positive for palpitations. Negative for chest pain. Gastrointestinal: Negative for abdominal pain, nausea and vomiting. Genitourinary: Negative for dysuria. Musculoskeletal: Negative for back pain and neck pain. Skin: Negative for rash. Neurological: Negative for headaches. Vitals:    10/15/19 1430   BP: 107/54   Pulse: 67   Resp: 16   Temp: 98 °F (36.7 °C)   SpO2: 94%   Weight: 47.2 kg (104 lb)   Height: 5' (1.524 m)            Physical Exam   Constitutional: She is oriented to person, place, and time. She appears well-developed and well-nourished. No distress. HENT:   Head: Normocephalic and atraumatic. Mouth/Throat: Oropharynx is clear and moist.   Eyes: Pupils are equal, round, and reactive to light. Conjunctivae are normal.   Neck: Normal range of motion. Neck supple. Cardiovascular: Normal rate and regular rhythm.    Pulmonary/Chest: Effort normal and breath sounds normal.   Abdominal: Soft. There is no tenderness. Musculoskeletal: Normal range of motion. She exhibits no edema. Neurological: She is alert and oriented to person, place, and time. Skin: Skin is warm and dry. Psychiatric: She has a normal mood and affect. Her behavior is normal.   Nursing note and vitals reviewed. MDM  Number of Diagnoses or Management Options  Diagnosis management comments: Blood work is unremarkable. EKG shows sinus rhythm first-degree AV block. No diagnostic ST changes or ectopy. Patient is comfortable and appears safe for discharge home. Further emergent work-up is not necessary.        Amount and/or Complexity of Data Reviewed  Clinical lab tests: ordered and reviewed    Risk of Complications, Morbidity, and/or Mortality  Presenting problems: moderate  Diagnostic procedures: moderate  Management options: moderate           Procedures

## 2019-10-15 NOTE — ED NOTES
I have reviewed discharge instructions with the patient and daughters. The patient and daughters verbalized understanding. Patient left ED via Discharge Method: wheelchair to Home with daughters    Opportunity for questions and clarification provided. Patient given 0 scripts. To continue your aftercare when you leave the hospital, you may receive an automated call from our care team to check in on how you are doing. This is a free service and part of our promise to provide the best care and service to meet your aftercare needs.  If you have questions, or wish to unsubscribe from this service please call [phone]. Thank you for Choosing our [facility] Emergency Department.

## 2019-10-15 NOTE — ED TRIAGE NOTES
Patient brought in via EMS for a-fib RVR in 150s. Converted spontaneously en route.    Some weakness, denies cp/sob

## 2019-10-15 NOTE — DISCHARGE INSTRUCTIONS

## 2019-11-01 ENCOUNTER — HOSPITAL ENCOUNTER (EMERGENCY)
Age: 84
Discharge: HOME | End: 2019-11-01
Attending: EMERGENCY MEDICINE
Payer: MEDICARE

## 2019-11-01 VITALS
BODY MASS INDEX: 18.77 KG/M2 | SYSTOLIC BLOOD PRESSURE: 117 MMHG | HEART RATE: 91 BPM | WEIGHT: 102 LBS | OXYGEN SATURATION: 98 % | RESPIRATION RATE: 18 BRPM | HEIGHT: 62 IN | DIASTOLIC BLOOD PRESSURE: 71 MMHG | TEMPERATURE: 98.1 F

## 2019-11-01 DIAGNOSIS — I48.91 ATRIAL FIBRILLATION WITH RAPID VENTRICULAR RESPONSE (HCC): Primary | ICD-10-CM

## 2019-11-01 LAB
ALBUMIN SERPL-MCNC: 3.2 G/DL (ref 3.2–4.6)
ALBUMIN/GLOB SERPL: 0.8 {RATIO} (ref 1.2–3.5)
ALP SERPL-CCNC: 80 U/L (ref 50–136)
ALT SERPL-CCNC: 21 U/L (ref 12–65)
ANION GAP SERPL CALC-SCNC: 7 MMOL/L (ref 7–16)
AST SERPL-CCNC: 16 U/L (ref 15–37)
ATRIAL RATE: 147 BPM
BILIRUB SERPL-MCNC: 0.7 MG/DL (ref 0.2–1.1)
BUN SERPL-MCNC: 13 MG/DL (ref 8–23)
CALCIUM SERPL-MCNC: 8.9 MG/DL (ref 8.3–10.4)
CALCULATED P AXIS, ECG09: 67 DEGREES
CALCULATED R AXIS, ECG10: -155 DEGREES
CALCULATED T AXIS, ECG11: 63 DEGREES
CHLORIDE SERPL-SCNC: 104 MMOL/L (ref 98–107)
CO2 SERPL-SCNC: 26 MMOL/L (ref 21–32)
CREAT SERPL-MCNC: 0.9 MG/DL (ref 0.6–1)
DIAGNOSIS, 93000: NORMAL
ERYTHROCYTE [DISTWIDTH] IN BLOOD BY AUTOMATED COUNT: 13.2 % (ref 11.9–14.6)
GLOBULIN SER CALC-MCNC: 3.8 G/DL (ref 2.3–3.5)
GLUCOSE SERPL-MCNC: 102 MG/DL (ref 65–100)
HCT VFR BLD AUTO: 33.3 % (ref 35.8–46.3)
HGB BLD-MCNC: 11 G/DL (ref 11.7–15.4)
MAGNESIUM SERPL-MCNC: 2.1 MG/DL (ref 1.8–2.4)
MCH RBC QN AUTO: 31.7 PG (ref 26.1–32.9)
MCHC RBC AUTO-ENTMCNC: 33 G/DL (ref 31.4–35)
MCV RBC AUTO: 96 FL (ref 79.6–97.8)
NRBC # BLD: 0 K/UL (ref 0–0.2)
P-R INTERVAL, ECG05: 176 MS
PLATELET # BLD AUTO: 201 K/UL (ref 150–450)
PMV BLD AUTO: 10.3 FL (ref 9.4–12.3)
POTASSIUM SERPL-SCNC: 4.2 MMOL/L (ref 3.5–5.1)
PROT SERPL-MCNC: 7 G/DL (ref 6.3–8.2)
Q-T INTERVAL, ECG07: 338 MS
QRS DURATION, ECG06: 124 MS
QTC CALCULATION (BEZET), ECG08: 521 MS
RBC # BLD AUTO: 3.47 M/UL (ref 4.05–5.2)
SODIUM SERPL-SCNC: 137 MMOL/L (ref 136–145)
T3 SERPL-MCNC: 1.16 NG/ML (ref 0.6–1.81)
T4 FREE SERPL-MCNC: 2.3 NG/DL (ref 0.78–1.46)
TROPONIN I BLD-MCNC: 0.04 NG/ML (ref 0.02–0.05)
TSH SERPL DL<=0.005 MIU/L-ACNC: <0.005 UIU/ML (ref 0.36–3.74)
VENTRICULAR RATE, ECG03: 143 BPM
WBC # BLD AUTO: 7.3 K/UL (ref 4.3–11.1)

## 2019-11-01 PROCEDURE — 84480 ASSAY TRIIODOTHYRONINE (T3): CPT

## 2019-11-01 PROCEDURE — 96375 TX/PRO/DX INJ NEW DRUG ADDON: CPT | Performed by: EMERGENCY MEDICINE

## 2019-11-01 PROCEDURE — 80053 COMPREHEN METABOLIC PANEL: CPT

## 2019-11-01 PROCEDURE — 93005 ELECTROCARDIOGRAM TRACING: CPT | Performed by: PHYSICIAN ASSISTANT

## 2019-11-01 PROCEDURE — 85027 COMPLETE CBC AUTOMATED: CPT

## 2019-11-01 PROCEDURE — 74011250636 HC RX REV CODE- 250/636: Performed by: EMERGENCY MEDICINE

## 2019-11-01 PROCEDURE — 84484 ASSAY OF TROPONIN QUANT: CPT

## 2019-11-01 PROCEDURE — 93005 ELECTROCARDIOGRAM TRACING: CPT | Performed by: EMERGENCY MEDICINE

## 2019-11-01 PROCEDURE — 74011000250 HC RX REV CODE- 250: Performed by: PHYSICIAN ASSISTANT

## 2019-11-01 PROCEDURE — 83735 ASSAY OF MAGNESIUM: CPT

## 2019-11-01 PROCEDURE — 74011000250 HC RX REV CODE- 250: Performed by: EMERGENCY MEDICINE

## 2019-11-01 PROCEDURE — 84439 ASSAY OF FREE THYROXINE: CPT

## 2019-11-01 PROCEDURE — 96374 THER/PROPH/DIAG INJ IV PUSH: CPT | Performed by: EMERGENCY MEDICINE

## 2019-11-01 PROCEDURE — 84443 ASSAY THYROID STIM HORMONE: CPT

## 2019-11-01 PROCEDURE — 99285 EMERGENCY DEPT VISIT HI MDM: CPT | Performed by: EMERGENCY MEDICINE

## 2019-11-01 RX ORDER — METOPROLOL TARTRATE 5 MG/5ML
5 INJECTION INTRAVENOUS ONCE
Status: COMPLETED | OUTPATIENT
Start: 2019-11-01 | End: 2019-11-01

## 2019-11-01 RX ORDER — DILTIAZEM HYDROCHLORIDE 5 MG/ML
10 INJECTION INTRAVENOUS ONCE
Status: COMPLETED | OUTPATIENT
Start: 2019-11-01 | End: 2019-11-01

## 2019-11-01 RX ORDER — MORPHINE SULFATE 4 MG/ML
2 INJECTION INTRAVENOUS
Status: COMPLETED | OUTPATIENT
Start: 2019-11-01 | End: 2019-11-01

## 2019-11-01 RX ADMIN — MORPHINE SULFATE 2 MG: 4 INJECTION INTRAVENOUS at 12:30

## 2019-11-01 RX ADMIN — METOPROLOL TARTRATE 5 MG: 5 INJECTION INTRAVENOUS at 15:14

## 2019-11-01 RX ADMIN — DILTIAZEM HYDROCHLORIDE 10 MG: 5 INJECTION INTRAVENOUS at 11:43

## 2019-11-01 NOTE — ED NOTES
I have reviewed discharge instructions with the patient. The patient verbalized understanding. Patient left ED via Discharge Method: wheelchair to Home with daughters. Opportunity for questions and clarification provided. Patient given 0 scripts. To continue your aftercare when you leave the hospital, you may receive an automated call from our care team to check in on how you are doing. This is a free service and part of our promise to provide the best care and service to meet your aftercare needs.  If you have questions, or wish to unsubscribe from this service please call [phone]. Thank you for Choosing our [facility] Emergency Department.

## 2019-11-01 NOTE — ED PROVIDER NOTES
HPI:  80 F, here with chest palpitations that started this morning at roughly 7:00 in the morning. Has history of A. fib. Is currently on Eliquis and metoprolol. Family gave 1 extra dose of her morning dose of Lopressor 12.5 mg. She denies any pain. Denies any urinary pain, cough, congestion, fever, leg swelling. ROS  Constitutional: No fever, no chills  Skin: no rash  Eye:   ENMT:   Respiratory: No shortness of breath, no cough  Cardiovascular: No chest pain, + palpitations  Gastrointestinal: No vomiting, no nausea, no diarrhea, no abdominal pain  : No dysuria  MSK: + chronic back pain, no muscle pain, no joint pain  Neuro: No headache, no change in mental status, no numbness, no tingling, no weakness  Psych:   Endocrine:   All other review of systems positive per history of present illness and the above otherwise negative or noncontributory. There were no vitals taken for this visit. Past Medical History:   Diagnosis Date    Thyroid cyst Distant past    thyroid cyst removed x40 years ago     Past Surgical History:   Procedure Laterality Date    HX CHOLECYSTECTOMY      HX HEENT      thyroglossal cyst    HX HYSTERECTOMY       Prior to Admission Medications   Prescriptions Last Dose Informant Patient Reported? Taking? apixaban (ELIQUIS) 2.5 mg tablet   No No   Sig: Take 1 Tab by mouth two (2) times a day. cholecalciferol, vitamin D3, (VITAMIN D3) 2,000 unit tab   Yes No   Sig: Take 1,000 Int'l Units by mouth daily. famotidine (PEPCID) 20 mg tablet   No No   Sig: Take 1 Tab by mouth two (2) times a day. furosemide (LASIX) 20 mg tablet   No No   Sig: Take 1 Tab by mouth every other day. Indications: 20mg QOD, 40mg on other days   lisinopril (PRINIVIL, ZESTRIL) 2.5 mg tablet   No No   Sig: Take 1 Tab by mouth daily. methIMAzole (TAPAZOLE) 5 mg tablet   Yes No   Sig: Take  by mouth.   metoprolol tartrate (LOPRESSOR) 25 mg tablet   No No   Sig: Take 1 Tab by mouth every twelve (12) hours. nitroglycerin (NITRODUR) 0.4 mg/hr   Yes No   Si Patch by TransDERmal route daily. oxyCODONE IR (OXY-IR) 15 mg immediate release tablet   Yes No   Sig: Take 15 mg by mouth every four (4) hours as needed for Pain.   polyethylene glycol (MIRALAX) 17 gram/dose powder   Yes No   Sig: Take 17 g by mouth as needed (constipation). potassium chloride SR (KLOR-CON 10) 10 mEq tablet   No No   Sig: Take 1 Tab by mouth daily. pyridoxine (VITAMIN B-6) 100 mg tablet   Yes No   Sig: Take 100 mg by mouth daily. Takes at [facility]      Facility-Administered Medications: None         Adult Exam   General: alert, no acute distress  Head: normocephalic, atraumatic  ENT: moist mucous membranes  Neck: supple, non-tender; full range of motion  Cardiovascular: Tachycardic at greater than 120 and sounds irregularly irregular, normal peripheral perfusion, no edema. Equal pulses  Respiratory:  normal respirations; no wheezing, rales or rhonchi  Gastrointestinal: soft, non-tender; no rebound or guarding, no peritoneal signs, no distension  Back: non-tender, full range of motion  Musculoskeletal: normal range of motion, normal strength, no gross deformities  Neurological: alert and oriented x 4, no gross focal deficits; normal speech  Psychiatric: cooperative; appropriate mood and affect    MDM:  She is otherwise well-appearing. EKG from EMS showing rate of roughly 133. Will obtain her own EKG here. Already on Eliquis. Denies any chest pain. Will check electrolytes including magnesium level, EKG. If persistently A. fib with rapid ventricular response will need to give Cardizem. Will check troponin. Do not exam fluid overload. Will reassess. Do not suspect acute PE or acs. EKG rate of 143 and tachycardic. Likely 2-1 conduction. No STEMI equivalent noted. Her EKG from EMS appear consistent with A. fib with RVR. Will give 10 mg IV Cardizem. 1:50 PM  Still in the 140s.   Electrolytes within normal limits including magnesium. We will start her on Cardizem drip. Spoke with cardiology team about patient. They will come to assess for admission    Recent Results (from the past 12 hour(s))   EKG, 12 LEAD, INITIAL    Collection Time: 11/01/19 11:33 AM   Result Value Ref Range    Ventricular Rate 143 BPM    Atrial Rate 147 BPM    P-R Interval 176 ms    QRS Duration 124 ms    Q-T Interval 338 ms    QTC Calculation (Bezet) 521 ms    Calculated P Axis 67 degrees    Calculated R Axis -155 degrees    Calculated T Axis 63 degrees    Diagnosis       !! AGE AND GENDER SPECIFIC ECG ANALYSIS !! Sinus tachycardia with frequent Premature ventricular complexes and Fusion   complexes  Right superior axis deviation  Septal infarct , age undetermined  Abnormal ECG  When compared with ECG of 15-OCT-2019 14:28,  Significant changes have occurred     CBC W/O DIFF    Collection Time: 11/01/19 11:37 AM   Result Value Ref Range    WBC 7.3 4.3 - 11.1 K/uL    RBC 3.47 (L) 4.05 - 5.2 M/uL    HGB 11.0 (L) 11.7 - 15.4 g/dL    HCT 33.3 (L) 35.8 - 46.3 %    MCV 96.0 79.6 - 97.8 FL    MCH 31.7 26.1 - 32.9 PG    MCHC 33.0 31.4 - 35.0 g/dL    RDW 13.2 11.9 - 14.6 %    PLATELET 693 515 - 518 K/uL    MPV 10.3 9.4 - 12.3 FL    ABSOLUTE NRBC 0.00 0.0 - 0.2 K/uL   METABOLIC PANEL, COMPREHENSIVE    Collection Time: 11/01/19 11:37 AM   Result Value Ref Range    Sodium 137 136 - 145 mmol/L    Potassium 4.2 3.5 - 5.1 mmol/L    Chloride 104 98 - 107 mmol/L    CO2 26 21 - 32 mmol/L    Anion gap 7 7 - 16 mmol/L    Glucose 102 (H) 65 - 100 mg/dL    BUN 13 8 - 23 MG/DL    Creatinine 0.90 0.6 - 1.0 MG/DL    GFR est AA >60 >60 ml/min/1.73m2    GFR est non-AA >60 >60 ml/min/1.73m2    Calcium 8.9 8.3 - 10.4 MG/DL    Bilirubin, total 0.7 0.2 - 1.1 MG/DL    ALT (SGPT) 21 12 - 65 U/L    AST (SGOT) 16 15 - 37 U/L    Alk.  phosphatase 80 50 - 136 U/L    Protein, total 7.0 6.3 - 8.2 g/dL    Albumin 3.2 3.2 - 4.6 g/dL    Globulin 3.8 (H) 2.3 - 3.5 g/dL    A-G Ratio 0.8 (L) 1.2 - 3.5 MAGNESIUM    Collection Time: 11/01/19 11:37 AM   Result Value Ref Range    Magnesium 2.1 1.8 - 2.4 mg/dL   POC TROPONIN-I    Collection Time: 11/01/19 11:40 AM   Result Value Ref Range    Troponin-I (POC) 0.04 0.02 - 0.05 ng/ml         Dragon voice recognition software was used to create this note. Although the note has been reviewed and corrected where necessary, additional errors may have been overlooked and remain in the text.

## 2019-11-01 NOTE — CONSULTS
[facility] Cardiology ER Consultation        Date of  Admission: 11/1/2019 11:03 AM     Primary Care Physician: Dr. Juliet Perez  Primary Cardiologist: Dr. Doug Castillo  Referring Physician: Dr. Dimas Michael Physician: Dr. Rashi Oliveira    CC: AF with RVR    HPI:  Alexandra Hathaway is a 80 y.o. 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 She took an extra dose of BB w/o much change in symptoms. She denies CP, SOB, N/V, diaphoresis or syncope. In [facility] ED, ECG showed AF with  bpm and she was given IV cardizem 10 mg bolus x 1 dose. Pt has previous discussed PPM implant with primary cardiologist and has continued to decline pacemaker. She has no acute HF symptoms. She continues to take low dose Eliquis at home.       Past Medical History:   Diagnosis Date    Thyroid cyst Distant past    thyroid cyst removed x40 years ago      Past Surgical History:   Procedure Laterality Date    HX CHOLECYSTECTOMY      HX HEENT      thyroglossal cyst    HX HYSTERECTOMY         Allergies   Allergen Reactions    Amiodarone Other (comments)    Amlodipine Hives    Amoxicillin Rash    Tetracycline Rash      Social History     Socioeconomic History    Marital status:      Spouse name: Not on file    Number of children: Not on file    Years of education: Not on file    Highest education level: Not on file   Occupational History    Not on file   Social Needs    Financial resource strain: Not on file    Food insecurity:     Worry: Not on file     Inability: Not on file    Transportation needs:     Medical: Not on file     Non-medical: Not on file   Tobacco Use    Smoking status: Never Smoker    Smokeless tobacco: Never Used   Substance and Sexual Activity    Alcohol use: No    Drug use: No    Sexual activity: Not on file   Lifestyle    Physical activity:     Days per week: Not on file     Minutes per session: Not on file    Stress: Not on file   Relationships    Social connections:     Talks on phone: Not on file     Gets together: Not on file     Attends Taoist service: Not on file     Active member of club or organization: Not on file     Attends meetings of clubs or organizations: Not on file     Relationship status: Not on file    Intimate partner violence:     Fear of current or ex partner: Not on file     Emotionally abused: Not on file     Physically abused: Not on file     Forced sexual activity: Not on file   Other Topics Concern    Not on file   Social History Narrative    , lives with  and 2 daughters. Pt did work as  at one time. 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 Family History   Problem Relation Age of Onset    Other Father             Other Mother                 Current Facility-Administered Medications   Medication Dose Route Frequency    metoprolol (LOPRESSOR) injection 5 mg  5 mg IntraVENous ONCE     Current Outpatient Medications   Medication Sig    methIMAzole (TAPAZOLE) 5 mg tablet Take  by mouth.  famotidine (PEPCID) 20 mg tablet Take 1 Tab by mouth two (2) times a day.  lisinopril (PRINIVIL, ZESTRIL) 2.5 mg tablet Take 1 Tab by mouth daily.  potassium chloride SR (KLOR-CON 10) 10 mEq tablet Take 1 Tab by mouth daily.  furosemide (LASIX) 20 mg tablet Take 1 Tab by mouth every other day. Indications: 20mg QOD, 40mg on other days    apixaban (ELIQUIS) 2.5 mg tablet Take 1 Tab by mouth two (2) times a day.  metoprolol tartrate (LOPRESSOR) 25 mg tablet Take 1 Tab by mouth every twelve (12) hours.  nitroglycerin (NITRODUR) 0.4 mg/hr 1 Patch by TransDERmal route daily.  polyethylene glycol (MIRALAX) 17 gram/dose powder Take 17 g by mouth as needed (constipation).  cholecalciferol, vitamin D3, (VITAMIN D3) 2,000 unit tab Take 1,000 Int'l Units by mouth daily.     oxyCODONE IR (OXY-IR) 15 mg immediate release tablet Take 15 mg by mouth every four (4) hours as needed for Pain.  pyridoxine (VITAMIN B-6) 100 mg tablet Take 100 mg by mouth daily. Takes at [address] of symptoms:  General: no recent weight loss/gain, weakness, fatigue, fever or chills   Skin: no rashes, lumps, or other skin changes   HEENT: no headache, dizziness, lightheadedness, vision changes, hearing changes, tinnitus, vertigo, sinus pressure/pain, bleeding gums, sore throat, or hoarseness   Neck: no swollen glands, goiter, pain or stiffness   Respiratory: no cough, sputum, hemoptysis, dyspnea, wheezing   Cardiovascular: no chest pain or discomfort, +palpitations, dyspnea, orthopnea, paroxysmal nocturnal dyspnea, no peripheral edema   Gastrointestinal: no trouble swallowing, heartburn, change of appetite, nausea, change in bowel habits, pain with defecation, rectal bleeding or black/tarry stools, hemorrhoids, constipation, diarrhea, abdominal pain, jaundice, liver or gallbladder problems   Urinary: no frequency, urgency , hematuria, burning/pain with urination, recent flank pain, polyuria, nocturia, or difficulty urinating   Genital: no vaginal or pelvic infections   Peripheral Vascular: no claudication, leg cramps, prior DVTs, swelling of calves, legs, or feet, color change, or swelling with redness or tenderness   Musculoskeletal: no muscle or joint pain/stiffness, joint swelling, erythema of joints, or back pain   Psychiatric: no depression, mental disorders, or excessive stress   Neurological: no history of CVA, dizziness, no sensory or motor loss, seizures, syncope, tremors, numbness, tingling, no changes in mood, attention, or speech, no changes in orientation, memory, insight, or judgment. no headache, vertigo.    Hematologic: no anemia, easy bruising or bleeding   Endocrine: no diabetes, thyroid problems, heat or cold intolerance, excessive sweating, polyuria, polydipsia        Subjective:   Physical Exam    Visit Vitals  BP 115/57   Pulse 80   Temp 97.8 °F (36.6 °C)   Resp 16   Ht 5' 2\" (1.575 m)   Wt 46.3 kg (102 lb)   SpO2 97%   BMI 18.66 kg/m²     General Appearance:  Well developed, thin appearing, alert and oriented x 3, and individual in no acute distress. Ears/Nose/Mouth/Throat:   Hard of hearing          Neck: Supple. Chest:   Lungs clear to auscultation bilaterally. Cardiovascular:  Rapid irregular rate and rhythm, S1, S2   Abdomen:   Soft, non-tender, bowel sounds are active. Extremities: No edema bilaterally. Skin: Warm and dry. Labs:   Recent Results (from the past 24 hour(s))   EKG, 12 LEAD, INITIAL    Collection Time: 11/01/19 11:33 AM   Result Value Ref Range    Ventricular Rate 143 BPM    Atrial Rate 147 BPM    P-R Interval 176 ms    QRS Duration 124 ms    Q-T Interval 338 ms    QTC Calculation (Bezet) 521 ms    Calculated P Axis 67 degrees    Calculated R Axis -155 degrees    Calculated T Axis 63 degrees    Diagnosis       !! AGE AND GENDER SPECIFIC ECG ANALYSIS !!   Sinus tachycardia with frequent Premature ventricular complexes and Fusion   complexes  Right superior axis deviation  Septal infarct , age undetermined  Abnormal ECG  When compared with ECG of 15-OCT-2019 14:28,  Significant changes have occurred     CBC W/O DIFF    Collection Time: 11/01/19 11:37 AM   Result Value Ref Range    WBC 7.3 4.3 - 11.1 K/uL    RBC 3.47 (L) 4.05 - 5.2 M/uL    HGB 11.0 (L) 11.7 - 15.4 g/dL    HCT 33.3 (L) 35.8 - 46.3 %    MCV 96.0 79.6 - 97.8 FL    MCH 31.7 26.1 - 32.9 PG    MCHC 33.0 31.4 - 35.0 g/dL    RDW 13.2 11.9 - 14.6 %    PLATELET 667 103 - 902 K/uL    MPV 10.3 9.4 - 12.3 FL    ABSOLUTE NRBC 0.00 0.0 - 0.2 K/uL   METABOLIC PANEL, COMPREHENSIVE    Collection Time: 11/01/19 11:37 AM   Result Value Ref Range    Sodium 137 136 - 145 mmol/L    Potassium 4.2 3.5 - 5.1 mmol/L    Chloride 104 98 - 107 mmol/L    CO2 26 21 - 32 mmol/L    Anion gap 7 7 - 16 mmol/L    Glucose 102 (H) 65 - 100 mg/dL    BUN 13 8 - 23 MG/DL    Creatinine 0.90 0.6 - 1.0 MG/DL    GFR est AA >60 >60 ml/min/1.73m2    GFR est non-AA >60 >60 ml/min/1.73m2    Calcium 8.9 8.3 - 10.4 MG/DL    Bilirubin, total 0.7 0.2 - 1.1 MG/DL    ALT (SGPT) 21 12 - 65 U/L    AST (SGOT) 16 15 - 37 U/L    Alk. phosphatase 80 50 - 136 U/L    Protein, total 7.0 6.3 - 8.2 g/dL    Albumin 3.2 3.2 - 4.6 g/dL    Globulin 3.8 (H) 2.3 - 3.5 g/dL    A-G Ratio 0.8 (L) 1.2 - 3.5     MAGNESIUM    Collection Time: 11/01/19 11:37 AM   Result Value Ref Range    Magnesium 2.1 1.8 - 2.4 mg/dL   TSH 3RD GENERATION    Collection Time: 11/01/19 11:37 AM   Result Value Ref Range    TSH <0.005 (L) 0.358 - 3.740 uIU/mL   T4, FREE    Collection Time: 11/01/19 11:37 AM   Result Value Ref Range    T4, Free 2.3 (H) 0.78 - 1.46 NG/DL   T3 TOTAL    Collection Time: 11/01/19 11:37 AM   Result Value Ref Range    T3, total 1.16 0.60 - 1.81 ng/mL   POC TROPONIN-I    Collection Time: 11/01/19 11:40 AM   Result Value Ref Range    Troponin-I (POC) 0.04 0.02 - 0.05 ng/ml       Pt has been seen and examined by Dr. Babatunde Hooks. He agrees with the following assessment and plan. Assessment/Plan:          Diagnosis    Atrial fibrillation with rapid ventricular response- IV Cardizem and IV BB in ER for rate control, continue home Metoprolol 12.5 mg BID with additional dose at noon if HR>80, continue Eliquis, may go home per Dr. Nicole Randle Tachy-samira syndrome [facility])- Pt declines PPM implant which limits agressive tx of AF w/ RVR, consider LT goals at f/u appt with Dr. Ezio Field Hyperthyroidism- on Tapazole, endocrine follow up    CAD (coronary artery disease) s/p PCI- no angina, continue home meds    Hypertension- controlled, continue home meds    GERD (gastroesophageal reflux disease)- PPI per PCP       Thank you for consulting [facility] Cardiology and allowing us to participate in the care of this patient. We will continue to follow along with you.     Stephanie Gracia PA-C

## 2019-11-01 NOTE — ED TRIAGE NOTES
Pt arrives via [facility] from home, pt complains over overall generalized weakness, pt presents in Afib RVR 130s, given 12.5mg of metoprolol PTA of EMS, not given any cardizem with EMS because of no access to IV. AxO x4.

## 2019-11-01 NOTE — DISCHARGE INSTRUCTIONS

## 2019-11-02 LAB
ATRIAL RATE: 375 BPM
CALCULATED R AXIS, ECG10: -135 DEGREES
CALCULATED T AXIS, ECG11: 53 DEGREES
DIAGNOSIS, 93000: NORMAL
Q-T INTERVAL, ECG07: 342 MS
QRS DURATION, ECG06: 138 MS
QTC CALCULATION (BEZET), ECG08: 520 MS
VENTRICULAR RATE, ECG03: 139 BPM

## 2020-03-05 ENCOUNTER — APPOINTMENT (OUTPATIENT)
Dept: GENERAL RADIOLOGY | Age: 85
End: 2020-03-05
Attending: EMERGENCY MEDICINE
Payer: MEDICARE

## 2020-03-05 ENCOUNTER — HOSPITAL ENCOUNTER (EMERGENCY)
Age: 85
Discharge: HOME | End: 2020-03-05
Attending: EMERGENCY MEDICINE
Payer: MEDICARE

## 2020-03-05 VITALS
HEART RATE: 78 BPM | BODY MASS INDEX: 20.24 KG/M2 | HEIGHT: 62 IN | RESPIRATION RATE: 21 BRPM | OXYGEN SATURATION: 97 % | SYSTOLIC BLOOD PRESSURE: 173 MMHG | DIASTOLIC BLOOD PRESSURE: 73 MMHG | TEMPERATURE: 98.3 F | WEIGHT: 110 LBS

## 2020-03-05 DIAGNOSIS — E86.0 DEHYDRATION: ICD-10-CM

## 2020-03-05 DIAGNOSIS — R07.89 ATYPICAL CHEST PAIN: ICD-10-CM

## 2020-03-05 DIAGNOSIS — R42 LIGHTHEADEDNESS: Primary | ICD-10-CM

## 2020-03-05 LAB
ALBUMIN SERPL-MCNC: 3.5 G/DL (ref 3.2–4.6)
ALBUMIN/GLOB SERPL: 1 {RATIO} (ref 1.2–3.5)
ALP SERPL-CCNC: 102 U/L (ref 50–136)
ALT SERPL-CCNC: 21 U/L (ref 12–65)
ANION GAP SERPL CALC-SCNC: 8 MMOL/L (ref 7–16)
AST SERPL-CCNC: 21 U/L (ref 15–37)
BASOPHILS # BLD: 0.1 K/UL (ref 0–0.2)
BASOPHILS NFR BLD: 1 % (ref 0–2)
BILIRUB SERPL-MCNC: 0.4 MG/DL (ref 0.2–1.1)
BUN SERPL-MCNC: 17 MG/DL (ref 8–23)
CALCIUM SERPL-MCNC: 8.6 MG/DL (ref 8.3–10.4)
CHLORIDE SERPL-SCNC: 103 MMOL/L (ref 98–107)
CO2 SERPL-SCNC: 26 MMOL/L (ref 21–32)
CREAT SERPL-MCNC: 1.15 MG/DL (ref 0.6–1)
DIFFERENTIAL METHOD BLD: ABNORMAL
EOSINOPHIL # BLD: 0.4 K/UL (ref 0–0.8)
EOSINOPHIL NFR BLD: 6 % (ref 0.5–7.8)
ERYTHROCYTE [DISTWIDTH] IN BLOOD BY AUTOMATED COUNT: 13.8 % (ref 11.9–14.6)
GLOBULIN SER CALC-MCNC: 3.6 G/DL (ref 2.3–3.5)
GLUCOSE SERPL-MCNC: 105 MG/DL (ref 65–100)
HCT VFR BLD AUTO: 33.4 % (ref 35.8–46.3)
HGB BLD-MCNC: 10.9 G/DL (ref 11.7–15.4)
IMM GRANULOCYTES # BLD AUTO: 0 K/UL (ref 0–0.5)
IMM GRANULOCYTES NFR BLD AUTO: 1 % (ref 0–5)
LYMPHOCYTES # BLD: 1.3 K/UL (ref 0.5–4.6)
LYMPHOCYTES NFR BLD: 20 % (ref 13–44)
MAGNESIUM SERPL-MCNC: 2.2 MG/DL (ref 1.8–2.4)
MCH RBC QN AUTO: 32.9 PG (ref 26.1–32.9)
MCHC RBC AUTO-ENTMCNC: 32.6 G/DL (ref 31.4–35)
MCV RBC AUTO: 100.9 FL (ref 79.6–97.8)
MONOCYTES # BLD: 0.6 K/UL (ref 0.1–1.3)
MONOCYTES NFR BLD: 10 % (ref 4–12)
NEUTS SEG # BLD: 4 K/UL (ref 1.7–8.2)
NEUTS SEG NFR BLD: 63 % (ref 43–78)
NRBC # BLD: 0 K/UL (ref 0–0.2)
PLATELET # BLD AUTO: 210 K/UL (ref 150–450)
PMV BLD AUTO: 9.9 FL (ref 9.4–12.3)
POTASSIUM SERPL-SCNC: 4.1 MMOL/L (ref 3.5–5.1)
PROT SERPL-MCNC: 7.1 G/DL (ref 6.3–8.2)
RBC # BLD AUTO: 3.31 M/UL (ref 4.05–5.2)
SODIUM SERPL-SCNC: 137 MMOL/L (ref 136–145)
TROPONIN I SERPL-MCNC: <0.02 NG/ML (ref 0.02–0.05)
TROPONIN I SERPL-MCNC: <0.02 NG/ML (ref 0.02–0.05)
WBC # BLD AUTO: 6.4 K/UL (ref 4.3–11.1)

## 2020-03-05 PROCEDURE — 80053 COMPREHEN METABOLIC PANEL: CPT

## 2020-03-05 PROCEDURE — 96360 HYDRATION IV INFUSION INIT: CPT

## 2020-03-05 PROCEDURE — 84484 ASSAY OF TROPONIN QUANT: CPT

## 2020-03-05 PROCEDURE — 74011250636 HC RX REV CODE- 250/636: Performed by: EMERGENCY MEDICINE

## 2020-03-05 PROCEDURE — 99285 EMERGENCY DEPT VISIT HI MDM: CPT

## 2020-03-05 PROCEDURE — 71045 X-RAY EXAM CHEST 1 VIEW: CPT

## 2020-03-05 PROCEDURE — 83735 ASSAY OF MAGNESIUM: CPT

## 2020-03-05 PROCEDURE — 85025 COMPLETE CBC W/AUTO DIFF WBC: CPT

## 2020-03-05 PROCEDURE — 81003 URINALYSIS AUTO W/O SCOPE: CPT

## 2020-03-05 PROCEDURE — 93005 ELECTROCARDIOGRAM TRACING: CPT | Performed by: EMERGENCY MEDICINE

## 2020-03-05 RX ADMIN — SODIUM CHLORIDE 500 ML: 900 INJECTION, SOLUTION INTRAVENOUS at 17:51

## 2020-03-05 NOTE — ED NOTES
Patient to ED via POV in care of family. Patient with onset of CP at appx 1455. Patient reports several shorts bursts of sharp CP to L side chest. Patient reports some SHOB at onset, none at current. Patient reports no return of pain since onset. Family reports patient with stable VS shortly after the onset of pain. Patient denies any NV. Patient with hx CHF, followed by Levine, Susan. \Hospital Has a New Name and Outlook.\"" cards.

## 2020-03-05 NOTE — ED PROVIDER NOTES
Patient presents to the ER with daughter for concerns of a lightheadedness and chest discomfort. Patient states earlier this afternoon after lunch she all of a sudden felt somewhat lightheaded and weak. Reports she did have some pain and pressure under her left breast and into her left shoulder. Did call her daughter who came and checked on her. Subsequently brought patient to the ER. Reports symptoms have since resolved. Reports no palpitations. Denies any vomiting or diaphoresis. Daughter reports patient otherwise had been in her normal state of health. The history is provided by the patient. Chest Pain (Angina)    This is a new problem. The current episode started 1 to 2 hours ago. The pain is present in the left side. The pain is at a severity of 2/10. The pain is mild. Associated symptoms include malaise/fatigue and near-syncope. Pertinent negatives include no abdominal pain, no back pain, no cough, no dizziness, no fever, no numbness, no orthopnea, no palpitations and no vomiting.         Past Medical History:   Diagnosis Date    CAD (coronary artery disease)     Ill-defined condition     afib    Thyroid cyst Distant past    thyroid cyst removed x40 years ago       Past Surgical History:   Procedure Laterality Date    HX CHOLECYSTECTOMY      HX HEENT      thyroglossal cyst    HX HYSTERECTOMY           Family History:   Problem Relation Age of Onset    Other Father             Other Mother                Social History     Socioeconomic History    Marital status:      Spouse name: Not on file    Number of children: Not on file    Years of education: Not on file    Highest education level: Not on file   Occupational History    Not on file   Social Needs    Financial resource strain: Not on file    Food insecurity:     Worry: Not on file     Inability: Not on file    Transportation needs:     Medical: Not on file     Non-medical: Not on file   Tobacco Use    Smoking status: Never Smoker    Smokeless tobacco: Never Used   Substance and Sexual Activity    Alcohol use: No    Drug use: No    Sexual activity: Not on file   Lifestyle    Physical activity:     Days per week: Not on file     Minutes per session: Not on file    Stress: Not on file   Relationships    Social connections:     Talks on phone: Not on file     Gets together: Not on file     Attends Scientologist service: Not on file     Active member of club or organization: Not on file     Attends meetings of clubs or organizations: Not on file     Relationship status: Not on file    Intimate partner violence:     Fear of current or ex partner: Not on file     Emotionally abused: Not on file     Physically abused: Not on file     Forced sexual activity: Not on file   Other Topics Concern    Not on file   Social History Narrative    , lives with  and 2 daughters. Pt did work as  at one time. 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 ALLERGIES: Amiodarone; Amlodipine; Amoxicillin; and Tetracycline    Review of Systems   Constitutional: Positive for malaise/fatigue. Negative for fever. HENT: Negative for congestion and dental problem. Eyes: Negative for photophobia and visual disturbance. Respiratory: Negative for cough and chest tightness. Cardiovascular: Positive for chest pain and near-syncope. Negative for palpitations and orthopnea. Gastrointestinal: Negative for abdominal pain and vomiting. Genitourinary: Negative for flank pain, frequency and urgency. Musculoskeletal: Negative for back pain and gait problem. Skin: Negative for color change. Neurological: Positive for light-headedness. Negative for dizziness and numbness. Hematological: Negative for adenopathy. Does not bruise/bleed easily. Psychiatric/Behavioral: Negative for behavioral problems. All other systems reviewed and are negative.       Vitals:    03/05/20 1641   BP: 162/67 Pulse: 68   Resp: 18   Temp: 98.3 °F (36.8 °C)   SpO2: 99%   Weight: 49.9 kg (110 lb)   Height: 5' 2\" (1.575 m)            Physical Exam  Vitals signs and nursing note reviewed. Constitutional:       Appearance: Normal appearance. HENT:      Head: Normocephalic and atraumatic. Neck:      Musculoskeletal: Normal range of motion and neck supple. No neck rigidity or muscular tenderness. Cardiovascular:      Rate and Rhythm: Normal rate and regular rhythm. Pulses: Normal pulses. Heart sounds: Normal heart sounds. Pulmonary:      Effort: Pulmonary effort is normal. No respiratory distress. Breath sounds: Normal breath sounds. Musculoskeletal: Normal range of motion. General: No swelling or tenderness. Skin:     General: Skin is warm. Coloration: Skin is not jaundiced. Neurological:      Mental Status: She is alert. MDM  Number of Diagnoses or Management Options  Diagnosis management comments: Fairly well-appearing here. Plan obtain basic labs, EKG and chest x-ray and continue to monitor symptoms    7:31 PM  Normal basic labs including stable hemoglobin and normal white count. Cardiac enzymes are negative x2. EKG shows sinus rhythm without ischemic changes. Urinalysis is negative for infection. Creatinine today noted be 1.15, baseline appears to be 0.9. May be indicative of some mild dehydration. Treated here with IV fluids    Feels better, results of testing discussed at bedside with patient and family.   Plan to discharge home, arrange close follow-up with PCP       Amount and/or Complexity of Data Reviewed  Clinical lab tests: reviewed and ordered  Tests in the radiology section of CPT®: ordered and reviewed  Independent visualization of images, tracings, or specimens: yes (EKG interpretation: sinus rhythm rate of 67, first-degree AV block, normal axis, no acute ischemic changes in comparison to previous EKG performed in November 2018)    Risk of Complications, Morbidity, and/or Mortality  Presenting problems: moderate  Diagnostic procedures: moderate  Management options: moderate    Patient Progress  Patient progress: stable         Procedures          Results Include:    Recent Results (from the past 24 hour(s))   CBC WITH AUTOMATED DIFF    Collection Time: 03/05/20  4:53 PM   Result Value Ref Range    WBC 6.4 4.3 - 11.1 K/uL    RBC 3.31 (L) 4.05 - 5.2 M/uL    HGB 10.9 (L) 11.7 - 15.4 g/dL    HCT 33.4 (L) 35.8 - 46.3 %    .9 (H) 79.6 - 97.8 FL    MCH 32.9 26.1 - 32.9 PG    MCHC 32.6 31.4 - 35.0 g/dL    RDW 13.8 11.9 - 14.6 %    PLATELET 656 238 - 556 K/uL    MPV 9.9 9.4 - 12.3 FL    ABSOLUTE NRBC 0.00 0.0 - 0.2 K/uL    DF AUTOMATED      NEUTROPHILS 63 43 - 78 %    LYMPHOCYTES 20 13 - 44 %    MONOCYTES 10 4.0 - 12.0 %    EOSINOPHILS 6 0.5 - 7.8 %    BASOPHILS 1 0.0 - 2.0 %    IMMATURE GRANULOCYTES 1 0.0 - 5.0 %    ABS. NEUTROPHILS 4.0 1.7 - 8.2 K/UL    ABS. LYMPHOCYTES 1.3 0.5 - 4.6 K/UL    ABS. MONOCYTES 0.6 0.1 - 1.3 K/UL    ABS. EOSINOPHILS 0.4 0.0 - 0.8 K/UL    ABS. BASOPHILS 0.1 0.0 - 0.2 K/UL    ABS. IMM. GRANS. 0.0 0.0 - 0.5 K/UL   METABOLIC PANEL, COMPREHENSIVE    Collection Time: 03/05/20  4:53 PM   Result Value Ref Range    Sodium 137 136 - 145 mmol/L    Potassium 4.1 3.5 - 5.1 mmol/L    Chloride 103 98 - 107 mmol/L    CO2 26 21 - 32 mmol/L    Anion gap 8 7 - 16 mmol/L    Glucose 105 (H) 65 - 100 mg/dL    BUN 17 8 - 23 MG/DL    Creatinine 1.15 (H) 0.6 - 1.0 MG/DL    GFR est AA 56 (L) >60 ml/min/1.73m2    GFR est non-AA 47 (L) >60 ml/min/1.73m2    Calcium 8.6 8.3 - 10.4 MG/DL    Bilirubin, total 0.4 0.2 - 1.1 MG/DL    ALT (SGPT) 21 12 - 65 U/L    AST (SGOT) 21 15 - 37 U/L    Alk.  phosphatase 102 50 - 136 U/L    Protein, total 7.1 6.3 - 8.2 g/dL    Albumin 3.5 3.2 - 4.6 g/dL    Globulin 3.6 (H) 2.3 - 3.5 g/dL    A-G Ratio 1.0 (L) 1.2 - 3.5     TROPONIN I    Collection Time: 03/05/20  4:53 PM   Result Value Ref Range    Troponin-I, Qt. <0.02 (L) 0.02 - 0.05 NG/ML   MAGNESIUM    Collection Time: 03/05/20  4:53 PM   Result Value Ref Range    Magnesium 2.2 1.8 - 2.4 mg/dL   TROPONIN I    Collection Time: 03/05/20  7:02 PM   Result Value Ref Range    Troponin-I, Qt. <0.02 (L) 0.02 - 0.05 NG/ML     Voice dictation software was used during the making of this note. This software is not perfect and grammatical and other typographical errors may be present. This note has been proofread, but may still contain errors.   Jenni Fu MD; 3/5/2020 @7:32 PM   ===================================================================

## 2020-03-05 NOTE — ED NOTES
Pts family expressed concern about patient's IV fluids, informed Dr Tammy Cortez, Dr Tammy Cortez states IV fluids could be discontinued.

## 2020-03-06 LAB
ATRIAL RATE: 67 BPM
CALCULATED P AXIS, ECG09: 108 DEGREES
CALCULATED R AXIS, ECG10: -49 DEGREES
CALCULATED T AXIS, ECG11: 102 DEGREES
DIAGNOSIS, 93000: NORMAL
P-R INTERVAL, ECG05: 224 MS
Q-T INTERVAL, ECG07: 420 MS
QRS DURATION, ECG06: 142 MS
QTC CALCULATION (BEZET), ECG08: 443 MS
VENTRICULAR RATE, ECG03: 67 BPM

## 2020-03-06 NOTE — ED NOTES
I have reviewed discharge instructions with the patient. The patient verbalized understanding. Patient left ED via Discharge Method: ambulatory to Home with her family. Opportunity for questions and clarification provided. Patient given 0 scripts. To continue your aftercare when you leave the hospital, you may receive an automated call from our care team to check in on how you are doing. This is a free service and part of our promise to provide the best care and service to meet your aftercare needs.  If you have questions, or wish to unsubscribe from this service please call [phone]. Thank you for Choosing our [facility] Emergency Department.

## 2020-03-06 NOTE — DISCHARGE INSTRUCTIONS
As we discussed, testing shows no signs of serious or life-threatening illnesses  Monitor your fluid intake  Follow-up with your primary care physician  Return to the ER for any new or worsening symptoms    Chest Pain: Care Instructions  Your Care Instructions    There are many things that can cause chest pain. Some are not serious and will get better on their own in a few days. But some kinds of chest pain need more testing and treatment. Your doctor may have recommended a follow-up visit in the next 8 to 12 hours. If you are not getting better, you may need more tests or treatment. Even though your doctor has released you, you still need to watch for any problems. The doctor carefully checked you, but sometimes problems can develop later. If you have new symptoms or if your symptoms do not get better, get medical care right away. If you have worse or different chest pain or pressure that lasts more than 5 minutes or you passed out (lost consciousness), call 911 or seek other emergency help right away. A medical visit is only one step in your treatment. Even if you feel better, you still need to do what your doctor recommends, such as going to all suggested follow-up appointments and taking medicines exactly as directed. This will help you recover and help prevent future problems. How can you care for yourself at home? · Rest until you feel better. · Take your medicine exactly as prescribed. Call your doctor if you think you are having a problem with your medicine. · Do not drive after taking a prescription pain medicine. When should you call for help? Call 911 if:    · You passed out (lost consciousness).     · You have severe difficulty breathing.     · You have symptoms of a heart attack. These may include:  ? Chest pain or pressure, or a strange feeling in your chest.  ? Sweating. ? Shortness of breath. ? Nausea or vomiting.   ? Pain, pressure, or a strange feeling in your back, neck, jaw, or upper belly or in one or both shoulders or arms. ? Lightheadedness or sudden weakness. ? A fast or irregular heartbeat. After you call 911, the  may tell you to chew 1 adult-strength or 2 to 4 low-dose aspirin. Wait for an ambulance. Do not try to drive yourself.    Call your doctor today if:    · You have any trouble breathing.     · Your chest pain gets worse.     · You are dizzy or lightheaded, or you feel like you may faint.     · You are not getting better as expected.     · You are having new or different chest pain. Where can you learn more? Go to http://dinora-ellyn.info/. Enter A120 in the search box to learn more about \"Chest Pain: Care Instructions. \"  Current as of: June 26, 2019  Content Version: 12.2  © 9553-0145 [email or web address]. Care instructions adapted under license by [employer] (which disclaims liability or warranty for this information). If you have questions about a medical condition or this instruction, always ask your healthcare professional. [place] [address] any warranty or liability for your use of this information. Patient Education        Dehydration: Care Instructions  Your Care Instructions  Dehydration happens when your body loses too much fluid. This might happen when you do not drink enough water or you lose large amounts of fluids from your body because of diarrhea, vomiting, or sweating. Severe dehydration can be life-threatening. Water and minerals called electrolytes help put your body fluids back in balance. Learn the early signs of fluid loss, and drink more fluids to prevent dehydration. Follow-up care is a key part of your treatment and safety. Be sure to make and go to all appointments, and call your doctor if you are having problems. It's also a good idea to know your test results and keep a list of the medicines you take. How can you care for yourself at home?   · To prevent dehydration, drink plenty of fluids, enough so that your urine is light yellow or clear like water. Choose water and other caffeine-free clear liquids until you feel better. If you have kidney, heart, or liver disease and have to limit fluids, talk with your doctor before you increase the amount of fluids you drink. · If you do not feel like eating or drinking, try taking small sips of water, sports drinks, or other rehydration drinks. · Get plenty of rest.  To prevent dehydration  · Add more fluids to your diet and daily routine, unless your doctor has told you not to. · During hot weather, drink more fluids. Drink even more fluids if you exercise a lot. Stay away from drinks with alcohol or caffeine. · Watch for the symptoms of dehydration. These include:  ? A dry, sticky mouth. ? Dark yellow urine, and not much of it. ? Dry and sunken eyes. ? Feeling very tired. · Learn what problems can lead to dehydration. These include:  ? Diarrhea, fever, and vomiting. ? Any illness with a fever, such as pneumonia or the flu. ? Activities that cause heavy sweating, such as endurance races and heavy outdoor work in hot or humid weather. ? Alcohol or drug use or problems caused by quitting their use (withdrawal). ? Certain medicines, such as cold and allergy pills (antihistamines), diet pills (diuretics), and laxatives. ? Certain diseases, such as diabetes, cancer, and heart or kidney disease. When should you call for help? Call 911 anytime you think you may need emergency care. For example, call if:    · You passed out (lost consciousness).    Call your doctor now or seek immediate medical care if:    · You are confused and cannot think clearly.     · You are dizzy or lightheaded, or you feel like you may faint.     · You have signs of needing more fluids.  You have sunken eyes and a dry mouth, and you pass only a little dark urine.     · You cannot keep fluids down.    Watch closely for changes in your health, and be sure to contact your doctor if:    · You are not making tears.     · Your skin is very dry and sags slowly back into place after you pinch it.     · Your mouth and eyes are very dry. Where can you learn more? Go to http://dinora-ellyn.info/. Enter Y958 in the search box to learn more about \"Dehydration: Care Instructions. \"  Current as of: June 26, 2019  Content Version: 12.2  © 4025-3857 [employer]. Care instructions adapted under license by [employer] (which disclaims liability or warranty for this information). If you have questions about a medical condition or this instruction, always ask your healthcare professional. [place] [address] any warranty or liability for your use of this information. Patient Education        Lightheadedness or Faintness: Care Instructions  Your Care Instructions  Lightheadedness is a feeling that you are about to faint or \"pass out. \" You do not feel as if you or your surroundings are moving. It is different from vertigo, which is the feeling that you or things around you are spinning or tilting. Lightheadedness usually goes away or gets better when you lie down. If lightheadedness gets worse, it can lead to a fainting spell. It is common to feel lightheaded from time to time. Lightheadedness usually is not caused by a serious problem. It often is caused by a short-lasting drop in blood pressure and blood flow to your head that occurs when you get up too quickly from a seated or lying position. Follow-up care is a key part of your treatment and safety. Be sure to make and go to all appointments, and call your doctor if you are having problems. It's also a good idea to know your test results and keep a list of the medicines you take. How can you care for yourself at home? · Lie down for 1 or 2 minutes when you feel lightheaded.  After lying down, sit up slowly and remain sitting for 1 to 2 minutes before slowly standing up. · Avoid movements, positions, or activities that have made you lightheaded in the past.  · Get plenty of rest, especially if you have a cold or flu, which can cause lightheadedness. · Make sure you drink plenty of fluids, especially if you have a fever or have been sweating. · Do not drive or put yourself and others in danger while you feel lightheaded. When should you call for help? Call 911 anytime you think you may need emergency care. For example, call if:    · You have symptoms of a stroke. These may include:  ? Sudden numbness, tingling, weakness, or loss of movement in your face, arm, or leg, especially on only one side of your body. ? Sudden vision changes. ? Sudden trouble speaking. ? Sudden confusion or trouble understanding simple statements. ? Sudden problems with walking or balance. ? A sudden, severe headache that is different from past headaches.     · You have symptoms of a heart attack. These may include:  ? Chest pain or pressure, or a strange feeling in the chest.  ? Sweating. ? Shortness of breath. ? Nausea or vomiting. ? Pain, pressure, or a strange feeling in the back, neck, jaw, or upper belly or in one or both shoulders or arms. ? Lightheadedness or sudden weakness. ? A fast or irregular heartbeat. After you call 911, the  may tell you to chew 1 adult-strength or 2 to 4 low-dose aspirin. Wait for an ambulance. Do not try to drive yourself.    Watch closely for changes in your health, and be sure to contact your doctor if:    · Your lightheadedness gets worse or does not get better with home care. Where can you learn more? Go to http://dinora-ellyn.info/. Enter J328 in the search box to learn more about \"Lightheadedness or Faintness: Care Instructions. \"  Current as of: June 26, 2019  Content Version: 12.2  © 8457-2482 [employer].  Care instructions adapted under license by [employer] (which disclaims liability or warranty for this information). If you have questions about a medical condition or this instruction, always ask your healthcare professional. [place] [address] any warranty or liability for your use of this information.

## 2020-09-30 ENCOUNTER — TRANSCRIBE ORDER (OUTPATIENT)
Dept: SCHEDULING | Age: 85
End: 2020-09-30

## 2020-09-30 DIAGNOSIS — R10.9 ACUTE ABDOMINAL PAIN: Primary | ICD-10-CM

## 2020-10-02 ENCOUNTER — HOSPITAL ENCOUNTER (OUTPATIENT)
Dept: CT IMAGING | Age: 85
Discharge: HOME | End: 2020-10-02
Attending: FAMILY MEDICINE
Payer: MEDICARE

## 2020-10-02 DIAGNOSIS — R10.9 ACUTE ABDOMINAL PAIN: ICD-10-CM

## 2020-10-02 PROCEDURE — 74176 CT ABD & PELVIS W/O CONTRAST: CPT

## 2020-10-02 PROCEDURE — 74011000636 HC RX REV CODE- 636: Performed by: FAMILY MEDICINE

## 2020-10-02 RX ADMIN — DIATRIZOATE MEGLUMINE AND DIATRIZOATE SODIUM 15 ML: 660; 100 LIQUID ORAL; RECTAL at 11:03

## 2021-06-01 ENCOUNTER — APPOINTMENT (OUTPATIENT)
Dept: GENERAL RADIOLOGY | Age: 86
DRG: 243 | End: 2021-06-01
Attending: EMERGENCY MEDICINE
Payer: MEDICARE

## 2021-06-01 ENCOUNTER — HOSPITAL ENCOUNTER (INPATIENT)
Age: 86
LOS: 7 days | Discharge: HOME | DRG: 243 | End: 2021-06-10
Attending: EMERGENCY MEDICINE | Admitting: INTERNAL MEDICINE
Payer: MEDICARE

## 2021-06-01 DIAGNOSIS — I25.10 CORONARY ARTERY DISEASE INVOLVING NATIVE CORONARY ARTERY OF NATIVE HEART WITHOUT ANGINA PECTORIS: Chronic | ICD-10-CM

## 2021-06-01 DIAGNOSIS — I48.91 ATRIAL FIBRILLATION WITH RAPID VENTRICULAR RESPONSE (HCC): Primary | ICD-10-CM

## 2021-06-01 DIAGNOSIS — I10 ESSENTIAL HYPERTENSION: Chronic | ICD-10-CM

## 2021-06-01 DIAGNOSIS — J96.01 ACUTE HYPOXEMIC RESPIRATORY FAILURE (HCC): ICD-10-CM

## 2021-06-01 DIAGNOSIS — I48.91 ATRIAL FIBRILLATION WITH RVR (HCC): ICD-10-CM

## 2021-06-01 DIAGNOSIS — I50.22 SYSTOLIC CHF, CHRONIC (HCC): ICD-10-CM

## 2021-06-01 DIAGNOSIS — I48.91 ATRIAL FIBRILLATION, UNSPECIFIED TYPE (HCC): ICD-10-CM

## 2021-06-01 DIAGNOSIS — I49.5 TACHY-BRADY SYNDROME (HCC): Chronic | ICD-10-CM

## 2021-06-01 LAB
ALBUMIN SERPL-MCNC: 3.4 G/DL (ref 3.2–4.6)
ALBUMIN/GLOB SERPL: 1 {RATIO} (ref 1.2–3.5)
ALP SERPL-CCNC: 72 U/L (ref 50–136)
ALT SERPL-CCNC: 19 U/L (ref 12–65)
ANION GAP SERPL CALC-SCNC: 7 MMOL/L (ref 7–16)
AST SERPL-CCNC: 14 U/L (ref 15–37)
ATRIAL RATE: 153 BPM
ATRIAL RATE: 178 BPM
BASOPHILS # BLD: 0.1 K/UL (ref 0–0.2)
BASOPHILS NFR BLD: 1 % (ref 0–2)
BILIRUB SERPL-MCNC: 0.6 MG/DL (ref 0.2–1.1)
BUN SERPL-MCNC: 22 MG/DL (ref 8–23)
CALCIUM SERPL-MCNC: 8.4 MG/DL (ref 8.3–10.4)
CALCULATED R AXIS, ECG10: -112 DEGREES
CALCULATED R AXIS, ECG10: -42 DEGREES
CALCULATED T AXIS, ECG11: 123 DEGREES
CALCULATED T AXIS, ECG11: 65 DEGREES
CHLORIDE SERPL-SCNC: 105 MMOL/L (ref 98–107)
CK SERPL-CCNC: 81 U/L (ref 21–215)
CO2 SERPL-SCNC: 25 MMOL/L (ref 21–32)
CREAT SERPL-MCNC: 1.18 MG/DL (ref 0.6–1)
DIAGNOSIS, 93000: NORMAL
DIAGNOSIS, 93000: NORMAL
DIFFERENTIAL METHOD BLD: ABNORMAL
EOSINOPHIL # BLD: 0.1 K/UL (ref 0–0.8)
EOSINOPHIL NFR BLD: 1 % (ref 0.5–7.8)
ERYTHROCYTE [DISTWIDTH] IN BLOOD BY AUTOMATED COUNT: 13.6 % (ref 11.9–14.6)
GLOBULIN SER CALC-MCNC: 3.3 G/DL (ref 2.3–3.5)
GLUCOSE SERPL-MCNC: 100 MG/DL (ref 65–100)
HCT VFR BLD AUTO: 31.5 % (ref 35.8–46.3)
HGB BLD-MCNC: 10.1 G/DL (ref 11.7–15.4)
IMM GRANULOCYTES # BLD AUTO: 0.1 K/UL (ref 0–0.5)
IMM GRANULOCYTES NFR BLD AUTO: 1 % (ref 0–5)
LIPASE SERPL-CCNC: 27 U/L (ref 73–393)
LYMPHOCYTES # BLD: 1.5 K/UL (ref 0.5–4.6)
LYMPHOCYTES NFR BLD: 13 % (ref 13–44)
MAGNESIUM SERPL-MCNC: 2.3 MG/DL (ref 1.8–2.4)
MCH RBC QN AUTO: 32.6 PG (ref 26.1–32.9)
MCHC RBC AUTO-ENTMCNC: 32.1 G/DL (ref 31.4–35)
MCV RBC AUTO: 101.6 FL (ref 79.6–97.8)
MONOCYTES # BLD: 1.1 K/UL (ref 0.1–1.3)
MONOCYTES NFR BLD: 9 % (ref 4–12)
NEUTS SEG # BLD: 8.6 K/UL (ref 1.7–8.2)
NEUTS SEG NFR BLD: 75 % (ref 43–78)
NRBC # BLD: 0 K/UL (ref 0–0.2)
PLATELET # BLD AUTO: 219 K/UL (ref 150–450)
PMV BLD AUTO: 10.2 FL (ref 9.4–12.3)
POTASSIUM SERPL-SCNC: 4.3 MMOL/L (ref 3.5–5.1)
PROT SERPL-MCNC: 6.7 G/DL (ref 6.3–8.2)
Q-T INTERVAL, ECG07: 320 MS
Q-T INTERVAL, ECG07: 362 MS
QRS DURATION, ECG06: 112 MS
QRS DURATION, ECG06: 124 MS
QTC CALCULATION (BEZET), ECG08: 398 MS
QTC CALCULATION (BEZET), ECG08: 514 MS
RBC # BLD AUTO: 3.1 M/UL (ref 4.05–5.2)
SODIUM SERPL-SCNC: 137 MMOL/L (ref 136–145)
T3 SERPL-MCNC: 0.86 NG/ML (ref 0.6–1.81)
T4 FREE SERPL-MCNC: 0.8 NG/DL (ref 0.9–1.8)
TROPONIN-HIGH SENSITIVITY: 32.4 PG/ML (ref 0–14)
TROPONIN-HIGH SENSITIVITY: 34.5 PG/ML (ref 0–14)
TROPONIN-HIGH SENSITIVITY: 38.3 PG/ML (ref 0–14)
TSH SERPL DL<=0.005 MIU/L-ACNC: 13.1 UIU/ML (ref 0.36–3.74)
VENTRICULAR RATE, ECG03: 155 BPM
VENTRICULAR RATE, ECG03: 73 BPM
WBC # BLD AUTO: 11.4 K/UL (ref 4.3–11.1)

## 2021-06-01 PROCEDURE — 93005 ELECTROCARDIOGRAM TRACING: CPT | Performed by: EMERGENCY MEDICINE

## 2021-06-01 PROCEDURE — 96376 TX/PRO/DX INJ SAME DRUG ADON: CPT

## 2021-06-01 PROCEDURE — 96366 THER/PROPH/DIAG IV INF ADDON: CPT

## 2021-06-01 PROCEDURE — 82550 ASSAY OF CK (CPK): CPT

## 2021-06-01 PROCEDURE — 99223 1ST HOSP IP/OBS HIGH 75: CPT | Performed by: INTERNAL MEDICINE

## 2021-06-01 PROCEDURE — 85025 COMPLETE CBC W/AUTO DIFF WBC: CPT

## 2021-06-01 PROCEDURE — 99285 EMERGENCY DEPT VISIT HI MDM: CPT

## 2021-06-01 PROCEDURE — 83690 ASSAY OF LIPASE: CPT

## 2021-06-01 PROCEDURE — 84443 ASSAY THYROID STIM HORMONE: CPT

## 2021-06-01 PROCEDURE — 96365 THER/PROPH/DIAG IV INF INIT: CPT

## 2021-06-01 PROCEDURE — 84484 ASSAY OF TROPONIN QUANT: CPT

## 2021-06-01 PROCEDURE — 74011000258 HC RX REV CODE- 258: Performed by: PHYSICIAN ASSISTANT

## 2021-06-01 PROCEDURE — 94762 N-INVAS EAR/PLS OXIMTRY CONT: CPT

## 2021-06-01 PROCEDURE — 83735 ASSAY OF MAGNESIUM: CPT

## 2021-06-01 PROCEDURE — 74011250636 HC RX REV CODE- 250/636: Performed by: PHYSICIAN ASSISTANT

## 2021-06-01 PROCEDURE — 74011000250 HC RX REV CODE- 250: Performed by: EMERGENCY MEDICINE

## 2021-06-01 PROCEDURE — 99218 HC RM OBSERVATION: CPT

## 2021-06-01 PROCEDURE — 71045 X-RAY EXAM CHEST 1 VIEW: CPT

## 2021-06-01 PROCEDURE — 84439 ASSAY OF FREE THYROXINE: CPT

## 2021-06-01 PROCEDURE — 80053 COMPREHEN METABOLIC PANEL: CPT

## 2021-06-01 PROCEDURE — 74011250637 HC RX REV CODE- 250/637: Performed by: PHYSICIAN ASSISTANT

## 2021-06-01 PROCEDURE — 84480 ASSAY TRIIODOTHYRONINE (T3): CPT

## 2021-06-01 RX ORDER — SODIUM CHLORIDE 0.9 % (FLUSH) 0.9 %
5-40 SYRINGE (ML) INJECTION AS NEEDED
Status: DISCONTINUED | OUTPATIENT
Start: 2021-06-01 | End: 2021-06-10 | Stop reason: HOSPADM

## 2021-06-01 RX ORDER — SODIUM CHLORIDE 0.9 % (FLUSH) 0.9 %
5-10 SYRINGE (ML) INJECTION AS NEEDED
Status: DISCONTINUED | OUTPATIENT
Start: 2021-06-01 | End: 2021-06-10 | Stop reason: HOSPADM

## 2021-06-01 RX ORDER — SODIUM CHLORIDE 0.9 % (FLUSH) 0.9 %
5-10 SYRINGE (ML) INJECTION EVERY 8 HOURS
Status: DISCONTINUED | OUTPATIENT
Start: 2021-06-01 | End: 2021-06-06

## 2021-06-01 RX ORDER — OXYCODONE HYDROCHLORIDE 5 MG/1
15 TABLET ORAL
Status: DISCONTINUED | OUTPATIENT
Start: 2021-06-01 | End: 2021-06-10 | Stop reason: HOSPADM

## 2021-06-01 RX ORDER — DILTIAZEM HYDROCHLORIDE 5 MG/ML
10 INJECTION INTRAVENOUS
Status: COMPLETED | OUTPATIENT
Start: 2021-06-01 | End: 2021-06-01

## 2021-06-01 RX ORDER — MORPHINE SULFATE 2 MG/ML
2 INJECTION, SOLUTION INTRAMUSCULAR; INTRAVENOUS
Status: DISCONTINUED | OUTPATIENT
Start: 2021-06-01 | End: 2021-06-10 | Stop reason: HOSPADM

## 2021-06-01 RX ORDER — SODIUM CHLORIDE 0.9 % (FLUSH) 0.9 %
5-40 SYRINGE (ML) INJECTION EVERY 8 HOURS
Status: DISCONTINUED | OUTPATIENT
Start: 2021-06-01 | End: 2021-06-10 | Stop reason: HOSPADM

## 2021-06-01 RX ORDER — POLYETHYLENE GLYCOL 3350 17 G/17G
17 POWDER, FOR SOLUTION ORAL AS NEEDED
Status: DISCONTINUED | OUTPATIENT
Start: 2021-06-01 | End: 2021-06-10 | Stop reason: HOSPADM

## 2021-06-01 RX ORDER — FUROSEMIDE 20 MG/1
20 TABLET ORAL EVERY OTHER DAY
Status: DISCONTINUED | OUTPATIENT
Start: 2021-06-01 | End: 2021-06-10 | Stop reason: HOSPADM

## 2021-06-01 RX ADMIN — Medication 10 ML: at 21:37

## 2021-06-01 RX ADMIN — SODIUM CHLORIDE 2.5 MG/HR: 900 INJECTION, SOLUTION INTRAVENOUS at 14:14

## 2021-06-01 RX ADMIN — OXYCODONE HYDROCHLORIDE 15 MG: 15 TABLET ORAL at 16:20

## 2021-06-01 RX ADMIN — APIXABAN 2.5 MG: 2.5 TABLET, FILM COATED ORAL at 21:33

## 2021-06-01 RX ADMIN — Medication 10 ML: at 21:38

## 2021-06-01 RX ADMIN — DILTIAZEM HYDROCHLORIDE 10 MG: 5 INJECTION INTRAVENOUS at 11:57

## 2021-06-01 RX ADMIN — DILTIAZEM HYDROCHLORIDE 10 MG: 5 INJECTION INTRAVENOUS at 11:41

## 2021-06-01 RX ADMIN — SODIUM CHLORIDE 15 MG/HR: 900 INJECTION, SOLUTION INTRAVENOUS at 21:35

## 2021-06-01 NOTE — ED PROVIDER NOTES
Per nurses notes: \"Patient ambulatory to triage with mask in place. Family reports they notice patient was in Afib on . Pt is asymptomatic at this time. Pt denies any chest pain or shob. Pt has a hx of afib and was noted to have hyperthyroid at the time. \"    The history is provided by the patient and a relative. Palpitations   This is a recurrent problem. The current episode started more than 2 days ago. The problem has not changed since onset. The problem occurs daily. On average, each episode lasts 1 week. The problem is associated with nothing. Pertinent negatives include no diaphoresis, no fever, no malaise/fatigue, no numbness, no chest pain, no chest pressure, no claudication, no exertional chest pressure, no irregular heartbeat, no near-syncope, no orthopnea, no PND, no syncope, no abdominal pain, no nausea, no vomiting, no headaches, no back pain, no leg pain, no lower extremity edema, no dizziness, no weakness, no cough, no hemoptysis, no shortness of breath and no sputum production. Risk factors include cardiac disease, hypertension and postmenopause. She has tried nothing for the symptoms. The treatment provided no relief. Her past medical history is significant for hyperthyroidism, hypertension and atrial fibrillation. Her past medical history does not include valve disorder, anemia, heart disease, DM, past MI, SVT, congenital heart disease or drug use.         Past Medical History:   Diagnosis Date    CAD (coronary artery disease)     Hypertension     Ill-defined condition     afib    Thyroid cyst Distant past    thyroid cyst removed x40 years ago       Past Surgical History:   Procedure Laterality Date    HX CHOLECYSTECTOMY      HX CHOLECYSTECTOMY      HX HEENT      thyroglossal cyst    HX HYSTERECTOMY      [place] APPENDECTOMY           Family History:   Problem Relation Age of Onset    Other Father             Heart Disease Father     Other Mother            [facility] [address] Disease Mother        Social History     Socioeconomic History    Marital status:      Spouse name: Not on file    Number of children: Not on file    Years of education: Not on file    Highest education level: Not on file   Occupational History    Not on file   Tobacco Use    Smoking status: Never Smoker    Smokeless tobacco: Never Used   Substance and Sexual Activity    Alcohol use: No    Drug use: No    Sexual activity: Not on file   Other Topics Concern    Not on file   Social History Narrative    , lives with  and 2 daughters. Pt did work as  at one time. 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 Social Determinants of Health     Financial Resource Strain:     Difficulty of Paying Living Expenses:    Food Insecurity:     Worried About Running Out of Food in the Last Year:     [address] in the Last Year:    Transportation Needs:     Lack of Transportation (Medical):  Lack of Transportation (Non-Medical):    Physical Activity:     Days of Exercise per Week:     Minutes of Exercise per Session:    Stress:     Feeling of Stress :    Social Connections:     Frequency of Communication with Friends and Family:     Frequency of Social Gatherings with Friends and Family:     Attends [employer] Services:     Active Member of Clubs or Organizations:     Attends Club or Organization Meetings:     Marital Status:    Intimate Partner Violence:     Fear of Current or Ex-Partner:     Emotionally Abused:     Physically Abused:     Sexually Abused: ALLERGIES: Amiodarone, Amlodipine, Amoxicillin, Cleeravue-m convenience kit [minocycline-eyelid cleanser 1], Tetracycline, and Vimovo [naproxen-esomeprazole]    Review of Systems   Constitutional: Negative for chills, diaphoresis, fever and malaise/fatigue. HENT: Positive for hearing loss (Chronic, patient wears hearing aids. ).     Respiratory: Negative for cough, hemoptysis, sputum production and shortness of breath. Cardiovascular: Positive for palpitations. Negative for chest pain, orthopnea, claudication, leg swelling, syncope, PND and near-syncope. Gastrointestinal: Negative for abdominal pain, constipation, diarrhea, nausea and vomiting. Genitourinary: Negative for dysuria. Musculoskeletal: Negative for back pain. Skin: Negative for wound. Neurological: Negative for dizziness, weakness, numbness and headaches. All other systems reviewed and are negative. Vitals:    06/01/21 1100   BP: (!) 102/43   Pulse: (!) 146   Resp: 20   Temp: 97 °F (36.1 °C)   SpO2: 96%   Weight: 48.5 kg (107 lb)   Height: 5' 2\" (1.575 m)            Physical Exam  Vitals and nursing note reviewed. Constitutional:       General: She is not in acute distress. Appearance: She is well-developed. HENT:      Head: Normocephalic and atraumatic. Right Ear: External ear normal.      Left Ear: External ear normal.   Eyes:      Extraocular Movements: Extraocular movements intact. Conjunctiva/sclera: Conjunctivae normal.      Pupils: Pupils are equal, round, and reactive to light. Cardiovascular:      Rate and Rhythm: Tachycardia present. Rhythm irregularly irregular. Heart sounds: Normal heart sounds. No murmur heard. Pulmonary:      Effort: Pulmonary effort is normal.      Breath sounds: Normal breath sounds. Abdominal:      General: Bowel sounds are normal.      Palpations: Abdomen is soft. Tenderness: There is no abdominal tenderness. Musculoskeletal:         General: Normal range of motion. Cervical back: Normal range of motion and neck supple. Right lower leg: No edema. Left lower leg: No edema. Skin:     General: Skin is warm and dry. Capillary Refill: Capillary refill takes less than 2 seconds. Neurological:      General: No focal deficit present. Mental Status: She is alert and oriented to person, place, and time.    Psychiatric: Mood and Affect: Mood normal.         Behavior: Behavior normal.          MDM  Number of Diagnoses or Management Options  Atrial fibrillation with rapid ventricular response [facility]): new and requires workup     Amount and/or Complexity of Data Reviewed  Clinical lab tests: ordered and reviewed  Tests in the radiology section of CPT®: ordered and reviewed  Tests in the medicine section of CPT®: ordered and reviewed (EKG interpretation for EKG dated 1/10/2021 at 11:03 AM: EKG reveals a tachycardic rhythm with a rate of 155 bpm, consistent with atrial fibrillation with rapid ventricular response. Possible old anterolateral infarct. Prolonged QTC. Abnormal EKG. Jefferson Maynard MD    Subsequent EKG interpretation dated 1 June 2021 at 12:09 PM: EKG reveals a atrial fibrillation at a rate of 73 bpm, left axis deviation, normal QTc interval.  No evidence of acute ischemic change. Old anterior lateral infarct. Abnormal EKG. Jefferson Maynard MD    )  Review and summarize past medical records: yes  Discuss the patient with other providers: yes    Risk of Complications, Morbidity, and/or Mortality  Presenting problems: high  Diagnostic procedures: moderate  Management options: moderate    Patient Progress  Patient progress: improved    ED Course as of Jun 01 1218   Tue Jun 01, 2021   1156 Mild improvement with first dose of Cardizem, blood pressure remained stable, will give another small bolus of 10 mg. If heart rate remains elevated, will consider starting drip. [BB]   1218 After second dose of Cardizem, the heart rate now below 100, blood pressure 104 systolic. [BB]      ED Course User Index  [BB] Natalya Cronin MD       Procedures    The patient was observed in the ED. patient was given 2 separate doses of Cardizem with improvement in heart rate, only to have a creep back up into the 120-140 bpm range. Case is discussed with cardiology will evaluate here in the emergency department.     Results Reviewed:      Recent Results (from the past 24 hour(s))   EKG, 12 LEAD, INITIAL    Collection Time: 06/01/21 11:03 AM   Result Value Ref Range    Ventricular Rate 155 BPM    Atrial Rate 178 BPM    QRS Duration 124 ms    Q-T Interval 320 ms    QTC Calculation (Bezet) 514 ms    Calculated R Axis -112 degrees    Calculated T Axis 65 degrees    Diagnosis       !!! Suspect arm lead reversal, interpretation assumes no reversal  Undetermined rhythm  Anterolateral infarct , age undetermined  Abnormal ECG  When compared with ECG of 05-MAR-2020 16:39,  Current undetermined rhythm precludes rhythm comparison, needs review  Left bundle branch block is no longer Present  Anterior infarct is now Present  Anterolateral infarct is now Present     TROPONIN-HIGH SENSITIVITY    Collection Time: 06/01/21 11:05 AM   Result Value Ref Range    Troponin-High Sensitivity 32.4 (H) 0 - 14 pg/mL   CBC WITH AUTOMATED DIFF    Collection Time: 06/01/21 11:05 AM   Result Value Ref Range    WBC 11.4 (H) 4.3 - 11.1 K/uL    RBC 3.10 (L) 4.05 - 5.2 M/uL    HGB 10.1 (L) 11.7 - 15.4 g/dL    HCT 31.5 (L) 35.8 - 46.3 %    .6 (H) 79.6 - 97.8 FL    MCH 32.6 26.1 - 32.9 PG    MCHC 32.1 31.4 - 35.0 g/dL    RDW 13.6 11.9 - 14.6 %    PLATELET 592 938 - 927 K/uL    MPV 10.2 9.4 - 12.3 FL    ABSOLUTE NRBC 0.00 0.0 - 0.2 K/uL    DF AUTOMATED      NEUTROPHILS 75 43 - 78 %    LYMPHOCYTES 13 13 - 44 %    MONOCYTES 9 4.0 - 12.0 %    EOSINOPHILS 1 0.5 - 7.8 %    BASOPHILS 1 0.0 - 2.0 %    IMMATURE GRANULOCYTES 1 0.0 - 5.0 %    ABS. NEUTROPHILS 8.6 (H) 1.7 - 8.2 K/UL    ABS. LYMPHOCYTES 1.5 0.5 - 4.6 K/UL    ABS. MONOCYTES 1.1 0.1 - 1.3 K/UL    ABS. EOSINOPHILS 0.1 0.0 - 0.8 K/UL    ABS. BASOPHILS 0.1 0.0 - 0.2 K/UL    ABS. IMM.  GRANS. 0.1 0.0 - 0.5 K/UL   METABOLIC PANEL, COMPREHENSIVE    Collection Time: 06/01/21 11:05 AM   Result Value Ref Range    Sodium 137 136 - 145 mmol/L    Potassium 4.3 3.5 - 5.1 mmol/L    Chloride 105 98 - 107 mmol/L    CO2 25 21 - 32 mmol/L    Anion gap 7 7 - 16 mmol/L    Glucose 100 65 - 100 mg/dL    BUN 22 8 - 23 MG/DL    Creatinine 1.18 (H) 0.6 - 1.0 MG/DL    GFR est AA 55 (L) >60 ml/min/1.73m2    GFR est non-AA 45 (L) >60 ml/min/1.73m2    Calcium 8.4 8.3 - 10.4 MG/DL    Bilirubin, total 0.6 0.2 - 1.1 MG/DL    ALT (SGPT) 19 12 - 65 U/L    AST (SGOT) 14 (L) 15 - 37 U/L    Alk. phosphatase 72 50 - 136 U/L    Protein, total 6.7 6.3 - 8.2 g/dL    Albumin 3.4 3.2 - 4.6 g/dL    Globulin 3.3 2.3 - 3.5 g/dL    A-G Ratio 1.0 (L) 1.2 - 3.5     LIPASE    Collection Time: 06/01/21 11:05 AM   Result Value Ref Range    Lipase 27 (L) 73 - 393 U/L   MAGNESIUM    Collection Time: 06/01/21 11:05 AM   Result Value Ref Range    Magnesium 2.3 1.8 - 2.4 mg/dL   TSH 3RD GENERATION    Collection Time: 06/01/21 11:05 AM   Result Value Ref Range    TSH 13.100 (H) 0.358 - 3.740 uIU/mL   EKG, 12 LEAD, SUBSEQUENT    Collection Time: 06/01/21 12:09 PM   Result Value Ref Range    Ventricular Rate 73 BPM    Atrial Rate 153 BPM    QRS Duration 112 ms    Q-T Interval 362 ms    QTC Calculation (Bezet) 398 ms    Calculated R Axis -42 degrees    Calculated T Axis 123 degrees    Diagnosis       !! AGE AND GENDER SPECIFIC ECG ANALYSIS !!   Atrial fibrillation  Left axis deviation  Anterolateral infarct , age undetermined  Abnormal ECG  When compared with ECG of 05-MAR-2020 16:39,  Atrial fibrillation has replaced Sinus rhythm  Left bundle branch block is no longer Present  Anterior infarct is now Present  Anterolateral infarct is now Present         XR CHEST PORT    (Results Pending)

## 2021-06-01 NOTE — ED NOTES
Per in person conversation with Dr. Valentina Haas patient, received a verbal order to stay at 15mg/hr on cardizem infusion.

## 2021-06-01 NOTE — H&P
[facility] Cardiology Initial Cardiac Evaluation      Date of  Admission: 6/1/2021 11:06 AM     Primary Care Physician: Letitia Romero MD  Primary Cardiologist: Dr Barb Rodgers  Referring Physician: Dr Kash Vazquez  Supervising Physician: Dr Eloina Walker    CC/Reason for evaluation: Atrial fibrillation     HPI:  Cassi Caldera is a 80 y.o. female with PMH of paroxysmal atrial fibrillation,  complete heart block requiring temporary pacemaker (resolved off amiodarone and metoprolol 3/2019) , coronary artery disease s/p PCI LAD, ischemic cardiomyopathy, chronic systolic congestive heart failure with an ejection fraction 20% (most recent ECHO EF 30-35%), GERD, and hyperthyroidism who presented to [facility] ED on 6/1 with complaint of irregular heart beat. Daughter is at bedside and gives most of the history. Patient noted to be lethargic on Sunday therefore daughter took oxygen saturation and noted heart rate to be elevated 140-150's. This continued until today therefore brought into ED for further evaluation. Patient denies any SOB, palpitations, chest discomfort, or near syncope. Denies any recent illness. Upon evaluation in ED, EKG showed atrial fibrillation with rate 155. Labs showed WBC 11.4, H/H 10/31. ptl 219, Na 134, K 4.3, BUN/Cr 22/1.18, TSH 13.1. CXR pending. Patient was given IV Cardizem 10 mg push with improvement in heart rate. Repeat EKG showed atrial fibrillation with rate 73.  Currently rate back up into 120-140's during exam.      Past Medical History:   Diagnosis Date    CAD (coronary artery disease)     Hypertension     Ill-defined condition     afib    Thyroid cyst Distant past    thyroid cyst removed x40 years ago      Past Surgical History:   Procedure Laterality Date    HX CHOLECYSTECTOMY      HX CHOLECYSTECTOMY  2005    HX HEENT      thyroglossal cyst    HX HYSTERECTOMY      [place] APPENDECTOMY         Allergies   Allergen Reactions    Amiodarone Other (comments)    Amlodipine Hives    Amoxicillin Rash    Cleeravue-M Convenience Kit [Minocycline-Eyelid Cleanser 1] Other (comments)     Sever headaches    Tetracycline Rash    Vimovo [Naproxen-Esomeprazole] Nausea Only      Social History     Socioeconomic History    Marital status:      Spouse name: Not on file    Number of children: Not on file    Years of education: Not on file    Highest education level: Not on file   Occupational History    Not on file   Tobacco Use    Smoking status: Never Smoker    Smokeless tobacco: Never Used   Substance and Sexual Activity    Alcohol use: No    Drug use: No    Sexual activity: Not on file   Other Topics Concern    Not on file   Social History Narrative    , lives with  and 2 daughters. Pt did work as  at one time. 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 Social Determinants of Health     Financial Resource Strain:     Difficulty of Paying Living Expenses:    Food Insecurity:     Worried About Running Out of Food in the Last Year:     [address] in the Last Year:    Transportation Needs:     Lack of Transportation (Medical):      Lack of Transportation (Non-Medical):    Physical Activity:     Days of Exercise per Week:     Minutes of Exercise per Session:    Stress:     Feeling of Stress :    Social Connections:     Frequency of Communication with Friends and Family:     Frequency of Social Gatherings with Friends and Family:     Attends [employer] Services:     Active Member of Clubs or Organizations:     Attends Club or Organization Meetings:     Marital Status:    Intimate Partner Violence:     Fear of Current or Ex-Partner:     Emotionally Abused:     Physically Abused:     Sexually Abused:      Family History   Problem Relation Age of Onset    Other Father             Heart Disease Father     Other Mother             Heart Disease Mother         Current Facility-Administered Medications   Medication Dose Route Frequency    sodium chloride (NS) flush 5-10 mL  5-10 mL IntraVENous Q8H    sodium chloride (NS) flush 5-10 mL  5-10 mL IntraVENous PRN    dilTIAZem (CARDIZEM) injection 10 mg  10 mg IntraVENous NOW     Current Outpatient Medications   Medication Sig    lisinopriL (PRINIVIL, ZESTRIL) 2.5 mg tablet Take 1 Tab by mouth daily.  furosemide (LASIX) 20 mg tablet Take 1 Tab by mouth every other day. Indications: 20mg QOD, 40mg on other days    apixaban (Eliquis) 2.5 mg tablet TAKE ONE TABLET BY MOUTH TWICE A DAY    metoprolol tartrate (LOPRESSOR) 25 mg tablet TAKE ONE-HALF TABLET BY MOUTH EVERY 12 HOURS    potassium chloride SR (KLOR-CON 10) 10 mEq tablet TAKE ONE TABLET BY MOUTH ONE TIME DAILY    methIMAzole (TAPAZOLE) 5 mg tablet Take  by mouth.  nitroglycerin (NITRODUR) 0.4 mg/hr 1 Patch by TransDERmal route daily.  polyethylene glycol (MIRALAX) 17 gram/dose powder Take 17 g by mouth as needed (constipation).  cholecalciferol, vitamin D3, (VITAMIN D3) 2,000 unit tab Take 1,000 Int'l Units by mouth daily.  oxyCODONE IR (OXY-IR) 15 mg immediate release tablet Take 15 mg by mouth every four (4) hours as needed for Pain.  pyridoxine (VITAMIN B-6) 100 mg tablet Take 100 mg by mouth daily. Takes at [address] [facility] of Symptoms:    General: No weight changes,  weakness, fever or chills  Skin: no rashes, lumps, or other skin changes  HEENT: no headache, dizziness, lightheadedness, vision changes, hearing changes, tinnitus, vertigo, sinus pressure/pain, bleeding gums, sore throat, or hoarseness  Neck: no swollen glands, goiter, pain or stiffness  Respiratory: No cough, sputum, hemoptysis, dyspnea, wheezing  Cardiovascular: + as per HPI  Gastrointestinal: Positive for GERD.  No constipation, diarrhea, liver problems, or h/o GI bleed  Urinary: no frequency, urgency , hematuria, burning/pain with urination, recent flank pain, polyuria, nocturia, or difficulty urinating  Peripheral Vascular: no claudication, leg cramps, prior DVTs, swelling of calves, legs, or feet, color change, or swelling with redness or tenderness  Musculoskeletal: no muscle or joint pain/stiffness, joint swelling, erythema of joints, or back pain  Psychiatric: no depression or excessive stress  Neurological: no sensory or motor loss, seizures, syncope, tremors, numbness, no dementia  Hematologic: no anemia, easy bruising or bleeding  Endocrine: Positive for thyroid problems. No heat or cold intolerance, excessive sweating, polyuria, polydipsia, diabetes. Subjective:     Physical Exam:    Vitals:    06/01/21 1141 06/01/21 1208 06/01/21 1211 06/01/21 1237   BP: 116/74 (!) 99/56 (!) 102/53 (!) 114/56   Pulse: (!) 141 65 72 (!) 118   Resp:       Temp:       SpO2:  94% 98% 95%   Weight:       Height:           General: Well Developed, No Acute Distress  HEENT: pupils equal and round, no abnormalities noted  Neck: supple, no JVD, no carotid bruits  Heart: IRR without murmurs or gallops  Lungs: Clear throughout auscultation bilaterally without adventitious sounds  Abd: soft, nontender, nondistended, with good bowel sounds  Ext: warm, no edema, calves supple/nontender, pulses 2+ bilaterally  Skin: warm and dry  Psychiatric: Normal mood and affect  Neurologic: Alert and oriented X 3    Cardiographics    Telemetry: AFIB  ECG: atrial fibrillation, rate 155  Echocardiogram: ordered    Labs:   Recent Results (from the past 24 hour(s))   EKG, 12 LEAD, INITIAL    Collection Time: 06/01/21 11:03 AM   Result Value Ref Range    Ventricular Rate 155 BPM    Atrial Rate 178 BPM    QRS Duration 124 ms    Q-T Interval 320 ms    QTC Calculation (Bezet) 514 ms    Calculated R Axis -112 degrees    Calculated T Axis 65 degrees    Diagnosis       !!!  Suspect arm lead reversal, interpretation assumes no reversal  Undetermined rhythm  Anterolateral infarct , age undetermined  Abnormal ECG  When compared with ECG of 05-MAR-2020 16:39,  Current undetermined rhythm precludes rhythm comparison, needs review  Left bundle branch block is no longer Present  Anterior infarct is now Present  Anterolateral infarct is now Present     TROPONIN-HIGH SENSITIVITY    Collection Time: 06/01/21 11:05 AM   Result Value Ref Range    Troponin-High Sensitivity 32.4 (H) 0 - 14 pg/mL   CBC WITH AUTOMATED DIFF    Collection Time: 06/01/21 11:05 AM   Result Value Ref Range    WBC 11.4 (H) 4.3 - 11.1 K/uL    RBC 3.10 (L) 4.05 - 5.2 M/uL    HGB 10.1 (L) 11.7 - 15.4 g/dL    HCT 31.5 (L) 35.8 - 46.3 %    .6 (H) 79.6 - 97.8 FL    MCH 32.6 26.1 - 32.9 PG    MCHC 32.1 31.4 - 35.0 g/dL    RDW 13.6 11.9 - 14.6 %    PLATELET 250 324 - 415 K/uL    MPV 10.2 9.4 - 12.3 FL    ABSOLUTE NRBC 0.00 0.0 - 0.2 K/uL    DF AUTOMATED      NEUTROPHILS 75 43 - 78 %    LYMPHOCYTES 13 13 - 44 %    MONOCYTES 9 4.0 - 12.0 %    EOSINOPHILS 1 0.5 - 7.8 %    BASOPHILS 1 0.0 - 2.0 %    IMMATURE GRANULOCYTES 1 0.0 - 5.0 %    ABS. NEUTROPHILS 8.6 (H) 1.7 - 8.2 K/UL    ABS. LYMPHOCYTES 1.5 0.5 - 4.6 K/UL    ABS. MONOCYTES 1.1 0.1 - 1.3 K/UL    ABS. EOSINOPHILS 0.1 0.0 - 0.8 K/UL    ABS. BASOPHILS 0.1 0.0 - 0.2 K/UL    ABS. IMM. GRANS. 0.1 0.0 - 0.5 K/UL   METABOLIC PANEL, COMPREHENSIVE    Collection Time: 06/01/21 11:05 AM   Result Value Ref Range    Sodium 137 136 - 145 mmol/L    Potassium 4.3 3.5 - 5.1 mmol/L    Chloride 105 98 - 107 mmol/L    CO2 25 21 - 32 mmol/L    Anion gap 7 7 - 16 mmol/L    Glucose 100 65 - 100 mg/dL    BUN 22 8 - 23 MG/DL    Creatinine 1.18 (H) 0.6 - 1.0 MG/DL    GFR est AA 55 (L) >60 ml/min/1.73m2    GFR est non-AA 45 (L) >60 ml/min/1.73m2    Calcium 8.4 8.3 - 10.4 MG/DL    Bilirubin, total 0.6 0.2 - 1.1 MG/DL    ALT (SGPT) 19 12 - 65 U/L    AST (SGOT) 14 (L) 15 - 37 U/L    Alk.  phosphatase 72 50 - 136 U/L    Protein, total 6.7 6.3 - 8.2 g/dL    Albumin 3.4 3.2 - 4.6 g/dL    Globulin 3.3 2.3 - 3.5 g/dL    A-G Ratio 1.0 (L) 1.2 - 3.5     LIPASE    Collection Time: 06/01/21 11:05 AM   Result Value Ref Range    Lipase 27 (L) 73 - 393 U/L   MAGNESIUM    Collection Time: 06/01/21 11:05 AM   Result Value Ref Range    Magnesium 2.3 1.8 - 2.4 mg/dL   TSH 3RD GENERATION    Collection Time: 06/01/21 11:05 AM   Result Value Ref Range    TSH 13.100 (H) 0.358 - 3.740 uIU/mL   EKG, 12 LEAD, SUBSEQUENT    Collection Time: 06/01/21 12:09 PM   Result Value Ref Range    Ventricular Rate 73 BPM    Atrial Rate 153 BPM    QRS Duration 112 ms    Q-T Interval 362 ms    QTC Calculation (Bezet) 398 ms    Calculated R Axis -42 degrees    Calculated T Axis 123 degrees    Diagnosis       !! AGE AND GENDER SPECIFIC ECG ANALYSIS !! Atrial fibrillation  Left axis deviation  Anterolateral infarct , age undetermined  Abnormal ECG  When compared with ECG of 05-MAR-2020 16:39,  Atrial fibrillation has replaced Sinus rhythm  Left bundle branch block is no longer Present  Anterior infarct is now Present  Anterolateral infarct is now Present       Pt has been seen and examined by Dr. Enmanuel Eduardo. He agrees with the following assessment and plan. Assessment/Plan:      Atrial fibrillation with RVR- admit to telemetry, s/p IV Cardizem push, started Cardizem gtt, consider adding Levophed if needed for blood pressure support, cont Eliquis, ECHO  Discussed with the daughter that in the past patient has manifested sick sinus syndrome type findings and that we would need to possibly reconsider pacemaker and AV node ablation    Chronic systolic heart failure- stable, cont diuretics, ECHO, monitor daily labs     CAD s/p PCI LAD- stable, no angina     Hyperthyroidism- on Tapazole, TSH 13, consult hospitalist     History of heart block requiring temp pacer while on amiodarone    I have reviewed this note in its entirety and edited where appropriate. I have performed an independent exam and/or observed the history taking. All portions of the E/M have been edited and reviewed prior to signing note.   Ty Armendariz MD  06/01/21         Thank you for requesting cardiac evaluation and allowing us to participate in the care of this patient. We will continue to follow along with you.       Byron Sears PA-C  Supervising Physician: Dr Valentina Haas

## 2021-06-01 NOTE — Clinical Note
TRANSFER - OUT REPORT:     Verbal report given to: (at bedside). Report consisted of patient's Situation, Background, Assessment and   Recommendations(SBAR). Opportunity for questions and clarification was provided. Patient transported with a Registered Nurse.

## 2021-06-01 NOTE — PROGRESS NOTES
Chart review complete, CM met with pt and daughter Edna Wade [phone] at bedside, pt is [facility] and wears jane hear aides, daughter Edna Wade states pt lives with herself and sister Susan West in 1 level home with 1 step to enter, pt has Alisa Salazar, transport chair, BSC and SC in home for use, states pt is independent with with ADLs, does not drive,   Demographics, insurance and PCP confirmed. Made pt and family aware CM staff will remain available for dc needs after admitted to room, family verbalized understanding. Care Management Interventions  PCP Verified by CM:  Yes (Dr Rosanna Llamas)  Discharge Durable Medical Equipment: No  Physical Therapy Consult: No  Occupational Therapy Consult: No  Speech Therapy Consult: No  Current Support Network:  (pt lives with bother daughters Edna Wade and Susan West)  Confirm Follow Up Transport: Family  Discharge Location  Discharge Placement: Unable to determine at this time

## 2021-06-01 NOTE — ROUTINE PROCESS
TRANSFER - IN REPORT: 
 
Verbal report received from Black Parra RN on Nguyen Roberts being received from ER for routine progression of care. Report consisted of patients Situation, Background, Assessment and Recommendations(SBAR). Information from the following report(s) SBAR, Kardex, MAR and Cardiac Rhythm Afib RVR was reviewed. Opportunity for questions and clarification was provided. Assessment completed upon patients arrival to unit and care assumed. Patient received to room 317. Patient connected to monitor and assessment completed. Plan of care reviewed. Patient oriented to room and call light. Patient aware to use call light to communicate any chest pain or needs.

## 2021-06-01 NOTE — PROGRESS NOTES
Pt brought to [facility] by family who reported they noticed the pt was in Afib on Sunday; is asymptomatic at this time. [facility]. She lives with her daughter, Rita Aguayo [phone], who was at bedside. Rita Aguayo reported that the pt lives with herself and her sister Fabio Stewart in a one level home. Pt is [place] and wears jane hearing aides. Pt has a RW, Cane, transport chair, BSC and SC in home for use. Rita Aguayo stated pt is independent with with ADLs and does not drive. PCP and insurance confirmed. Pt is currently in observation status. Care Management Interventions  PCP Verified by CM:  Yes (Dr Rusty Cockayne)  Discharge Durable Medical Equipment: No  Physical Therapy Consult: No  Occupational Therapy Consult: No  Speech Therapy Consult: No  Current Support Network:  (pt lives with bother daughters Rita Aguayo and Fabio Stewart)  Confirm Follow Up Transport: Family  Discharge Location  Discharge Placement: Unable to determine at this time

## 2021-06-01 NOTE — PROGRESS NOTES
MD Ashley notified about night dose of Eliquis with Bittrick note about possible EP intervention. Orders to give dose due to pt being in A. Fib RVR currently.

## 2021-06-01 NOTE — Clinical Note
TRANSFER - OUT REPORT:     Verbal report given to: (at bedside). Report consisted of patient's Situation, Background, Assessment and   Recommendations(SBAR). Opportunity for questions and clarification was provided. Patient transported to: recovery.

## 2021-06-01 NOTE — CONSULTS
[facility]   History and Physical       Name:  Ladi Zelaya  Age:89 y.o. Sex:female   :  3/3/1924    MRN:  [id number]   PCP:  Akira Macedo MD      Admit Date:  2021 11:06 AM   Chief Complaint: thyroid       Requested by dr Jamia Mcmullen of cardiology    Reason for Admission:   Atrial fibrillation with RVR ([address] [I48.91]    Assessment & Plan:     Hyperthyroidism:  · Check free T4, T3  · Recommend endocrine consult once stable   · Unclear if she is taking tapozole as unable to verify with patient or daughter at this time      AFIB with RVR:  · Defer to cardiology  · Continued on eliquis and cardizem       Anemia:  · Trend HGB            History of Presenting Illness: Ladi Zelaya is a 80 y.o. female with medical history of hyperthyroidism on tapazole, AFIB admitted to cardiology with AFIB with RVR. She has continued her eliquis and started IV cardizem. Her TSH is elevated at 13. 1. she is very hard of hearing and unable to answer my questions and unable to discuss her thryoid condition. I have attempted to contact her daughter without success.        Review of Systems:  A 14 point review of systems was not possible due to hearing       Past Medical History:   Diagnosis Date    CAD (coronary artery disease)     Hypertension     Ill-defined condition     afib    Thyroid cyst Distant past    thyroid cyst removed x40 years ago       Past Surgical History:   Procedure Laterality Date    HX CHOLECYSTECTOMY      HX CHOLECYSTECTOMY      HX HEENT      thyroglossal cyst    HX HYSTERECTOMY      FL APPENDECTOMY         Family History : reviewed  Family History   Problem Relation Age of Onset    Other Father             Heart Disease Father     Other Mother             Heart Disease Mother         Social History     Tobacco Use    Smoking status: Never Smoker    Smokeless tobacco: Never Used   Substance Use Topics    Alcohol use: No       Allergies   Allergen Reactions    Amiodarone Other (comments)    Amlodipine Hives    Amoxicillin Rash    Cleeravue-M Convenience Kit [Minocycline-Eyelid Cleanser 1] Other (comments)     Sever headaches    Tetracycline Rash    Vimovo [Naproxen-Esomeprazole] Nausea Only       Immunization History   Administered Date(s) Administered    Influenza Vaccine 10/01/2015    Influenza Vaccine (Quad) PF (>6 Mo Flulaval, Fluarix, and >3 Yrs Afluria, Fluzone 29587) 09/30/2018    Influenza Vaccine Whole 10/20/2008    TB Skin Test (PPD) Intradermal 07/27/2018, 09/29/2018         PTA Medications:  Current Outpatient Medications   Medication Instructions    apixaban (Eliquis) 2.5 mg tablet TAKE ONE TABLET BY MOUTH TWICE A DAY    cholecalciferol, vitamin D3, (VITAMIN D3) 2,000 unit tab 1,000 Int'l Units, Oral, DAILY    furosemide (LASIX) 20 mg, Oral, EVERY OTHER DAY    lisinopriL (PRINIVIL, ZESTRIL) 2.5 mg, Oral, DAILY    methIMAzole (TAPAZOLE) 5 mg tablet Oral    metoprolol tartrate (LOPRESSOR) 25 mg tablet TAKE ONE-HALF TABLET BY MOUTH EVERY 12 HOURS    nitroglycerin (NITRODUR) 0.4 mg/hr 1 Patch, TransDERmal, DAILY    oxyCODONE IR (OXY-IR) 15 mg, Oral, EVERY 4 HOURS AS NEEDED    polyethylene glycol (MIRALAX) 17 g, Oral, AS NEEDED    potassium chloride SR (KLOR-CON 10) 10 mEq tablet TAKE ONE TABLET BY MOUTH ONE TIME DAILY    pyridoxine (vitamin B6) (VITAMIN B-6) 100 mg, Oral, DAILY, Takes at NOON        Objective:     Patient Vitals for the past 24 hrs:   Temp Pulse Resp BP SpO2   06/01/21 1758 97.7 °F (36.5 °C) 76 17 (!) 142/82 95 %   06/01/21 1707  (!) 132  120/79 97 %   06/01/21 1637  (!) 115  117/67 98 %   06/01/21 1608  (!) 126  122/70    06/01/21 1537  (!) 129  103/71 99 %   06/01/21 1534  (!) 140   99 %   06/01/21 1507    120/76    06/01/21 1444  (!) 142      06/01/21 1437  (!) 145  114/73 97 %   06/01/21 1408    120/82    06/01/21 1407  (!) 140   95 %   06/01/21 1337  (!) 139  (!) 145/72    06/01/21 1237  (!) 118  (!) 114/56 95 %   06/01/21 1211  72  (!) 102/53 98 %   06/01/21 1208  65  (!) 99/56 94 %   06/01/21 1157  (!) 127  104/67    06/01/21 1152  97  104/68 (!) 89 %   06/01/21 1141  (!) 141  116/74    06/01/21 1100 97 °F (36.1 °C) (!) 146 20 (!) 102/43 96 %       Oxygen Therapy  O2 Sat (%): 95 % (06/01/21 1758)  Pulse via Oximetry: 128 beats per minute (06/01/21 1707)  O2 Device: None (Room air) (06/01/21 1843)    Body mass index is 19.57 kg/m². Physical Exam:    General: No acute distress, speaking in full sentences, no use of accessory muscles, pleasant, elderly    Lungs: Clear to auscultation bilaterally, anterior    Cardiovascular: irregular, tachycardic, no edema   Abdomen: Soft, nontender, nondistended, normoactive bowel sounds   Extremities: No cyanosis  Neuro: Nonfocal  Psych: Normal mood and affect       Data Reviewed: I have reviewed all labs, meds, and studies.       Recent Results (from the past 24 hour(s))   EKG, 12 LEAD, INITIAL    Collection Time: 06/01/21 11:03 AM   Result Value Ref Range    Ventricular Rate 155 BPM    Atrial Rate 178 BPM    QRS Duration 124 ms    Q-T Interval 320 ms    QTC Calculation (Bezet) 514 ms    Calculated R Axis -112 degrees    Calculated T Axis 65 degrees    Diagnosis       Wide complex tacyhcardia - suspect atrial flutter  Left bundle branch block  Confirmed by Ileana Medina MD (), JORGE VALENTIN ([id number]) on 6/1/2021 5:02:50 PM     TROPONIN-HIGH SENSITIVITY    Collection Time: 06/01/21 11:05 AM   Result Value Ref Range    Troponin-High Sensitivity 32.4 (H) 0 - 14 pg/mL   CBC WITH AUTOMATED DIFF    Collection Time: 06/01/21 11:05 AM   Result Value Ref Range    WBC 11.4 (H) 4.3 - 11.1 K/uL    RBC 3.10 (L) 4.05 - 5.2 M/uL    HGB 10.1 (L) 11.7 - 15.4 g/dL    HCT 31.5 (L) 35.8 - 46.3 %    .6 (H) 79.6 - 97.8 FL    MCH 32.6 26.1 - 32.9 PG    MCHC 32.1 31.4 - 35.0 g/dL    RDW 13.6 11.9 - 14.6 %    PLATELET 319 056 - 591 K/uL    MPV 10.2 9.4 - 12.3 FL    ABSOLUTE NRBC 0.00 0.0 - 0.2 K/uL    DF AUTOMATED      NEUTROPHILS 75 43 - 78 %    LYMPHOCYTES 13 13 - 44 %    MONOCYTES 9 4.0 - 12.0 %    EOSINOPHILS 1 0.5 - 7.8 %    BASOPHILS 1 0.0 - 2.0 %    IMMATURE GRANULOCYTES 1 0.0 - 5.0 %    ABS. NEUTROPHILS 8.6 (H) 1.7 - 8.2 K/UL    ABS. LYMPHOCYTES 1.5 0.5 - 4.6 K/UL    ABS. MONOCYTES 1.1 0.1 - 1.3 K/UL    ABS. EOSINOPHILS 0.1 0.0 - 0.8 K/UL    ABS. BASOPHILS 0.1 0.0 - 0.2 K/UL    ABS. IMM. GRANS. 0.1 0.0 - 0.5 K/UL   METABOLIC PANEL, COMPREHENSIVE    Collection Time: 06/01/21 11:05 AM   Result Value Ref Range    Sodium 137 136 - 145 mmol/L    Potassium 4.3 3.5 - 5.1 mmol/L    Chloride 105 98 - 107 mmol/L    CO2 25 21 - 32 mmol/L    Anion gap 7 7 - 16 mmol/L    Glucose 100 65 - 100 mg/dL    BUN 22 8 - 23 MG/DL    Creatinine 1.18 (H) 0.6 - 1.0 MG/DL    GFR est AA 55 (L) >60 ml/min/1.73m2    GFR est non-AA 45 (L) >60 ml/min/1.73m2    Calcium 8.4 8.3 - 10.4 MG/DL    Bilirubin, total 0.6 0.2 - 1.1 MG/DL    ALT (SGPT) 19 12 - 65 U/L    AST (SGOT) 14 (L) 15 - 37 U/L    Alk. phosphatase 72 50 - 136 U/L    Protein, total 6.7 6.3 - 8.2 g/dL    Albumin 3.4 3.2 - 4.6 g/dL    Globulin 3.3 2.3 - 3.5 g/dL    A-G Ratio 1.0 (L) 1.2 - 3.5     LIPASE    Collection Time: 06/01/21 11:05 AM   Result Value Ref Range    Lipase 27 (L) 73 - 393 U/L   MAGNESIUM    Collection Time: 06/01/21 11:05 AM   Result Value Ref Range    Magnesium 2.3 1.8 - 2.4 mg/dL   TSH 3RD GENERATION    Collection Time: 06/01/21 11:05 AM   Result Value Ref Range    TSH 13.100 (H) 0.358 - 3.740 uIU/mL   EKG, 12 LEAD, SUBSEQUENT    Collection Time: 06/01/21 12:09 PM   Result Value Ref Range    Ventricular Rate 73 BPM    Atrial Rate 153 BPM    QRS Duration 112 ms    Q-T Interval 362 ms    QTC Calculation (Bezet) 398 ms    Calculated R Axis -42 degrees    Calculated T Axis 123 degrees    Diagnosis       !! AGE AND GENDER SPECIFIC ECG ANALYSIS !!   Atrial fibrillation  Left axis deviation  Anterolateral infarct , age undetermined  Abnormal ECG  When compared with ECG of 05-MAR-2020 16:39,  Atrial fibrillation has replaced Sinus rhythm  Left bundle branch block is no longer Present  Anterior infarct is now Present  Anterolateral infarct is now Present  Confirmed by Aris Snyder MD ()JORGE ([id number]) on 6/1/2021 5:04:07 PM     TROPONIN-HIGH SENSITIVITY    Collection Time: 06/01/21  1:13 PM   Result Value Ref Range    Troponin-High Sensitivity 34.5 (H) 0 - 14 pg/mL   CK    Collection Time: 06/01/21  4:33 PM   Result Value Ref Range    CK 81 21 - 215 U/L   TROPONIN-HIGH SENSITIVITY    Collection Time: 06/01/21  4:33 PM   Result Value Ref Range    Troponin-High Sensitivity 38.3 (H) 0 - 14 pg/mL       EKG Results     Procedure 720 Value Units Date/Time    EKG 12 LEAD SUBSEQUENT [[id number]] Collected: 06/01/21 1209    Order Status: Completed Updated: 06/01/21 1704     Ventricular Rate 73 BPM      Atrial Rate 153 BPM      QRS Duration 112 ms      Q-T Interval 362 ms      QTC Calculation (Bezet) 398 ms      Calculated R Axis -42 degrees      Calculated T Axis 123 degrees      Diagnosis --     !! AGE AND GENDER SPECIFIC ECG ANALYSIS !!   Atrial fibrillation  Left axis deviation  Anterolateral infarct , age undetermined  Abnormal ECG  When compared with ECG of 05-MAR-2020 16:39,  Atrial fibrillation has replaced Sinus rhythm  Left bundle branch block is no longer Present  Anterior infarct is now Present  Anterolateral infarct is now Present  Confirmed by Aris Snyder MD ()JORGE ([id number]) on 6/1/2021 5:04:07 PM      EKG [[id number]] Collected: 06/01/21 1103    Order Status: Completed Updated: 06/01/21 1702     Ventricular Rate 155 BPM      Atrial Rate 178 BPM      QRS Duration 124 ms      Q-T Interval 320 ms      QTC Calculation (Bezet) 514 ms      Calculated R Axis -112 degrees      Calculated T Axis 65 degrees      Diagnosis --     Wide complex tacyhcardia - suspect atrial flutter  Left bundle branch block  Confirmed by Aris Snyder MD (), JORGE VALENTIN ([id number]) on 6/1/2021 5:02:50 PM            All Micro Results     None          Other Studies:  XR CHEST PORT    Result Date: 6/1/2021  PORTABLE CHEST, June 1, 2021 at 1309 hours CLINICAL HISTORY:  Atrial fibrillation. COMPARISON:  March 5, 2020. FINDINGS:  AP erect image demonstrates no confluent infiltrate or significant pleural fluid. The heart size is within normal limits without evidence of congestive heart failure or pneumothorax. The bony thorax appears intact on this view. There are overlying radiopaque support devices. NO ACUTE CARDIOPULMONARY DISEASE IDENTIFIED.         Medications:  Medications Administered     dilTIAZem (CARDIZEM) 100 mg in 0.9% sodium chloride (MBP/ADV) 100 mL infusion     Admin Date  06/01/2021 Action  New Bag Dose  2.5 mg/hr Rate  2.5 mL/hr Route  IntraVENous Administered By  Abhi Crouch RN           Admin Date  06/01/2021 Action  Rate Change Dose  7 mg/hr Rate  7 mL/hr Route  IntraVENous Administered By  Alejandrina Rudolph RN           Admin Date  06/01/2021 Action  Rate Change Dose  10 mg/hr Rate  10 mL/hr Route  IntraVENous Administered By  Abhi Crouch RN           Admin Date  06/01/2021 Action  Rate Change Dose  12.5 mg/hr Rate  12.5 mL/hr Route  IntraVENous Administered By  Abhi Crouch RN           Admin Date  06/01/2021 Action  Rate Change Dose  15 mg/hr Rate  15 mL/hr Route  IntraVENous Administered By  Abhi Crouch RN          dilTIAZem (CARDIZEM) injection 10 mg     Admin Date  06/01/2021 Action  Given Dose  10 mg Route  IntraVENous Administered By  Abhi Crouch RN           Admin Date  06/01/2021 Action  Given Dose  10 mg Route  IntraVENous Administered By  Abhi Crouch RN          oxyCODONE IR (OXY-IR) immediate release tablet 15 mg     Admin Date  06/01/2021 Action  Given Dose  15 mg Route  Oral Administered By  Abhi Crouch RN                    Problem List:     Hospital Problems as of 6/1/2021 Date Reviewed: 11/2/2020        Codes Class Noted - Resolved POA    Systolic CHF, chronic ([facility] ICD-10-CM: I50.22  ICD-9-CM: 428.22, 428.0  6/1/2021 - Present Unknown        Atrial fibrillation with RVR (HCC) ICD-10-CM: I48.91  ICD-9-CM: 427.31  6/1/2021 - Present Unknown        * (Principal) Atrial fibrillation with rapid ventricular response (HCC) ICD-10-CM: I48.91  ICD-9-CM: 427.31  4/24/2019 - Present Yes        Hypertension (Chronic) ICD-10-CM: I10  ICD-9-CM: 401.9  12/1/2015 - Present Yes        CAD (coronary artery disease) (Chronic) ICD-10-CM: I25.10  ICD-9-CM: 414.00  11/29/2015 - Present Yes                   Signed By: Sonu Rodriguez MD   [employer] [facility]    June 1, 2021  7:06 PM

## 2021-06-01 NOTE — ED NOTES
TRANSFER - OUT REPORT:    Verbal report given to Cyril(name) on Cassi Yanuty  being transferred to [facility](unit) for routine progression of care       Report consisted of patients Situation, Background, Assessment and   Recommendations(SBAR). Information from the following report(s) SBAR, ED Summary, [facility] and Recent Results was reviewed with the receiving nurse. Lines:   Peripheral IV 06/01/21 Left Antecubital (Active)       Peripheral IV 06/01/21 Right Forearm (Active)        Opportunity for questions and clarification was provided.       Patient transported with:   Monitor  Registered Nurse

## 2021-06-01 NOTE — ED TRIAGE NOTES
Patient ambulatory to triage with mask in place. Family reports they notice patient was in Afib on Sunday. Pt is asymptomatic at this time. Pt denies any chest pain or shob. Pt has a hx of afib and was noted to have hyperthyroid at the time.

## 2021-06-01 NOTE — Clinical Note
A Bovie was used. Mode: monopolar. Coagulation Settin.  Cut Settin. Site (pad location): upper leg. Laterality: right.

## 2021-06-01 NOTE — ROUTINE PROCESS
Skin assessment completed. Sacrum and heels intact with no ulceration present. Encouraged repositioning.

## 2021-06-02 LAB
ANION GAP SERPL CALC-SCNC: 5 MMOL/L (ref 7–16)
BUN SERPL-MCNC: 20 MG/DL (ref 8–23)
CALCIUM SERPL-MCNC: 8.1 MG/DL (ref 8.3–10.4)
CHLORIDE SERPL-SCNC: 110 MMOL/L (ref 98–107)
CHOLEST SERPL-MCNC: 133 MG/DL
CO2 SERPL-SCNC: 26 MMOL/L (ref 21–32)
CREAT SERPL-MCNC: 1.01 MG/DL (ref 0.6–1)
ERYTHROCYTE [DISTWIDTH] IN BLOOD BY AUTOMATED COUNT: 13.5 % (ref 11.9–14.6)
GLUCOSE SERPL-MCNC: 128 MG/DL (ref 65–100)
HCT VFR BLD AUTO: 30.5 % (ref 35.8–46.3)
HDLC SERPL-MCNC: 40 MG/DL (ref 40–60)
HDLC SERPL: 3.3 {RATIO}
HGB BLD-MCNC: 10 G/DL (ref 11.7–15.4)
LDLC SERPL CALC-MCNC: 81 MG/DL
MCH RBC QN AUTO: 32.8 PG (ref 26.1–32.9)
MCHC RBC AUTO-ENTMCNC: 32.8 G/DL (ref 31.4–35)
MCV RBC AUTO: 100 FL (ref 79.6–97.8)
NRBC # BLD: 0 K/UL (ref 0–0.2)
PLATELET # BLD AUTO: 207 K/UL (ref 150–450)
PMV BLD AUTO: 10.3 FL (ref 9.4–12.3)
POTASSIUM SERPL-SCNC: 4.5 MMOL/L (ref 3.5–5.1)
RBC # BLD AUTO: 3.05 M/UL (ref 4.05–5.2)
SODIUM SERPL-SCNC: 141 MMOL/L (ref 136–145)
TRIGL SERPL-MCNC: 60 MG/DL (ref 35–150)
VLDLC SERPL CALC-MCNC: 12 MG/DL (ref 6–23)
WBC # BLD AUTO: 11.6 K/UL (ref 4.3–11.1)

## 2021-06-02 PROCEDURE — 80061 LIPID PANEL: CPT

## 2021-06-02 PROCEDURE — 74011250637 HC RX REV CODE- 250/637: Performed by: INTERNAL MEDICINE

## 2021-06-02 PROCEDURE — 99232 SBSQ HOSP IP/OBS MODERATE 35: CPT | Performed by: INTERNAL MEDICINE

## 2021-06-02 PROCEDURE — 74011250637 HC RX REV CODE- 250/637: Performed by: PHYSICIAN ASSISTANT

## 2021-06-02 PROCEDURE — 85027 COMPLETE CBC AUTOMATED: CPT

## 2021-06-02 PROCEDURE — 36415 COLL VENOUS BLD VENIPUNCTURE: CPT

## 2021-06-02 PROCEDURE — 74011000258 HC RX REV CODE- 258: Performed by: PHYSICIAN ASSISTANT

## 2021-06-02 PROCEDURE — 74011250636 HC RX REV CODE- 250/636: Performed by: PHYSICIAN ASSISTANT

## 2021-06-02 PROCEDURE — 74011250636 HC RX REV CODE- 250/636: Performed by: INTERNAL MEDICINE

## 2021-06-02 PROCEDURE — 74011250637 HC RX REV CODE- 250/637: Performed by: NURSE PRACTITIONER

## 2021-06-02 PROCEDURE — 99218 HC RM OBSERVATION: CPT

## 2021-06-02 PROCEDURE — 96366 THER/PROPH/DIAG IV INF ADDON: CPT

## 2021-06-02 PROCEDURE — 80048 BASIC METABOLIC PNL TOTAL CA: CPT

## 2021-06-02 PROCEDURE — C8929 TTE W OR WO FOL WCON,DOPPLER: HCPCS

## 2021-06-02 PROCEDURE — 74011000250 HC RX REV CODE- 250: Performed by: INTERNAL MEDICINE

## 2021-06-02 RX ORDER — DILTIAZEM HYDROCHLORIDE 30 MG/1
30 TABLET, FILM COATED ORAL 2 TIMES DAILY
Status: DISCONTINUED | OUTPATIENT
Start: 2021-06-02 | End: 2021-06-03

## 2021-06-02 RX ORDER — METOPROLOL TARTRATE 25 MG/1
25 TABLET, FILM COATED ORAL EVERY 12 HOURS
Status: DISCONTINUED | OUTPATIENT
Start: 2021-06-02 | End: 2021-06-03

## 2021-06-02 RX ORDER — METOPROLOL TARTRATE 25 MG/1
12.5 TABLET, FILM COATED ORAL EVERY 12 HOURS
Status: DISCONTINUED | OUTPATIENT
Start: 2021-06-02 | End: 2021-06-02

## 2021-06-02 RX ORDER — METOPROLOL SUCCINATE 25 MG/1
12.5 TABLET, EXTENDED RELEASE ORAL EVERY 12 HOURS
Status: DISCONTINUED | OUTPATIENT
Start: 2021-06-02 | End: 2021-06-02

## 2021-06-02 RX ORDER — METHIMAZOLE 5 MG/1
5 TABLET ORAL DAILY
Status: DISCONTINUED | OUTPATIENT
Start: 2021-06-02 | End: 2021-06-10 | Stop reason: HOSPADM

## 2021-06-02 RX ADMIN — Medication 5 ML: at 12:30

## 2021-06-02 RX ADMIN — OXYCODONE HYDROCHLORIDE 15 MG: 15 TABLET ORAL at 00:53

## 2021-06-02 RX ADMIN — DILTIAZEM HYDROCHLORIDE 30 MG: 30 TABLET, FILM COATED ORAL at 08:53

## 2021-06-02 RX ADMIN — DILTIAZEM HYDROCHLORIDE 30 MG: 30 TABLET, FILM COATED ORAL at 17:33

## 2021-06-02 RX ADMIN — Medication 10 ML: at 21:18

## 2021-06-02 RX ADMIN — PERFLUTREN 1 ML: 6.52 INJECTION, SUSPENSION INTRAVENOUS at 09:30

## 2021-06-02 RX ADMIN — APIXABAN 2.5 MG: 2.5 TABLET, FILM COATED ORAL at 08:53

## 2021-06-02 RX ADMIN — Medication 10 ML: at 05:37

## 2021-06-02 RX ADMIN — METHIMAZOLE 5 MG: 5 TABLET ORAL at 10:44

## 2021-06-02 RX ADMIN — METOPROLOL TARTRATE 25 MG: 25 TABLET, FILM COATED ORAL at 21:15

## 2021-06-02 RX ADMIN — APIXABAN 2.5 MG: 2.5 TABLET, FILM COATED ORAL at 21:15

## 2021-06-02 RX ADMIN — SODIUM CHLORIDE 15 MG/HR: 900 INJECTION, SOLUTION INTRAVENOUS at 02:40

## 2021-06-02 RX ADMIN — METOPROLOL TARTRATE 12.5 MG: 25 TABLET, FILM COATED ORAL at 00:52

## 2021-06-02 RX ADMIN — METOPROLOL TARTRATE 25 MG: 25 TABLET, FILM COATED ORAL at 08:53

## 2021-06-02 NOTE — PROGRESS NOTES
am  6/2/2021 6:45 AM    Admit Date: 6/1/2021    Admit Diagnosis: Atrial fibrillation with RVR ([address] [I48.91]      Subjective:    Patient : Patient continues to have A. fib with RVR in the past she has had sick sinus syndrome with bradycardia she is currently on Eliquis. I have had a discussion with the daughter concerning the difficulty in managing her effectively and have brought up that the end run may be consideration of a pacemaker and AV node ablation. She has declined this in the past    Objective:      Visit Vitals  BP (!) 117/54   Pulse (!) 123   Temp 98.1 °F (36.7 °C)   Resp 18   Ht 5' 2\" (1.575 m)   Wt 114 lb 9.6 oz (52 kg)   SpO2 92%   BMI 20.96 kg/m²       ROS:  General ROS: negative for - chills  Hematological and Lymphatic ROS: negative for - blood clots or jaundice  Respiratory ROS: no cough, shortness of breath, or wheezing  Cardiovascular ROS: no chest pain or dyspnea on exertion  Gastrointestinal ROS: no abdominal pain, change in bowel habits, or black or bloody stools  Neurological ROS: no TIA or stroke symptoms    Physical Exam:      Physical Examination: General appearance - Appearance: alert, well appearing, and in no distress.    Neck/lymph - supple, no significant adenopathy  Chest/CV - clear to auscultation, no wheezes, rales or rhonchi, symmetric air entry  Heart - irregularly irregular rhythm  Abdomen/GI - soft, nontender, nondistended, no masses or organomegaly   Musculoskeletal - no joint tenderness, deformity or swelling  Extremities - peripheral pulses normal, no pedal edema, no clubbing or cyanosis  Skin - normal coloration and turgor, no rashes, no suspicious skin lesions noted    Current Facility-Administered Medications   Medication Dose Route Frequency    metoprolol tartrate (LOPRESSOR) tablet 12.5 mg  12.5 mg Oral Q12H    sodium chloride (NS) flush 5-10 mL  5-10 mL IntraVENous Q8H    sodium chloride (NS) flush 5-10 mL  5-10 mL IntraVENous PRN    apixaban (ELIQUIS) tablet 2.5 mg  2.5 mg Oral Q12H    furosemide (LASIX) tablet 20 mg  20 mg Oral EVERY OTHER DAY    oxyCODONE IR (OXY-IR) immediate release tablet 15 mg  15 mg Oral Q4H PRN    polyethylene glycol (MIRALAX) powder 17 g  17 g Oral PRN    sodium chloride (NS) flush 5-40 mL  5-40 mL IntraVENous Q8H    sodium chloride (NS) flush 5-40 mL  5-40 mL IntraVENous PRN    morphine injection 2 mg  2 mg IntraVENous Q4H PRN    dilTIAZem (CARDIZEM) 100 mg in 0.9% sodium chloride (MBP/ADV) 100 mL infusion  0-15 mg/hr IntraVENous TITRATE       Data Review: data included in this note has been independently reviewed by the author   CMP:   Lab Results   Component Value Date/Time     06/02/2021 05:26 AM    K 4.5 06/02/2021 05:26 AM     (H) 06/02/2021 05:26 AM    CO2 26 06/02/2021 05:26 AM    AGAP 5 (L) 06/02/2021 05:26 AM     (H) 06/02/2021 05:26 AM    BUN 20 06/02/2021 05:26 AM    CREA 1.01 (H) 06/02/2021 05:26 AM    GFRAA >60 06/02/2021 05:26 AM    GFRNA 54 (L) 06/02/2021 05:26 AM    CA 8.1 (L) 06/02/2021 05:26 AM    MG 2.3 06/01/2021 11:05 AM    ALB 3.4 06/01/2021 11:05 AM    TP 6.7 06/01/2021 11:05 AM    GLOB 3.3 06/01/2021 11:05 AM    AGRAT 1.0 (L) 06/01/2021 11:05 AM    ALT 19 06/01/2021 11:05 AM     CBC:   Lab Results   Component Value Date/Time    WBC 11.6 (H) 06/02/2021 05:26 AM    HGB 10.0 (L) 06/02/2021 05:26 AM    HCT 30.5 (L) 06/02/2021 05:26 AM     06/02/2021 05:26 AM        TELEMETRY: af    Assessment/Plan:     Principal Problem:    Atrial fibrillation with rapid ventricular response (HCC) (4/24/2019)  I will discontinue IV Cardizem and will introduce p.o. Cardizem and will continue p.o. Lopressor. We need to be able to control her with p.o. medicines so therefore I am abandoning intravenous rate control at this point    Active Problems:    CAD (coronary artery disease) (11/29/2015)    The current medical regimen is effective;  continue present plan and medications.         Hypertension (12/1/2015) The current medical regimen is effective;  continue present plan and medications. Systolic CHF, chronic ([facility] (6/1/2021)    The current medical regimen is effective;  continue present plan and medications.   We need to maximize rate control medicines for introducing any heart failure medicines        Atrial fibrillation with RVR ([facility] (6/1/2021)    As above             Christiana Meyers MD

## 2021-06-02 NOTE — ROUTINE PROCESS
Verbal bedside report given to [facility], oncoming RN. Patient's situation, background, assessment and recommendations provided. Opportunity for questions provided. Oncoming RN assumed care of patient. Cardizem IV drip verified at bedside with oncoming RN.

## 2021-06-02 NOTE — PROGRESS NOTES
Problem: General Medical Care Plan  Goal: *Optimal pain control at patient's stated goal  Outcome: Progressing Towards Goal  Goal: *Optimize nutritional status  Outcome: Progressing Towards Goal     Problem: Falls - Risk of  Goal: *Absence of Falls  Description: Document Darby Gross Fall Risk and appropriate interventions in the flowsheet. Outcome: Progressing Towards Goal  Note: Fall Risk Interventions:  Mobility Interventions: Bed/chair exit alarm, Patient to call before getting OOB    Mentation Interventions: Bed/chair exit alarm    Medication Interventions: Bed/chair exit alarm, Patient to call before getting OOB, Teach patient to arise slowly    Elimination Interventions: Bed/chair exit alarm, Call light in reach, Patient to call for help with toileting needs, Stay With Me (per policy)    History of Falls Interventions: Bed/chair exit alarm, Door open when patient unattended         Problem: Pressure Injury - Risk of  Goal: *Prevention of pressure injury  Description: Document Ahsan Scale and appropriate interventions in the flowsheet.   Outcome: Progressing Towards Goal  Note: Pressure Injury Interventions:  Sensory Interventions: Avoid rigorous massage over bony prominences, Keep linens dry and wrinkle-free    Moisture Interventions: Absorbent underpads, Check for incontinence Q2 hours and as needed, Limit adult briefs    Activity Interventions: Assess need for specialty bed, Increase time out of bed    Mobility Interventions: Pressure redistribution bed/mattress (bed type), HOB 30 degrees or less    Nutrition Interventions: Document food/fluid/supplement intake

## 2021-06-02 NOTE — PROGRESS NOTES
Hospitalist Progress Note     Admit Date:  2021 11:06 AM   Name:  Chai Tarango   Age:  80 y.o.  :  3/3/1924   MRN:  [id number]   PCP:  Liam Garibay MD  Presenting Complaint: Irregular Heart Beat    Initial Admission Diagnosis: Atrial fibrillation with RVR ([facility] [I48.91]     Assessment and Plan:     Hospital Problems as of 2021 Date Reviewed: 2020        Codes Class Noted - Resolved POA    Systolic CHF, chronic ([facility] ICD-10-CM: I50.22  ICD-9-CM: 428.22, 428.0  2021 - Present Unknown        Atrial fibrillation with RVR ([facility] ICD-10-CM: I48.91  ICD-9-CM: 427.31  2021 - Present Unknown        * (Principal) Atrial fibrillation with rapid ventricular response ([facility] ICD-10-CM: I48.91  ICD-9-CM: 427.31  2019 - Present Yes        Hypertension (Chronic) ICD-10-CM: I10  ICD-9-CM: 401.9  2015 - Present Yes        CAD (coronary artery disease) (Chronic) ICD-10-CM: I25.10  ICD-9-CM: 414.00  2015 - Present Yes              Plan:  # Hyperthyroidism   - Elevated TSH and mildly reduced FT4. Would reduce methimazole from 5mg BID to once daily, then repeat thyroid studies in 4-6 weeks with PCP. # AFib RVR   - PO rate control, Eliquis. Mgmt per Cardiology. # Chronic macrocytic anemia   - No bleeding, Hb stable. Outpt f/u. Other listed chronic conditions stable, continue current management. Discharge planning: Per primary. Diet:  DIET ADULT  DVT ppx: Eliquis    Hospital Course:   Ms. Lilibeth De La Rosa is a very nice 79 y/o WF with a h/o hyperthyroidism on methimazole and AFib admitted to Cardiology service on  with rapid AFib. Thyroid studies were checked and noted elevated TSH at 13 and mildly reduced FT4 at 0.8. Reports compliance with methimazole and has been taking 5mg BID for several months. Hospitalist was consulted for further recommendations. 24hr Events/Subjective:   6/2: Daughter at bedside, pt has been compliant with methimazole 5mg BID.  Remains in AFib, rates better but still tachy. No chest pain or SOB. No other complaints  Objective:     Patient Vitals for the past 24 hrs:   Temp Pulse Resp BP SpO2   06/02/21 0853  (!) 104  (!) 105/57    06/02/21 0835 98 °F (36.7 °C) 89 17 (!) 94/56 95 %   06/02/21 0449 98.1 °F (36.7 °C) (!) 123 18 (!) 117/54 92 %   06/02/21 0100 97.6 °F (36.4 °C) (!) 113 18 (!) 140/71 92 %   06/02/21 0052  (!) 153  (!) 140/71    06/01/21 1937 97.9 °F (36.6 °C) 99 15 118/84 95 %   06/01/21 1758 97.7 °F (36.5 °C) 76 17 (!) 142/82 95 %   06/01/21 1707  (!) 132  120/79 97 %   06/01/21 1637  (!) 115  117/67 98 %   06/01/21 1608  (!) 126  122/70    06/01/21 1537  (!) 129  103/71 99 %   06/01/21 1534  (!) 140   99 %   06/01/21 1507    120/76    06/01/21 1444  (!) 142      06/01/21 1437  (!) 145  114/73 97 %   06/01/21 1408    120/82    06/01/21 1407  (!) 140   95 %   06/01/21 1337  (!) 139  (!) 145/72    06/01/21 1237  (!) 118  (!) 114/56 95 %   06/01/21 1211  72  (!) 102/53 98 %   06/01/21 1208  65  (!) 99/56 94 %   06/01/21 1157  (!) 127  104/67    06/01/21 1152  97  104/68 (!) 89 %   06/01/21 1141  (!) 141  116/74    06/01/21 1100 97 °F (36.1 °C) (!) 146 20 (!) 102/43 96 %     Oxygen Therapy  O2 Sat (%): 95 % (06/02/21 0835)  Pulse via Oximetry: 128 beats per minute (06/01/21 1707)  O2 Device: None (Room air) (06/02/21 9423)    Estimated body mass index is 20.96 kg/m² as calculated from the following:    Height as of this encounter: 5' 2\" (1.575 m). Weight as of this encounter: 52 kg (114 lb 9.6 oz). Intake/Output Summary (Last 24 hours) at 6/2/2021 0953  Last data filed at 6/2/2021 2489  Gross per 24 hour   Intake 100 ml   Output 100 ml   Net 0 ml       *Note that automatically entered I/Os may not be accurate; dependent on patient compliance with collection and accurate  by techs. General:    Well nourished but thin. No overt distress. Appears stated age.   CV:   Irreg irreg rhythm, mild tachycardia. No m/r/g. No edema. No JVD  Lungs:   CTAB. No wheezing, rhonchi, or rales. Unlabored  Abdomen:   Soft, nontender, nondistended. Extremities: Warm and dry. No cyanosis   Skin:     No rashes. Normal coloration  Neuro:  No gross focal deficits. I have reviewed all labs, meds, and other studies shown below:  Last 24hr Labs:  Recent Results (from the past 24 hour(s))   EKG, 12 LEAD, INITIAL    Collection Time: 06/01/21 11:03 AM   Result Value Ref Range    Ventricular Rate 155 BPM    Atrial Rate 178 BPM    QRS Duration 124 ms    Q-T Interval 320 ms    QTC Calculation (Bezet) 514 ms    Calculated R Axis -112 degrees    Calculated T Axis 65 degrees    Diagnosis       Wide complex tacyhcardia - suspect atrial flutter  Left bundle branch block  Confirmed by Maren Gray MD (), JORGE VALENTIN ([id number]) on 6/1/2021 5:02:50 PM     TROPONIN-HIGH SENSITIVITY    Collection Time: 06/01/21 11:05 AM   Result Value Ref Range    Troponin-High Sensitivity 32.4 (H) 0 - 14 pg/mL   CBC WITH AUTOMATED DIFF    Collection Time: 06/01/21 11:05 AM   Result Value Ref Range    WBC 11.4 (H) 4.3 - 11.1 K/uL    RBC 3.10 (L) 4.05 - 5.2 M/uL    HGB 10.1 (L) 11.7 - 15.4 g/dL    HCT 31.5 (L) 35.8 - 46.3 %    .6 (H) 79.6 - 97.8 FL    MCH 32.6 26.1 - 32.9 PG    MCHC 32.1 31.4 - 35.0 g/dL    RDW 13.6 11.9 - 14.6 %    PLATELET 102 185 - 543 K/uL    MPV 10.2 9.4 - 12.3 FL    ABSOLUTE NRBC 0.00 0.0 - 0.2 K/uL    DF AUTOMATED      NEUTROPHILS 75 43 - 78 %    LYMPHOCYTES 13 13 - 44 %    MONOCYTES 9 4.0 - 12.0 %    EOSINOPHILS 1 0.5 - 7.8 %    BASOPHILS 1 0.0 - 2.0 %    IMMATURE GRANULOCYTES 1 0.0 - 5.0 %    ABS. NEUTROPHILS 8.6 (H) 1.7 - 8.2 K/UL    ABS. LYMPHOCYTES 1.5 0.5 - 4.6 K/UL    ABS. MONOCYTES 1.1 0.1 - 1.3 K/UL    ABS. EOSINOPHILS 0.1 0.0 - 0.8 K/UL    ABS. BASOPHILS 0.1 0.0 - 0.2 K/UL    ABS. IMM.  GRANS. 0.1 0.0 - 0.5 K/UL   METABOLIC PANEL, COMPREHENSIVE    Collection Time: 06/01/21 11:05 AM   Result Value Ref Range    Sodium 137 136 - 145 mmol/L    Potassium 4.3 3.5 - 5.1 mmol/L    Chloride 105 98 - 107 mmol/L    CO2 25 21 - 32 mmol/L    Anion gap 7 7 - 16 mmol/L    Glucose 100 65 - 100 mg/dL    BUN 22 8 - 23 MG/DL    Creatinine 1.18 (H) 0.6 - 1.0 MG/DL    GFR est AA 55 (L) >60 ml/min/1.73m2    GFR est non-AA 45 (L) >60 ml/min/1.73m2    Calcium 8.4 8.3 - 10.4 MG/DL    Bilirubin, total 0.6 0.2 - 1.1 MG/DL    ALT (SGPT) 19 12 - 65 U/L    AST (SGOT) 14 (L) 15 - 37 U/L    Alk. phosphatase 72 50 - 136 U/L    Protein, total 6.7 6.3 - 8.2 g/dL    Albumin 3.4 3.2 - 4.6 g/dL    Globulin 3.3 2.3 - 3.5 g/dL    A-G Ratio 1.0 (L) 1.2 - 3.5     LIPASE    Collection Time: 06/01/21 11:05 AM   Result Value Ref Range    Lipase 27 (L) 73 - 393 U/L   MAGNESIUM    Collection Time: 06/01/21 11:05 AM   Result Value Ref Range    Magnesium 2.3 1.8 - 2.4 mg/dL   TSH 3RD GENERATION    Collection Time: 06/01/21 11:05 AM   Result Value Ref Range    TSH 13.100 (H) 0.358 - 3.740 uIU/mL   EKG, 12 LEAD, SUBSEQUENT    Collection Time: 06/01/21 12:09 PM   Result Value Ref Range    Ventricular Rate 73 BPM    Atrial Rate 153 BPM    QRS Duration 112 ms    Q-T Interval 362 ms    QTC Calculation (Bezet) 398 ms    Calculated R Axis -42 degrees    Calculated T Axis 123 degrees    Diagnosis       !! AGE AND GENDER SPECIFIC ECG ANALYSIS !!   Atrial fibrillation  Left axis deviation  Anterolateral infarct , age undetermined  Abnormal ECG  When compared with ECG of 05-MAR-2020 16:39,  Atrial fibrillation has replaced Sinus rhythm  Left bundle branch block is no longer Present  Anterior infarct is now Present  Anterolateral infarct is now Present  Confirmed by Martell Hess MD (), JORGE VALENTIN ([id number]) on 6/1/2021 5:04:07 PM     TROPONIN-HIGH SENSITIVITY    Collection Time: 06/01/21  1:13 PM   Result Value Ref Range    Troponin-High Sensitivity 34.5 (H) 0 - 14 pg/mL   T4, FREE    Collection Time: 06/01/21  4:32 PM   Result Value Ref Range    T4, Free 0.8 (L) 0.9 - 1.8 NG/DL   T3 TOTAL Collection Time: 06/01/21  4:32 PM   Result Value Ref Range    T3, total 0.86 0.60 - 1.81 ng/mL   CK    Collection Time: 06/01/21  4:33 PM   Result Value Ref Range    CK 81 21 - 215 U/L   TROPONIN-HIGH SENSITIVITY    Collection Time: 06/01/21  4:33 PM   Result Value Ref Range    Troponin-High Sensitivity 38.3 (H) 0 - 14 pg/mL   LIPID PANEL    Collection Time: 06/02/21  5:26 AM   Result Value Ref Range    Cholesterol, total 133 <200 MG/DL    Triglyceride 60 35 - 150 MG/DL    HDL Cholesterol 40 40 - 60 MG/DL    LDL, calculated 81 <100 MG/DL    VLDL, calculated 12 6.0 - 23.0 MG/DL    CHOL/HDL Ratio 3.3     METABOLIC PANEL, BASIC    Collection Time: 06/02/21  5:26 AM   Result Value Ref Range    Sodium 141 136 - 145 mmol/L    Potassium 4.5 3.5 - 5.1 mmol/L    Chloride 110 (H) 98 - 107 mmol/L    CO2 26 21 - 32 mmol/L    Anion gap 5 (L) 7 - 16 mmol/L    Glucose 128 (H) 65 - 100 mg/dL    BUN 20 8 - 23 MG/DL    Creatinine 1.01 (H) 0.6 - 1.0 MG/DL    GFR est AA >60 >60 ml/min/1.73m2    GFR est non-AA 54 (L) >60 ml/min/1.73m2    Calcium 8.1 (L) 8.3 - 10.4 MG/DL   CBC W/O DIFF    Collection Time: 06/02/21  5:26 AM   Result Value Ref Range    WBC 11.6 (H) 4.3 - 11.1 K/uL    RBC 3.05 (L) 4.05 - 5.2 M/uL    HGB 10.0 (L) 11.7 - 15.4 g/dL    HCT 30.5 (L) 35.8 - 46.3 %    .0 (H) 79.6 - 97.8 FL    MCH 32.8 26.1 - 32.9 PG    MCHC 32.8 31.4 - 35.0 g/dL    RDW 13.5 11.9 - 14.6 %    PLATELET 096 386 - 268 K/uL    MPV 10.3 9.4 - 12.3 FL    ABSOLUTE NRBC 0.00 0.0 - 0.2 K/uL       All Micro Results     None          SARS-CoV-2 Lab Results  \"Novel Coronavirus\" Test: No results found for: COV2NT   \"Emergent Disease\" Test: No results found for: EDPR  \"SARS-COV-2\" Test: No results found for: XGCOVT  Rapid Test: No results found for: COVR         Current Meds:  Current Facility-Administered Medications   Medication Dose Route Frequency    metoprolol tartrate (LOPRESSOR) tablet 25 mg  25 mg Oral Q12H    dilTIAZem IR (CARDIZEM) tablet 30 mg  30 mg Oral BID    sodium chloride (NS) flush 5-10 mL  5-10 mL IntraVENous Q8H    sodium chloride (NS) flush 5-10 mL  5-10 mL IntraVENous PRN    apixaban (ELIQUIS) tablet 2.5 mg  2.5 mg Oral Q12H    furosemide (LASIX) tablet 20 mg  20 mg Oral EVERY OTHER DAY    oxyCODONE IR (OXY-IR) immediate release tablet 15 mg  15 mg Oral Q4H PRN    polyethylene glycol (MIRALAX) powder 17 g  17 g Oral PRN    sodium chloride (NS) flush 5-40 mL  5-40 mL IntraVENous Q8H    sodium chloride (NS) flush 5-40 mL  5-40 mL IntraVENous PRN    morphine injection 2 mg  2 mg IntraVENous Q4H PRN       Other Studies:  No results found for this visit on 06/01/21. XR CHEST PORT    Result Date: 6/1/2021  PORTABLE CHEST, June 1, 2021 at 1309 hours CLINICAL HISTORY:  Atrial fibrillation. COMPARISON:  March 5, 2020. FINDINGS:  AP erect image demonstrates no confluent infiltrate or significant pleural fluid. The heart size is within normal limits without evidence of congestive heart failure or pneumothorax. The bony thorax appears intact on this view. There are overlying radiopaque support devices. NO ACUTE CARDIOPULMONARY DISEASE IDENTIFIED.       Signed:  German Ruffin MD

## 2021-06-02 NOTE — PROGRESS NOTES
[employer] message sent to [facility], [place] reference pt HR from high 90's-130's, daughter at bedside. Pt switched from Cardizem gtt to po this morning, next dose scheduled for 1800 today.

## 2021-06-02 NOTE — ROUTINE PROCESS
Verbal bedside report received from Yessica [facility]. Assumed care of patient.

## 2021-06-02 NOTE — PROGRESS NOTES
HR sustaining 130-140s maxed out on cardizem gtt (15mL/hr). FABIAN Navarro notified. Orders to give 12.5 of metoprolol now and start q 12hr dose for tomorrow am as pt takes this at home. Also orders to keep pt NPO until MD rounds in the morning.

## 2021-06-03 ENCOUNTER — APPOINTMENT (OUTPATIENT)
Dept: GENERAL RADIOLOGY | Age: 86
DRG: 243 | End: 2021-06-03
Attending: INTERNAL MEDICINE
Payer: MEDICARE

## 2021-06-03 LAB
ANION GAP SERPL CALC-SCNC: 7 MMOL/L (ref 7–16)
BUN SERPL-MCNC: 22 MG/DL (ref 8–23)
CALCIUM SERPL-MCNC: 8.5 MG/DL (ref 8.3–10.4)
CHLORIDE SERPL-SCNC: 110 MMOL/L (ref 98–107)
CO2 SERPL-SCNC: 23 MMOL/L (ref 21–32)
CREAT SERPL-MCNC: 1.07 MG/DL (ref 0.6–1)
ERYTHROCYTE [DISTWIDTH] IN BLOOD BY AUTOMATED COUNT: 13.7 % (ref 11.9–14.6)
GLUCOSE SERPL-MCNC: 146 MG/DL (ref 65–100)
HCT VFR BLD AUTO: 32.9 % (ref 35.8–46.3)
HGB BLD-MCNC: 10.4 G/DL (ref 11.7–15.4)
MCH RBC QN AUTO: 32.2 PG (ref 26.1–32.9)
MCHC RBC AUTO-ENTMCNC: 31.6 G/DL (ref 31.4–35)
MCV RBC AUTO: 101.9 FL (ref 79.6–97.8)
NRBC # BLD: 0 K/UL (ref 0–0.2)
PLATELET # BLD AUTO: 276 K/UL (ref 150–450)
PMV BLD AUTO: 10.2 FL (ref 9.4–12.3)
POTASSIUM SERPL-SCNC: 4.2 MMOL/L (ref 3.5–5.1)
RBC # BLD AUTO: 3.23 M/UL (ref 4.05–5.2)
SODIUM SERPL-SCNC: 140 MMOL/L (ref 136–145)
WBC # BLD AUTO: 15.6 K/UL (ref 4.3–11.1)

## 2021-06-03 PROCEDURE — 74011250637 HC RX REV CODE- 250/637: Performed by: INTERNAL MEDICINE

## 2021-06-03 PROCEDURE — 99233 SBSQ HOSP IP/OBS HIGH 50: CPT | Performed by: INTERNAL MEDICINE

## 2021-06-03 PROCEDURE — 80048 BASIC METABOLIC PNL TOTAL CA: CPT

## 2021-06-03 PROCEDURE — 65660000000 HC RM CCU STEPDOWN

## 2021-06-03 PROCEDURE — 71045 X-RAY EXAM CHEST 1 VIEW: CPT

## 2021-06-03 PROCEDURE — 85027 COMPLETE CBC AUTOMATED: CPT

## 2021-06-03 PROCEDURE — 99218 HC RM OBSERVATION: CPT

## 2021-06-03 PROCEDURE — 36415 COLL VENOUS BLD VENIPUNCTURE: CPT

## 2021-06-03 PROCEDURE — 74011250636 HC RX REV CODE- 250/636: Performed by: INTERNAL MEDICINE

## 2021-06-03 PROCEDURE — 74011250637 HC RX REV CODE- 250/637: Performed by: PHYSICIAN ASSISTANT

## 2021-06-03 PROCEDURE — 2709999900 HC NON-CHARGEABLE SUPPLY

## 2021-06-03 PROCEDURE — 96375 TX/PRO/DX INJ NEW DRUG ADDON: CPT

## 2021-06-03 PROCEDURE — 77030038269 HC DRN EXT URIN PURWCK BARD -A

## 2021-06-03 PROCEDURE — 65270000029 HC RM PRIVATE

## 2021-06-03 RX ORDER — METOPROLOL TARTRATE 25 MG/1
25 TABLET, FILM COATED ORAL EVERY 6 HOURS
Status: DISCONTINUED | OUTPATIENT
Start: 2021-06-03 | End: 2021-06-04

## 2021-06-03 RX ORDER — DIGOXIN 0.25 MG/ML
250 INJECTION INTRAMUSCULAR; INTRAVENOUS EVERY 6 HOURS
Status: COMPLETED | OUTPATIENT
Start: 2021-06-03 | End: 2021-06-03

## 2021-06-03 RX ORDER — FUROSEMIDE 10 MG/ML
20 INJECTION INTRAMUSCULAR; INTRAVENOUS ONCE
Status: COMPLETED | OUTPATIENT
Start: 2021-06-03 | End: 2021-06-03

## 2021-06-03 RX ADMIN — Medication 10 ML: at 21:13

## 2021-06-03 RX ADMIN — DILTIAZEM HYDROCHLORIDE 30 MG: 30 TABLET, FILM COATED ORAL at 09:05

## 2021-06-03 RX ADMIN — DIGOXIN 250 MCG: 0.25 INJECTION INTRAMUSCULAR; INTRAVENOUS at 13:17

## 2021-06-03 RX ADMIN — Medication 10 ML: at 05:41

## 2021-06-03 RX ADMIN — DIGOXIN 250 MCG: 0.25 INJECTION INTRAMUSCULAR; INTRAVENOUS at 17:47

## 2021-06-03 RX ADMIN — APIXABAN 2.5 MG: 2.5 TABLET, FILM COATED ORAL at 21:13

## 2021-06-03 RX ADMIN — OXYCODONE HYDROCHLORIDE 15 MG: 15 TABLET ORAL at 01:01

## 2021-06-03 RX ADMIN — Medication 5 ML: at 13:27

## 2021-06-03 RX ADMIN — FUROSEMIDE 20 MG: 10 INJECTION, SOLUTION INTRAMUSCULAR; INTRAVENOUS at 13:15

## 2021-06-03 RX ADMIN — OXYCODONE HYDROCHLORIDE 15 MG: 15 TABLET ORAL at 17:47

## 2021-06-03 RX ADMIN — METOPROLOL TARTRATE 25 MG: 25 TABLET, FILM COATED ORAL at 17:24

## 2021-06-03 RX ADMIN — METOPROLOL TARTRATE 25 MG: 25 TABLET, FILM COATED ORAL at 09:05

## 2021-06-03 RX ADMIN — METOPROLOL TARTRATE 25 MG: 25 TABLET, FILM COATED ORAL at 23:28

## 2021-06-03 RX ADMIN — OXYCODONE HYDROCHLORIDE 15 MG: 15 TABLET ORAL at 21:52

## 2021-06-03 RX ADMIN — METHIMAZOLE 5 MG: 5 TABLET ORAL at 09:05

## 2021-06-03 RX ADMIN — OXYCODONE HYDROCHLORIDE 15 MG: 15 TABLET ORAL at 09:11

## 2021-06-03 RX ADMIN — APIXABAN 2.5 MG: 2.5 TABLET, FILM COATED ORAL at 09:05

## 2021-06-03 RX ADMIN — FUROSEMIDE 20 MG: 20 TABLET ORAL at 17:24

## 2021-06-03 NOTE — ROUTINE PROCESS
Verbal bedside report given to [facility], oncoming RN. Patient's situation, background, assessment and recommendations provided. Opportunity for questions provided. Oncoming RN assumed care of patient.

## 2021-06-03 NOTE — PROGRESS NOTES
[facility] CARDIOLOGY PROGRESS NOTE           6/3/2021 12:52 PM    Admit Date: 6/1/2021      Subjective:     Persistent issues with RVR. On room air. Extensive discussion with daughter today. ROS:  Cardiovascular:  As noted above    Objective:      Vitals:    06/03/21 0042 06/03/21 0537 06/03/21 0800 06/03/21 1133   BP: 113/73 121/70 128/78 118/78   Pulse: (!) 122 (!) 150 (!) 134 (!) 120   Resp: 18 18 14 16   Temp: 98 °F (36.7 °C) 97.5 °F (36.4 °C) 97.5 °F (36.4 °C) 97.6 °F (36.4 °C)   SpO2: 94% 91% 95% 96%   Weight:  113 lb 14.4 oz (51.7 kg)     Height:           Physical Exam:  General-No Acute Distress  Neck- supple, no JVD  CV-tachycardic; ir minimal basilar rales regular rate and rhythm   Lung-  basilar rales  Abd- soft, nontender, nondistended  Ext- no edema bilaterally. Skin- warm and dry    Data Review:   Recent Labs     06/03/21  0502 06/02/21  0526 06/01/21  1105    141 137   K 4.2 4.5 4.3   MG  --   --  2.3   BUN 22 20 22   CREA 1.07* 1.01* 1.18*   * 128* 100   WBC 15.6* 11.6* 11.4*   HGB 10.4* 10.0* 10.1*   HCT 32.9* 30.5* 31.5*    207 219   CHOL  --  133  --    LDLC  --  81  --    HDL  --  40  --        Assessment/Plan:     Principal Problem:    Atrial fibrillation with rapid ventricular response (HCC) (6/01/9014)  -Uncertain duration but appears likely 2 to 3 days prior to presentation per daughter is a frequently check her vitals  -Prior issues of bradycardia that have limited therapy for rate/rhythm control.  -Patient has declined pacemaker consideration  -Currently on Lopressor/p.o. Cardizem. Not ideal in the setting of left ventricular dysfunction. Stop Cardizem. Increase Lopressor 25 mg every 6 hours. Plan transitioning to Toprol-XL prior to discharge.   Some lower BPs and attempt intermittent digoxin today.  -On Eliquis 2.5 mg twice daily  -Discussed with daughter regarding pacemaker consideration if bradycardia limits therapy but she is uncertain if patient will agree. Also discussed Micra consideration.  -If low BPs limit therapy, also discussed consideration for AV modesta ablation.  -Noted mildly elevated TSH/low T4; management per hospital medicine but would not impact RVR; prior history of hyperthyroidism on methimazole  -Discussed less likely to maintain rhythm control with severe left atrial enlargement and plan rate control at this time    Active Problems:    Hypertension (12/1/2015)  -Controlled. Systolic CHF, chronic ([address] (6/1/2021)  - Minimal basilar rales noted on exam finding; x-ray also suggestive of mild interstitial edema. -Appears on Lasix 20 mg every other day at home. Prior issues with hyponatremia. Plan dose of IV Lasix today and closely monitor.       Vero Mojica MD  6/3/2021 12:52 PM

## 2021-06-03 NOTE — ROUTINE PROCESS
Bedside and Verbal shift change report received from [facility]. Report included the following information SBAR, Kardex, Intake/Output, MAR and Recent Results.

## 2021-06-03 NOTE — PROGRESS NOTES
Hospitalist Progress Note     Admit Date:  2021 11:06 AM   Name:  Pb Rodriguez   Age:  80 y.o.  :  3/3/1924   MRN:  [id number]   PCP:  Adam Lozano MD  Presenting Complaint: Irregular Heart Beat    Initial Admission Diagnosis: Atrial fibrillation with RVR ([facility] [I48.91]     Assessment and Plan:     Hospital Problems as of 6/3/2021 Date Reviewed: 2020        Codes Class Noted - Resolved POA    Systolic CHF, chronic ([facility] ICD-10-CM: I50.22  ICD-9-CM: 428.22, 428.0  2021 - Present Unknown        Atrial fibrillation with RVR ([facility] ICD-10-CM: I48.91  ICD-9-CM: 427.31  2021 - Present Unknown        * (Principal) Atrial fibrillation with rapid ventricular response ([facility] ICD-10-CM: I48.91  ICD-9-CM: 427.31  2019 - Present Yes        Hypertension (Chronic) ICD-10-CM: I10  ICD-9-CM: 401.9  2015 - Present Yes        CAD (coronary artery disease) (Chronic) ICD-10-CM: I25.10  ICD-9-CM: 414.00  2015 - Present Yes              Plan:  # Leukocytosis   - Some new rales, repeat CXR. Afebrile, on RA. No urinary symptoms so would not pursue UA unless that changes. CBC w diff tomorrow. # Hyperthyroidism              - Elevated TSH and mildly reduced FT4. Would reduce methimazole from 5mg BID to once daily, then repeat thyroid studies in 4-6 weeks with PCP.      # AFib RVR              - Still tachy. Eliquis, rate control. Mgmt per Cardiology.     # Chronic macrocytic anemia              - No bleeding, Hb stable. Outpt f/u. Other listed chronic conditions stable, continue current management. Discharge planning: Per primary. Diet:  DIET ADULT  DVT ppx: Eliquis    Hospital Course:   Ms. Cammy Blackwell is a very nice 79 y/o WF with a h/o hyperthyroidism on methimazole and AFib admitted to Cardiology service on  with rapid AFib. Thyroid studies were checked and noted elevated TSH at 13 and mildly reduced FT4 at 0.8.  Reports compliance with methimazole and has been taking 5mg BID for several months. Hospitalist was consulted for further recommendations. Methimazole resumed at once daily. 24hr Events/Subjective:   6/3: Feels well, no chest pain or SOB. Still in AFib RVR, rates currently 120s-130s. Daughter at bedside, says urine looked like she has a UTI, but patient denies any urinary symptoms. WBCs up but afebrile, still on RA. No other complaints  Objective:     Patient Vitals for the past 24 hrs:   Temp Pulse Resp BP SpO2   06/03/21 0800 97.5 °F (36.4 °C) (!) 134 14 128/78 95 %   06/03/21 0537 97.5 °F (36.4 °C) (!) 150 18 121/70 91 %   06/03/21 0042 98 °F (36.7 °C) (!) 122 18 113/73 94 %   06/02/21 1947 98.3 °F (36.8 °C) (!) 128 16 109/66 93 %   06/02/21 1736 98.1 °F (36.7 °C) (!) 131 18 (!) 103/59 97 %   06/02/21 1733  (!) 122  113/61    06/02/21 1344 98.1 °F (36.7 °C) (!) 110 18 (!) 97/59 94 %   06/02/21 1220  97        Oxygen Therapy  O2 Sat (%): 95 % (06/03/21 0800)  Pulse via Oximetry: 128 beats per minute (06/01/21 1707)  O2 Device: None (Room air) (06/03/21 0800)    Estimated body mass index is 20.83 kg/m² as calculated from the following:    Height as of this encounter: 5' 2\" (1.575 m). Weight as of this encounter: 51.7 kg (113 lb 14.4 oz). Intake/Output Summary (Last 24 hours) at 6/3/2021 0954  Last data filed at 6/3/2021 0911  Gross per 24 hour   Intake 100 ml   Output 0 ml   Net 100 ml       *Note that automatically entered I/Os may not be accurate; dependent on patient compliance with collection and accurate  by techs. General:    Thin. No overt distress. CV:   Irreg irreg rhythm, tachycardia. No m/r/g. No edema. No JVD  Lungs:   Mild bb rales, R>L. No wheezing, rhonchi, or rales. Unlabored. On RA. Abdomen:   Soft, nontender, nondistended. Extremities: Warm and dry. No cyanosis   Skin:     No rashes. Normal coloration  Neuro:  No gross focal deficits.      I have reviewed all labs, meds, and other studies shown below:  Last 24hr Labs:  Recent Results (from the past 24 hour(s))   METABOLIC PANEL, BASIC    Collection Time: 06/03/21  5:02 AM   Result Value Ref Range    Sodium 140 136 - 145 mmol/L    Potassium 4.2 3.5 - 5.1 mmol/L    Chloride 110 (H) 98 - 107 mmol/L    CO2 23 21 - 32 mmol/L    Anion gap 7 7 - 16 mmol/L    Glucose 146 (H) 65 - 100 mg/dL    BUN 22 8 - 23 MG/DL    Creatinine 1.07 (H) 0.6 - 1.0 MG/DL    GFR est AA >60 >60 ml/min/1.73m2    GFR est non-AA 50 (L) >60 ml/min/1.73m2    Calcium 8.5 8.3 - 10.4 MG/DL   CBC W/O DIFF    Collection Time: 06/03/21  5:02 AM   Result Value Ref Range    WBC 15.6 (H) 4.3 - 11.1 K/uL    RBC 3.23 (L) 4.05 - 5.2 M/uL    HGB 10.4 (L) 11.7 - 15.4 g/dL    HCT 32.9 (L) 35.8 - 46.3 %    .9 (H) 79.6 - 97.8 FL    MCH 32.2 26.1 - 32.9 PG    MCHC 31.6 31.4 - 35.0 g/dL    RDW 13.7 11.9 - 14.6 %    PLATELET 810 410 - 729 K/uL    MPV 10.2 9.4 - 12.3 FL    ABSOLUTE NRBC 0.00 0.0 - 0.2 K/uL       All Micro Results     None          SARS-CoV-2 Lab Results  \"Novel Coronavirus\" Test: No results found for: COV2NT   \"Emergent Disease\" Test: No results found for: EDPR  \"SARS-COV-2\" Test: No results found for: XGCOVT  Rapid Test: No results found for: COVR         Current Meds:  Current Facility-Administered Medications   Medication Dose Route Frequency    metoprolol tartrate (LOPRESSOR) tablet 25 mg  25 mg Oral Q12H    dilTIAZem IR (CARDIZEM) tablet 30 mg  30 mg Oral BID    methIMAzole (TAPAZOLE) tablet 5 mg  5 mg Oral DAILY    sodium chloride (NS) flush 5-10 mL  5-10 mL IntraVENous Q8H    sodium chloride (NS) flush 5-10 mL  5-10 mL IntraVENous PRN    apixaban (ELIQUIS) tablet 2.5 mg  2.5 mg Oral Q12H    furosemide (LASIX) tablet 20 mg  20 mg Oral EVERY OTHER DAY    oxyCODONE IR (OXY-IR) immediate release tablet 15 mg  15 mg Oral Q4H PRN    polyethylene glycol (MIRALAX) powder 17 g  17 g Oral PRN    sodium chloride (NS) flush 5-40 mL  5-40 mL IntraVENous Q8H    sodium chloride (NS) flush 5-40 mL  5-40 mL IntraVENous PRN    morphine injection 2 mg  2 mg IntraVENous Q4H PRN       Other Studies:  Results for orders placed or performed during the hospital encounter of 21   2D ECHO COMPLETE ADULT (TTE) W OR WO CONTR    Narrative    Shirley Culp [address], [address] [facility]  [phone]    Transthoracic Echocardiogram  2D, M-mode, Doppler, and Color Doppler    Patient: Kimber Day  MR #: [id number]  : 03-Mar-1924  Age: 80 years  Gender: Female  Study date: 2021  Account #: [de-identified]  Height: 62 in  Weight: 106.7 lb  BSA: 1.46 mï¾²  Status:Routine  Location: [facility]  BP: 117/ 54    Allergies: AMIODARONE, AMLODIPINE, AMOXICILLIN, MINOCYCLINE-EYELID CLEANSER   1,  TETRACYCLINE, NAPROXEN-ESOMEPRAZOLE    Sonographer:  Sunil Luna  Group:  [id number] Cardiology  Referring Physician:  Thierry Shay. Sarika Justice MD  Reading Physician:  Mena Conde MD    INDICATIONS: Atrial fibrillation with RVR. PROCEDURE: This was a routine study. A transthoracic echocardiogram was  performed. The study included complete 2D imaging, M-mode, complete spectral  Doppler, and color Doppler. Intravenous contrast (Definity) was administered. Image quality was adequate. LEFT VENTRICLE: Size was normal. Ejection fraction was estimated in the range  of 25 % to 30 %. This study was inadequate for the evaluation of regional   wall  motion. Wall thickness was normal. The study was not technically sufficient   to  allow evaluation of LV diastolic function. RIGHT VENTRICLE: The size was normal. Systolic function was reduced. Estimated  peak pressure was in the range of 40-45 mmHg. LEFT ATRIUM: The atrium was markedly dilated. RIGHT ATRIUM: The atrium was moderately dilated. SYSTEMIC VEINS: IVC: The inferior vena cava was normal in size. The  respirophasic change in diameter was more than 50%. AORTIC VALVE: The valve was probably trileaflet. Leaflets exhibited mild  calcification.  There was no evidence for stenosis. There was trivial  regurgitation. MITRAL VALVE: There was mild annular calcification. There was no evidence for  stenosis. There was moderate regurgitation. TRICUSPID VALVE: The valve structure was normal. There was no evidence for  stenosis. There was moderate regurgitation. PULMONIC VALVE: The valve structure was normal. There was no evidence for  stenosis. There was mild regurgitation. PERICARDIUM: There was no pericardial effusion. AORTA: The root exhibited normal size. SUMMARY:    -  Left ventricle: Ejection fraction was estimated in the range of 25 % to 30  %. This study was inadequate for the evaluation of regional wall motion.    -  Right ventricle: Systolic function was reduced. -  Left atrium: The atrium was markedly dilated. -  Right atrium: The atrium was moderately dilated. -  Inferior vena cava, hepatic veins: The respirophasic change in diameter   was  more than 50%. -  Mitral valve: There was mild annular calcification. There was moderate  regurgitation.    -  Tricuspid valve: There was moderate regurgitation. SYSTEM MEASUREMENT TABLES    2D mode  AoR Diam (2D): 2.4 cm  LA Dimension (2D): 5.3 cm  Left Atrium Systolic Volume Index; Method of Disks, Biplane; 2D mode;: 60.3  ml/m2  IVS/LVPW (2D): 1  IVSd (2D): 0.8 cm  LVIDd (2D): 4.5 cm  LVIDs (2D): 3.7 cm  LVPWd (2D): 0.9 cm  RVIDd (2D): 3.1 cm    Unspecified Scan Mode  Peak Grad; Mean; Antegrade Flow: 7 mm[Hg]  Vmax; Antegrade Flow: 130 cm/s    Prepared and signed by    Flora Tao MD  Signed 02-Jun-2021 11:50:12         No results found.     Signed:  Audra Mayer MD

## 2021-06-04 LAB
AMORPH CRY URNS QL MICRO: ABNORMAL
ANION GAP SERPL CALC-SCNC: 6 MMOL/L (ref 7–16)
APPEARANCE UR: ABNORMAL
BACTERIA URNS QL MICRO: ABNORMAL /HPF
BASOPHILS # BLD: 0.1 K/UL (ref 0–0.2)
BASOPHILS NFR BLD: 1 % (ref 0–2)
BILIRUB UR QL: NEGATIVE
BUN SERPL-MCNC: 24 MG/DL (ref 8–23)
CALCIUM SERPL-MCNC: 8.1 MG/DL (ref 8.3–10.4)
CASTS URNS QL MICRO: 0 /LPF
CHLORIDE SERPL-SCNC: 106 MMOL/L (ref 98–107)
CO2 SERPL-SCNC: 27 MMOL/L (ref 21–32)
COLOR UR: YELLOW
CREAT SERPL-MCNC: 1.05 MG/DL (ref 0.6–1)
CRYSTALS URNS QL MICRO: 0 /LPF
DIFFERENTIAL METHOD BLD: ABNORMAL
EOSINOPHIL # BLD: 0.4 K/UL (ref 0–0.8)
EOSINOPHIL NFR BLD: 4 % (ref 0.5–7.8)
EPI CELLS #/AREA URNS HPF: ABNORMAL /HPF
ERYTHROCYTE [DISTWIDTH] IN BLOOD BY AUTOMATED COUNT: 13.4 % (ref 11.9–14.6)
GLUCOSE SERPL-MCNC: 88 MG/DL (ref 65–100)
GLUCOSE UR STRIP.AUTO-MCNC: NEGATIVE MG/DL
HCT VFR BLD AUTO: 28.8 % (ref 35.8–46.3)
HGB BLD-MCNC: 9.2 G/DL (ref 11.7–15.4)
HGB UR QL STRIP: ABNORMAL
IMM GRANULOCYTES # BLD AUTO: 0.1 K/UL (ref 0–0.5)
IMM GRANULOCYTES NFR BLD AUTO: 1 % (ref 0–5)
KETONES UR QL STRIP.AUTO: NEGATIVE MG/DL
LEUKOCYTE ESTERASE UR QL STRIP.AUTO: ABNORMAL
LYMPHOCYTES # BLD: 1.7 K/UL (ref 0.5–4.6)
LYMPHOCYTES NFR BLD: 17 % (ref 13–44)
MCH RBC QN AUTO: 32.5 PG (ref 26.1–32.9)
MCHC RBC AUTO-ENTMCNC: 31.9 G/DL (ref 31.4–35)
MCV RBC AUTO: 101.8 FL (ref 79.6–97.8)
MONOCYTES # BLD: 0.9 K/UL (ref 0.1–1.3)
MONOCYTES NFR BLD: 9 % (ref 4–12)
MUCOUS THREADS URNS QL MICRO: 0 /LPF
NEUTS SEG # BLD: 6.7 K/UL (ref 1.7–8.2)
NEUTS SEG NFR BLD: 68 % (ref 43–78)
NITRITE UR QL STRIP.AUTO: NEGATIVE
NRBC # BLD: 0 K/UL (ref 0–0.2)
OTHER OBSERVATIONS,UCOM: ABNORMAL
PH UR STRIP: 6 [PH] (ref 5–9)
PLATELET # BLD AUTO: 189 K/UL (ref 150–450)
PMV BLD AUTO: 10.2 FL (ref 9.4–12.3)
POTASSIUM SERPL-SCNC: 3.8 MMOL/L (ref 3.5–5.1)
PROT UR STRIP-MCNC: 30 MG/DL
RBC # BLD AUTO: 2.83 M/UL (ref 4.05–5.2)
RBC #/AREA URNS HPF: ABNORMAL /HPF
SODIUM SERPL-SCNC: 139 MMOL/L (ref 136–145)
SP GR UR REFRACTOMETRY: 1.01 (ref 1–1.02)
UROBILINOGEN UR QL STRIP.AUTO: 0.2 EU/DL (ref 0.2–1)
WBC # BLD AUTO: 9.8 K/UL (ref 4.3–11.1)
WBC URNS QL MICRO: >100 /HPF

## 2021-06-04 PROCEDURE — 87086 URINE CULTURE/COLONY COUNT: CPT

## 2021-06-04 PROCEDURE — 97162 PT EVAL MOD COMPLEX 30 MIN: CPT

## 2021-06-04 PROCEDURE — 74011250637 HC RX REV CODE- 250/637: Performed by: INTERNAL MEDICINE

## 2021-06-04 PROCEDURE — 97530 THERAPEUTIC ACTIVITIES: CPT

## 2021-06-04 PROCEDURE — 74011250637 HC RX REV CODE- 250/637: Performed by: PHYSICIAN ASSISTANT

## 2021-06-04 PROCEDURE — 80048 BASIC METABOLIC PNL TOTAL CA: CPT

## 2021-06-04 PROCEDURE — 2709999900 HC NON-CHARGEABLE SUPPLY

## 2021-06-04 PROCEDURE — 65660000000 HC RM CCU STEPDOWN

## 2021-06-04 PROCEDURE — 85025 COMPLETE CBC W/AUTO DIFF WBC: CPT

## 2021-06-04 PROCEDURE — 81003 URINALYSIS AUTO W/O SCOPE: CPT

## 2021-06-04 PROCEDURE — 81015 MICROSCOPIC EXAM OF URINE: CPT

## 2021-06-04 PROCEDURE — 99232 SBSQ HOSP IP/OBS MODERATE 35: CPT | Performed by: INTERNAL MEDICINE

## 2021-06-04 PROCEDURE — 74011000258 HC RX REV CODE- 258: Performed by: INTERNAL MEDICINE

## 2021-06-04 PROCEDURE — 74011250636 HC RX REV CODE- 250/636: Performed by: INTERNAL MEDICINE

## 2021-06-04 PROCEDURE — 36415 COLL VENOUS BLD VENIPUNCTURE: CPT

## 2021-06-04 RX ORDER — METOPROLOL SUCCINATE 25 MG/1
50 TABLET, EXTENDED RELEASE ORAL 2 TIMES DAILY
Status: DISCONTINUED | OUTPATIENT
Start: 2021-06-04 | End: 2021-06-05

## 2021-06-04 RX ADMIN — APIXABAN 2.5 MG: 2.5 TABLET, FILM COATED ORAL at 20:35

## 2021-06-04 RX ADMIN — Medication 10 ML: at 20:35

## 2021-06-04 RX ADMIN — Medication 10 ML: at 13:59

## 2021-06-04 RX ADMIN — METOPROLOL SUCCINATE 50 MG: 25 TABLET, EXTENDED RELEASE ORAL at 17:58

## 2021-06-04 RX ADMIN — OXYCODONE HYDROCHLORIDE 15 MG: 15 TABLET ORAL at 05:29

## 2021-06-04 RX ADMIN — Medication 10 ML: at 05:29

## 2021-06-04 RX ADMIN — APIXABAN 2.5 MG: 2.5 TABLET, FILM COATED ORAL at 09:54

## 2021-06-04 RX ADMIN — Medication 10 ML: at 05:39

## 2021-06-04 RX ADMIN — METHIMAZOLE 5 MG: 5 TABLET ORAL at 09:54

## 2021-06-04 RX ADMIN — CEFTRIAXONE SODIUM 1 G: 1 INJECTION, POWDER, FOR SOLUTION INTRAMUSCULAR; INTRAVENOUS at 15:23

## 2021-06-04 RX ADMIN — OXYCODONE HYDROCHLORIDE 15 MG: 15 TABLET ORAL at 15:23

## 2021-06-04 RX ADMIN — METOPROLOL TARTRATE 25 MG: 25 TABLET, FILM COATED ORAL at 05:29

## 2021-06-04 RX ADMIN — METOPROLOL SUCCINATE 50 MG: 25 TABLET, EXTENDED RELEASE ORAL at 10:39

## 2021-06-04 NOTE — PROGRESS NOTES
[facility] CARDIOLOGY PROGRESS NOTE         6/4/2021 2:19 PM    Admit Date: 6/1/2021    Subjective:   Patient feels better today, daughter at bedside helps with interval history. No chest pain, dyspnea, no palpitations. Minimal  activity overnight. ROS:  Cardiovascular:  As noted above    Objective:      Vitals:    06/04/21 0541 06/04/21 0749 06/04/21 1039 06/04/21 1214   BP: 122/87 106/78 129/75 114/63   Pulse: (!) 145 (!) 106 (!) 132 (!) 101   Resp:  18  17   Temp: 97.8 °F (36.6 °C) 97.3 °F (36.3 °C)  97.5 °F (36.4 °C)   SpO2: 97% 96%  98%   Weight: 106 lb (48.1 kg)      Height:         Physical Exam:  General-No Acute Distress, somewhat interactive   Neck- supple, no JVD  CV- irregular rate and rhythm    Lung- minimal rales at bases bilaterally  Abd- soft, nontender, nondistended  Ext- no edema bilaterally. Skin- warm and dry    Data Review:   Recent Labs     06/04/21  0456 06/03/21  0502 06/02/21  0526    140 141   K 3.8 4.2 4.5   BUN 24* 22 20   CREA 1.05* 1.07* 1.01*   GLU 88 146* 128*   WBC 9.8 15.6* 11.6*   HGB 9.2* 10.4* 10.0*   HCT 28.8* 32.9* 30.5*    276 207   CHOL  --   --  133   LDLC  --   --  81   HDL  --   --  40       Assessment/Plan:     Principal Problem:    Atrial fibrillation with rapid ventricular response (HCC) (4/24/2019)    - change to Toprol XL formulation 50 mg BID as patient with prolonged conversion pauses in the past, CHF     - On Eliquis 2.5 mg twice daily (If patient is ?[de-identified]years of age and either weighs ? 60 kg or has a serum creatinine ?1.5 mg/dL (133 mcmol/L), then reduce Eliquis dose to 2.5 mg twice daily. )    - no current indication for pacer    - goal rate conrol <130    Active Problems:    CAD (coronary artery disease) (11/29/2015)    - on beta blocker, eliquis      Hypertension (12/1/2015)    - controlled at this time        Systolic CHF, chronic ([facility] (6/1/2021)    - intermittent lasix, Toprol XL as above, does not appear decompensated at this time from a volume standpoint     PT to see, change to Toprol XL, Pending recs plan for likely DC home tomorrow.      Michael Shirley,   6/4/2021 2:19 PM

## 2021-06-04 NOTE — ROUTINE PROCESS
Bedside and Verbal shift change report given to self (oncoming nurse) by Howie Sanchez (offgoing nurse). Report included the following information SBAR, Kardex, ED Summary, Procedure Summary, Intake/Output, MAR and Recent Results.

## 2021-06-04 NOTE — ROUTINE PROCESS
Bedside and Verbal shift change report to be given to [facility] SARAI CAPUTO (oncoming nurse) by self (offgoing nurse). Report included the following information SBAR, Kardex, Intake/Output, MAR and Recent Results.

## 2021-06-04 NOTE — PROGRESS NOTES
Problem: Falls - Risk of  Goal: *Absence of Falls  Description: Document Edel Munoz Fall Risk and appropriate interventions in the flowsheet.   Outcome: Progressing Towards Goal  Note: Fall Risk Interventions:  Mobility Interventions: Bed/chair exit alarm, Communicate number of staff needed for ambulation/transfer, Patient to call before getting OOB    Mentation Interventions: Adequate sleep, hydration, pain control, Bed/chair exit alarm, Door open when patient unattended, Evaluate medications/consider consulting pharmacy    Medication Interventions: Assess postural VS orthostatic hypotension, Bed/chair exit alarm, Evaluate medications/consider consulting pharmacy, Patient to call before getting OOB    Elimination Interventions: Bed/chair exit alarm, Call light in reach, Patient to call for help with toileting needs    History of Falls Interventions: Bed/chair exit alarm, Door open when patient unattended, Evaluate medications/consider consulting pharmacy

## 2021-06-04 NOTE — ROUTINE PROCESS
Bedside and Verbal shift change report given to myself (oncoming nurse) by [facility] SARAI CAPUTO (offgoing nurse). Report included the following information SBAR, Kardex, MAR and Recent Results.

## 2021-06-04 NOTE — ROUTINE PROCESS
Bedside and Verbal shift change report given to Phil Quevedo 66. (oncoming nurse) by self (offgoing nurse). Report included the following information SBAR, Kardex, ED Summary, Procedure Summary, Intake/Output and Recent Results.

## 2021-06-04 NOTE — PROGRESS NOTES
ACUTE PHYSICAL THERAPY GOALS:  (Developed with and agreed upon by patient and/or caregiver.)  STG:  (1.)Ms. Radha Stevens will move from supine to sit and sit to supine , scoot up and down and roll side to side with STAND BY ASSIST within 3 treatment day(s). (2.)Ms. Radha Stevens will transfer from bed to chair and chair to bed with CONTACT GUARD ASSIST using the least restrictive device within 3 treatment day(s). (3.)Ms. Radha Stevens will ambulate with CONTACT GUARD ASSIST for 40 feet with the least restrictive device within 3 treatment day(s). (4.)Ms. Radha Stevens will perform standing static and dynamic balance activities x 15 minutes with CONTACT GUARD ASSIST to improve safety within 3 treatment day(s). (5.)Ms. Radha Stevens will maintain stable vital signs throughout all functional mobility within 3 treatment days. LTG:  (1.)Ms. Radha Stevens will move from supine to sit and sit to supine , scoot up and down and roll side to side in bed with SUPERVISION within 7 treatment day(s). (2.)Ms. Radha Stevens will transfer from bed to chair and chair to bed with STAND BY ASSIST using the least restrictive device within 7 treatment day(s). (3.)Ms. Radha Stevens will ambulate with STAND BY ASSIST for 50 feet with the least restrictive device within 7 treatment day(s). (4.)Ms. Radha Stevens will perform standing static and dynamic balance activities x 15 minutes with STAND BY ASSIST to improve safety within 7 treatment day(s). (5.)Ms. Radha Stevens will ambulate and/or perform functional activities for 15 consecutive minutes with stable vital signs and no rests required to improve activity tolerance within 7 treatment days.   ________________________________________________________________________________________________    PHYSICAL THERAPY ASSESSMENT: Initial Assessment, Daily Note and PM PT Treatment Day # 1      Ligia Nickerson is a 80 y.o. female   PRIMARY DIAGNOSIS: Atrial fibrillation with rapid ventricular response (HCC)  Atrial fibrillation with RVR ([address] [I48.91]       Reason for Referral:  weakness  ICD-10: Treatment Diagnosis: Generalized Muscle Weakness (M62.81)  Difficulty in walking, Not elsewhere classified (R26.2)  [place] / [place] / Product Type: Medicare /     ASSESSMENT:     REHAB RECOMMENDATIONS:   Recommendation to date pending progress:  [address] [place] [address] patient and daughter do not feel necessary. Equipment:    None     PRIOR LEVEL OF FUNCTION:  (Prior to Hospitalization) INITIAL/CURRENT LEVEL OF FUNCTION:  (Most Recently Demonstrated)   Bed Mobility:   Contact Guard Assistance  Sit to Stand:   Supervision  Transfers:   Supervision  Gait/Mobility:   Supervision Bed Mobility:   Minimal Assistance  Sit to Stand:   Minimal Assistance  Transfers:   Minimal Assistance  Gait/Mobility:   Minimal Assistance     ASSESSMENT:  Ms. Jessica Nunez presents with decreased strength, decreased activity tolerance and impaired balance which are impacting her ability to perform ambulation, transfers and bed mobility at their baseline level of mobility. She currently requires minimal assistance to complete ambulation 20' with a rolling walker. Patient presents with good rehab potential secondary to previous level of function. Idania Gilbert is currently functioning below her baseline and would benefit from skilled PT during acute care stay to maximize safety and independence with functional mobility. SUBJECTIVE:   Ms. Jessica Nunez states, \"My back hurts. \"    SOCIAL HISTORY/LIVING ENVIRONMENT: Patient lives with 2 daughters in a single story home with a ramp. Ambulates with a rollator for household distances and can for community distances. Requires assist from daughters for ADLs.     Home Environment: Private residence  One/Two Story Residence: One story  Living Alone: No  Support Systems: None  OBJECTIVE:     PAIN: VITAL SIGNS: LINES/DRAINS:   Pre Treatment: Pain Screen  Pain Scale 1: Numeric (0 - 10)  Pain Intensity 1: 6  Post Treatment: 6     O2 Device: None (Room air)     GROSS EVALUATION:  BLE Within Functional Limits Abnormal/ Functional Abnormal/ Non-Functional (see comments) Not Tested Comments:   AROM [] [x] [] []    PROM [] [x] [] []    Strength [] [x] [] []    Balance [] [x] [] []    Posture [] [x] [] []    Sensation [] [] [] [x]    Coordination [x] [] [] []    Tone [x] [] [] []    Edema [x] [] [] []    Activity Tolerance [] [x] [] [] Fatigued after mobility    [] [] [] []      COGNITION/  PERCEPTION: Intact Impaired   (see comments) Comments:   Orientation [x] []    Vision [] [x]    Hearing [] [x] Hopi with hearing aides   Command Following [x] []    Safety Awareness [x] []     [] []      MOBILITY: I Mod I S SBA CGA Min Mod Max Total  NT x2 Comments:   Bed Mobility    Rolling [] [] [] [] [] [x] [] [] [] [] []    Supine to Sit [] [] [] [] [] [x] [] [] [] [] []    Scooting [] [] [] [] [] [x] [] [] [] [] []    Sit to Supine [] [] [] [] [] [x] [] [] [] [] []    Transfers    Sit to Stand [] [] [] [] [] [x] [] [] [] [] []    Bed to Chair [] [] [] [] [] [] [] [] [] [] []    Stand to Sit [] [] [] [] [] [x] [] [] [] [] []    I=Independent, Mod I=Modified Independent, S=Supervision, SBA=Standby Assistance, CGA=Contact Guard Assistance,   Min=Minimal Assistance, Mod=Moderate Assistance, Max=Maximal Assistance, Total=Total Assistance, NT=Not Tested  GAIT: I Mod I S SBA CGA Min Mod Max Total  NT x2 Comments:   Level of Assistance [] [] [] [] [] [x] [] [] [] [] []    Distance 20'    DME Rolling Walker    Gait Quality Decreased step clearance    Weightbearing Status N/A     I=Independent, Mod I=Modified Independent, S=Supervision, SBA=Standby Assistance, CGA=Contact Guard Assistance,   Min=Minimal Assistance, Mod=Moderate Assistance, Max=Maximal Assistance, Total=Total Assistance, NT=Not Tested    [employer] 6 Clicks   Basic Mobility Inpatient Short Form       How much difficulty does the patient currently have... Unable A Lot A Little None   1. Turning over in bed (including adjusting bedclothes, sheets and blankets)? [] 1   [] 2   [x] 3   [] 4   2. Sitting down on and standing up from a chair with arms ( e.g., wheelchair, bedside commode, etc.)   [] 1   [] 2   [x] 3   [] 4   3. Moving from lying on back to sitting on the side of the bed? [] 1   [] 2   [x] 3   [] 4   How much help from another person does the patient currently need. .. Total A Lot A Little None   4. Moving to and from a bed to a chair (including a wheelchair)? [] 1   [] 2   [x] 3   [] 4   5. Need to walk in hospital room? [] 1   [] 2   [x] 3   [] 4   6. Climbing 3-5 steps with a railing? [] 1   [x] 2   [] 3   [] 4   © 2007, Trustees of [facility] [employer], under license to [employer]. All rights reserved     Score:  Initial: 17 Most Recent: X (Date: -- )    Interpretation of Tool:  Represents activities that are increasingly more difficult (i.e. Bed mobility, Transfers, Gait). PLAN:   FREQUENCY/DURATION: PT Plan of Care: 3 times/week for duration of hospital stay or until stated goals are met, whichever comes first.    PROBLEM LIST:   (Skilled intervention is medically necessary to address:)  1. Decreased Activity Tolerance  2. Decreased Balance  3. Decreased Cognition  4. Decreased Coordination  5. Decreased Gait Ability  6. Decreased Strength  7. Decreased Transfer Abilities   INTERVENTIONS PLANNED:   (Benefits and precautions of physical therapy have been discussed with the patient.)  1. Therapeutic Activity  2. Therapeutic Exercise/HEP  3. Neuromuscular Re-education  4. Gait Training  5. Manual Therapy  6. Education     TREATMENT:     EVALUATION: Moderate Complexity : (Untimed Charge)    TREATMENT:   ($$ Therapeutic Activity: 8-22 mins    )  Therapeutic Activity (8 Minutes):  Therapeutic activity included Supine to Sit, Sit to Supine, Transfer Training, Ambulation on level ground, Sitting balance  and Standing balance to improve functional Mobility, Strength and Activity tolerance.     TREATMENT GRID:  N/A    AFTER TREATMENT POSITION/PRECAUTIONS:  Bed, Needs within reach, RN notified and Visitors at bedside    INTERDISCIPLINARY COLLABORATION:  RN/PCT and PT/PTA    TOTAL TREATMENT DURATION:  PT Patient Time In/Time Out  Time In: 1458  Time Out: [address], PT, DPT

## 2021-06-04 NOTE — PROGRESS NOTES
Hospitalist Progress Note     Admit Date:  2021 11:06 AM   Name:  Luis Bush   Age:  80 y.o.  :  3/3/1924   MRN:  [id number]   PCP:  Jesus Alberto Daniels MD  Presenting Complaint: Irregular Heart Beat    Initial Admission Diagnosis: Atrial fibrillation with RVR ([facility] [I48.91]     Assessment and Plan:     Hospital Problems as of 2021 Date Reviewed: 2020        Codes Class Noted - Resolved POA    Systolic CHF, chronic ([facility] ICD-10-CM: I50.22  ICD-9-CM: 428.22, 428.0  2021 - Present Yes        Atrial fibrillation with RVR ([facility] ICD-10-CM: I48.91  ICD-9-CM: 427.31  2021 - Present Yes        * (Principal) Atrial fibrillation with rapid ventricular response ([facility] ICD-10-CM: I48.91  ICD-9-CM: 427.31  2019 - Present Yes        Hypertension (Chronic) ICD-10-CM: I10  ICD-9-CM: 401.9  2015 - Present Yes        CAD (coronary artery disease) (Chronic) ICD-10-CM: I25.10  ICD-9-CM: 414.00  2015 - Present Yes              Plan:  # Leukocytosis              - Repeat CXR 6/3 with edema, no infiltrate. Will send UA with culture today, however WBCs have now normalized.     # Hyperthyroidism              - Elevated TSH and mildly reduced FT4. Reduce methimazole from 5mg BID to once daily, will need repeat thyroid studies in 4-6 weeks with PCP.      # AFib RVR              - Eliquis, rate control. Mgmt per Cardiology.     # Chronic macrocytic anemia              - No bleeding, Hb stable. Outpt f/u. Other listed chronic conditions stable, continue current management. Discharge planning: Per primary. Diet:  DIET ADULT  DVT ppx: Eliquis    Hospital Course:   Ms. Renu Maldonado is a very nice 79 y/o WF with a h/o hyperthyroidism on methimazole and AFib admitted to Cardiology service on  with rapid AFib. Thyroid studies were checked and noted elevated TSH at 13 and mildly reduced FT4 at 0.8. Reports compliance with methimazole and has been taking 5mg BID for several months.  Hospitalist was consulted for further recommendations. Methimazole resumed at once daily. 24hr Events/Subjective:   6/4: HRs better, low 100s now. Daughter present, remains concerned for UTI based on urine appearance/odor. Patient notes she's had some mild lower abdominal pain with urination. Afebrile and WBCs have normalized today w/o intervention. No chest pain or SOB. No other complaints  Objective:     Patient Vitals for the past 24 hrs:   Temp Pulse Resp BP SpO2   06/04/21 0749 97.3 °F (36.3 °C) (!) 106 18 106/78 96 %   06/04/21 0541 97.8 °F (36.6 °C) (!) 145  122/87 97 %   06/04/21 0529  (!) 127  137/68    06/04/21 0040 98 °F (36.7 °C) 80 18 (!) 96/56 97 %   06/03/21 2328  (!) 101      06/03/21 1955 98.4 °F (36.9 °C) (!) 125 18 114/71 93 %   06/03/21 1724  (!) 159  (!) 165/86    06/03/21 1644 98.4 °F (36.9 °C) (!) 118 17 (!) 148/78 95 %   06/03/21 1608  (!) 139      06/03/21 1543  (!) 167  (!) 111/59    06/03/21 1538  (!) 154      06/03/21 1504  (!) 117      06/03/21 1317  (!) 118  117/70    06/03/21 1315  (!) 132  117/70    06/03/21 1258   18     06/03/21 1133 97.6 °F (36.4 °C) (!) 120 16 118/78 96 %     Oxygen Therapy  O2 Sat (%): 96 % (06/04/21 0749)  Pulse via Oximetry: 128 beats per minute (06/01/21 1707)  O2 Device: None (Room air) (06/04/21 0749)    Estimated body mass index is 19.39 kg/m² as calculated from the following:    Height as of this encounter: 5' 2\" (1.575 m). Weight as of this encounter: 48.1 kg (106 lb). Intake/Output Summary (Last 24 hours) at 6/4/2021 1025  Last data filed at 6/4/2021 0847  Gross per 24 hour   Intake 320 ml   Output 700 ml   Net -380 ml       *Note that automatically entered I/Os may not be accurate; dependent on patient compliance with collection and accurate  by techs. General:    Well nourished but thin. No overt distress  CV:   Irreg irreg rhythm, tachy low 100s. No m/r/g. No edema. No JVD. Lungs:   CTAB.   No wheezing, rhonchi, or rales. Unlabored  Abdomen:   Soft, nontender, nondistended. Extremities: Warm and dry. No cyanosis   Skin:     No rashes. Normal coloration  Neuro:  No gross focal deficits. I have reviewed all labs, meds, and other studies shown below:  Last 24hr Labs:  Recent Results (from the past 24 hour(s))   METABOLIC PANEL, BASIC    Collection Time: 06/04/21  4:56 AM   Result Value Ref Range    Sodium 139 136 - 145 mmol/L    Potassium 3.8 3.5 - 5.1 mmol/L    Chloride 106 98 - 107 mmol/L    CO2 27 21 - 32 mmol/L    Anion gap 6 (L) 7 - 16 mmol/L    Glucose 88 65 - 100 mg/dL    BUN 24 (H) 8 - 23 MG/DL    Creatinine 1.05 (H) 0.6 - 1.0 MG/DL    GFR est AA >60 >60 ml/min/1.73m2    GFR est non-AA 52 (L) >60 ml/min/1.73m2    Calcium 8.1 (L) 8.3 - 10.4 MG/DL   CBC WITH AUTOMATED DIFF    Collection Time: 06/04/21  4:56 AM   Result Value Ref Range    WBC 9.8 4.3 - 11.1 K/uL    RBC 2.83 (L) 4.05 - 5.2 M/uL    HGB 9.2 (L) 11.7 - 15.4 g/dL    HCT 28.8 (L) 35.8 - 46.3 %    .8 (H) 79.6 - 97.8 FL    MCH 32.5 26.1 - 32.9 PG    MCHC 31.9 31.4 - 35.0 g/dL    RDW 13.4 11.9 - 14.6 %    PLATELET 905 253 - 270 K/uL    MPV 10.2 9.4 - 12.3 FL    ABSOLUTE NRBC 0.00 0.0 - 0.2 K/uL    DF AUTOMATED      NEUTROPHILS 68 43 - 78 %    LYMPHOCYTES 17 13 - 44 %    MONOCYTES 9 4.0 - 12.0 %    EOSINOPHILS 4 0.5 - 7.8 %    BASOPHILS 1 0.0 - 2.0 %    IMMATURE GRANULOCYTES 1 0.0 - 5.0 %    ABS. NEUTROPHILS 6.7 1.7 - 8.2 K/UL    ABS. LYMPHOCYTES 1.7 0.5 - 4.6 K/UL    ABS. MONOCYTES 0.9 0.1 - 1.3 K/UL    ABS. EOSINOPHILS 0.4 0.0 - 0.8 K/UL    ABS. BASOPHILS 0.1 0.0 - 0.2 K/UL    ABS. IMM.  GRANS. 0.1 0.0 - 0.5 K/UL       All Micro Results     Procedure Component Value Units Date/Time    CULTURE, URINE [[id number]]     Order Status: Sent Specimen: Urine from Clean catch           SARS-CoV-2 Lab Results  \"Novel Coronavirus\" Test: No results found for: COV2NT   \"Emergent Disease\" Test: No results found for: EDPR  \"SARS-COV-2\" Test: No results found for: XGCOVT  Rapid Test: No results found for: COVR         Current Meds:  Current Facility-Administered Medications   Medication Dose Route Frequency    metoprolol succinate (TOPROL-XL) XL tablet 50 mg  50 mg Oral BID    methIMAzole (TAPAZOLE) tablet 5 mg  5 mg Oral DAILY    sodium chloride (NS) flush 5-10 mL  5-10 mL IntraVENous Q8H    sodium chloride (NS) flush 5-10 mL  5-10 mL IntraVENous PRN    apixaban (ELIQUIS) tablet 2.5 mg  2.5 mg Oral Q12H    furosemide (LASIX) tablet 20 mg  20 mg Oral EVERY OTHER DAY    oxyCODONE IR (OXY-IR) immediate release tablet 15 mg  15 mg Oral Q4H PRN    polyethylene glycol (MIRALAX) powder 17 g  17 g Oral PRN    sodium chloride (NS) flush 5-40 mL  5-40 mL IntraVENous Q8H    sodium chloride (NS) flush 5-40 mL  5-40 mL IntraVENous PRN    morphine injection 2 mg  2 mg IntraVENous Q4H PRN       Other Studies:  Results for orders placed or performed during the hospital encounter of 21   2D ECHO COMPLETE ADULT (TTE) W OR WO CONTR    Narrative    Jessikakendra  [facility] [address] [facility] [address] [facility]  [phone]    Transthoracic Echocardiogram  2D, M-mode, Doppler, and Color Doppler    Patient: Lolly Pelletier  MR #: [id number]  : 03-Mar-1924  Age: 80 years  Gender: Female  Study date: 2021  Account #: [de-identified]  Height: 62 in  Weight: 106.7 lb  BSA: 1.46 mï¾²  Status:Routine  Location: [facility]  BP: 117/ 54    Allergies: AMIODARONE, AMLODIPINE, AMOXICILLIN, MINOCYCLINE-EYELID CLEANSER   1,  TETRACYCLINE, NAPROXEN-ESOMEPRAZOLE    Sonographer:  Sharron Harley  Group:  [address] Cardiology  Referring Physician:  Sid Rose. Devyn Zelaya MD  Reading Physician:  Neisha Hernandez MD    INDICATIONS: Atrial fibrillation with RVR. PROCEDURE: This was a routine study. A transthoracic echocardiogram was  performed. The study included complete 2D imaging, M-mode, complete spectral  Doppler, and color Doppler.  Intravenous contrast (Definity) was administered. Image quality was adequate. LEFT VENTRICLE: Size was normal. Ejection fraction was estimated in the range  of 25 % to 30 %. This study was inadequate for the evaluation of regional   wall  motion. Wall thickness was normal. The study was not technically sufficient   to  allow evaluation of LV diastolic function. RIGHT VENTRICLE: The size was normal. Systolic function was reduced. Estimated  peak pressure was in the range of 40-45 mmHg. LEFT ATRIUM: The atrium was markedly dilated. RIGHT ATRIUM: The atrium was moderately dilated. SYSTEMIC VEINS: IVC: The inferior vena cava was normal in size. The  respirophasic change in diameter was more than 50%. AORTIC VALVE: The valve was probably trileaflet. Leaflets exhibited mild  calcification. There was no evidence for stenosis. There was trivial  regurgitation. MITRAL VALVE: There was mild annular calcification. There was no evidence for  stenosis. There was moderate regurgitation. TRICUSPID VALVE: The valve structure was normal. There was no evidence for  stenosis. There was moderate regurgitation. PULMONIC VALVE: The valve structure was normal. There was no evidence for  stenosis. There was mild regurgitation. PERICARDIUM: There was no pericardial effusion. AORTA: The root exhibited normal size. SUMMARY:    -  Left ventricle: Ejection fraction was estimated in the range of 25 % to 30  %. This study was inadequate for the evaluation of regional wall motion.    -  Right ventricle: Systolic function was reduced. -  Left atrium: The atrium was markedly dilated. -  Right atrium: The atrium was moderately dilated. -  Inferior vena cava, hepatic veins: The respirophasic change in diameter   was  more than 50%. -  Mitral valve: There was mild annular calcification. There was moderate  regurgitation.    -  Tricuspid valve: There was moderate regurgitation.     SYSTEM MEASUREMENT TABLES    2D mode  AoR Diam (2D): 2.4 cm  LA Dimension (2D): 5.3 cm  Left Atrium Systolic Volume Index; Method of Disks, Biplane; 2D mode;: 60.3  ml/m2  IVS/LVPW (2D): 1  IVSd (2D): 0.8 cm  LVIDd (2D): 4.5 cm  LVIDs (2D): 3.7 cm  LVPWd (2D): 0.9 cm  RVIDd (2D): 3.1 cm    Unspecified Scan Mode  Peak Grad; Mean; Antegrade Flow: 7 mm[Hg]  Vmax; Antegrade Flow: 130 cm/s    Prepared and signed by    Daxa Whitmore MD  Signed 02-Jun-2021 11:50:12         XR CHEST SNGL V    Result Date: 6/3/2021  EXAM: XR CHEST SNGL V INDICATION: SOB COMPARISON: 6/1/2021 FINDINGS: A portable AP radiograph of the chest was obtained at 1050 hours. The patient is on a cardiac monitor. Increase in basilar interstitial markings. The cardiac and mediastinal contours and pulmonary vascularity are normal.  The bones and soft tissues are grossly within normal limits. Interstitial pulmonary edema.       Signed:  Kate Fisher MD

## 2021-06-05 LAB
ANION GAP SERPL CALC-SCNC: 6 MMOL/L (ref 7–16)
BUN SERPL-MCNC: 29 MG/DL (ref 8–23)
CALCIUM SERPL-MCNC: 8.4 MG/DL (ref 8.3–10.4)
CHLORIDE SERPL-SCNC: 108 MMOL/L (ref 98–107)
CO2 SERPL-SCNC: 27 MMOL/L (ref 21–32)
CREAT SERPL-MCNC: 1.11 MG/DL (ref 0.6–1)
GLUCOSE SERPL-MCNC: 110 MG/DL (ref 65–100)
POTASSIUM SERPL-SCNC: 4 MMOL/L (ref 3.5–5.1)
SODIUM SERPL-SCNC: 141 MMOL/L (ref 136–145)

## 2021-06-05 PROCEDURE — 2709999900 HC NON-CHARGEABLE SUPPLY

## 2021-06-05 PROCEDURE — 65660000000 HC RM CCU STEPDOWN

## 2021-06-05 PROCEDURE — 74011000258 HC RX REV CODE- 258: Performed by: INTERNAL MEDICINE

## 2021-06-05 PROCEDURE — 74011250637 HC RX REV CODE- 250/637: Performed by: INTERNAL MEDICINE

## 2021-06-05 PROCEDURE — 99233 SBSQ HOSP IP/OBS HIGH 50: CPT | Performed by: INTERNAL MEDICINE

## 2021-06-05 PROCEDURE — 74011250637 HC RX REV CODE- 250/637: Performed by: PHYSICIAN ASSISTANT

## 2021-06-05 PROCEDURE — 80048 BASIC METABOLIC PNL TOTAL CA: CPT

## 2021-06-05 PROCEDURE — 36415 COLL VENOUS BLD VENIPUNCTURE: CPT

## 2021-06-05 PROCEDURE — 74011250636 HC RX REV CODE- 250/636: Performed by: INTERNAL MEDICINE

## 2021-06-05 RX ORDER — METOPROLOL TARTRATE 25 MG/1
25 TABLET, FILM COATED ORAL 4 TIMES DAILY
Status: DISCONTINUED | OUTPATIENT
Start: 2021-06-05 | End: 2021-06-06

## 2021-06-05 RX ADMIN — METOPROLOL SUCCINATE 50 MG: 25 TABLET, EXTENDED RELEASE ORAL at 08:45

## 2021-06-05 RX ADMIN — METOPROLOL TARTRATE 25 MG: 25 TABLET, FILM COATED ORAL at 13:22

## 2021-06-05 RX ADMIN — METHIMAZOLE 5 MG: 5 TABLET ORAL at 08:45

## 2021-06-05 RX ADMIN — APIXABAN 2.5 MG: 2.5 TABLET, FILM COATED ORAL at 21:43

## 2021-06-05 RX ADMIN — Medication 10 ML: at 13:28

## 2021-06-05 RX ADMIN — METOPROLOL TARTRATE 25 MG: 25 TABLET, FILM COATED ORAL at 17:18

## 2021-06-05 RX ADMIN — OXYCODONE HYDROCHLORIDE 15 MG: 15 TABLET ORAL at 21:43

## 2021-06-05 RX ADMIN — CEFTRIAXONE SODIUM 1 G: 1 INJECTION, POWDER, FOR SOLUTION INTRAMUSCULAR; INTRAVENOUS at 17:02

## 2021-06-05 RX ADMIN — FUROSEMIDE 20 MG: 20 TABLET ORAL at 17:02

## 2021-06-05 RX ADMIN — METOPROLOL TARTRATE 25 MG: 25 TABLET, FILM COATED ORAL at 09:42

## 2021-06-05 RX ADMIN — APIXABAN 2.5 MG: 2.5 TABLET, FILM COATED ORAL at 08:45

## 2021-06-05 RX ADMIN — Medication 10 ML: at 05:40

## 2021-06-05 RX ADMIN — Medication 10 ML: at 21:45

## 2021-06-05 RX ADMIN — Medication 10 ML: at 13:29

## 2021-06-05 RX ADMIN — METOPROLOL TARTRATE 25 MG: 25 TABLET, FILM COATED ORAL at 21:43

## 2021-06-05 NOTE — ROUTINE PROCESS
Bedside and Verbal shift change report given to self (oncoming nurse) by Vanessa Staley (offgoing nurse). Report included the following information SBAR, Kardex, MAR and Recent Results.

## 2021-06-05 NOTE — PROGRESS NOTES
[facility] Hospitalist Progress Note     Name:  Seda Winters  Age:89 y.o. Sex:female   :  3/3/1924    MRN:  [id number]     Admit Date:  2021    Reason for Admission:  Atrial fibrillation with RVR [facility]) [I48.91]    Hospital Course/Interval history:     81 y/o WF with a h/o hyperthyroidism on methimazole and AFib admitted to Cardiology service on  with rapid AFib. Thyroid studies were checked and noted elevated TSH at 13 and mildly reduced FT4 at 0.8. Reports compliance with methimazole and has been taking 5mg BID for several months. Hospitalist was consulted for further recommendations. Subjective (21): Alert and oriented x3. Daughter at bedside, HR noted A Fib RVR rate, patient denies any pain or dizziness. Does endorse feeling weak. Review of Systems: 14 point review of systems is otherwise negative with the exception of the elements mentioned above. Assessment & Plan     # Leukocytosis   resolved     # Hyperthyroidism              - Elevated TSH and mildly reduced FT4. Reduce methimazole from 5mg BID to once daily, will need repeat thyroid studies in 4-6 weeks with PCP.      # AFib RVR              - Eliquis and short acting Metoprolol. . Mgmt per Cardiology.     # Chronic macrocytic anemia              - No bleeding, Hb stable. Outpt f/u.      Other listed chronic conditions stable, continue current management.     Discharge planning: Per primary.   Diet:  DIET ADULT  DVT ppx: Eliquis        Objective:     Patient Vitals for the past 24 hrs:   Temp Pulse Resp BP SpO2   21 0903 97.9 °F (36.6 °C) (!) 137 16 (!) 145/53 98 %   21 0509 98.2 °F (36.8 °C) (!) 132 18 121/76 97 %   21 0128 97.8 °F (36.6 °C) (!) 114 18 104/68 98 %   21 1925 98.1 °F (36.7 °C) (!) 108 17 125/69 99 %   21 1650 97.5 °F (36.4 °C) (!) 117 18 128/69 98 %   21 1214 97.5 °F (36.4 °C) (!) 101 17 114/63 98 %     Oxygen Therapy  O2 Sat (%): 98 % (21 0903)  Pulse via Oximetry: 128 beats per minute (06/01/21 1707)  O2 Device: None (Room air) (06/05/21 0844)    Body mass index is 19.31 kg/m². Physical Exam:   General-no distress  Neck- supple, no JVD  CV- irregular rate and rhythm no MRG  Lung- clear bilaterally  Abd- soft, nontender, nondistended  Ext- no edema bilaterally. Skin- warm and dry  Psychiatric:  Normal mood and affect. Neurologic:  Alert and oriented X 3       Data Review:  I have reviewed all labs, meds, and studies from the last 24 hours:    Labs:    Recent Results (from the past 24 hour(s))   URINALYSIS W/ RFLX MICROSCOPIC    Collection Time: 06/04/21 12:35 PM   Result Value Ref Range    Color YELLOW      Appearance TURBID      Specific gravity 1.015 1.001 - 1.023      pH (UA) 6.0 5.0 - 9.0      Protein 30 (A) NEG mg/dL    Glucose Negative mg/dL    Ketone Negative NEG mg/dL    Bilirubin Negative NEG      Blood MODERATE (A) NEG      Urobilinogen 0.2 0.2 - 1.0 EU/dL    Nitrites Negative NEG      Leukocyte Esterase MODERATE (A) NEG     URINE MICROSCOPIC    Collection Time: 06/04/21 12:35 PM   Result Value Ref Range    WBC >100 0 /hpf    RBC 5-10 0 /hpf    Epithelial cells 3-5 0 /hpf    Bacteria 2+ (H) 0 /hpf    Casts 0 0 /lpf    Crystals, urine 0 0 /LPF    Amorphous Crystals 1+ (H) 0    Mucus 0 0 /lpf    Other observations RESULTS VERIFIED MANUALLY     CULTURE, URINE    Collection Time: 06/04/21 12:36 PM    Specimen: Clean catch; Urine   Result Value Ref Range    Special Requests: NO SPECIAL REQUESTS      Culture result:        NO GROWTH AFTER SHORT PERIOD OF INCUBATION. FURTHER RESULTS TO FOLLOW AFTER OVERNIGHT INCUBATION.    METABOLIC PANEL, BASIC    Collection Time: 06/05/21  5:11 AM   Result Value Ref Range    Sodium 141 136 - 145 mmol/L    Potassium 4.0 3.5 - 5.1 mmol/L    Chloride 108 (H) 98 - 107 mmol/L    CO2 27 21 - 32 mmol/L    Anion gap 6 (L) 7 - 16 mmol/L    Glucose 110 (H) 65 - 100 mg/dL    BUN 29 (H) 8 - 23 MG/DL    Creatinine 1.11 (H) 0.6 - 1.0 MG/DL    GFR est AA 58 (L) >60 ml/min/1.73m2    GFR est non-AA 48 (L) >60 ml/min/1.73m2    Calcium 8.4 8.3 - 10.4 MG/DL       All Micro Results     Procedure Component Value Units Date/Time    CULTURE, URINE [[id number]] Collected: 06/04/21 1236    Order Status: Completed Specimen: Urine from Clean catch Updated: 06/05/21 0910     Special Requests: NO SPECIAL REQUESTS        Culture result:       NO GROWTH AFTER SHORT PERIOD OF INCUBATION. FURTHER RESULTS TO FOLLOW AFTER OVERNIGHT INCUBATION. EKG Results     Procedure 720 Value Units Date/Time    EKG 12 LEAD SUBSEQUENT [[id number]] Collected: 06/01/21 1209    Order Status: Completed Updated: 06/01/21 1704     Ventricular Rate 73 BPM      Atrial Rate 153 BPM      QRS Duration 112 ms      Q-T Interval 362 ms      QTC Calculation (Bezet) 398 ms      Calculated R Axis -42 degrees      Calculated T Axis 123 degrees      Diagnosis --     !! AGE AND GENDER SPECIFIC ECG ANALYSIS !! Atrial fibrillation  Left axis deviation  Anterolateral infarct , age undetermined  Abnormal ECG  When compared with ECG of 05-MAR-2020 16:39,  Atrial fibrillation has replaced Sinus rhythm  Left bundle branch block is no longer Present  Anterior infarct is now Present  Anterolateral infarct is now Present  Confirmed by Robin Johnson MD ()JORGE ([id number]) on 6/1/2021 5:04:07 PM      EKG [[id number]] Collected: 06/01/21 1103    Order Status: Completed Updated: 06/01/21 1702     Ventricular Rate 155 BPM      Atrial Rate 178 BPM      QRS Duration 124 ms      Q-T Interval 320 ms      QTC Calculation (Bezet) 514 ms      Calculated R Axis -112 degrees      Calculated T Axis 65 degrees      Diagnosis --     Wide complex tacyhcardia - suspect atrial flutter  Left bundle branch block  Confirmed by Robin Johnson MD ()JORGE ([id number]) on 6/1/2021 5:02:50 PM            Other Studies:  No results found.     Current Meds:   Current Facility-Administered Medications   Medication Dose Route Frequency  metoprolol tartrate (LOPRESSOR) tablet 25 mg  25 mg Oral QID    cefTRIAXone (ROCEPHIN) 1 g in 0.9% sodium chloride (MBP/ADV) 50 mL MBP  1 g IntraVENous Q24H    methIMAzole (TAPAZOLE) tablet 5 mg  5 mg Oral DAILY    sodium chloride (NS) flush 5-10 mL  5-10 mL IntraVENous Q8H    sodium chloride (NS) flush 5-10 mL  5-10 mL IntraVENous PRN    apixaban (ELIQUIS) tablet 2.5 mg  2.5 mg Oral Q12H    furosemide (LASIX) tablet 20 mg  20 mg Oral EVERY OTHER DAY    oxyCODONE IR (OXY-IR) immediate release tablet 15 mg  15 mg Oral Q4H PRN    polyethylene glycol (MIRALAX) powder 17 g  17 g Oral PRN    sodium chloride (NS) flush 5-40 mL  5-40 mL IntraVENous Q8H    sodium chloride (NS) flush 5-40 mL  5-40 mL IntraVENous PRN    morphine injection 2 mg  2 mg IntraVENous Q4H PRN       Problem List:  Hospital Problems as of 6/5/2021 Date Reviewed: 11/2/2020        Codes Class Noted - Resolved POA    Systolic CHF, chronic ([facility] ICD-10-CM: I50.22  ICD-9-CM: 428.22, 428.0  6/1/2021 - Present Yes        Atrial fibrillation with RVR (HCC) ICD-10-CM: I48.91  ICD-9-CM: 427.31  6/1/2021 - Present Yes        * (Principal) Atrial fibrillation with rapid ventricular response (HCC) ICD-10-CM: I48.91  ICD-9-CM: 427.31  4/24/2019 - Present Yes        Hypertension (Chronic) ICD-10-CM: I10  ICD-9-CM: 401.9  12/1/2015 - Present Yes        CAD (coronary artery disease) (Chronic) ICD-10-CM: I25.10  ICD-9-CM: 414.00  11/29/2015 - Present Yes               Signed By: Liam Fuentes NP   [employer] [facility]    June 5, 2021  5:15 PM

## 2021-06-05 NOTE — ROUTINE PROCESS
Bedside and Verbal shift change report given to Phil Quevedo 66. (oncoming nurse) by self (offgoing nurse). Report included the following information SBAR, Kardex, Intake/Output, MAR and Recent Results.

## 2021-06-05 NOTE — PROGRESS NOTES
[facility] [[email or web address]] CARDIOLOGY PROGRESS NOTE           6/5/2021 8:57 AM    Admit Date: 6/1/2021      Subjective:   No complaints. ROS:  GEN:  No fever or chills  Cardiovascular:  As noted above:no CP or palpitations. Pulmonary:  As noted above:no SOB  Neuro:  No new focal motor or sensory loss    Objective:      Vitals:    06/04/21 1650 06/04/21 1925 06/05/21 0128 06/05/21 0509   BP: 128/69 125/69 104/68 121/76   Pulse: (!) 117 (!) 108 (!) 114 (!) 132   Resp: 18 17 18 18   Temp: 97.5 °F (36.4 °C) 98.1 °F (36.7 °C) 97.8 °F (36.6 °C) 98.2 °F (36.8 °C)   SpO2: 98% 99% 98% 97%   Weight:       Height:           Physical Exam:  General-no distress  Neck- supple, no JVD  CV- irregular rate and rhythm no MRG  Lung- clear bilaterally  Abd- soft, nontender, nondistended  Ext- no edema bilaterally. Skin- warm and dry  Psychiatric:  Normal mood and affect. Neurologic:  Alert and oriented X 3      Data Review:   Recent Labs     06/05/21  0511 06/04/21  0456 06/03/21  0502    139 140   K 4.0 3.8 4.2   BUN 29* 24* 22   CREA 1.11* 1.05* 1.07*   * 88 146*   WBC  --  9.8 15.6*   HGB  --  9.2* 10.4*   HCT  --  28.8* 32.9*   PLT  --  189 276       TELEMETRY:  AF with -150 bpm.    Assessment/Plan:     Principal Problem:    Atrial fibrillation with rapid ventricular response (HCC) (4/24/2019):Persist.Longacting Metoprolol is proving ineffective at controlling tachycardia. Try to go back short acting Metoprolol. Ultimately,may benefit from VVI PPM and AV modesta ablation. Active Problems:    CAD (coronary artery disease) (11/29/2015): No angina. Continue BB. Hypertension (12/1/2015): Stable. Continue BB. Systolic CHF, chronic ([address] (6/1/2021):stable at present. Ultimately,will need to change Lopressor to Toprol.                     Kendra Melendez MD  6/5/2021 8:57 AM

## 2021-06-05 NOTE — ROUTINE PROCESS
Bedside and Verbal shift change report given to [facility] SARAI CAPUTO (oncoming nurse) by myself (offgoing nurse). Report included the following information SBAR, Kardex, MAR and Recent Results.

## 2021-06-05 NOTE — ROUTINE PROCESS
Bedside and Verbal shift change report given to myself (oncoming nurse) by Cortney Shahid RN (offgoing nurse). Report included the following information SBAR, Kardex, MAR and Recent Results.

## 2021-06-06 LAB
ANION GAP SERPL CALC-SCNC: 9 MMOL/L (ref 7–16)
BACTERIA SPEC CULT: NORMAL
BUN SERPL-MCNC: 32 MG/DL (ref 8–23)
CALCIUM SERPL-MCNC: 8.7 MG/DL (ref 8.3–10.4)
CHLORIDE SERPL-SCNC: 109 MMOL/L (ref 98–107)
CO2 SERPL-SCNC: 23 MMOL/L (ref 21–32)
CREAT SERPL-MCNC: 1.07 MG/DL (ref 0.6–1)
GLUCOSE SERPL-MCNC: 164 MG/DL (ref 65–100)
POTASSIUM SERPL-SCNC: 4 MMOL/L (ref 3.5–5.1)
SERVICE CMNT-IMP: NORMAL
SODIUM SERPL-SCNC: 141 MMOL/L (ref 136–145)

## 2021-06-06 PROCEDURE — 74011000258 HC RX REV CODE- 258: Performed by: INTERNAL MEDICINE

## 2021-06-06 PROCEDURE — 36415 COLL VENOUS BLD VENIPUNCTURE: CPT

## 2021-06-06 PROCEDURE — 2709999900 HC NON-CHARGEABLE SUPPLY

## 2021-06-06 PROCEDURE — 74011250637 HC RX REV CODE- 250/637: Performed by: PHYSICIAN ASSISTANT

## 2021-06-06 PROCEDURE — 65660000000 HC RM CCU STEPDOWN

## 2021-06-06 PROCEDURE — 74011250636 HC RX REV CODE- 250/636: Performed by: INTERNAL MEDICINE

## 2021-06-06 PROCEDURE — 99233 SBSQ HOSP IP/OBS HIGH 50: CPT | Performed by: INTERNAL MEDICINE

## 2021-06-06 PROCEDURE — 74011250637 HC RX REV CODE- 250/637: Performed by: INTERNAL MEDICINE

## 2021-06-06 PROCEDURE — 80048 BASIC METABOLIC PNL TOTAL CA: CPT

## 2021-06-06 PROCEDURE — 74011000250 HC RX REV CODE- 250: Performed by: INTERNAL MEDICINE

## 2021-06-06 RX ORDER — DILTIAZEM HYDROCHLORIDE 5 MG/ML
5 INJECTION INTRAVENOUS ONCE
Status: COMPLETED | OUTPATIENT
Start: 2021-06-06 | End: 2021-06-06

## 2021-06-06 RX ADMIN — OXYCODONE HYDROCHLORIDE 15 MG: 15 TABLET ORAL at 16:07

## 2021-06-06 RX ADMIN — Medication 10 ML: at 21:00

## 2021-06-06 RX ADMIN — APIXABAN 2.5 MG: 2.5 TABLET, FILM COATED ORAL at 20:57

## 2021-06-06 RX ADMIN — SODIUM CHLORIDE 2.5 MG/HR: 900 INJECTION, SOLUTION INTRAVENOUS at 10:42

## 2021-06-06 RX ADMIN — METHIMAZOLE 5 MG: 5 TABLET ORAL at 09:06

## 2021-06-06 RX ADMIN — DILTIAZEM HYDROCHLORIDE 5 MG: 5 INJECTION INTRAVENOUS at 10:46

## 2021-06-06 RX ADMIN — CEFTRIAXONE SODIUM 1 G: 1 INJECTION, POWDER, FOR SOLUTION INTRAMUSCULAR; INTRAVENOUS at 15:55

## 2021-06-06 RX ADMIN — APIXABAN 2.5 MG: 2.5 TABLET, FILM COATED ORAL at 09:06

## 2021-06-06 RX ADMIN — Medication 10 ML: at 05:39

## 2021-06-06 RX ADMIN — OXYCODONE HYDROCHLORIDE 15 MG: 15 TABLET ORAL at 09:14

## 2021-06-06 RX ADMIN — OXYCODONE HYDROCHLORIDE 15 MG: 15 TABLET ORAL at 20:57

## 2021-06-06 RX ADMIN — METOPROLOL TARTRATE 25 MG: 25 TABLET, FILM COATED ORAL at 09:06

## 2021-06-06 NOTE — PROGRESS NOTES
Pt's HR frequently increasing into the 160's. Morning metoprolol given and [facility] PA made aware. Said she will notify Dr. Juliano Ortiz. Will continue to monitor.

## 2021-06-06 NOTE — PROGRESS NOTES
INITIAL SPIRITUAL ASSESSMENT     NOTED:    Congregation    DNR    ACP ON FILE    HIGH RISK FOR DECLINE      WILL ASSESS HOW WE CAN BEST SERVE THIS FAMILY

## 2021-06-06 NOTE — ROUTINE PROCESS
Bedside and Verbal shift change report given to Ike Severino RN (oncoming nurse) by myself (offgoing nurse). Report included the following information SBAR, Kardex, MAR and Recent Results.

## 2021-06-06 NOTE — ROUTINE PROCESS
Bedside and Verbal shift change report given to myself (oncoming nurse) by Steve Paige RN (offgoing nurse). Report included the following information SBAR, Kardex, MAR and Recent Results.

## 2021-06-06 NOTE — PROGRESS NOTES
[facility] Progress Note     Name:  Maura Tijerina  Age:89 y.o. Sex:female   :  3/3/1924    MRN:  [id number]     Admit Date:  2021    Reason for Admission:  Atrial fibrillation with RVR [facility]) [I48.91]    Hospital Course/Interval history:     79 y/o WF with a h/o hyperthyroidism on methimazole and AFib admitted to Cardiology service on  with rapid AFib. Thyroid studies were checked and noted elevated TSH at 13 and mildly reduced FT4 at 0.8. Reports compliance with methimazole and has been taking 5mg BID for several months. Hospitalist was consulted for further recommendations. Subjective (21): Alert and oriented x3, hearing aid in. . Daughter at bedside, HR noted A Fib RVR rate, patient denies any pain or dizziness. Does endorse feeling weak. Review of Systems: 14 point review of systems is otherwise negative with the exception of the elements mentioned above. Assessment & Plan     # Leukocytosis   resolved     # Hyperthyroidism  - Elevated TSH and mildly reduced FT4. Reduce methimazole from 5mg BID to once daily, will need repeat thyroid studies in 4-6 weeks with PCP.      # AFib RVR  - Eliquis and short acting Metoprolol. Mandi Henry Mgmt per Cardiology.  remains in A Fib with RVR, cardiology managing. May need VVI PPM and AV modesta ablation.     # Chronic macrocytic anemia  - No bleeding, Hb stable. Outpt f/u.      Chronic systolic heart failure:  Stable, no signs of overload. Home medication of Lasix 20 mg every other day, on BB. Echo from  shows EF 25-30%     Discharge planning: Per primary.   Diet:  DIET ADULT  DVT ppx: Eliquis        Objective:     Patient Vitals for the past 24 hrs:   Temp Pulse Resp BP SpO2   21 0547 97.7 °F (36.5 °C) (!) 144 18 123/78 94 %   21 0035 97.7 °F (36.5 °C) (!) 146 18 130/75 94 %   21 2014 97.8 °F (36.6 °C) (!) 144 18 105/66 96 %   21 1642 98.9 °F (37.2 °C) (!) 122 18 110/83 100 %   21 1209 98 °F (36.7 °C) (!) 130 20 130/61 98 %   06/05/21 0903 97.9 °F (36.6 °C) (!) 137 16 (!) 145/53 98 %     Oxygen Therapy  O2 Sat (%): 94 % (06/06/21 0547)  Pulse via Oximetry: 128 beats per minute (06/01/21 1707)  O2 Device: None (Room air) (06/06/21 0408)    Body mass index is 19.31 kg/m². Physical Exam:   General-no distress  Neck- supple, no JVD  CV- irregular rate and rhythm no MRG  Lung- clear bilaterally  Abd- soft, nontender, nondistended  Ext- no edema bilaterally. Skin- warm and dry  Psychiatric:  Normal mood and affect. Neurologic:  Alert and oriented X 3       Data Review:  I have reviewed all labs, meds, and studies from the last 24 hours:    Labs:    Recent Results (from the past 24 hour(s))   METABOLIC PANEL, BASIC    Collection Time: 06/06/21  4:42 AM   Result Value Ref Range    Sodium 141 136 - 145 mmol/L    Potassium 4.0 3.5 - 5.1 mmol/L    Chloride 109 (H) 98 - 107 mmol/L    CO2 23 21 - 32 mmol/L    Anion gap 9 7 - 16 mmol/L    Glucose 164 (H) 65 - 100 mg/dL    BUN 32 (H) 8 - 23 MG/DL    Creatinine 1.07 (H) 0.6 - 1.0 MG/DL    GFR est AA >60 >60 ml/min/1.73m2    GFR est non-AA 50 (L) >60 ml/min/1.73m2    Calcium 8.7 8.3 - 10.4 MG/DL       All Micro Results     Procedure Component Value Units Date/Time    CULTURE, URINE [[id number]] Collected: 06/04/21 1236    Order Status: Completed Specimen: Urine from Clean catch Updated: 06/05/21 0910     Special Requests: NO SPECIAL REQUESTS        Culture result:       NO GROWTH AFTER SHORT PERIOD OF INCUBATION. FURTHER RESULTS TO FOLLOW AFTER OVERNIGHT INCUBATION.                 EKG Results     Procedure 720 Value Units Date/Time    EKG 12 LEAD SUBSEQUENT [[id number]] Collected: 06/01/21 1209    Order Status: Completed Updated: 06/01/21 1704     Ventricular Rate 73 BPM      Atrial Rate 153 BPM      QRS Duration 112 ms      Q-T Interval 362 ms      QTC Calculation (Bezet) 398 ms      Calculated R Axis -42 degrees      Calculated T Axis 123 degrees      Diagnosis --     !! AGE AND GENDER SPECIFIC ECG ANALYSIS !! Atrial fibrillation  Left axis deviation  Anterolateral infarct , age undetermined  Abnormal ECG  When compared with ECG of 05-MAR-2020 16:39,  Atrial fibrillation has replaced Sinus rhythm  Left bundle branch block is no longer Present  Anterior infarct is now Present  Anterolateral infarct is now Present  Confirmed by Ayla Rivero MD (), JORGE VALENTIN ([id number]) on 6/1/2021 5:04:07 PM      EKG [[id number]] Collected: 06/01/21 1103    Order Status: Completed Updated: 06/01/21 1702     Ventricular Rate 155 BPM      Atrial Rate 178 BPM      QRS Duration 124 ms      Q-T Interval 320 ms      QTC Calculation (Bezet) 514 ms      Calculated R Axis -112 degrees      Calculated T Axis 65 degrees      Diagnosis --     Wide complex tacyhcardia - suspect atrial flutter  Left bundle branch block  Confirmed by Ayla Rivero MD ()JORGE ([id number]) on 6/1/2021 5:02:50 PM            Other Studies:  No results found.     Current Meds:   Current Facility-Administered Medications   Medication Dose Route Frequency    metoprolol tartrate (LOPRESSOR) tablet 25 mg  25 mg Oral QID    cefTRIAXone (ROCEPHIN) 1 g in 0.9% sodium chloride (MBP/ADV) 50 mL MBP  1 g IntraVENous Q24H    methIMAzole (TAPAZOLE) tablet 5 mg  5 mg Oral DAILY    sodium chloride (NS) flush 5-10 mL  5-10 mL IntraVENous Q8H    sodium chloride (NS) flush 5-10 mL  5-10 mL IntraVENous PRN    apixaban (ELIQUIS) tablet 2.5 mg  2.5 mg Oral Q12H    furosemide (LASIX) tablet 20 mg  20 mg Oral EVERY OTHER DAY    oxyCODONE IR (OXY-IR) immediate release tablet 15 mg  15 mg Oral Q4H PRN    polyethylene glycol (MIRALAX) powder 17 g  17 g Oral PRN    sodium chloride (NS) flush 5-40 mL  5-40 mL IntraVENous Q8H    sodium chloride (NS) flush 5-40 mL  5-40 mL IntraVENous PRN    morphine injection 2 mg  2 mg IntraVENous Q4H PRN       Problem List:  Hospital Problems as of 6/6/2021 Date Reviewed: 11/2/2020        Codes Class Noted - Resolved POA Systolic CHF, chronic (HCC) ICD-10-CM: I50.22  ICD-9-CM: 428.22, 428.0  6/1/2021 - Present Yes        Atrial fibrillation with RVR (HCC) ICD-10-CM: I48.91  ICD-9-CM: 427.31  6/1/2021 - Present Yes        * (Principal) Atrial fibrillation with rapid ventricular response (HCC) ICD-10-CM: I48.91  ICD-9-CM: 427.31  4/24/2019 - Present Yes        Hypertension (Chronic) ICD-10-CM: I10  ICD-9-CM: 401.9  12/1/2015 - Present Yes        CAD (coronary artery disease) (Chronic) ICD-10-CM: I25.10  ICD-9-CM: 414.00  11/29/2015 - Present Yes               Signed By: Tsering Eng NP   [employer] [facility]    June 6, 2021  5:15 PM

## 2021-06-06 NOTE — PROGRESS NOTES
[facility] CARDIOLOGY PROGRESS NOTE           6/6/2021 10:18 AM    Admit Date: 6/1/2021      Subjective:   AF persists with RVR. Surprisingly,she denies complaints. ROS:  GEN:  No fever or chills  Cardiovascular:  As noted above:no CP or palpitations. Pulmonary:  As noted above:no SOB  Neuro:  No new focal motor or sensory loss    Objective:      Vitals:    06/05/21 2014 06/06/21 0035 06/06/21 0547 06/06/21 0830   BP: 105/66 130/75 123/78 (!) 120/92   Pulse: (!) 144 (!) 146 (!) 144 (!) 130   Resp: 18 18 18 20   Temp: 97.8 °F (36.6 °C) 97.7 °F (36.5 °C) 97.7 °F (36.5 °C) 97 °F (36.1 °C)   SpO2: 96% 94% 94% 98%   Weight:       Height:           Physical Exam:  General-no distress  Neck- supple, no JVD  CV- irregular rate and rhythm no MRG  Lung- clear bilaterally  Abd- soft, nontender, nondistended  Ext- no edema bilaterally. Skin- warm and dry  Psychiatric:  Normal mood and affect. Neurologic:  Alert and oriented X 3      Data Review:   Recent Labs     06/06/21  0442 06/05/21  0511 06/04/21  0456    141 139   K 4.0 4.0 3.8   BUN 32* 29* 24*   CREA 1.07* 1.11* 1.05*   * 110* 88   WBC  --   --  9.8   HGB  --   --  9.2*   HCT  --   --  28.8*   PLT  --   --  189       TELEMETRY: AF with HR of 150 bpm.    Assessment/Plan:     Principal Problem:    Atrial fibrillation with rapid ventricular response (HCC) (4/24/2019):Persists. po Metoprolol is ineffective. Resolve iv Cardizem and drip. Continue Eliquis. Active Problems:    CAD (coronary artery disease) (11/29/2015): Stable. No angina despite persistent tachycardia. Trying to control tachycardia. Switching po Metoprolol back to iv Cardizem. Future consideration for cardioversion or AV modesta ablation and pacemaker may need to be considered by EP. Hypertension (12/1/2015): Stable on Cardizem. Systolic CHF, chronic ([facility] [address] (6/1/2021): Stable at present. Continue Lasix.                 Jared Pérez MD  6/6/2021 10:18 AM

## 2021-06-07 LAB
ANION GAP SERPL CALC-SCNC: 9 MMOL/L (ref 7–16)
BASOPHILS # BLD: 0.1 K/UL (ref 0–0.2)
BASOPHILS NFR BLD: 1 % (ref 0–2)
BUN SERPL-MCNC: 33 MG/DL (ref 8–23)
CALCIUM SERPL-MCNC: 8.2 MG/DL (ref 8.3–10.4)
CHLORIDE SERPL-SCNC: 107 MMOL/L (ref 98–107)
CO2 SERPL-SCNC: 25 MMOL/L (ref 21–32)
CREAT SERPL-MCNC: 1.12 MG/DL (ref 0.6–1)
DIFFERENTIAL METHOD BLD: ABNORMAL
EOSINOPHIL # BLD: 0.3 K/UL (ref 0–0.8)
EOSINOPHIL NFR BLD: 2 % (ref 0.5–7.8)
ERYTHROCYTE [DISTWIDTH] IN BLOOD BY AUTOMATED COUNT: 13.8 % (ref 11.9–14.6)
GLUCOSE SERPL-MCNC: 104 MG/DL (ref 65–100)
HCT VFR BLD AUTO: 30.9 % (ref 35.8–46.3)
HGB BLD-MCNC: 10.1 G/DL (ref 11.7–15.4)
IMM GRANULOCYTES # BLD AUTO: 0.2 K/UL (ref 0–0.5)
IMM GRANULOCYTES NFR BLD AUTO: 2 % (ref 0–5)
LYMPHOCYTES # BLD: 1.7 K/UL (ref 0.5–4.6)
LYMPHOCYTES NFR BLD: 14 % (ref 13–44)
MCH RBC QN AUTO: 32.7 PG (ref 26.1–32.9)
MCHC RBC AUTO-ENTMCNC: 32.7 G/DL (ref 31.4–35)
MCV RBC AUTO: 100 FL (ref 79.6–97.8)
MONOCYTES # BLD: 1.2 K/UL (ref 0.1–1.3)
MONOCYTES NFR BLD: 9 % (ref 4–12)
NEUTS SEG # BLD: 9.3 K/UL (ref 1.7–8.2)
NEUTS SEG NFR BLD: 73 % (ref 43–78)
NRBC # BLD: 0 K/UL (ref 0–0.2)
PLATELET # BLD AUTO: 242 K/UL (ref 150–450)
PMV BLD AUTO: 10.4 FL (ref 9.4–12.3)
POTASSIUM SERPL-SCNC: 3.7 MMOL/L (ref 3.5–5.1)
RBC # BLD AUTO: 3.09 M/UL (ref 4.05–5.2)
SODIUM SERPL-SCNC: 141 MMOL/L (ref 136–145)
WBC # BLD AUTO: 12.8 K/UL (ref 4.3–11.1)

## 2021-06-07 PROCEDURE — 99233 SBSQ HOSP IP/OBS HIGH 50: CPT | Performed by: INTERNAL MEDICINE

## 2021-06-07 PROCEDURE — 74011000258 HC RX REV CODE- 258: Performed by: INTERNAL MEDICINE

## 2021-06-07 PROCEDURE — 65660000000 HC RM CCU STEPDOWN

## 2021-06-07 PROCEDURE — 74011250637 HC RX REV CODE- 250/637: Performed by: PHYSICIAN ASSISTANT

## 2021-06-07 PROCEDURE — 36415 COLL VENOUS BLD VENIPUNCTURE: CPT

## 2021-06-07 PROCEDURE — 80048 BASIC METABOLIC PNL TOTAL CA: CPT

## 2021-06-07 PROCEDURE — 74011250637 HC RX REV CODE- 250/637: Performed by: INTERNAL MEDICINE

## 2021-06-07 PROCEDURE — 74011250636 HC RX REV CODE- 250/636: Performed by: INTERNAL MEDICINE

## 2021-06-07 PROCEDURE — 74011250636 HC RX REV CODE- 250/636: Performed by: NURSE PRACTITIONER

## 2021-06-07 PROCEDURE — 85025 COMPLETE CBC W/AUTO DIFF WBC: CPT

## 2021-06-07 PROCEDURE — 2709999900 HC NON-CHARGEABLE SUPPLY

## 2021-06-07 RX ORDER — DIGOXIN 0.25 MG/ML
125 INJECTION INTRAMUSCULAR; INTRAVENOUS
Status: COMPLETED | OUTPATIENT
Start: 2021-06-07 | End: 2021-06-07

## 2021-06-07 RX ADMIN — OXYCODONE HYDROCHLORIDE 15 MG: 15 TABLET ORAL at 18:49

## 2021-06-07 RX ADMIN — APIXABAN 2.5 MG: 2.5 TABLET, FILM COATED ORAL at 20:46

## 2021-06-07 RX ADMIN — Medication 10 ML: at 23:12

## 2021-06-07 RX ADMIN — Medication 10 ML: at 06:13

## 2021-06-07 RX ADMIN — METHIMAZOLE 5 MG: 5 TABLET ORAL at 09:04

## 2021-06-07 RX ADMIN — OXYCODONE HYDROCHLORIDE 15 MG: 15 TABLET ORAL at 12:35

## 2021-06-07 RX ADMIN — SODIUM CHLORIDE 7.5 MG/HR: 900 INJECTION, SOLUTION INTRAVENOUS at 00:40

## 2021-06-07 RX ADMIN — OXYCODONE HYDROCHLORIDE 15 MG: 15 TABLET ORAL at 06:39

## 2021-06-07 RX ADMIN — SODIUM CHLORIDE 7.5 MG/HR: 900 INJECTION, SOLUTION INTRAVENOUS at 09:15

## 2021-06-07 RX ADMIN — FUROSEMIDE 20 MG: 20 TABLET ORAL at 16:31

## 2021-06-07 RX ADMIN — DIGOXIN 125 MCG: 0.25 INJECTION INTRAMUSCULAR; INTRAVENOUS at 18:28

## 2021-06-07 RX ADMIN — APIXABAN 2.5 MG: 2.5 TABLET, FILM COATED ORAL at 09:04

## 2021-06-07 NOTE — PROGRESS NOTES
Pts HR has slightly improved and is still above 100; A Fib RVR rate. CM met with pt and her dtr, who was at bedside. PT is recommending HH, however, pt and her dtr report that it is not needed at this time. CM will continue to follow. Care Management Interventions  PCP Verified by CM: Yes Radha Fisher MD)  Mode of Transport at Discharge:  Other (see comment) (Family; daughter)  Transition of Care Consult (CM Consult): Discharge Planning  Discharge Durable Medical Equipment: No  Physical Therapy Consult: Yes  Occupational Therapy Consult: No  Speech Therapy Consult: No  Current Support Network: Family Lives Nearby, [facility] (Pt lives with her two daughters)  Confirm Follow Up Transport: Family  The Plan for Transition of Care is Related to the Following Treatment Goals : Return home and back to her baseline  Discharge Location  Discharge Placement: Home

## 2021-06-07 NOTE — PROGRESS NOTES
PT Note:  Attempted PT. Patient politely declined stating her LB is bothering her and she wants to rest.  Will attempt another time/day as schedule permits.   A Jonathan Nayak, PT, DPT

## 2021-06-07 NOTE — ROUTINE PROCESS
Bedside and Verbal shift change report given to SABINO SANTANA RN (oncoming nurse) by myself (offgoing nurse). Report included the following information SBAR, Kardex, MAR and Recent Results.

## 2021-06-07 NOTE — PROGRESS NOTES
[facility] Progress Note     Name:  Dinora Kwok  Age:89 y.o. Sex:female   :  3/3/1924    MRN:  [id number]     Admit Date:  2021    Reason for Admission:  Atrial fibrillation with RVR [facility]) [I48.91]    Hospital Course/Interval history:     81 y/o WF with a h/o hyperthyroidism on methimazole and AFib admitted to Cardiology service on  with rapid AFib. Thyroid studies were checked and noted elevated TSH at 13 and mildly reduced FT4 at 0.8. Reports compliance with methimazole and has been taking 5mg BID for several months. Hospitalist was consulted for further recommendations. Subjective (21): Alert and oriented x3, hearing aid in. States feeling some better. Daughter at bedside, HR slightly improved rate, still above 100; noted A Fib RVR rate, patient denies any pain or dizziness. On Cardizem drip. Review of Systems: 14 point review of systems is otherwise negative with the exception of the elements mentioned above. Assessment & Plan     # Leukocytosis   resolved     # Hyperthyroidism  - Elevated TSH and mildly reduced FT4. Reduce methimazole from 5mg BID to once daily, will need repeat thyroid studies in 4-6 weeks with PCP.      # AFib RVR  - Eliquis and short acting Metoprolol. Mgmt per Cardiology.  remains in A Fib with RVR, cardiology managing. May need VVI PPM and AV modesta ablation.     # Chronic macrocytic anemia  - No bleeding, Hb stable. Outpt f/u.      Chronic systolic heart failure:  Stable, no signs of overload. Home medication of Lasix 20 mg every other day, on BB. Echo from  shows EF 25-30%    UTI:   received 3 days Rocephin IV (ended )  Urine culture skin roderick. CKD:  Baseline 1.0-1.1   remains baseline at 1.12, continue to trend.     Leukocytosis:   noted 12.8 today, no infectious process noted, likely reactive to HR  Trend  *finished 3 days Rocephin IV for UTI, urine culture skin roderick.     Discharge planning: Per primary. Diet:  DIET ADULT  DVT ppx: Eliquis        Objective:     Patient Vitals for the past 24 hrs:   Temp Pulse Resp BP SpO2   06/07/21 0759 97.9 °F (36.6 °C) (!) 119 18 (!) 105/55 96 %   06/07/21 0527 97.6 °F (36.4 °C) (!) 120 20 120/62 92 %   06/07/21 0018 98 °F (36.7 °C) 95 20 (!) 114/51 93 %   06/06/21 2013 97.6 °F (36.4 °C) (!) 106 18 112/64 93 %   06/06/21 1645 98.6 °F (37 °C) (!) 112 18 119/66 94 %   06/06/21 1230 97.7 °F (36.5 °C) (!) 118 18 123/70 95 %     Oxygen Therapy  O2 Sat (%): 96 % (06/07/21 0759)  Pulse via Oximetry: 128 beats per minute (06/01/21 1707)  O2 Device: None (Room air) (06/07/21 0703)    Body mass index is 19.33 kg/m². Physical Exam:   General-no distress  Neck- supple, no JVD  CV- irregular rate and rhythm no MRG  Lung- clear bilaterally  Abd- soft, nontender, nondistended  Ext- no edema bilaterally. Skin- warm and dry  Psychiatric:  Normal mood and affect.   Neurologic:  Alert and oriented X 3       Data Review:  I have reviewed all labs, meds, and studies from the last 24 hours:    Labs:    Recent Results (from the past 24 hour(s))   METABOLIC PANEL, BASIC    Collection Time: 06/07/21  4:29 AM   Result Value Ref Range    Sodium 141 136 - 145 mmol/L    Potassium 3.7 3.5 - 5.1 mmol/L    Chloride 107 98 - 107 mmol/L    CO2 25 21 - 32 mmol/L    Anion gap 9 7 - 16 mmol/L    Glucose 104 (H) 65 - 100 mg/dL    BUN 33 (H) 8 - 23 MG/DL    Creatinine 1.12 (H) 0.6 - 1.0 MG/DL    GFR est AA 58 (L) >60 ml/min/1.73m2    GFR est non-AA 48 (L) >60 ml/min/1.73m2    Calcium 8.2 (L) 8.3 - 10.4 MG/DL   CBC WITH AUTOMATED DIFF    Collection Time: 06/07/21  4:29 AM   Result Value Ref Range    WBC 12.8 (H) 4.3 - 11.1 K/uL    RBC 3.09 (L) 4.05 - 5.2 M/uL    HGB 10.1 (L) 11.7 - 15.4 g/dL    HCT 30.9 (L) 35.8 - 46.3 %    .0 (H) 79.6 - 97.8 FL    MCH 32.7 26.1 - 32.9 PG    MCHC 32.7 31.4 - 35.0 g/dL    RDW 13.8 11.9 - 14.6 %    PLATELET 166 721 - 833 K/uL    MPV 10.4 9.4 - 12.3 FL    ABSOLUTE NRBC 0. 00 0.0 - 0.2 K/uL    DF AUTOMATED      NEUTROPHILS 73 43 - 78 %    LYMPHOCYTES 14 13 - 44 %    MONOCYTES 9 4.0 - 12.0 %    EOSINOPHILS 2 0.5 - 7.8 %    BASOPHILS 1 0.0 - 2.0 %    IMMATURE GRANULOCYTES 2 0.0 - 5.0 %    ABS. NEUTROPHILS 9.3 (H) 1.7 - 8.2 K/UL    ABS. LYMPHOCYTES 1.7 0.5 - 4.6 K/UL    ABS. MONOCYTES 1.2 0.1 - 1.3 K/UL    ABS. EOSINOPHILS 0.3 0.0 - 0.8 K/UL    ABS. BASOPHILS 0.1 0.0 - 0.2 K/UL    ABS. IMM. GRANS. 0.2 0.0 - 0.5 K/UL       All Micro Results     Procedure Component Value Units Date/Time    CULTURE, URINE [[id number]] Collected: 06/04/21 1236    Order Status: Completed Specimen: Urine from Clean catch Updated: 06/06/21 0831     Special Requests: NO SPECIAL REQUESTS        Culture result:       >100,000 COLONIES/mL MIXED SKIN MERCY ISOLATED                EKG Results     Procedure 720 Value Units Date/Time    EKG 12 LEAD SUBSEQUENT [[id number]] Collected: 06/01/21 1209    Order Status: Completed Updated: 06/01/21 1704     Ventricular Rate 73 BPM      Atrial Rate 153 BPM      QRS Duration 112 ms      Q-T Interval 362 ms      QTC Calculation (Bezet) 398 ms      Calculated R Axis -42 degrees      Calculated T Axis 123 degrees      Diagnosis --     !! AGE AND GENDER SPECIFIC ECG ANALYSIS !!   Atrial fibrillation  Left axis deviation  Anterolateral infarct , age undetermined  Abnormal ECG  When compared with ECG of 05-MAR-2020 16:39,  Atrial fibrillation has replaced Sinus rhythm  Left bundle branch block is no longer Present  Anterior infarct is now Present  Anterolateral infarct is now Present  Confirmed by Ileana Medina MD (), JORGE VALENTIN ([id number]) on 6/1/2021 5:04:07 PM      EKG [[id number]] Collected: 06/01/21 1103    Order Status: Completed Updated: 06/01/21 1702     Ventricular Rate 155 BPM      Atrial Rate 178 BPM      QRS Duration 124 ms      Q-T Interval 320 ms      QTC Calculation (Bezet) 514 ms      Calculated R Axis -112 degrees      Calculated T Axis 65 degrees      Diagnosis --     Wide complex tacyhcardia - suspect atrial flutter  Left bundle branch block  Confirmed by Beth Adhikari MD (), JORGE VALENTIN ([id number]) on 6/1/2021 5:02:50 PM            Other Studies:  No results found.     Current Meds:   Current Facility-Administered Medications   Medication Dose Route Frequency    dilTIAZem (CARDIZEM) 100 mg in 0.9% sodium chloride (MBP/ADV) 100 mL infusion  0-15 mg/hr IntraVENous TITRATE    methIMAzole (TAPAZOLE) tablet 5 mg  5 mg Oral DAILY    sodium chloride (NS) flush 5-10 mL  5-10 mL IntraVENous PRN    apixaban (ELIQUIS) tablet 2.5 mg  2.5 mg Oral Q12H    furosemide (LASIX) tablet 20 mg  20 mg Oral EVERY OTHER DAY    oxyCODONE IR (OXY-IR) immediate release tablet 15 mg  15 mg Oral Q4H PRN    polyethylene glycol (MIRALAX) powder 17 g  17 g Oral PRN    sodium chloride (NS) flush 5-40 mL  5-40 mL IntraVENous Q8H    sodium chloride (NS) flush 5-40 mL  5-40 mL IntraVENous PRN    morphine injection 2 mg  2 mg IntraVENous Q4H PRN       Problem List:  Hospital Problems as of 6/7/2021 Date Reviewed: 11/2/2020        Codes Class Noted - Resolved POA    Systolic CHF, chronic ([facility] ICD-10-CM: I50.22  ICD-9-CM: 428.22, 428.0  6/1/2021 - Present Yes        Atrial fibrillation with RVR (HCC) ICD-10-CM: I48.91  ICD-9-CM: 427.31  6/1/2021 - Present Yes        * (Principal) [facility] ICD-10-CM: I48.91  ICD-9-CM: 427.31  4/24/2019 - Present Yes        Hypertension (Chronic) ICD-10-CM: I10  ICD-9-CM: 401.9  12/1/2015 - Present Yes        CAD (coronary artery disease) (Chronic) ICD-10-CM: I25.10  ICD-9-CM: 414.00  11/29/2015 - Present Yes               Signed By: Christian Tam NP   [employer] [facility]    June 7, 2021  5:15 PM

## 2021-06-07 NOTE — PROGRESS NOTES
Notified Sam Seaman NP about patients rhythm showing V tach on the monitor. She reviewed the rhythm and stated it wasn't true V tach.

## 2021-06-07 NOTE — PROGRESS NOTES
[facility] CARDIOLOGY PROGRESS NOTE           6/7/2021 9:52 AM    Admit Date: 6/1/2021      Subjective:     Persistent issues with RVR. On room air. Extensive discussion with daughter today; appears Toprol-XL stopped over the weekend and started back on Cardizem gtt with persistent difficult to control rates; also with some low BPs currently. Currently agreeable to evaluation for pacemaker/AV modesta ablation; was resistant previously    ROS:  Cardiovascular:  As noted above    Objective:      Vitals:    06/07/21 0018 06/07/21 0527 06/07/21 0759 06/07/21 1228   BP: (!) 114/51 120/62 (!) 105/55 (!) 141/63   Pulse: 95 (!) 120 (!) 119 (!) 124   Resp: 20 20 18 18   Temp: 98 °F (36.7 °C) 97.6 °F (36.4 °C) 97.9 °F (36.6 °C) 97.7 °F (36.5 °C)   SpO2: 93% 92% 96% 97%   Weight:  105 lb 11.2 oz (47.9 kg)     Height:           Physical Exam:  General-No Acute Distress  Neck- supple, no JVD  CV-tachycardic;  irregularly irregular with variable S1   Lung-  Min basilar rales  Abd- soft, nontender, nondistended  Ext- no edema bilaterally. Skin- warm and dry    Data Review:   Recent Labs     06/07/21  0429 06/06/21  0442    141   K 3.7 4.0   BUN 33* 32*   CREA 1.12* 1.07*   * 164*   WBC 12.8*  --    HGB 10.1*  --    HCT 30.9*  --      --        Assessment/Plan:     Principal Problem:    Atrial fibrillation with rapid ventricular response (HCC) (2/44/7185)  -Uncertain duration but appears likely 2 to 3 days prior to presentation per daughter as frequently check her vitals  -Prior issues of bradycardia that have limited therapy for rate/rhythm control.  -Patient has declined pacemaker consideration previously but family and patient agreeable currently. Extensive discussion today. Discussed options including ideally biventricular device with left ventricular dysfunction with some concern from family with generator/frail status/advanced age.   Also consideration for leadless pacemaker although not ideal with LV dysfunction based on location  -Currently on IV Cardizem. Was on Toprol-XL which appears to been stopped over the weekend. Plan for AV modesta ablation/device as stated above. Low BPs limit therapy currently  -On Eliquis 2.5 mg twice daily  -Noted mildly elevated TSH/low T4; management per hospital medicine but would not impact RVR; prior history of hyperthyroidism on methimazole  -Discussed less likely to maintain rhythm control with severe left atrial enlargement and plan rate control at this time    Active Problems:    Hypertension (12/1/2015)  -Controlled. Systolic CHF, chronic ([address] (6/1/2021)  -Appears on Lasix 20 mg every other day at home. Prior issues with hyponatremia. Exam euvolemic.       Paco Vera MD  6/7/2021 10:12 AM

## 2021-06-08 LAB
ANION GAP SERPL CALC-SCNC: 7 MMOL/L (ref 7–16)
BASOPHILS # BLD: 0.1 K/UL (ref 0–0.2)
BASOPHILS NFR BLD: 1 % (ref 0–2)
BUN SERPL-MCNC: 25 MG/DL (ref 8–23)
CALCIUM SERPL-MCNC: 8.2 MG/DL (ref 8.3–10.4)
CHLORIDE SERPL-SCNC: 108 MMOL/L (ref 98–107)
CO2 SERPL-SCNC: 25 MMOL/L (ref 21–32)
CREAT SERPL-MCNC: 1.05 MG/DL (ref 0.6–1)
DIFFERENTIAL METHOD BLD: ABNORMAL
EOSINOPHIL # BLD: 0.4 K/UL (ref 0–0.8)
EOSINOPHIL NFR BLD: 3 % (ref 0.5–7.8)
ERYTHROCYTE [DISTWIDTH] IN BLOOD BY AUTOMATED COUNT: 13.8 % (ref 11.9–14.6)
GLUCOSE SERPL-MCNC: 92 MG/DL (ref 65–100)
HCT VFR BLD AUTO: 28.5 % (ref 35.8–46.3)
HGB BLD-MCNC: 9.1 G/DL (ref 11.7–15.4)
IMM GRANULOCYTES # BLD AUTO: 0.3 K/UL (ref 0–0.5)
IMM GRANULOCYTES NFR BLD AUTO: 2 % (ref 0–5)
LYMPHOCYTES # BLD: 1.7 K/UL (ref 0.5–4.6)
LYMPHOCYTES NFR BLD: 14 % (ref 13–44)
MCH RBC QN AUTO: 32.4 PG (ref 26.1–32.9)
MCHC RBC AUTO-ENTMCNC: 31.9 G/DL (ref 31.4–35)
MCV RBC AUTO: 101.4 FL (ref 79.6–97.8)
MONOCYTES # BLD: 1.3 K/UL (ref 0.1–1.3)
MONOCYTES NFR BLD: 10 % (ref 4–12)
NEUTS SEG # BLD: 8.8 K/UL (ref 1.7–8.2)
NEUTS SEG NFR BLD: 70 % (ref 43–78)
NRBC # BLD: 0 K/UL (ref 0–0.2)
PLATELET # BLD AUTO: 234 K/UL (ref 150–450)
PMV BLD AUTO: 10.4 FL (ref 9.4–12.3)
POTASSIUM SERPL-SCNC: 3.6 MMOL/L (ref 3.5–5.1)
RBC # BLD AUTO: 2.81 M/UL (ref 4.05–5.2)
SODIUM SERPL-SCNC: 140 MMOL/L (ref 136–145)
WBC # BLD AUTO: 12.5 K/UL (ref 4.3–11.1)

## 2021-06-08 PROCEDURE — 74011000258 HC RX REV CODE- 258: Performed by: INTERNAL MEDICINE

## 2021-06-08 PROCEDURE — 77030038269 HC DRN EXT URIN PURWCK BARD -A

## 2021-06-08 PROCEDURE — 36415 COLL VENOUS BLD VENIPUNCTURE: CPT

## 2021-06-08 PROCEDURE — 74011250636 HC RX REV CODE- 250/636: Performed by: INTERNAL MEDICINE

## 2021-06-08 PROCEDURE — 74011250637 HC RX REV CODE- 250/637: Performed by: INTERNAL MEDICINE

## 2021-06-08 PROCEDURE — 85025 COMPLETE CBC W/AUTO DIFF WBC: CPT

## 2021-06-08 PROCEDURE — 99223 1ST HOSP IP/OBS HIGH 75: CPT | Performed by: INTERNAL MEDICINE

## 2021-06-08 PROCEDURE — 65660000000 HC RM CCU STEPDOWN

## 2021-06-08 PROCEDURE — 80048 BASIC METABOLIC PNL TOTAL CA: CPT

## 2021-06-08 PROCEDURE — 97530 THERAPEUTIC ACTIVITIES: CPT

## 2021-06-08 PROCEDURE — 74011250637 HC RX REV CODE- 250/637: Performed by: PHYSICIAN ASSISTANT

## 2021-06-08 PROCEDURE — 2709999900 HC NON-CHARGEABLE SUPPLY

## 2021-06-08 RX ADMIN — METHIMAZOLE 5 MG: 5 TABLET ORAL at 08:38

## 2021-06-08 RX ADMIN — SODIUM CHLORIDE 10 MG/HR: 900 INJECTION, SOLUTION INTRAVENOUS at 06:27

## 2021-06-08 RX ADMIN — OXYCODONE HYDROCHLORIDE 15 MG: 15 TABLET ORAL at 06:38

## 2021-06-08 RX ADMIN — APIXABAN 2.5 MG: 2.5 TABLET, FILM COATED ORAL at 08:38

## 2021-06-08 RX ADMIN — OXYCODONE HYDROCHLORIDE 15 MG: 15 TABLET ORAL at 10:49

## 2021-06-08 RX ADMIN — OXYCODONE HYDROCHLORIDE 15 MG: 15 TABLET ORAL at 00:43

## 2021-06-08 RX ADMIN — SODIUM CHLORIDE 10 MG/HR: 900 INJECTION, SOLUTION INTRAVENOUS at 16:45

## 2021-06-08 RX ADMIN — SODIUM CHLORIDE 15 MG/HR: 900 INJECTION, SOLUTION INTRAVENOUS at 00:43

## 2021-06-08 RX ADMIN — CEFAZOLIN SODIUM 1 G: 1 INJECTION, POWDER, FOR SOLUTION INTRAMUSCULAR; INTRAVENOUS at 10:35

## 2021-06-08 RX ADMIN — Medication 10 ML: at 22:45

## 2021-06-08 RX ADMIN — OXYCODONE HYDROCHLORIDE 15 MG: 15 TABLET ORAL at 18:07

## 2021-06-08 RX ADMIN — CEFAZOLIN SODIUM 1 G: 1 INJECTION, POWDER, FOR SOLUTION INTRAMUSCULAR; INTRAVENOUS at 22:44

## 2021-06-08 RX ADMIN — Medication 5 ML: at 05:15

## 2021-06-08 RX ADMIN — Medication 10 ML: at 16:46

## 2021-06-08 NOTE — ROUTINE PROCESS
Bedside and Verbal shift change report given to Slim Xavier RN (oncoming nurse) by self (offgoing nurse). Report included the following information SBAR, Kardex, Intake/Output, MAR and Recent Results.

## 2021-06-08 NOTE — PROGRESS NOTES
Progress Note    Patient: Fritz Burr MRN: [id number]  SSN: [id number]    YOB: 1924  Age: 80 y.o. Sex: female      Admit Date: 6/1/2021    LOS: 5 days     Assessment and Plan:   81 y/o WF with a h/o hyperthyroidism on methimazole and AFib admitted to Cardiology service on 6/1 with rapid AFib. Thyroid studies were checked and noted elevated TSH at 13 and mildly reduced FT4 at 0.8. Reports compliance with methimazole and has been taking 5mg BID for several months. We were consulted for further recommendations    # Hyperthyroidism  - Elevated TSH and mildly reduced FT4  - Continue methimazole from 5mg once daily  - Outpatient follow up with PMD     # AFib RVR  - On cardizem gtt  - Holding Eliquis for possible AV node ablation and BIV device  - Mgmt per primary team Cardiology    # Chronic macrocytic anemia  - No bleeding, Hb stable  - Monitor H&H      # HFrEF  - On lasix   - Echo from 6/1 shows EF 25-30%  - Cardiology primary     # UTI  - Received 3 days Rocephin IV  - Urine culture skin roderick     # CKD  - Monitor     # Leukocytosis  - No current signs of infection  - Monitor CBC    # RUE cellulitis  - Start cefazolin   - Monitor erythema     Subjective:   81 y/o WF with a h/o hyperthyroidism on methimazole and AFib admitted to Cardiology service on 6/1 with rapid AFib. Thyroid studies were checked and noted elevated TSH at 13 and mildly reduced FT4 at 0.8. Reports compliance with methimazole and has been taking 5mg BID for several months. We were consulted for further recommendations. Patient seen and examined at bedside. This morning patient feeling tired, denies chest pain, no SOB, no abdominal pain, no nausea.      Objective:     Vitals:    06/08/21 0436 06/08/21 0642 06/08/21 0807 06/08/21 1157   BP: (!) 110/58  116/71 129/75   Pulse: (!) 106 89 (!) 107 (!) 115   Resp: 18  18 18   Temp: 97.9 °F (36.6 °C)  97.8 °F (36.6 °C) 98.7 °F (37.1 °C)   SpO2: 92%  93% 91%   Weight: 53.3 kg (117 lb 6.4 oz) Height:            Intake and Output:  Current Shift: No intake/output data recorded. Last three shifts: 06/06 1901 - 06/08 0700  In: 930 [P.O.:930]  Out: 200 [Urine:200]    ROS  10 ROS negative except from stated on subjective    Physical Exam:   General: Alert, oriented, NAD  HEENT: NC/AT, EOM are intact  Neck: supple, no JVD  Cardiovascular: Irregular, S1, S2, no murmurs  Respiratory: Lungs are clear, no wheezes or rales  Abdomen: Soft, NT, ND  Back: No CVA tenderness, no paraspinal tenderness  Extremities: LE without pedal edema, RUE erythema  Neuro: A&O, CN are intact, no focal deficits  Skin: no rash or ulcers  Psych: good mood and affect    Lab/Data Review:  I have personally reviewed patients laboratory data showing  Recent Results (from the past 24 hour(s))   METABOLIC PANEL, BASIC    Collection Time: 06/08/21  4:32 AM   Result Value Ref Range    Sodium 140 136 - 145 mmol/L    Potassium 3.6 3.5 - 5.1 mmol/L    Chloride 108 (H) 98 - 107 mmol/L    CO2 25 21 - 32 mmol/L    Anion gap 7 7 - 16 mmol/L    Glucose 92 65 - 100 mg/dL    BUN 25 (H) 8 - 23 MG/DL    Creatinine 1.05 (H) 0.6 - 1.0 MG/DL    GFR est AA >60 >60 ml/min/1.73m2    GFR est non-AA 52 (L) >60 ml/min/1.73m2    Calcium 8.2 (L) 8.3 - 10.4 MG/DL   CBC WITH AUTOMATED DIFF    Collection Time: 06/08/21  4:32 AM   Result Value Ref Range    WBC 12.5 (H) 4.3 - 11.1 K/uL    RBC 2.81 (L) 4.05 - 5.2 M/uL    HGB 9.1 (L) 11.7 - 15.4 g/dL    HCT 28.5 (L) 35.8 - 46.3 %    .4 (H) 79.6 - 97.8 FL    MCH 32.4 26.1 - 32.9 PG    MCHC 31.9 31.4 - 35.0 g/dL    RDW 13.8 11.9 - 14.6 %    PLATELET 257 516 - 033 K/uL    MPV 10.4 9.4 - 12.3 FL    ABSOLUTE NRBC 0.00 0.0 - 0.2 K/uL    DF AUTOMATED      NEUTROPHILS 70 43 - 78 %    LYMPHOCYTES 14 13 - 44 %    MONOCYTES 10 4.0 - 12.0 %    EOSINOPHILS 3 0.5 - 7.8 %    BASOPHILS 1 0.0 - 2.0 %    IMMATURE GRANULOCYTES 2 0.0 - 5.0 %    ABS. NEUTROPHILS 8.8 (H) 1.7 - 8.2 K/UL    ABS. LYMPHOCYTES 1.7 0.5 - 4.6 K/UL    ABS. MONOCYTES 1.3 0.1 - 1.3 K/UL    ABS. EOSINOPHILS 0.4 0.0 - 0.8 K/UL    ABS. BASOPHILS 0.1 0.0 - 0.2 K/UL    ABS. IMM. GRANS. 0.3 0.0 - 0.5 K/UL      All Micro Results     Procedure Component Value Units Date/Time    CULTURE, URINE [[id number]] Collected: 06/04/21 1236    Order Status: Completed Specimen: Urine from Clean catch Updated: 06/06/21 0831     Special Requests: NO SPECIAL REQUESTS        Culture result:       >100,000 COLONIES/mL MIXED SKIN MERCY ISOLATED                 Image:  I have personally reviewed patients imaging showing  XR CHEST SNGL V   Final Result   Interstitial pulmonary edema. XR CHEST PORT   Final Result   NO ACUTE CARDIOPULMONARY DISEASE IDENTIFIED.            Hospital problems     Principal Problem:    Atrial fibrillation with rapid ventricular response ([address] (4/24/2019)    Active Problems:    CAD (coronary artery disease) (11/29/2015)      Hypertension (32/0/1493)      Systolic CHF, chronic ([address] (6/1/2021)      Atrial fibrillation with RVR ([address] (6/1/2021)         Signed By: Margo Michael MD     June 8, 2021

## 2021-06-08 NOTE — PROGRESS NOTES
Pt's HR remaining uncontrolled despite being on 15/hr of cardizem. Cami Paulino NP made aware and given brief report on pt. EP to see pt tomorrow regarding pacemaker placement is current plan. Pt fatigued but does not complain of pain or dizziness. When getting up to the bathroom HR increased into the 170's. Instructed pt and family not to get out of bed anymore and that we would place purewick until HR is better controlled. Rola Chávez NP reviewed chart and 125mcg of digoxin ordered. Med administered. Pt also medicated for pain. BP stable. Will continue to monitor.

## 2021-06-08 NOTE — H&P
[facility] Cardiology Initial Cardiac EP Evaluation                 Date of  Admission: 6/1/2021 11:06 AM     Primary Care Physician: Dr. Hnaane Bobo   Primary Cardiologist: Dr. Baljit Reich   Referring Physician: Dr. Francie García  Supervising Cardiologist: Dr. oBnnie Hanks    Reason for cardiac evaluation: A. Fib with RVR with prior CHB while on amio and BB likely SSS. Luis Bush is a 80 y.o. female with prior h/o PAF, complete heart block requiring temporary pacemaker (resolved off amiodarone and metoprolol 3/2019) , coronary artery disease s/p PCI LAD, ischemic cardiomyopathy, chronic systolic congestive heart failure with an ejection fraction 20% (most recent ECHO EF 30-35%), GERD, and hyperthyroidism who presented to [facility] ED on 6/1 with complaint of irregular heart beat. Dgt reported that patient was lethargic on Sunday and dgt noted HR was elevated. Dgt brought patient to ED for further care. In ED, EKG showed atrial fibrillation with rate 155. Labs showed WBC 11.4, H/H 10/31. ptl 219, Na 134, K 4.3, BUN/Cr 22/1.18, TSH 13.1. Patient was started on IV cardizem. Cardiology admitted patient for A. Fib RVR and cardizem gtt was continued along with home eliquis. She was transitioned to po lopressor then to low dose Toprol but rates increased again and cardizem, gtt restarted. Patient had declined PM in past but now patient and family is agreeable to AV modesta ablation with either BIV device or micra device. She has finished IV abx for UTI on 6/6  but urine culture skin roderick. EP Cardiology was consulted for consideration for AV modesta ablation with either micra or BIV device.          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    Atrial fibrillation with rapid ventricular response (HCC) S48.18    Systolic CHF, chronic (HCC) I50.22    Atrial fibrillation with RVR (HCC) I48.91       Past Medical History:   Diagnosis Date    CAD (coronary artery disease)     Hypertension     Ill-defined condition     afib    Thyroid cyst Distant past    thyroid cyst removed x40 years ago      Past Surgical History:   Procedure Laterality Date    HX CHOLECYSTECTOMY      HX CHOLECYSTECTOMY  2005    HX HEENT      thyroglossal cyst    HX HYSTERECTOMY      AK APPENDECTOMY       Allergies   Allergen Reactions    Amiodarone Other (comments)    Amlodipine Hives    Amoxicillin Rash    Cleeravue-M Convenience Kit [Minocycline-Eyelid Cleanser 1] Other (comments)     Sever headaches    Tetracycline Rash    Vimovo [Naproxen-Esomeprazole] Nausea Only      Family History   Problem Relation Age of Onset    Other Father             Heart Disease Father     Other Mother             Heart Disease Mother         Current Facility-Administered Medications   Medication Dose Route Frequency    ceFAZolin (ANCEF) 1 g in 0.9% sodium chloride (MBP/ADV) 50 mL MBP  1 g IntraVENous Q12H    dilTIAZem (CARDIZEM) 100 mg in 0.9% sodium chloride (MBP/ADV) 100 mL infusion  0-15 mg/hr IntraVENous TITRATE    methIMAzole (TAPAZOLE) tablet 5 mg  5 mg Oral DAILY    sodium chloride (NS) flush 5-10 mL  5-10 mL IntraVENous PRN    apixaban (ELIQUIS) tablet 2.5 mg  2.5 mg Oral Q12H    furosemide (LASIX) tablet 20 mg  20 mg Oral EVERY OTHER DAY    oxyCODONE IR (OXY-IR) immediate release tablet 15 mg  15 mg Oral Q4H PRN    polyethylene glycol (MIRALAX) powder 17 g  17 g Oral PRN    sodium chloride (NS) flush 5-40 mL  5-40 mL IntraVENous Q8H    sodium chloride (NS) flush 5-40 mL  5-40 mL IntraVENous PRN    morphine injection 2 mg  2 mg IntraVENous Q4H PRN       Review of Systems   Constitutional: Negative for diaphoresis and malaise/fatigue. HENT: Negative for congestion. Cardiovascular: Positive for irregular heartbeat. Negative for chest pain, claudication, cyanosis, dyspnea on exertion, leg swelling, near-syncope, orthopnea, palpitations, paroxysmal nocturnal dyspnea and syncope. Respiratory: Negative for cough, shortness of breath and wheezing. Endocrine: Negative for cold intolerance and heat intolerance. Hematologic/Lymphatic: Does not bruise/bleed easily. Skin: Negative for nail changes. Neurological: Negative for dizziness, headaches and weakness. Physical Exam  Vitals:    06/08/21 0103 06/08/21 0436 06/08/21 0642 06/08/21 0807   BP: (!) 129/53 (!) 110/58  116/71   Pulse: (!) 120 (!) 106 89 (!) 107   Resp: 16 18  18   Temp: 98.2 °F (36.8 °C) 97.9 °F (36.6 °C)  97.8 °F (36.6 °C)   SpO2: 92% 92%  93%   Weight:  53.3 kg (117 lb 6.4 oz)     Height:           Physical Exam:  General: frail elderly female, no Acute Distress  HEENT: pupils equal and round, no abnormalities noted  Neck: supple, no JVD, no carotid bruits  Heart: S1S2 irregular irregular   Lungs: Clear throughout auscultation bilaterally without adventitious sounds  Abd: soft, nontender, nondistended, with good bowel sounds  Ext: warm, no edema, calves supple/nontender, pulses 2+ bilaterally  Skin: warm and dry  Psychiatric: Normal mood and affect  Neurologic: Alert and oriented X 3    Cardiographics    Telemetry: A. Fib rates 80-120s   ECG: A. Fib RVR  Echocardiogram: -  Left ventricle: Ejection fraction was estimated in the range of 25 % to 30  %. This study was inadequate for the evaluation of regional wall motion.-  Right ventricle: Systolic function was reduced. -  Left atrium: The atrium was markedly dilated. -  Right atrium: The atrium was moderately dilated. -  Inferior vena cava, hepatic veins:  The respirophasic change in diameter was more than 50%. -  Mitral valve: There was mild annular calcification. There was moderate regurgitation.-  Tricuspid valve: There was moderate regurgitation. Labs:   Recent Labs     06/08/21  0432 06/07/21  0429    141   K 3.6 3.7   BUN 25* 33*   CREA 1.05* 1.12*   GLU 92 104*   WBC 12.5* 12.8*   HGB 9.1* 10.1*   HCT 28.5* 30.9*    242        Assessment/Plan:     Assessment:      Principal Problem:    Atrial fibrillation with rapid ventricular response (HCC) (4/24/2019)- continue cardizem gtt for now; NPO after midnight for possible AV modesta Ablation with either BIV device or micra. Hold eliquis. Active Problems:    CAD (coronary artery disease) (11/29/2015)- no active angina sx; off ACE and lopressor d/t borderline B/Ps needing cardizem gtt. Hypertension (12/1/2015)- actually mild hypotension; see above. Systolic CHF, chronic ([address] (6/1/2021)- looks euvolemic on lasix every other day. Atrial fibrillation with RVR ([facility] (6/1/2021)- see above. We appreciate the opportunity to participate in this patient's care. Edyta Huff NP  Supervising MD: Dr. Fabian Arreguin    Attending Addendum    Patient independently seen and examined by me. Agree with above note by physician extender with the following additions and exceptions: 80 y.o. female with prior h/o PAF, complete heart block requiring temporary pacemaker (resolved off amiodarone and metoprolol 3/2019) , coronary artery disease s/p PCI LAD, ischemic cardiomyopathy, chronic systolic congestive heart failure with an ejection fraction 20% (most recent ECHO EF 30-35%), GERD, and hyperthyroidism and EP was consulted for afib with RVR failing medical therapy complicated by tachy samira syndrome    Key findings are:  No CP or ROBLEDO  CV- irreg irreg, tachycardic, distant S1, S2  Lungs- Clear bilaterally  Ext- no edema    Plan:   1.  Persistent atrial fibrillation, failing medical therapy with RVR, uncontrolled c/b NICM with EF 25-30%: Given underlying NICM with EF 25-30% Pt would likely benefit from BiV pacemaker for LV pacing in the setting of the need for AV node ablation and high degree of ventricular pacing. I discussed with the patient and daughter the options of BiV PPM and AV node ablation as well as Micra AV node. We discussed risks and benefits in addition to the differences between the Micra and a traditional pacemaker, procedural differences, and the limitation of a Micra for RV only pacing. Pt and daughter would like to proceed with BiV PPM and AV node ablation. --tentative plan for BiV PPM and AV node ablation       --eliquis on hold, NPO after MN    2. NICM: currently euvolemic and compensated, cont OMT    Tanner Best [address] [facility]

## 2021-06-08 NOTE — PROGRESS NOTES
ACUTE PHYSICAL THERAPY GOALS:  (Developed with and agreed upon by patient and/or caregiver.)  STG:  (1.)Ms. Renu Maldonado will move from supine to sit and sit to supine , scoot up and down and roll side to side with STAND BY ASSIST within 3 treatment day(s). (2.)Ms. Renu Maldonado will transfer from bed to chair and chair to bed with CONTACT GUARD ASSIST using the least restrictive device within 3 treatment day(s). (3.)Ms. Renu Maldonado will ambulate with CONTACT GUARD ASSIST for 40 feet with the least restrictive device within 3 treatment day(s). (4.)Ms. Renu Maldonado will perform standing static and dynamic balance activities x 15 minutes with CONTACT GUARD ASSIST to improve safety within 3 treatment day(s). (5.)Ms. Renu Maldonado will maintain stable vital signs throughout all functional mobility within 3 treatment days.     LTG:  (1.)Ms. Renu Maldonado will move from supine to sit and sit to supine , scoot up and down and roll side to side in bed with SUPERVISION within 7 treatment day(s). (2.)Ms. Renu Maldonado will transfer from bed to chair and chair to bed with STAND BY ASSIST using the least restrictive device within 7 treatment day(s). (3.)Ms. Renu Maldonado will ambulate with STAND BY ASSIST for 50 feet with the least restrictive device within 7 treatment day(s). (4.)Ms. Renu Maldonado will perform standing static and dynamic balance activities x 15 minutes with STAND BY ASSIST to improve safety within 7 treatment day(s). (5.)Ms. Renu Maldonado will ambulate and/or perform functional activities for 15 consecutive minutes with stable vital signs and no rests required to improve activity tolerance within 7 treatment days.     PHYSICAL THERAPY: Daily Note Treatment Day # 2    Luis Bush is a 80 y.o. female   PRIMARY DIAGNOSIS: Atrial fibrillation with rapid ventricular response (HCC)  Atrial fibrillation with RVR ([address] [I48.91]         ASSESSMENT:     REHAB RECOMMENDATIONS: CURRENT LEVEL OF FUNCTION:  (Most Recently Demonstrated)   Recommendation to date pending progress:  Setting:  [facility] may benefit from HHPT but family declines need  Equipment:    To Be Determined Bed Mobility:   Minimal Assistance  Sit to Stand:   Minimal Assistance  Transfers:   Minimal Assistance  Gait/Mobility:   Not tested     ASSESSMENT:  Ms. Sylwia Rouse was supine in bed with daughter at bedside. Daughter states patient is not supposed to get out of bed and to check with RN. I reassured daughter that I did check with RN and will monitor patient's HR throughout PT. Patient very [address] and has difficulty following commands for exercises. Daughter appears very anxious and attempting to \"help\" me without being asked to. Patient continues to require min assist with all mobility. Just transferring to recliner, HR variable from 90's to 140's so gait deferred. HR very erratic during exercises. No progress today due to a fib. Will continue to work towards goals. .     SUBJECTIVE:   Ms. Sylwia Rouse states, \"Do you want me to stand up? .\"    SOCIAL HISTORY/ LIVING ENVIRONMENT:Patient lives with 2 daughters in a single story home with a ramp. Ambulates with a rollator for household distances and can for community distances. Requires assist from daughters for ADLs.   Home Environment: Private residence  One/Two Story Residence: One story  Living Alone: No  Support Systems: None  OBJECTIVE:     PAIN: VITAL SIGNS: LINES/DRAINS:   Pre Treatment: Pain Screen  Pain Scale 1: Numeric (0 - 10)  Pain Intensity 1: 5  Pain Onset 1: chronic  Pain Location 1: Back  Pain Orientation 1: Lower  Pain Intervention(s) 1: Repositioned  Post Treatment: 5   IV and Purewick  O2 Device: None (Room air)     MOBILITY: I Mod I S SBA CGA Min Mod Max Total  NT x2 Comments:   Bed Mobility    Rolling [] [] [] [] [] [x] [] [] [] [] []    Supine to Sit [] [] [] [] [] [x] [] [] [] [] []    Scooting [] [] [] [] [x] [] [] [] [] [] []    Sit to Supine [] [] [] [] [] [] [] [] [] [x] []    Transfers    Sit to Stand [] [] [] [] [x] [x] [] [] [] [] [] Bed to Chair [] [] [] [] [] [x] [] [] [] [] []    Stand to Sit [] [] [] [] [x] [] [] [] [] [] []    I=Independent, Mod I=Modified Independent, S=Supervision, SBA=Standby Assistance, CGA=Contact Guard Assistance,   Min=Minimal Assistance, Mod=Moderate Assistance, Max=Maximal Assistance, Total=Total Assistance, NT=Not Tested    BALANCE: Good Fair+ Fair Fair- Poor NT Comments   Sitting Static [x] [] [] [] [] []    Sitting Dynamic [x] [] [] [] [] []              Standing Static [] [] [] [x] [] []    Standing Dynamic [] [] [] [x] [] []      GAIT: I Mod I S SBA CGA Min Mod Max Total  NT x2 Comments:   Level of Assistance [] [] [] [] [] [] [] [] [] [x] []    Distance 0    DME None    Gait Quality N/A    Weightbearing  Status N/A     I=Independent, Mod I=Modified Independent, S=Supervision, SBA=Standby Assistance, CGA=Contact Guard Assistance,   Min=Minimal Assistance, Mod=Moderate Assistance, Max=Maximal Assistance, Total=Total Assistance, NT=Not Tested    PLAN:   FREQUENCY/DURATION: PT Plan of Care: 3 times/week for duration of hospital stay or until stated goals are met, whichever comes first.  TREATMENT:     TREATMENT:   ($$ Therapeutic Activity: 23-37 mins    )  Therapeutic Activity (15 Minutes): Therapeutic activity included Supine to Sit, Scooting, Transfer Training, Sitting balance , Standing balance and exercises to improve functional Mobility, Strength and Activity tolerance.     TREATMENT GRID:      DATE: 6/8/21       Ambulation        Hip Flexion x10 AB       Long Arc Quads x10 AB       Hip ab/ad        Heel Raises        Toe Raises                                 Key:  A=active, AA=active assisted, P=passive, B=bilaterally, R=right, L=left   DF=dorsiflexion, PF=plantarflexion    AFTER TREATMENT POSITION/PRECAUTIONS:  Chair, Needs within reach, RN notified and Visitors at bedside    INTERDISCIPLINARY COLLABORATION:  RN/PCT and PT/PTA    TOTAL TREATMENT DURATION:  PT Patient Time In/Time Out  Time In: 1420  Time Out: Dimitris Marie, PT, DPT

## 2021-06-08 NOTE — ROUTINE PROCESS
Bedside and Verbal shift change report given to self (oncoming nurse) by Dena Palmer RN (offgoing nurse). Report included the following information SBAR, Kardex, Intake/Output, MAR and Recent Results.

## 2021-06-09 ENCOUNTER — ANESTHESIA EVENT (OUTPATIENT)
Dept: CARDIAC CATH/INVASIVE PROCEDURES | Age: 86
DRG: 243 | End: 2021-06-09
Payer: MEDICARE

## 2021-06-09 ENCOUNTER — APPOINTMENT (OUTPATIENT)
Dept: GENERAL RADIOLOGY | Age: 86
DRG: 243 | End: 2021-06-09
Attending: INTERNAL MEDICINE
Payer: MEDICARE

## 2021-06-09 ENCOUNTER — ANESTHESIA (OUTPATIENT)
Dept: CARDIAC CATH/INVASIVE PROCEDURES | Age: 86
DRG: 243 | End: 2021-06-09
Payer: MEDICARE

## 2021-06-09 LAB
ANION GAP SERPL CALC-SCNC: 6 MMOL/L (ref 7–16)
BASOPHILS # BLD: 0.1 K/UL (ref 0–0.2)
BASOPHILS NFR BLD: 1 % (ref 0–2)
BUN SERPL-MCNC: 19 MG/DL (ref 8–23)
CALCIUM SERPL-MCNC: 8.3 MG/DL (ref 8.3–10.4)
CHLORIDE SERPL-SCNC: 110 MMOL/L (ref 98–107)
CO2 SERPL-SCNC: 25 MMOL/L (ref 21–32)
CREAT SERPL-MCNC: 0.85 MG/DL (ref 0.6–1)
DIFFERENTIAL METHOD BLD: ABNORMAL
EOSINOPHIL # BLD: 0.4 K/UL (ref 0–0.8)
EOSINOPHIL NFR BLD: 3 % (ref 0.5–7.8)
ERYTHROCYTE [DISTWIDTH] IN BLOOD BY AUTOMATED COUNT: 14 % (ref 11.9–14.6)
FERRITIN SERPL-MCNC: 62 NG/ML (ref 8–388)
FOLATE SERPL-MCNC: 4.9 NG/ML (ref 3.1–17.5)
GLUCOSE SERPL-MCNC: 92 MG/DL (ref 65–100)
HCT VFR BLD AUTO: 27.5 % (ref 35.8–46.3)
HGB BLD-MCNC: 8.7 G/DL (ref 11.7–15.4)
IMM GRANULOCYTES # BLD AUTO: 0.2 K/UL (ref 0–0.5)
IMM GRANULOCYTES NFR BLD AUTO: 2 % (ref 0–5)
IRON SATN MFR SERPL: 12 %
IRON SERPL-MCNC: 25 UG/DL (ref 35–150)
LYMPHOCYTES # BLD: 1.6 K/UL (ref 0.5–4.6)
LYMPHOCYTES NFR BLD: 14 % (ref 13–44)
MCH RBC QN AUTO: 32.3 PG (ref 26.1–32.9)
MCHC RBC AUTO-ENTMCNC: 31.6 G/DL (ref 31.4–35)
MCV RBC AUTO: 102.2 FL (ref 79.6–97.8)
MONOCYTES # BLD: 1.3 K/UL (ref 0.1–1.3)
MONOCYTES NFR BLD: 11 % (ref 4–12)
NEUTS SEG # BLD: 8 K/UL (ref 1.7–8.2)
NEUTS SEG NFR BLD: 69 % (ref 43–78)
NRBC # BLD: 0 K/UL (ref 0–0.2)
PLATELET # BLD AUTO: 219 K/UL (ref 150–450)
PMV BLD AUTO: 10.3 FL (ref 9.4–12.3)
POTASSIUM SERPL-SCNC: 3.8 MMOL/L (ref 3.5–5.1)
RBC # BLD AUTO: 2.69 M/UL (ref 4.05–5.2)
SODIUM SERPL-SCNC: 141 MMOL/L (ref 136–145)
TIBC SERPL-MCNC: 203 UG/DL (ref 250–450)
VIT B12 SERPL-MCNC: 193 PG/ML (ref 193–986)
WBC # BLD AUTO: 11.6 K/UL (ref 4.3–11.1)

## 2021-06-09 PROCEDURE — C1769 GUIDE WIRE: HCPCS | Performed by: INTERNAL MEDICINE

## 2021-06-09 PROCEDURE — 77030022704 HC SUT VLOC COVD -B: Performed by: INTERNAL MEDICINE

## 2021-06-09 PROCEDURE — C1894 INTRO/SHEATH, NON-LASER: HCPCS | Performed by: INTERNAL MEDICINE

## 2021-06-09 PROCEDURE — C1892 INTRO/SHEATH,FIXED,PEEL-AWAY: HCPCS | Performed by: INTERNAL MEDICINE

## 2021-06-09 PROCEDURE — 77030003029 HC SUT VCRL J&J -B: Performed by: INTERNAL MEDICINE

## 2021-06-09 PROCEDURE — 80048 BASIC METABOLIC PNL TOTAL CA: CPT

## 2021-06-09 PROCEDURE — 82746 ASSAY OF FOLIC ACID SERUM: CPT

## 2021-06-09 PROCEDURE — 65660000000 HC RM CCU STEPDOWN

## 2021-06-09 PROCEDURE — 93005 ELECTROCARDIOGRAM TRACING: CPT | Performed by: INTERNAL MEDICINE

## 2021-06-09 PROCEDURE — 85025 COMPLETE CBC W/AUTO DIFF WBC: CPT

## 2021-06-09 PROCEDURE — 82728 ASSAY OF FERRITIN: CPT

## 2021-06-09 PROCEDURE — 74011250636 HC RX REV CODE- 250/636: Performed by: INTERNAL MEDICINE

## 2021-06-09 PROCEDURE — 74011250636 HC RX REV CODE- 250/636: Performed by: NURSE ANESTHETIST, CERTIFIED REGISTERED

## 2021-06-09 PROCEDURE — 71045 X-RAY EXAM CHEST 1 VIEW: CPT

## 2021-06-09 PROCEDURE — 77030041279 HC DRSG PRMSL AG MDII -B: Performed by: INTERNAL MEDICINE

## 2021-06-09 PROCEDURE — 76210000063 HC OR PH I REC FIRST 0.5 HR: Performed by: INTERNAL MEDICINE

## 2021-06-09 PROCEDURE — 93650 ICAR CATH ABLTJ AV NODE FUNC: CPT | Performed by: INTERNAL MEDICINE

## 2021-06-09 PROCEDURE — 82607 VITAMIN B-12: CPT

## 2021-06-09 PROCEDURE — 74011000250 HC RX REV CODE- 250: Performed by: INTERNAL MEDICINE

## 2021-06-09 PROCEDURE — 74011000636 HC RX REV CODE- 636: Performed by: INTERNAL MEDICINE

## 2021-06-09 PROCEDURE — C1887 CATHETER, GUIDING: HCPCS | Performed by: INTERNAL MEDICINE

## 2021-06-09 PROCEDURE — 02HK3JZ INSERTION OF PACEMAKER LEAD INTO RIGHT VENTRICLE, PERCUTANEOUS APPROACH: ICD-10-PCS | Performed by: INTERNAL MEDICINE

## 2021-06-09 PROCEDURE — 77030033067 HC SUT PDO STRATFX SPIR J&J -B: Performed by: INTERNAL MEDICINE

## 2021-06-09 PROCEDURE — 02583ZZ DESTRUCTION OF CONDUCTION MECHANISM, PERCUTANEOUS APPROACH: ICD-10-PCS | Performed by: INTERNAL MEDICINE

## 2021-06-09 PROCEDURE — 33225 L VENTRIC PACING LEAD ADD-ON: CPT | Performed by: INTERNAL MEDICINE

## 2021-06-09 PROCEDURE — 77030031304 HC WAVWGD EIGR DISP INVO -D: Performed by: INTERNAL MEDICINE

## 2021-06-09 PROCEDURE — C2621 PMKR, OTHER THAN SING/DUAL: HCPCS | Performed by: INTERNAL MEDICINE

## 2021-06-09 PROCEDURE — 2709999900 HC NON-CHARGEABLE SUPPLY

## 2021-06-09 PROCEDURE — 83540 ASSAY OF IRON: CPT

## 2021-06-09 PROCEDURE — 76060000036 HC ANESTHESIA 2.5 TO 3 HR: Performed by: INTERNAL MEDICINE

## 2021-06-09 PROCEDURE — 0JH607Z INSERTION OF CARDIAC RESYNCHRONIZATION PACEMAKER PULSE GENERATOR INTO CHEST SUBCUTANEOUS TISSUE AND FASCIA, OPEN APPROACH: ICD-10-PCS | Performed by: INTERNAL MEDICINE

## 2021-06-09 PROCEDURE — 74011000250 HC RX REV CODE- 250: Performed by: NURSE ANESTHETIST, CERTIFIED REGISTERED

## 2021-06-09 PROCEDURE — 33207 INSERT HEART PM VENTRICULAR: CPT | Performed by: INTERNAL MEDICINE

## 2021-06-09 PROCEDURE — 51798 US URINE CAPACITY MEASURE: CPT

## 2021-06-09 PROCEDURE — 33208 INSRT HEART PM ATRIAL & VENT: CPT | Performed by: INTERNAL MEDICINE

## 2021-06-09 PROCEDURE — 77030013687 HC GD NDL BARD -B: Performed by: INTERNAL MEDICINE

## 2021-06-09 PROCEDURE — 74011000258 HC RX REV CODE- 258: Performed by: INTERNAL MEDICINE

## 2021-06-09 PROCEDURE — 99024 POSTOP FOLLOW-UP VISIT: CPT | Performed by: INTERNAL MEDICINE

## 2021-06-09 PROCEDURE — 36415 COLL VENOUS BLD VENIPUNCTURE: CPT

## 2021-06-09 PROCEDURE — 74011000272 HC RX REV CODE- 272: Performed by: INTERNAL MEDICINE

## 2021-06-09 PROCEDURE — BT40ZZZ ULTRASONOGRAPHY OF BLADDER: ICD-10-PCS | Performed by: INTERNAL MEDICINE

## 2021-06-09 PROCEDURE — 76210000020 HC REC RM PH II FIRST 0.5 HR: Performed by: INTERNAL MEDICINE

## 2021-06-09 PROCEDURE — C1732 CATH, EP, DIAG/ABL, 3D/VECT: HCPCS | Performed by: INTERNAL MEDICINE

## 2021-06-09 PROCEDURE — 77030018547 HC SUT ETHBND1 J&J -B: Performed by: INTERNAL MEDICINE

## 2021-06-09 PROCEDURE — C1893 INTRO/SHEATH, FIXED,NON-PEEL: HCPCS | Performed by: INTERNAL MEDICINE

## 2021-06-09 PROCEDURE — 77030038269 HC DRN EXT URIN PURWCK BARD -A

## 2021-06-09 PROCEDURE — 77030010507 HC ADH SKN DERMBND J&J -B: Performed by: INTERNAL MEDICINE

## 2021-06-09 PROCEDURE — C1898 LEAD, PMKR, OTHER THAN TRANS: HCPCS | Performed by: INTERNAL MEDICINE

## 2021-06-09 PROCEDURE — C1900 LEAD, CORONARY VENOUS: HCPCS | Performed by: INTERNAL MEDICINE

## 2021-06-09 PROCEDURE — 02HL3JZ INSERTION OF PACEMAKER LEAD INTO LEFT VENTRICLE, PERCUTANEOUS APPROACH: ICD-10-PCS | Performed by: INTERNAL MEDICINE

## 2021-06-09 PROCEDURE — 77030035291 HC TBNG PMP SMARTABLATE J&J -B: Performed by: INTERNAL MEDICINE

## 2021-06-09 PROCEDURE — C1751 CATH, INF, PER/CENT/MIDLINE: HCPCS | Performed by: INTERNAL MEDICINE

## 2021-06-09 PROCEDURE — 74011250637 HC RX REV CODE- 250/637: Performed by: PHYSICIAN ASSISTANT

## 2021-06-09 PROCEDURE — 77030027107 HC PTCH EXT REF CARTO3 J&J -F: Performed by: INTERNAL MEDICINE

## 2021-06-09 DEVICE — IMPLANTABLE DEVICE
Type: IMPLANTABLE DEVICE | Status: FUNCTIONAL
Brand: SOLIA S 60

## 2021-06-09 DEVICE — IMPLANTABLE DEVICE
Type: IMPLANTABLE DEVICE | Status: FUNCTIONAL
Brand: EDORA 8 HF-T QP

## 2021-06-09 DEVICE — IMPLANTABLE DEVICE
Type: IMPLANTABLE DEVICE | Status: FUNCTIONAL
Brand: SENTUS PROMRI OTW QP L-85/49

## 2021-06-09 RX ORDER — OXYCODONE HYDROCHLORIDE 5 MG/1
5 TABLET ORAL
Status: DISCONTINUED | OUTPATIENT
Start: 2021-06-09 | End: 2021-06-10 | Stop reason: SDUPTHER

## 2021-06-09 RX ORDER — LIDOCAINE HYDROCHLORIDE 10 MG/ML
0.1 INJECTION INFILTRATION; PERINEURAL AS NEEDED
Status: DISCONTINUED | OUTPATIENT
Start: 2021-06-09 | End: 2021-06-10 | Stop reason: HOSPADM

## 2021-06-09 RX ORDER — SODIUM CHLORIDE 0.9 % (FLUSH) 0.9 %
5-40 SYRINGE (ML) INJECTION AS NEEDED
Status: DISCONTINUED | OUTPATIENT
Start: 2021-06-09 | End: 2021-06-10 | Stop reason: HOSPADM

## 2021-06-09 RX ORDER — SODIUM CHLORIDE, SODIUM LACTATE, POTASSIUM CHLORIDE, CALCIUM CHLORIDE 600; 310; 30; 20 MG/100ML; MG/100ML; MG/100ML; MG/100ML
100 INJECTION, SOLUTION INTRAVENOUS CONTINUOUS
Status: DISCONTINUED | OUTPATIENT
Start: 2021-06-09 | End: 2021-06-09

## 2021-06-09 RX ORDER — PROPOFOL 10 MG/ML
INJECTION, EMULSION INTRAVENOUS AS NEEDED
Status: DISCONTINUED | OUTPATIENT
Start: 2021-06-09 | End: 2021-06-09 | Stop reason: HOSPADM

## 2021-06-09 RX ORDER — CEFAZOLIN SODIUM/WATER 2 G/20 ML
SYRINGE (ML) INTRAVENOUS AS NEEDED
Status: DISCONTINUED | OUTPATIENT
Start: 2021-06-09 | End: 2021-06-09 | Stop reason: HOSPADM

## 2021-06-09 RX ORDER — NALOXONE HYDROCHLORIDE 0.4 MG/ML
0.04 INJECTION, SOLUTION INTRAMUSCULAR; INTRAVENOUS; SUBCUTANEOUS
Status: DISCONTINUED | OUTPATIENT
Start: 2021-06-09 | End: 2021-06-10 | Stop reason: HOSPADM

## 2021-06-09 RX ORDER — LIDOCAINE HYDROCHLORIDE 20 MG/ML
INJECTION, SOLUTION EPIDURAL; INFILTRATION; INTRACAUDAL; PERINEURAL AS NEEDED
Status: DISCONTINUED | OUTPATIENT
Start: 2021-06-09 | End: 2021-06-09 | Stop reason: HOSPADM

## 2021-06-09 RX ORDER — SODIUM CHLORIDE, SODIUM LACTATE, POTASSIUM CHLORIDE, CALCIUM CHLORIDE 600; 310; 30; 20 MG/100ML; MG/100ML; MG/100ML; MG/100ML
INJECTION, SOLUTION INTRAVENOUS
Status: DISCONTINUED | OUTPATIENT
Start: 2021-06-09 | End: 2021-06-09 | Stop reason: HOSPADM

## 2021-06-09 RX ORDER — ACETAMINOPHEN 325 MG/1
650 TABLET ORAL
Status: DISCONTINUED | OUTPATIENT
Start: 2021-06-09 | End: 2021-06-10 | Stop reason: HOSPADM

## 2021-06-09 RX ORDER — ONDANSETRON 2 MG/ML
4 INJECTION INTRAMUSCULAR; INTRAVENOUS
Status: DISCONTINUED | OUTPATIENT
Start: 2021-06-09 | End: 2021-06-10 | Stop reason: HOSPADM

## 2021-06-09 RX ORDER — SODIUM CHLORIDE 0.9 % (FLUSH) 0.9 %
5-40 SYRINGE (ML) INJECTION EVERY 8 HOURS
Status: DISCONTINUED | OUTPATIENT
Start: 2021-06-09 | End: 2021-06-09

## 2021-06-09 RX ORDER — HYDROMORPHONE HYDROCHLORIDE 1 MG/ML
0.2 INJECTION, SOLUTION INTRAMUSCULAR; INTRAVENOUS; SUBCUTANEOUS
Status: DISCONTINUED | OUTPATIENT
Start: 2021-06-09 | End: 2021-06-10 | Stop reason: SDUPTHER

## 2021-06-09 RX ORDER — PROPOFOL 10 MG/ML
INJECTION, EMULSION INTRAVENOUS
Status: DISCONTINUED | OUTPATIENT
Start: 2021-06-09 | End: 2021-06-09 | Stop reason: HOSPADM

## 2021-06-09 RX ORDER — DOCUSATE SODIUM 100 MG/1
100 CAPSULE, LIQUID FILLED ORAL
Status: DISCONTINUED | OUTPATIENT
Start: 2021-06-09 | End: 2021-06-10 | Stop reason: HOSPADM

## 2021-06-09 RX ORDER — HEPARIN SODIUM 200 [USP'U]/100ML
INJECTION, SOLUTION INTRAVENOUS AS NEEDED
Status: DISCONTINUED | OUTPATIENT
Start: 2021-06-09 | End: 2021-06-09 | Stop reason: HOSPADM

## 2021-06-09 RX ORDER — CEFAZOLIN SODIUM/WATER 2 G/20 ML
2 SYRINGE (ML) INTRAVENOUS EVERY 8 HOURS
Status: COMPLETED | OUTPATIENT
Start: 2021-06-10 | End: 2021-06-10

## 2021-06-09 RX ADMIN — Medication 10 ML: at 05:51

## 2021-06-09 RX ADMIN — SODIUM CHLORIDE, SODIUM LACTATE, POTASSIUM CHLORIDE, AND CALCIUM CHLORIDE: 600; 310; 30; 20 INJECTION, SOLUTION INTRAVENOUS at 14:46

## 2021-06-09 RX ADMIN — SODIUM CHLORIDE 12.5 MG/HR: 900 INJECTION, SOLUTION INTRAVENOUS at 08:40

## 2021-06-09 RX ADMIN — OXYCODONE HYDROCHLORIDE 15 MG: 15 TABLET ORAL at 05:50

## 2021-06-09 RX ADMIN — PROPOFOL 20 MG: 10 INJECTION, EMULSION INTRAVENOUS at 15:10

## 2021-06-09 RX ADMIN — Medication 10 ML: at 22:01

## 2021-06-09 RX ADMIN — PHENYLEPHRINE HYDROCHLORIDE 100 MCG: 10 INJECTION INTRAVENOUS at 15:22

## 2021-06-09 RX ADMIN — PROPOFOL 25 MCG/KG/MIN: 10 INJECTION, EMULSION INTRAVENOUS at 15:10

## 2021-06-09 RX ADMIN — PHENYLEPHRINE HYDROCHLORIDE 100 MCG: 10 INJECTION INTRAVENOUS at 16:52

## 2021-06-09 RX ADMIN — SODIUM CHLORIDE 12.5 MG/HR: 900 INJECTION, SOLUTION INTRAVENOUS at 00:39

## 2021-06-09 RX ADMIN — LIDOCAINE HYDROCHLORIDE 40 MG: 20 INJECTION, SOLUTION EPIDURAL; INFILTRATION; INTRACAUDAL; PERINEURAL at 15:10

## 2021-06-09 RX ADMIN — CEFAZOLIN 2 G: 1 INJECTION, POWDER, FOR SOLUTION INTRAVENOUS at 15:12

## 2021-06-09 NOTE — PROGRESS NOTES
TRANSFER - OUT REPORT:    Verbal report given to Francois Gonzáles RN on Stephanie Lemons  being transferred to 3rd floor for routine progression of care       Report consisted of patients Situation, Background, Assessment and Recommendations(SBAR). Information from the following report(s) SBAR, Kardex, Procedure Summary, MAR and Recent Results was reviewed with the receiving nurse. Opportunity for questions and clarification was provided. Right groin puncture site C/D/I without bleeding or hematoma. Left SC incision s/p PPM. Site C/D/I without bleeding or hematoma.

## 2021-06-09 NOTE — PROGRESS NOTES
[facility] CARDIOLOGY PROGRESS NOTE           6/9/2021 7:28 AM    Admit Date: 6/1/2021    Admit Diagnosis: Atrial fibrillation with RVR ([facility] [address] [I48.91]    Assessment:   Principal Problem:    Atrial fibrillation with rapid ventricular response ([address] (4/24/2019)    Active Problems:    CAD (coronary artery disease) (11/29/2015)      Hypertension (24/5/9348)      Systolic CHF, chronic (HCC) (6/1/2021)      Atrial fibrillation with RVR ([facility] [address] (6/1/2021)        Plan:   1. AF with RVR - discussed options, likely best strategy is pace/ablate. Discussed MICRA/AVJ vs CRT-P/AVJ. Her age and low weight increases her risk of MICRA and her thin chest also increases her risk of a device infection. Will discuss with family to determine their wishes. 2. Chronic systolic heart failure - lasix PRN. 3. Stroke ppx - holding Eliquis. 4. CAD - beta blocker when able. 5. Dispo - pending workup and treatment of above. 6.   PM/CRT & AVN Ablation  I discussed in detail the potential benefits of PM/CRT therapy with AV modesta ablation, including definitive rate control with possible improved mortality, prevention of SCD,  decreased hospitalization, beneficial cardiac remodeling, and prevention of disease progression. Additionally, I discussed with the patient the potential risks of PM/CRT implantation and subsequent AVN ablation, including the risk of bleeding, infection, venous occlusion, DVT/PE, pneumothorax, cardiac tamponade, perforation, need for urgent open heart surgery, device/lead failure or lead dislodgement with pacemaker dependence, inability to place an LV lead via the coronary sinus, inappropriate shock(s), heart attack, stroke, arrhythmia, radiation skin injury, kidney damage/failure, oversedation, respiratory arrest, and even death. The patient understands these risks in the context of the potential benefits of the device implantation and subsequent ablation, and agrees to proceed.       TOTAL TIME: 25 minutes, >50% during counseling and coordination of care      Thank you for allowing me to participate in the electrophysiologic care of this most pleasant patient. Please feel free to contact me if there are any questions or concerns. Moises Listen. MD [place], MS  Clinical Cardiac Electrophysiology  [facility] Cardiology    Subjective:   No complaints this AM, no chest pain or shortness of breath    Interval History: (History of pertinent interval events obtained from nursing staff)    ROS:  GEN:  No fever or chills  Cardiovascular:  As noted above  Pulmonary:  As noted above  Neuro:  No new focal motor or sensory loss      Objective:     Vitals:    06/08/21 1601 06/08/21 1956 06/09/21 0103 06/09/21 0433   BP: 117/66 116/62 (!) 113/59 101/61   Pulse: (!) 106 (!) 111 (!) 106 (!) 105   Resp: 18 15 18 18   Temp: 98.8 °F (37.1 °C) 98.7 °F (37.1 °C) 98.8 °F (37.1 °C) 97.6 °F (36.4 °C)   SpO2: 92% 94% 93% 95%   Weight:    121 lb (54.9 kg)   Height:           Physical Exam:  General-Well Developed, Well Nourished, No Acute Distress, Alert & Oriented x 3, appropriate mood. Neck- supple, no JVD  CV- IRRR no MRG  Lung- clear bilaterally  Abd- soft, nontender, nondistended  Ext- no edema bilaterally.   Skin- warm and dry    Current Facility-Administered Medications   Medication Dose Route Frequency    ceFAZolin (ANCEF) 1 g in 0.9% sodium chloride (MBP/ADV) 50 mL MBP  1 g IntraVENous Q12H    dilTIAZem (CARDIZEM) 100 mg in 0.9% sodium chloride (MBP/ADV) 100 mL infusion  0-15 mg/hr IntraVENous TITRATE    methIMAzole (TAPAZOLE) tablet 5 mg  5 mg Oral DAILY    sodium chloride (NS) flush 5-10 mL  5-10 mL IntraVENous PRN    [Held by provider] apixaban (ELIQUIS) tablet 2.5 mg  2.5 mg Oral Q12H    furosemide (LASIX) tablet 20 mg  20 mg Oral EVERY OTHER DAY    oxyCODONE IR (OXY-IR) immediate release tablet 15 mg  15 mg Oral Q4H PRN    polyethylene glycol (MIRALAX) powder 17 g  17 g Oral PRN    sodium chloride (NS) flush 5-40 mL 5-40 mL IntraVENous Q8H    sodium chloride (NS) flush 5-40 mL  5-40 mL IntraVENous PRN    morphine injection 2 mg  2 mg IntraVENous Q4H PRN       Data Review:   Recent Results (from the past 24 hour(s))   METABOLIC PANEL, BASIC    Collection Time: 06/09/21  4:32 AM   Result Value Ref Range    Sodium 141 136 - 145 mmol/L    Potassium 3.8 3.5 - 5.1 mmol/L    Chloride 110 (H) 98 - 107 mmol/L    CO2 25 21 - 32 mmol/L    Anion gap 6 (L) 7 - 16 mmol/L    Glucose 92 65 - 100 mg/dL    BUN 19 8 - 23 MG/DL    Creatinine 0.85 0.6 - 1.0 MG/DL    GFR est AA >60 >60 ml/min/1.73m2    GFR est non-AA >60 >60 ml/min/1.73m2    Calcium 8.3 8.3 - 10.4 MG/DL   CBC WITH AUTOMATED DIFF    Collection Time: 06/09/21  4:32 AM   Result Value Ref Range    WBC 11.6 (H) 4.3 - 11.1 K/uL    RBC 2.69 (L) 4.05 - 5.2 M/uL    HGB 8.7 (L) 11.7 - 15.4 g/dL    HCT 27.5 (L) 35.8 - 46.3 %    .2 (H) 79.6 - 97.8 FL    MCH 32.3 26.1 - 32.9 PG    MCHC 31.6 31.4 - 35.0 g/dL    RDW 14.0 11.9 - 14.6 %    PLATELET 319 374 - 991 K/uL    MPV 10.3 9.4 - 12.3 FL    ABSOLUTE NRBC 0.00 0.0 - 0.2 K/uL    DF AUTOMATED      NEUTROPHILS 69 43 - 78 %    LYMPHOCYTES 14 13 - 44 %    MONOCYTES 11 4.0 - 12.0 %    EOSINOPHILS 3 0.5 - 7.8 %    BASOPHILS 1 0.0 - 2.0 %    IMMATURE GRANULOCYTES 2 0.0 - 5.0 %    ABS. NEUTROPHILS 8.0 1.7 - 8.2 K/UL    ABS. LYMPHOCYTES 1.6 0.5 - 4.6 K/UL    ABS. MONOCYTES 1.3 0.1 - 1.3 K/UL    ABS. EOSINOPHILS 0.4 0.0 - 0.8 K/UL    ABS. BASOPHILS 0.1 0.0 - 0.2 K/UL    ABS. IMM. GRANS. 0.2 0.0 - 0.5 K/UL       EKG:  (EKG has been independently visualized by me with interpretation below): AF with RVR, LAD, NSST changes.

## 2021-06-09 NOTE — PROGRESS NOTES
Problem: General Medical Care Plan  Goal: *Vital signs within specified parameters  Outcome: Progressing Towards Goal  Goal: *Labs within defined limits  Outcome: Progressing Towards Goal  Goal: *Absence of infection signs and symptoms  Outcome: Progressing Towards Goal  Goal: *Optimal pain control at patient's stated goal  Outcome: Progressing Towards Goal  Goal: *Skin integrity maintained  Outcome: Progressing Towards Goal  Goal: *Fluid volume balance  Outcome: Progressing Towards Goal  Goal: *Optimize nutritional status  Outcome: Progressing Towards Goal  Goal: *Anxiety reduced or absent  Outcome: Progressing Towards Goal  Goal: *Progressive mobility and function (eg: ADL's)  Outcome: Progressing Towards Goal     Problem: Patient Education: Go to Patient Education Activity  Goal: Patient/Family Education  Outcome: Progressing Towards Goal     Problem: Falls - Risk of  Goal: *Absence of Falls  Description: Document Pal Fall Risk and appropriate interventions in the flowsheet. Outcome: Progressing Towards Goal  Note: Fall Risk Interventions:  Mobility Interventions: Bed/chair exit alarm    Mentation Interventions: Adequate sleep, hydration, pain control    Medication Interventions: Bed/chair exit alarm    Elimination Interventions: Bed/chair exit alarm    History of Falls Interventions: Bed/chair exit alarm         Problem: Patient Education: Go to Patient Education Activity  Goal: Patient/Family Education  Outcome: Progressing Towards Goal     Problem: Pressure Injury - Risk of  Goal: *Prevention of pressure injury  Description: Document Ahsan Scale and appropriate interventions in the flowsheet.   Outcome: Progressing Towards Goal  Note: Pressure Injury Interventions:  Sensory Interventions: Pressure redistribution bed/mattress (bed type)    Moisture Interventions: Absorbent underpads    Activity Interventions: Pressure redistribution bed/mattress(bed type)    Mobility Interventions: Pressure redistribution bed/mattress (bed type)    Nutrition Interventions: Document food/fluid/supplement intake    Friction and Shear Interventions: Lift sheet, HOB 30 degrees or less, Foam dressings/transparent film/skin sealants, Minimize layers, Apply protective barrier, creams and emollients                Problem: Patient Education: Go to Patient Education Activity  Goal: Patient/Family Education  Outcome: Progressing Towards Goal     Problem: Patient Education: Go to Patient Education Activity  Goal: Patient/Family Education  Outcome: Progressing Towards Goal

## 2021-06-09 NOTE — MANAGEMENT PLAN
A successful biventricular pacemaker and AV node ablation was performed on 6/9/2021. Please see procedure note under cardiology tab for further documentation. Thank you for allowing me to participate in the electrophysiologic care of Ms. Jodi Martinez. Please contact me if any questions or concerns were to arise. Donna Beckman.  Toni Welsh MD, [address] [place] [address] Cardiac Electrophysiology  [facility]

## 2021-06-09 NOTE — PROGRESS NOTES
Bedside shift change report given to [facility] (oncoming nurse) by self (offgoing nurse). Report included the following information SBAR, Kardex, Procedure Summary, Intake/Output, MAR and Cardiac Rhythm A fib.

## 2021-06-09 NOTE — PROGRESS NOTES
Progress Note    Patient: Ade Daigle MRN: [id number]  SSN: [id number]    YOB: 1924  Age: 80 y.o. Sex: female      Admit Date: 6/1/2021    LOS: 6 days   Reason for consultation:  Abnormal thyroid function test.    Assessment and Plan:   81 y/o WF with a h/o hyperthyroidism on methimazole and AFib admitted to Cardiology service on 6/1 with rapid AFib. Thyroid studies were checked and noted elevated TSH at 13 and mildly reduced FT4 at 0.8. Reports compliance with methimazole and has been taking 5mg BID for several months. We were consulted for further recommendations    # Hyperthyroidism  - Elevated TSH and mildly reduced FT4  - Continue methimazole from 5mg once daily  - Outpatient follow up with PMD    June 9: Patient TSH 13. Will hold methimazole for now. Will check daily TSH and FT4 and decide methimazole dose based on the lab work. #Cellulitis: Patient has infiltration of IV the site. Discussed with the patient's family. Side effects benefit discussed. Family wants to stop IV antibiotics for now. We will stop any of antibiotics and was monitoring. #Anemia: Denies any active bleeding. Will monitor  Checking iron studies, vitamin B12 and folate  Rest management per primary team.    Thanks for consultation. We will continue to follow along on daily basis. Please call on-call medicine provider with questions or if patient clinical condition changes in interim.           Subjective:   81 y/o WF with a h/o hyperthyroidism on methimazole and AFib admitted to Cardiology service on 6/1 with rapid AFib. Thyroid studies were checked and noted elevated TSH at 13 and mildly reduced FT4 at 0.8. Reports compliance with methimazole and has been taking 5mg BID for several months. We were consulted for further recommendations. June 9: Patient is feeling much better. Unable to verify why she has hypothyroidism. Patient's daughter at bedside. Denies any chest pain, shortness of breath, diarrhea. Patient has chronic constipation secondary to oxycodone use. Takes MiraLAX every third day at home. Denies any nausea. Rest review of system negative except mentioned above    Objective:     Vitals:    06/09/21 0433 06/09/21 0809 06/09/21 1023 06/09/21 1337   BP: 101/61 109/60  107/61   Pulse: (!) 105 88  83   Resp: 18 18  18   Temp: 97.6 °F (36.4 °C) 97 °F (36.1 °C)  96.8 °F (36 °C)   SpO2: 95% 91%  96%   Weight: 54.9 kg (121 lb)      Height:   5' 2\" (1.575 m)         Intake and Output:  Current Shift: No intake/output data recorded. Last three shifts: 06/07 1901 - 06/09 0700  In: 360 [P.O.:360]  Out: 1000 [Urine:1000]    Physical Exam:   General: AOx3  HEENT: NC/AT, EOM are intact  Neck: supple, no JVD  Cardiovascular: Irregular, S1, S2, no murmurs  Respiratory: Lungs are clear, no wheezes or rales  Abdomen: Soft, NT, ND  Extremities: LE without pedal edema. All inflammation at the site of IV on both arm.   Neuro: AOx3, moving all 4 extremities, speech normal  Skin: no rash or ulcers  Psych: good mood and affect    Lab/Data Review:  I have personally reviewed patients laboratory data showing    Procedures done this admission:  Procedure(s):  INSERT PPM BIV MULTI  ABLATION AV NODE    All Micro Results     Procedure Component Value Units Date/Time    CULTURE, URINE [[id number]] Collected: 06/04/21 1236    Order Status: Completed Specimen: Urine from Clean catch Updated: 06/06/21 0831     Special Requests: NO SPECIAL REQUESTS        Culture result:       >100,000 COLONIES/mL MIXED SKIN MERCY ISOLATED                SARS-CoV-2 Lab Results  \"Novel Coronavirus\" Test: No results found for: COV2NT   \"Emergent Disease\" Test: No results found for: EDPR  \"SARS-COV-2\" Test: No results found for: XGCOVT  \"Precision Labs\" Test: No results found for: RSLT  Rapid Test: No results found for: COVR         Labs: Results:       BMP, Mg, Phos Recent Labs     06/09/21  0432 06/08/21  0432 06/07/21  0429    140 141   K 3.8 3.6 3.7   * 108* 107   CO2 25 25 25   AGAP 6* 7 9   BUN 19 25* 33*   CREA 0.85 1.05* 1.12*   CA 8.3 8.2* 8.2*   GLU 92 92 104*      CBC Recent Labs     06/09/21  0432 06/08/21  0432 06/07/21  0429   WBC 11.6* 12.5* 12.8*   RBC 2.69* 2.81* 3.09*   HGB 8.7* 9.1* 10.1*   HCT 27.5* 28.5* 30.9*    234 242   GRANS 69 70 73   LYMPH 14 14 14   EOS 3 3 2   MONOS 11 10 9   BASOS 1 1 1   IG 2 2 2   ANEU 8.0 8.8* 9.3*   ABL 1.6 1.7 1.7   ESTEFANI 0.4 0.4 0.3   ABM 1.3 1.3 1.2   ABB 0.1 0.1 0.1   AIG 0.2 0.3 0.2      LFT No results for input(s): ALT, TBIL, AP, TP, ALB, GLOB, AGRAT in the last 72 hours.     No lab exists for component: SGOT, GPT   Cardiac Testing Lab Results   Component Value Date/Time    BNP 1,460 09/27/2018 10:29 PM    BNP 2,053 08/31/2018 05:52 AM    BNP 2,169 08/30/2018 01:30 AM     11/29/2015 09:55 AM    CK 81 06/01/2021 04:33 PM     07/28/2018 03:16 AM    CK - MB 23.9 (H) 07/28/2018 03:16 AM    CK-MB Index 13.8 07/28/2018 03:16 AM    Troponin-I, Qt. <0.02 (L) 03/05/2020 07:02 PM    Troponin-I, Qt. <0.02 (L) 03/05/2020 04:53 PM    Troponin-I, Qt. 0.02 10/15/2019 02:35 PM      Coagulation Tests Lab Results   Component Value Date/Time    Prothrombin time 15.1 (H) 07/27/2018 08:15 PM    Prothrombin time 11.4 11/29/2015 09:55 AM    INR 1.2 07/27/2018 08:15 PM    INR 1.1 11/29/2015 09:55 AM    aPTT 44.2 (H) 04/23/2019 04:59 PM    aPTT >200.0 (HH) 04/23/2019 08:05 AM    aPTT 31.8 04/23/2019 02:18 AM      A1c No results found for: HBA1C, [id number]   Lipid Panel Lab Results   Component Value Date/Time    Cholesterol, total 133 06/02/2021 05:26 AM    HDL Cholesterol 40 06/02/2021 05:26 AM    LDL, calculated 81 06/02/2021 05:26 AM    VLDL, calculated 12 06/02/2021 05:26 AM    Triglyceride 60 06/02/2021 05:26 AM    CHOL/HDL Ratio 3.3 06/02/2021 05:26 AM      Thyroid Panel Lab Results   Component Value Date/Time    TSH 13.100 (H) 06/01/2021 11:05 AM    TSH <0.005 (L) 11/01/2019 11:37 AM    T4, Total 7.5 07/28/2018 03:16 AM    T4, Free 0.8 (L) 06/01/2021 04:32 PM    T4, Free 2.3 (H) 11/01/2019 11:37 AM    T3, total 0.86 06/01/2021 04:32 PM    T3, total 1.16 11/01/2019 11:37 AM        Most Recent UA Lab Results   Component Value Date/Time    Color YELLOW 06/04/2021 12:35 PM    Appearance TURBID 06/04/2021 12:35 PM    Specific gravity 1.012 04/24/2019 08:28 AM    pH (UA) 6.0 06/04/2021 12:35 PM    Protein 30 (A) 06/04/2021 12:35 PM    Glucose Negative 06/04/2021 12:35 PM    Ketone Negative 06/04/2021 12:35 PM    Bilirubin Negative 06/04/2021 12:35 PM    Blood MODERATE (A) 06/04/2021 12:35 PM    Urobilinogen 0.2 06/04/2021 12:35 PM    Nitrites Negative 06/04/2021 12:35 PM    Leukocyte Esterase MODERATE (A) 06/04/2021 12:35 PM    WBC >100 06/04/2021 12:35 PM    RBC 5-10 06/04/2021 12:35 PM    Epithelial cells 3-5 06/04/2021 12:35 PM    Bacteria 2+ (H) 06/04/2021 12:35 PM    Casts 0 06/04/2021 12:35 PM    Crystals, urine 0 06/04/2021 12:35 PM    Mucus 0 06/04/2021 12:35 PM    Other observations RESULTS VERIFIED MANUALLY 06/04/2021 12:35 PM            Hospital problems     Principal Problem:    Atrial fibrillation with rapid ventricular response ([facility] [address] (4/24/2019)    Active Problems:    CAD (coronary artery disease) (11/29/2015)      Hypertension (53/9/7582)      Systolic CHF, chronic ([address] (6/1/2021)      Atrial fibrillation with RVR ([facility] [address] (6/1/2021)         Signed By: Shira Viveros MD     June 9, 2021

## 2021-06-09 NOTE — PROGRESS NOTES
Bedside shift change report given to self (oncoming nurse) by Radha Desouza (offgoing nurse). Report included the following information SBAR, Kardex, Procedure Summary, Intake/Output, MAR and Cardiac Rhythm A fib.

## 2021-06-09 NOTE — ROUTINE PROCESS
TRANSFER - IN REPORT: 
 
Verbal report received from Zaire Handy, RN on Zerita Mention being received from [address] [place] [address] for ordered procedure Report consisted of patients Situation, Background, Assessment and Recommendations(SBAR). Information from the following report(s) Procedure Summary was reviewed with the receiving nurse. Opportunity for questions and clarification was provided. Assessment completed upon patients arrival to unit and care assumed.

## 2021-06-09 NOTE — PROGRESS NOTES
Bedside shift change report given to self (oncoming nurse) by Jamal Spears (offgoing nurse). Report included the following information SBAR, Kardex, Procedure Summary, Intake/Output, MAR and Cardiac Rhythm paced.

## 2021-06-09 NOTE — PROGRESS NOTES
Pt with AF with RVR; best strategy may be pace/ablate. Reports no CP or SOB. No new discharge needs. Pt continues to work with PT during hospitalization, they continue to encourage [facility] but the pt and her family declines needs. CM will continue to monitor. Care Management Interventions  PCP Verified by CM: Yes Derrek Huerta MD)  Mode of Transport at Discharge:  Other (see comment) (Family; daughter)  Transition of Care Consult (CM Consult): Discharge Planning  Discharge Durable Medical Equipment: No  Physical Therapy Consult: Yes  Occupational Therapy Consult: No  Speech Therapy Consult: No  Current Support Network: Family Lives Nearby, Own Home, [facility] (Pt lives with her two daughters)  Confirm Follow Up Transport: Family  The Plan for Transition of Care is Related to the Following Treatment Goals : Return home and back to her baseline  Discharge Location  Discharge Placement: Home

## 2021-06-09 NOTE — ANESTHESIA POSTPROCEDURE EVALUATION
Procedure(s):  INSERT PPM BIV MULTI  ABLATION AV NODE.    total IV anesthesia    Anesthesia Post Evaluation      Multimodal analgesia: multimodal analgesia used between 6 hours prior to anesthesia start to PACU discharge  Patient location during evaluation: bedside  Patient participation: complete - patient participated  Level of consciousness: sleepy but conscious  Pain score: 1  Pain management: adequate  Airway patency: patent  Anesthetic complications: no  Cardiovascular status: acceptable  Respiratory status: acceptable  Hydration status: acceptable  Comments: Patient doing well. Continue care on floor. Post anesthesia nausea and vomiting:  none  Final Post Anesthesia Temperature Assessment:  Normothermia (36.0-37.5 degrees C)      INITIAL Post-op Vital signs:   Vitals Value Taken Time   /60 06/09/21 1726   Temp 36.5 °C (97.7 °F) 06/09/21 1724   Pulse 90 06/09/21 1731   Resp 14 06/09/21 1724   SpO2 100 % 06/09/21 1731   Vitals shown include unvalidated device data.

## 2021-06-09 NOTE — PROGRESS NOTES
Comprehensive Nutrition Assessment    Type and Reason for Visit: Initial, RD nutrition re-screen/LOS    Nutrition Recommendations/Plan:    Continue current diet. RN to add chopped meats to diet order once resume post procedure. Malnutrition Assessment:  Malnutrition Status: At risk for malnutrition (specify) (thin, advanced age)    Nutrition Assessment:   Nutrition History: History provided by daughter at bedside. She states that patient was eating at her baseline prior to admission. She usually eats 3 meals oer day. She states that they have tried nutrition supplement in the past and does not tolerate them. She denies any weight changes and they weigh patient daily at home to monitor for fluid status. Nutrition Background: Patient with PMH significant for afib with temp pacer, CAD, CHF, GERD, HTN. She presented with several day history of tachycardia. She was admitted with afib with RVR. She as been managed with medications with little improvement. Plan for ablation today (6/9). Daily Update:  Patient seen with daughter at bedside. She states that patient has been eating well. She states that she eats better at home, but has been doing fairly well here. She reports that she ate 100% of soup and sandwich and cookie dinner last night. She states she ate nearly 100% of spaghetti the other night. She reports the only real barrier to PO intake currently is some meats being too tough. She agrees chopped meats would be beneficial. She states that patient is unhappy with NPO status today.     Nutrition Related Findings:   No physical signs of malnutrition, appears listed weight, thin but appropriate for age      Current Nutrition Therapies:  DIET NPO    Current Intake:   Average Meal Intake:  (variable %) Average Supplement Intake: None ordered      Anthropometric Measures:  Height: 5' 2\" (157.5 cm)  Current Body Wt: 54.9 kg (121 lb 0.5 oz) (6/9), Weight source: Bed scale  BMI: 22.1, Normal weight (BMI 18.5-24. 9)  Admission Body Weight: 114 lb 10.2 oz (bed scale 6/2)  Ideal Body Weight (lbs) (Calculated): 110 lbs (50 kg), 110 %  Usual Body Wt:  (103-114# per review of EMR), Percent weight change:            Edema: LLE: Trace (6/9/2021  7:29 AM)  LUE: Trace (6/9/2021  7:29 AM)  RLE: Trace (6/9/2021  7:29 AM)  RUE: Trace (6/9/2021  7:29 AM)     Estimated Daily Nutrient Needs:  Energy (kcal/day): 2205-3000 (Kcal/kg (23-25), Weight Used: Usual (50 kg))  Protein (g/day): 50-63 (1-1.25 g/kg) Weight Used: (Ideal)  Fluid (ml/day):   (1 ml/kcal)    Nutrition Diagnosis:   · No nutrition diagnosis at this time      Nutrition Interventions:   Food and/or Nutrient Delivery: Continue current diet     Coordination of Nutrition Care: Continue to monitor while inpatient   Discussed with Lena Renteria RN    Goals:       Active Goal: Continue to meet at least 75% nutrition needs    Nutrition Monitoring and Evaluation:      Food/Nutrient Intake Outcomes: Food and nutrient intake       Discharge Planning:    No discharge needs at this time    [address] [place], LD on 6/9/2021 at 10:35 AM  Contact: [phone] no

## 2021-06-09 NOTE — ANESTHESIA PREPROCEDURE EVALUATION
Anesthetic History   No history of anesthetic complications            Review of Systems / Medical History  Patient summary reviewed and pertinent labs reviewed    Pulmonary  Within defined limits                 Neuro/Psych   Within defined limits           Cardiovascular    Hypertension: well controlled      CHF (EF 25%): NYHA Classification III  Dysrhythmias : atrial fibrillation  Past MI (2008), CAD and cardiac stents (2008?)  Pertinent negatives: Angina: 2008. Exercise tolerance: <4 METS: Walker/cane  Comments:        GI/Hepatic/Renal  Within defined limits              Endo/Other      Hypothyroidism: well controlled       Other Findings   Comments: presbycusis           Physical Exam    Airway  Mallampati: III  TM Distance: 4 - 6 cm  Neck ROM: normal range of motion   Mouth opening: Diminished (comment)     Cardiovascular    Rhythm: irregular  Rate: normal         Dental  No notable dental hx       Pulmonary  Breath sounds clear to auscultation               Abdominal  GI exam deferred       Other Findings            Anesthetic Plan    ASA: 4  Anesthesia type: total IV anesthesia          Induction: Intravenous  Anesthetic plan and risks discussed with: Patient      Admitted with Afib - ECHO unchanged since at least '18. Patient on diltiazem gtt.   Plan for TIVA

## 2021-06-10 VITALS
HEIGHT: 62 IN | TEMPERATURE: 98.8 F | OXYGEN SATURATION: 96 % | BODY MASS INDEX: 20.2 KG/M2 | SYSTOLIC BLOOD PRESSURE: 124 MMHG | DIASTOLIC BLOOD PRESSURE: 55 MMHG | RESPIRATION RATE: 18 BRPM | WEIGHT: 109.8 LBS | HEART RATE: 78 BPM

## 2021-06-10 LAB
ANION GAP SERPL CALC-SCNC: 5 MMOL/L (ref 7–16)
ATRIAL RATE: 90 BPM
BASOPHILS # BLD: 0.1 K/UL (ref 0–0.2)
BASOPHILS NFR BLD: 1 % (ref 0–2)
BUN SERPL-MCNC: 17 MG/DL (ref 8–23)
CALCIUM SERPL-MCNC: 8.2 MG/DL (ref 8.3–10.4)
CALCULATED P AXIS, ECG09: 12 DEGREES
CALCULATED R AXIS, ECG10: -82 DEGREES
CALCULATED T AXIS, ECG11: -149 DEGREES
CHLORIDE SERPL-SCNC: 109 MMOL/L (ref 98–107)
CO2 SERPL-SCNC: 25 MMOL/L (ref 21–32)
CREAT SERPL-MCNC: 0.74 MG/DL (ref 0.6–1)
DIAGNOSIS, 93000: NORMAL
DIFFERENTIAL METHOD BLD: ABNORMAL
EOSINOPHIL # BLD: 0 K/UL (ref 0–0.8)
EOSINOPHIL NFR BLD: 0 % (ref 0.5–7.8)
ERYTHROCYTE [DISTWIDTH] IN BLOOD BY AUTOMATED COUNT: 13.9 % (ref 11.9–14.6)
GLUCOSE SERPL-MCNC: 151 MG/DL (ref 65–100)
HCT VFR BLD AUTO: 28.8 % (ref 35.8–46.3)
HGB BLD-MCNC: 9.4 G/DL (ref 11.7–15.4)
IMM GRANULOCYTES # BLD AUTO: 0.3 K/UL (ref 0–0.5)
IMM GRANULOCYTES NFR BLD AUTO: 2 % (ref 0–5)
LYMPHOCYTES # BLD: 0.9 K/UL (ref 0.5–4.6)
LYMPHOCYTES NFR BLD: 6 % (ref 13–44)
MCH RBC QN AUTO: 32.3 PG (ref 26.1–32.9)
MCHC RBC AUTO-ENTMCNC: 32.6 G/DL (ref 31.4–35)
MCV RBC AUTO: 99 FL (ref 79.6–97.8)
MONOCYTES # BLD: 0.9 K/UL (ref 0.1–1.3)
MONOCYTES NFR BLD: 6 % (ref 4–12)
NEUTS SEG # BLD: 13.4 K/UL (ref 1.7–8.2)
NEUTS SEG NFR BLD: 86 % (ref 43–78)
NRBC # BLD: 0 K/UL (ref 0–0.2)
P-R INTERVAL, ECG05: 170 MS
PLATELET # BLD AUTO: 251 K/UL (ref 150–450)
PMV BLD AUTO: 9.9 FL (ref 9.4–12.3)
POTASSIUM SERPL-SCNC: 4.2 MMOL/L (ref 3.5–5.1)
Q-T INTERVAL, ECG07: 430 MS
QRS DURATION, ECG06: 120 MS
QTC CALCULATION (BEZET), ECG08: 526 MS
RBC # BLD AUTO: 2.91 M/UL (ref 4.05–5.2)
SODIUM SERPL-SCNC: 139 MMOL/L (ref 136–145)
T4 FREE SERPL-MCNC: 1 NG/DL (ref 0.9–1.8)
TSH SERPL DL<=0.005 MIU/L-ACNC: 6.38 UIU/ML (ref 0.36–3.74)
VENTRICULAR RATE, ECG03: 90 BPM
WBC # BLD AUTO: 15.5 K/UL (ref 4.3–11.1)

## 2021-06-10 PROCEDURE — 84439 ASSAY OF FREE THYROXINE: CPT

## 2021-06-10 PROCEDURE — 74011000250 HC RX REV CODE- 250: Performed by: INTERNAL MEDICINE

## 2021-06-10 PROCEDURE — 85025 COMPLETE CBC W/AUTO DIFF WBC: CPT

## 2021-06-10 PROCEDURE — 74011250637 HC RX REV CODE- 250/637: Performed by: INTERNAL MEDICINE

## 2021-06-10 PROCEDURE — 36415 COLL VENOUS BLD VENIPUNCTURE: CPT

## 2021-06-10 PROCEDURE — 74011250636 HC RX REV CODE- 250/636: Performed by: INTERNAL MEDICINE

## 2021-06-10 PROCEDURE — 84443 ASSAY THYROID STIM HORMONE: CPT

## 2021-06-10 PROCEDURE — 80048 BASIC METABOLIC PNL TOTAL CA: CPT

## 2021-06-10 PROCEDURE — 97530 THERAPEUTIC ACTIVITIES: CPT

## 2021-06-10 RX ORDER — FOLIC ACID 1 MG/1
1 TABLET ORAL DAILY
Qty: 30 TABLET | Refills: 5 | Status: SHIPPED | OUTPATIENT
Start: 2021-06-10 | End: 2022-05-18

## 2021-06-10 RX ORDER — FERROUS SULFATE 325(65) MG
325 TABLET, DELAYED RELEASE (ENTERIC COATED) ORAL
Qty: 30 TABLET | Refills: 0 | Status: SHIPPED | OUTPATIENT
Start: 2021-06-10 | End: 2022-05-18

## 2021-06-10 RX ORDER — CEPHALEXIN 500 MG/1
500 CAPSULE ORAL 2 TIMES DAILY
Qty: 10 CAPSULE | Refills: 0 | Status: SHIPPED | OUTPATIENT
Start: 2021-06-10 | End: 2021-06-15

## 2021-06-10 RX ORDER — LANOLIN ALCOHOL/MO/W.PET/CERES
1000 CREAM (GRAM) TOPICAL DAILY
Qty: 30 TABLET | Refills: 5 | Status: SHIPPED | OUTPATIENT
Start: 2021-06-10

## 2021-06-10 RX ADMIN — Medication 10 ML: at 06:01

## 2021-06-10 RX ADMIN — CEFAZOLIN 2 G: 10 INJECTION, POWDER, FOR SOLUTION INTRAVENOUS at 00:17

## 2021-06-10 RX ADMIN — METHIMAZOLE 5 MG: 5 TABLET ORAL at 09:01

## 2021-06-10 RX ADMIN — OXYCODONE HYDROCHLORIDE 15 MG: 15 TABLET ORAL at 00:35

## 2021-06-10 RX ADMIN — OXYCODONE HYDROCHLORIDE 15 MG: 15 TABLET ORAL at 04:43

## 2021-06-10 RX ADMIN — Medication 10 ML: at 13:25

## 2021-06-10 RX ADMIN — CEFAZOLIN 2 G: 10 INJECTION, POWDER, FOR SOLUTION INTRAVENOUS at 09:07

## 2021-06-10 NOTE — PROGRESS NOTES
Pt is discharging home in stable condition. No d/c needs identified. Pt and family declined [facility] services. Pts daughter will transport her home. Tx goals have been met. Care Management Interventions  PCP Verified by CM: Yes Hermann Moore MD)  Mode of Transport at Discharge:  Other (see comment) (Family; daughters)  Transition of Care Consult (CM Consult): Discharge Planning  Discharge Durable Medical Equipment: No  Physical Therapy Consult: Yes  Occupational Therapy Consult: No  Speech Therapy Consult: No  Current Support Network: Family Lives Nearby, [facility] (Pt lives with her two daughters)  Confirm Follow Up Transport: Family  The Plan for Transition of Care is Related to the Following Treatment Goals : Return home and back to her baseline  The Patient and/or Patient Representative was Provided with a Choice of Provider and Agrees with the Discharge Plan?: Yes  Name of the Patient Representative Who was Provided with a Choice of Provider and Agrees with the Discharge Plan: Patient  Freedom of Choice List was Provided with Basic Dialogue that Supports the Patient's Individualized Plan of Care/Goals, Treatment Preferences and Shares the Quality Data Associated with the Providers?: Yes   Resource Information Provided?: No  Discharge Location  Discharge Placement: Home

## 2021-06-10 NOTE — PROGRESS NOTES
Problem: General Medical Care Plan  Goal: *Vital signs within specified parameters  Outcome: Progressing Towards Goal  Goal: *Labs within defined limits  Outcome: Progressing Towards Goal  Goal: *Absence of infection signs and symptoms  Outcome: Progressing Towards Goal  Goal: *Optimal pain control at patient's stated goal  Outcome: Progressing Towards Goal  Goal: *Skin integrity maintained  Outcome: Progressing Towards Goal  Goal: *Fluid volume balance  Outcome: Progressing Towards Goal  Goal: *Optimize nutritional status  Outcome: Progressing Towards Goal  Goal: *Anxiety reduced or absent  Outcome: Progressing Towards Goal  Goal: *Progressive mobility and function (eg: ADL's)  Outcome: Progressing Towards Goal     Problem: Patient Education: Go to Patient Education Activity  Goal: Patient/Family Education  Outcome: Progressing Towards Goal     Problem: Falls - Risk of  Goal: *Absence of Falls  Description: Document Pal Fall Risk and appropriate interventions in the flowsheet. Outcome: Progressing Towards Goal  Note: Fall Risk Interventions:  Mobility Interventions: Patient to call before getting OOB    Mentation Interventions: Adequate sleep, hydration, pain control    Medication Interventions: Patient to call before getting OOB    Elimination Interventions: Call light in reach    History of Falls Interventions: Room close to nurse's station         Problem: Patient Education: Go to Patient Education Activity  Goal: Patient/Family Education  Outcome: Progressing Towards Goal     Problem: Pressure Injury - Risk of  Goal: *Prevention of pressure injury  Description: Document Ahsan Scale and appropriate interventions in the flowsheet.   Outcome: Progressing Towards Goal  Note: Pressure Injury Interventions:  Sensory Interventions: Assess changes in LOC    Moisture Interventions: Absorbent underpads    Activity Interventions: Pressure redistribution bed/mattress(bed type)    Mobility Interventions: HOB 30 degrees or less    Nutrition Interventions: Document food/fluid/supplement intake    Friction and Shear Interventions: Lift sheet, HOB 30 degrees or less, Foam dressings/transparent film/skin sealants, Minimize layers, Apply protective barrier, creams and emollients                Problem: Patient Education: Go to Patient Education Activity  Goal: Patient/Family Education  Outcome: Progressing Towards Goal     Problem: Patient Education: Go to Patient Education Activity  Goal: Patient/Family Education  Outcome: Progressing Towards Goal

## 2021-06-10 NOTE — PROGRESS NOTES
Progress Note    Patient: Emmett Cazares MRN: [id number]  SSN: [id number]    YOB: 1924  Age: 80 y.o. Sex: female      Admit Date: 6/1/2021    LOS: 7 days   Reason for consultation:  Abnormal thyroid function test.    Assessment and Plan:   81 y/o WF with a h/o hyperthyroidism on methimazole and AFib admitted to Cardiology service on 6/1 with rapid AFib. Thyroid studies were checked and noted elevated TSH at 13 and mildly reduced FT4 at 0.8. Reports compliance with methimazole and has been taking 5mg BID for several months. We were consulted for further recommendations    # Hyperthyroidism  - Elevated TSH and mildly reduced FT4  - Continue methimazole from 5mg once daily  - Outpatient follow up with PMD    June 9: Patient TSH 13. Will hold methimazole for now. Will check daily TSH and FT4 and decide methimazole dose based on the lab work. Iraida 10: TSH 6. Methimazole started once a day. Patient daughter at bedside. Discussed the importance of following with primary care and getting lab work done as soon as possible. As per daughter, patient has appointment with primary care soon. #Cellulitis: Patient has infiltration of IV the site. Discussed with the patient's family. Side effects benefit discussed. Family wants to stop IV antibiotics for now. We will stop any of antibiotics and was monitoring. #Anemia: Denies any active bleeding. Will monitor  Checking iron studies, vitamin B12 and folate  Rest management per primary team.    Iraida 10: Patient Vitamin B12 is 196 consistent with vitamin B12 deficiency. She has low normal folate of 4 and ferritin of 60 with low iron saturation and iron level. I have extensively discussed the side effects and benefit of treatment and started patient on iron, vitamin B12 and folate supplementation. Prescription sent to pharmacy of their choice. Thanks for consultation. We will continue to follow along on daily basis.   Please call on-call medicine provider with questions or if patient clinical condition changes in interim. If patient goes home then she needs to be on methimazole once a day. I have extensively discussed with patient's daughter and she agreed and all the instruction.         Subjective:   81 y/o WF with a h/o hyperthyroidism on methimazole and AFib admitted to Cardiology service on 6/1 with rapid AFib. Thyroid studies were checked and noted elevated TSH at 13 and mildly reduced FT4 at 0.8. Reports compliance with methimazole and has been taking 5mg BID for several months. We were consulted for further recommendations. June 9: Patient is feeling much better. Unable to verify why she has hypothyroidism. Patient's daughter at bedside. Denies any chest pain, shortness of breath, diarrhea. Patient has chronic constipation secondary to oxycodone use. Takes MiraLAX every third day at home. Denies any nausea. Iraida 10: As per patient she is feeling all right. Denies any pain, chest pain, nausea or shortness of breath. Rest review of system negative except mentioned above    Objective:     Vitals:    06/10/21 0222 06/10/21 0527 06/10/21 0855 06/10/21 1218   BP:  132/65 (!) 120/48 (!) 124/55   Pulse:  91 80 78   Resp:  18 18 18   Temp: 98.7 °F (37.1 °C) 98.3 °F (36.8 °C) 97 °F (36.1 °C) 98.8 °F (37.1 °C)   SpO2:  96% 96% 96%   Weight:  49.8 kg (109 lb 12.8 oz)     Height:            Intake and Output:  Current Shift: No intake/output data recorded. Last three shifts: 06/08 1901 - 06/10 0700  In: 350 [I.V.:350]  Out: 808 [Urine:800]    Physical Exam:   General: AOx3  HEENT: NC/AT, EOM are intact  Neck: supple, no JVD  Cardiovascular: Irregular, S1, S2, no murmurs  Respiratory: Lungs are clear, no wheezes or rales  Abdomen: Soft, NT, ND  Extremities: LE without pedal edema. Infiltration at the site of IV on both arm [improved significantly].   Neuro: AOx3, moving all 4 extremities, speech normal  Skin: no rash or ulcers  Psych: good mood and affect    Lab/Data Review:  I have personally reviewed patients laboratory data showing    Procedures done this admission:  Procedure(s):  INSERT PPM BIV MULTI  ABLATION AV NODE    All Micro Results     Procedure Component Value Units Date/Time    CULTURE, URINE [[id number]] Collected: 06/04/21 1236    Order Status: Completed Specimen: Urine from Clean catch Updated: 06/06/21 0831     Special Requests: NO SPECIAL REQUESTS        Culture result:       >100,000 COLONIES/mL MIXED SKIN MERCY ISOLATED                SARS-CoV-2 Lab Results  \"Novel Coronavirus\" Test: No results found for: COV2NT   \"Emergent Disease\" Test: No results found for: EDPR  \"SARS-COV-2\" Test: No results found for: XGCOVT  \"Precision Labs\" Test: No results found for: RSLT  Rapid Test: No results found for: COVR         Labs: Results:       BMP, Mg, Phos Recent Labs     06/10/21  0357 06/09/21  0432 06/08/21  0432    141 140   K 4.2 3.8 3.6   * 110* 108*   CO2 25 25 25   AGAP 5* 6* 7   BUN 17 19 25*   CREA 0.74 0.85 1.05*   CA 8.2* 8.3 8.2*   * 92 92      CBC Recent Labs     06/10/21  0357 06/09/21  0432 06/08/21  0432   WBC 15.5* 11.6* 12.5*   RBC 2.91* 2.69* 2.81*   HGB 9.4* 8.7* 9.1*   HCT 28.8* 27.5* 28.5*    219 234   GRANS 86* 69 70   LYMPH 6* 14 14   EOS 0* 3 3   MONOS 6 11 10   BASOS 1 1 1   IG 2 2 2   ANEU 13.4* 8.0 8.8*   ABL 0.9 1.6 1.7   ESTEFANI 0.0 0.4 0.4   ABM 0.9 1.3 1.3   ABB 0.1 0.1 0.1   AIG 0.3 0.2 0.3      LFT No results for input(s): ALT, TBIL, AP, TP, ALB, GLOB, AGRAT in the last 72 hours.     No lab exists for component: SGOT, GPT   Cardiac Testing Lab Results   Component Value Date/Time    BNP 1,460 09/27/2018 10:29 PM    BNP 2,053 08/31/2018 05:52 AM    BNP 2,169 08/30/2018 01:30 AM     11/29/2015 09:55 AM    CK 81 06/01/2021 04:33 PM     07/28/2018 03:16 AM    CK - MB 23.9 (H) 07/28/2018 03:16 AM    CK-MB Index 13.8 07/28/2018 03:16 AM    Troponin-I, Qt. <0.02 (L) 03/05/2020 07:02 PM    Troponin-I, Qt. <0.02 (L) 03/05/2020 04:53 PM    Troponin-I, Qt. 0.02 10/15/2019 02:35 PM      Coagulation Tests Lab Results   Component Value Date/Time    Prothrombin time 15.1 (H) 07/27/2018 08:15 PM    Prothrombin time 11.4 11/29/2015 09:55 AM    INR 1.2 07/27/2018 08:15 PM    INR 1.1 11/29/2015 09:55 AM    aPTT 44.2 (H) 04/23/2019 04:59 PM    aPTT >200.0 (HH) 04/23/2019 08:05 AM    aPTT 31.8 04/23/2019 02:18 AM      A1c No results found for: HBA1C, DTL6QYEB, GVQ6VGIJ   Lipid Panel Lab Results   Component Value Date/Time    Cholesterol, total 133 06/02/2021 05:26 AM    HDL Cholesterol 40 06/02/2021 05:26 AM    LDL, calculated 81 06/02/2021 05:26 AM    VLDL, calculated 12 06/02/2021 05:26 AM    Triglyceride 60 06/02/2021 05:26 AM    CHOL/HDL Ratio 3.3 06/02/2021 05:26 AM      Thyroid Panel Lab Results   Component Value Date/Time    TSH 6.380 (H) 06/10/2021 03:57 AM    TSH 13.100 (H) 06/01/2021 11:05 AM    T4, Total 7.5 07/28/2018 03:16 AM    T4, Free 1.0 06/10/2021 03:57 AM    T4, Free 0.8 (L) 06/01/2021 04:32 PM    T3, total 0.86 06/01/2021 04:32 PM    T3, total 1.16 11/01/2019 11:37 AM        Most Recent UA Lab Results   Component Value Date/Time    Color YELLOW 06/04/2021 12:35 PM    Appearance TURBID 06/04/2021 12:35 PM    Specific gravity 1.012 04/24/2019 08:28 AM    pH (UA) 6.0 06/04/2021 12:35 PM    Protein 30 (A) 06/04/2021 12:35 PM    Glucose Negative 06/04/2021 12:35 PM    Ketone Negative 06/04/2021 12:35 PM    Bilirubin Negative 06/04/2021 12:35 PM    Blood MODERATE (A) 06/04/2021 12:35 PM    Urobilinogen 0.2 06/04/2021 12:35 PM    Nitrites Negative 06/04/2021 12:35 PM    Leukocyte Esterase MODERATE (A) 06/04/2021 12:35 PM    WBC >100 06/04/2021 12:35 PM    RBC 5-10 06/04/2021 12:35 PM    Epithelial cells 3-5 06/04/2021 12:35 PM    Bacteria 2+ (H) 06/04/2021 12:35 PM    Casts 0 06/04/2021 12:35 PM    Crystals, urine 0 06/04/2021 12:35 PM    Mucus 0 06/04/2021 12:35 PM    Other observations RESULTS VERIFIED MANUALLY 06/04/2021 12:35 PM            Hospital problems     Principal Problem:    Atrial fibrillation with rapid ventricular response ([facility] (4/24/2019)    Active Problems:    CAD (coronary artery disease) (11/29/2015)      Hypertension (33/0/7348)      Systolic CHF, chronic ([facility] (6/1/2021)      [facility] RVR [facility] (6/1/2021)         Signed By: Mikaela Aragon MD     Iraida 10, 2021

## 2021-06-10 NOTE — PROGRESS NOTES
ACUTE PHYSICAL THERAPY GOALS:  (Developed with and agreed upon by patient and/or caregiver.)  STG:  (1.)Ms. Johnna Almazan will move from supine to sit and sit to supine , scoot up and down and roll side to side with STAND BY ASSIST within 3 treatment day(s). (2.)Ms. Johnna Almazan will transfer from bed to chair and chair to bed with CONTACT GUARD ASSIST using the least restrictive device within 3 treatment day(s). (3.)Ms. Johnna Almazan will ambulate with CONTACT GUARD ASSIST for 40 feet with the least restrictive device within 3 treatment day(s). (4.)Ms. Johnna Almazan will perform standing static and dynamic balance activities x 15 minutes with CONTACT GUARD ASSIST to improve safety within 3 treatment day(s). (5.)Ms. Johnna Almazan will maintain stable vital signs throughout all functional mobility within 3 treatment days.     LTG:  (1.)Ms. Johnna Almazan will move from supine to sit and sit to supine , scoot up and down and roll side to side in bed with SUPERVISION within 7 treatment day(s). (2.)Ms. Johnna Almazan will transfer from bed to chair and chair to bed with STAND BY ASSIST using the least restrictive device within 7 treatment day(s). (3.)Ms. Johnna Almazan will ambulate with STAND BY ASSIST for 50 feet with the least restrictive device within 7 treatment day(s). (4.)Ms. Johnna Almazan will perform standing static and dynamic balance activities x 15 minutes with STAND BY ASSIST to improve safety within 7 treatment day(s). (5.)Ms. Johnna Almazan will ambulate and/or perform functional activities for 15 consecutive minutes with stable vital signs and no rests required to improve activity tolerance within 7 treatment days.     PHYSICAL THERAPY: Daily Note Treatment Day # 3    Dwayne Pérez is a 80 y.o. female   PRIMARY DIAGNOSIS: Atrial fibrillation with rapid ventricular response (HCC)  Atrial fibrillation with RVR (HCC) [I48.91]  Procedure(s) (LRB):  INSERT PPM BIV MULTI (N/A)  ABLATION AV NODE (N/A)  1 Day Post-Op    ASSESSMENT:     REHAB RECOMMENDATIONS: CURRENT LEVEL OF FUNCTION:  (Most Recently Demonstrated)   Recommendation to date pending progress:  Setting:   may benefit from [address] but family declines need  Equipment:    To Be Determined Bed Mobility:   Minimal Assistance  Sit to Stand:   Minimal Assistance  Transfers:   Minimal Assistance  Gait/Mobility:   Contact Guard Assistance     ASSESSMENT:  Ms. Jodi Martinez was supine in bed with daughter at bedside. Daughter assisted patient up and into the bathroom with minimal assist.  Patient was able to walk 250 feet with the rollator very safely and smoothly and with CGA. Should be fine to return home with daughters. SUBJECTIVE:   Ms. Jodi Martinez states, \"I dont have the right clothes. \"    SOCIAL HISTORY/ LIVING ENVIRONMENT:Patient lives with 2 daughters in a single story home with a ramp. Ambulates with a rollator for household distances and can for community distances. Requires assist from daughters for ADLs.   Home Environment: Private residence  One/Two Story Residence: One story  Living Alone: No  Support Systems: None  OBJECTIVE:     PAIN: VITAL SIGNS: LINES/DRAINS:   Pre Treatment: Pain Screen  Pain Scale 1: FLACC  Pain Intensity 1: 0  Post Treatment:0     O2 Device: None (Room air)     MOBILITY: I Mod I S SBA CGA Min Mod Max Total  NT x2 Comments:   Bed Mobility    Rolling [] [] [] [] [] [x] [] [] [] [] []    Supine to Sit [] [] [] [] [] [x] [] [] [] [] []    Scooting [] [] [] [] [x] [] [] [] [] [] []    Sit to Supine [] [] [] [] [] [] [] [] [] [x] []    Transfers    Sit to Stand [] [] [] [] [x] [x] [] [] [] [] []    Bed to Chair [] [] [] [] [] [x] [] [] [] [] []    Stand to Sit [] [] [] [] [x] [] [] [] [] [] []    I=Independent, Mod I=Modified Independent, S=Supervision, SBA=Standby Assistance, CGA=Contact Guard Assistance,   Min=Minimal Assistance, Mod=Moderate Assistance, Max=Maximal Assistance, Total=Total Assistance, NT=Not Tested    BALANCE: Good Fair+ Fair Fair- Poor NT Comments   Sitting Static [x] [] [] [] [] []    Sitting Dynamic [x] [] [] [] [] []              Standing Static [] [] [x] [x] [] []    Standing Dynamic [] [] [x] [] [] []      GAIT: I Mod I S SBA CGA Min Mod Max Total  NT x2 Comments:   Level of Assistance [] [] [] [] [x] [] [] [] [] [] []    Distance 250    DME rollator    Gait Quality Steady with rollator    Weightbearing  Status N/A     I=Independent, Mod I=Modified Independent, S=Supervision, SBA=Standby Assistance, CGA=Contact Guard Assistance,   Min=Minimal Assistance, Mod=Moderate Assistance, Max=Maximal Assistance, Total=Total Assistance, NT=Not Tested    PLAN:   FREQUENCY/DURATION: PT Plan of Care: 3 times/week for duration of hospital stay or until stated goals are met, whichever comes first.  TREATMENT:     TREATMENT:   ($$ Therapeutic Activity: 23-37 mins    )  Therapeutic Activity (25 Minutes): Therapeutic activity included Supine to Sit, Scooting, Transfer Training and Standing balance to improve functional Mobility, Strength and Activity tolerance.     TREATMENT GRID:      DATE: 6/8/21       Ambulation        Hip Flexion x10 AB       Long Arc Quads x10 AB       Hip ab/ad        Heel Raises        Toe Raises                                 Key:  A=active, AA=active assisted, P=passive, B=bilaterally, R=right, L=left   DF=dorsiflexion, PF=plantarflexion    AFTER TREATMENT POSITION/PRECAUTIONS:  Chair, Needs within reach, RN notified and Visitors at bedside    INTERDISCIPLINARY COLLABORATION:  RN/PCT and PT/PTA    TOTAL TREATMENT DURATION:  PT Patient Time In/Time Out  Time In: 0935  Time Out: 1000    Lenora Martin PTA

## 2021-06-10 NOTE — DISCHARGE SUMMARY
[facility] Cardiology Discharge Summary     Patient ID:  Delmis Alejo  [id number]  70 y.o.  3/3/1924    Admit date: 6/1/2021    Discharge date and time:  06/10/21     Admitting Physician: Kathy Farmer MD     Discharge Physician: Joe Bunch NP/Dr. 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    Pulmonary infiltrates 03/04/2019    GERD (gastroesophageal reflux disease) 03/02/2019    Spinal stenosis 03/02/2019    Complete heart block ([address] 03/02/2019    Acute exacerbation of CHF (congestive heart failure) ([address] 09/28/2018    Congestive cardiac failure ([address] 09/28/2018    Atrial fibrillation ([address] 08/31/2018    Acute hypoxemic respiratory failure ([address] 07/29/2018    Hypertension 12/01/2015    CAD (coronary artery disease) 11/29/2015       Cardiology Procedures this admission:  Pacemaker Implantation, CXR and AV modesta ablation, echocardiogram  Consults: EP cardiology and hospitalist     Hospital Course: Patient with prior h/o PAF, complete heart block requiring temporary pacemaker (resolved off amiodarone and metoprolol 3/2019) , coronary artery disease s/p PCI LAD, ischemic cardiomyopathy, chronic systolic congestive heart failure with an ejection fraction 20% (most recent ECHO EF 30-35%), GERD, and hyperthyroidism who presented to [facility] ED on 6/1 with complaint of irregular heart beat. Dgt reported that patient was lethargic on Sunday and dgt noted HR was elevated. Dgt brought patient to ED for further care. In ED, EKG showed atrial fibrillation with rate 155. Labs showed WBC 11.4, H/H 10/31. ptl 219, Na 134, K 4.3, BUN/Cr 22/1.18, TSH 13.1. Patient was started on IV cardizem. Cardiology admitted patient for A. Fib RVR and cardizem gtt was continued along with home eliquis. She was transitioned to po lopressor then to low dose Toprol but rates increased again and cardizem, gtt restarted. Patient had declined PM in past but now patient and family is agreeable to AV modesta ablation with either BIV device or micra device. She has finished IV abx for UTI on 6/6  but urine culture skin roderick. An echocardiogram showed -  Left ventricle: Ejection fraction was estimated in the range of 25 % to 30%. This study was inadequate for the evaluation of regional wall motion.-  Right ventricle: Systolic function was reduced. -  Left atrium: The atrium was markedly dilated.   -  Right atrium: The atrium was moderately dilated. -  Inferior vena cava, hepatic veins: The respirophasic change in diameter was more than 50%. -  Mitral valve: There was mild annular calcification. There was moderate regurgitation.-  Tricuspid valve: There was moderate regurgitation. EP Cardiology was consulted for consideration for AV modesta ablation with either micra or BIV device. Patient underwent a successful Biotronik biventricular pacemaker (no A lead) and AV node ablation on 6/9/2021 by Dr. Paul Chacko without any complications. Follow up CXR showed no pneumothorax. Pt tolerated the procedure well and was taken to the telemetry floor for recovery. The following morning Pt was feeling well without any complaints of CP, SOB or palpitations. The patient's device was interrogated prior to discharge showing normal function. Pt's left subclavian PM site was clean, dry and intact without hematoma but small amount bruising. Right groin with dressing intact no hematoma or bleeding. Pt's labs were WNL. Pt was seen and examined by Dr. Paul Chacko and determined stable and ready for discharge. Pt was instructed to follow PM discharge instructions given by nursing staff.  The patient has device check on 6/22/2021 at 130 pm in [place] [facility]. DISPOSITION: The patient is being discharged home in stable condition on a low saturated fat, low cholesterol and low salt diet. Pt is instructed to advance activities as tolerated limited to fatigue or shortness of breath. Pt is instructed not to lift anything over 5-10 lbs with affected arm. No pushing or pulling or lifting arm above shoulder. See complete discharge instructions. Pt is instructed to watch PM site for bleeding/oozing, if seen Pt is instructed to apply firm pressure with clean cloth and call office at [phone]. Pt is instructed to watch for signs of infection which include increasing area of redness around site, fever/hot to touch or purulent drainage. Pt is instructed to call office or return to ER for immediate evaluation of any shortness of breath, chest pain or palpitations. Discharge Exam:   Visit Vitals  /65 (BP 1 Location: Right upper arm, BP Patient Position: At rest)   Pulse 91   Temp 98.3 °F (36.8 °C)   Resp 18   Ht 5' 2\" (1.575 m)   Wt 49.8 kg (109 lb 12.8 oz)   SpO2 96%   BMI 20.08 kg/m²    Pt has been seen by Dr. Viky Trejo: see his progress note for exam details.     Recent Results (from the past 24 hour(s))   EKG, 12 LEAD, INITIAL    Collection Time: 06/09/21  6:48 PM   Result Value Ref Range    Ventricular Rate 90 BPM    Atrial Rate 90 BPM    P-R Interval 170 ms    QRS Duration 120 ms    Q-T Interval 430 ms    QTC Calculation (Bezet) 526 ms    Calculated P Axis 12 degrees    Calculated R Axis -82 degrees    Calculated T Axis -149 degrees    Diagnosis       Electronic ventricular pacemaker  Confirmed by [facility]  MD ()GENA ([id number]) on 6/10/2021 5:31:68 AM     METABOLIC PANEL, BASIC    Collection Time: 06/10/21  3:57 AM   Result Value Ref Range    Sodium 139 136 - 145 mmol/L    Potassium 4.2 3.5 - 5.1 mmol/L    Chloride 109 (H) 98 - 107 mmol/L    CO2 25 21 - 32 mmol/L    Anion gap 5 (L) 7 - 16 mmol/L    Glucose 151 (H) 65 - 100 mg/dL    BUN 17 8 - 23 MG/DL    Creatinine 0.74 0.6 - 1.0 MG/DL    GFR est AA >60 >60 ml/min/1.73m2    GFR est non-AA >60 >60 ml/min/1.73m2    Calcium 8.2 (L) 8.3 - 10.4 MG/DL   CBC WITH AUTOMATED DIFF    Collection Time: 06/10/21  3:57 AM   Result Value Ref Range    WBC 15.5 (H) 4.3 - 11.1 K/uL    RBC 2.91 (L) 4.05 - 5.2 M/uL    HGB 9.4 (L) 11.7 - 15.4 g/dL    HCT 28.8 (L) 35.8 - 46.3 %    MCV 99.0 (H) 79.6 - 97.8 FL    MCH 32.3 26.1 - 32.9 PG    MCHC 32.6 31.4 - 35.0 g/dL    RDW 13.9 11.9 - 14.6 %    PLATELET 176 929 - 898 K/uL    MPV 9.9 9.4 - 12.3 FL    ABSOLUTE NRBC 0.00 0.0 - 0.2 K/uL    DF AUTOMATED      NEUTROPHILS 86 (H) 43 - 78 %    LYMPHOCYTES 6 (L) 13 - 44 %    MONOCYTES 6 4.0 - 12.0 %    EOSINOPHILS 0 (L) 0.5 - 7.8 %    BASOPHILS 1 0.0 - 2.0 %    IMMATURE GRANULOCYTES 2 0.0 - 5.0 %    ABS. NEUTROPHILS 13.4 (H) 1.7 - 8.2 K/UL    ABS. LYMPHOCYTES 0.9 0.5 - 4.6 K/UL    ABS. MONOCYTES 0.9 0.1 - 1.3 K/UL    ABS. EOSINOPHILS 0.0 0.0 - 0.8 K/UL    ABS. BASOPHILS 0.1 0.0 - 0.2 K/UL    ABS. IMM. GRANS. 0.3 0.0 - 0.5 K/UL   TSH 3RD GENERATION    Collection Time: 06/10/21  3:57 AM   Result Value Ref Range    TSH 6.380 (H) 0.358 - 3.740 uIU/mL   T4, FREE    Collection Time: 06/10/21  3:57 AM   Result Value Ref Range    T4, Free 1.0 0.9 - 1.8 NG/DL         Patient Instructions:     Current Discharge Medication List      START taking these medications    Details   cephALEXin (KEFLEX) 500 mg capsule Take 1 Capsule by mouth two (2) times a day for 5 days. Qty: 10 Capsule, Refills: 0  Start date: 6/10/2021, End date: 6/15/2021         CONTINUE these medications which have NOT CHANGED    Details   lisinopriL (PRINIVIL, ZESTRIL) 2.5 mg tablet Take 1 Tab by mouth daily. Qty: 90 Tab, Refills: 3      furosemide (LASIX) 20 mg tablet Take 1 Tab by mouth every other day.  Indications: 20mg QOD, 40mg on other days  Qty: 90 Tab, Refills: 1      metoprolol tartrate (LOPRESSOR) 25 mg tablet TAKE ONE-HALF TABLET BY MOUTH EVERY 12 HOURS  Qty: 180 Tab, Refills: 3      potassium chloride SR (KLOR-CON 10) 10 mEq tablet TAKE ONE TABLET BY MOUTH ONE TIME DAILY  Qty: 90 Tab, Refills: 3      methIMAzole (TAPAZOLE) 5 mg tablet Take  by mouth. cholecalciferol, vitamin D3, (VITAMIN D3) 2,000 unit tab Take 1,000 Int'l Units by mouth daily. pyridoxine (VITAMIN B-6) 100 mg tablet Take 100 mg by mouth daily. Takes at [facility]      apixaban (Eliquis) 2.5 mg tablet TAKE ONE TABLET BY MOUTH TWICE A DAY  Qty: 60 Tablet, Refills: 11  Start date: 6/3/2021      nitroglycerin (NITRODUR) 0.4 mg/hr 1 Patch by TransDERmal route daily. polyethylene glycol (MIRALAX) 17 gram/dose powder Take 17 g by mouth as needed (constipation). oxyCODONE IR (OXY-IR) 15 mg immediate release tablet Take 15 mg by mouth every four (4) hours as needed for Pain.                Signed:  Jaycee Ríos NP  6/10/2021  9:22 AM

## 2021-06-10 NOTE — PROGRESS NOTES
Bedside shift change report given to [facility] (oncoming nurse) by self (offgoing nurse). Report included the following information SBAR, Kardex, Procedure Summary, Intake/Output, MAR and Cardiac Rhythm paced.

## 2021-06-10 NOTE — PROGRESS NOTES
[facility] CARDIOLOGY PROGRESS NOTE           6/10/2021 7:28 AM    Admit Date: 6/1/2021    Admit Diagnosis: Atrial fibrillation with RVR ([facility] [I48.91]    Assessment:   Principal Problem:    Atrial fibrillation with rapid ventricular response ([address] (4/24/2019)    Active Problems:    CAD (coronary artery disease) (11/29/2015)      Hypertension (59/1/3003)      Systolic CHF, chronic (HCC) (6/1/2021)      Atrial fibrillation with RVR ([facility] (6/1/2021)      Plan:   1. AF with RVR - discussed options, likely best strategy is pace/ablate. S/p CRT-P (no A lead) and AVJ. Stable wound, device interrogation and CXR. 2. Stroke ppx - holding Eliquis. 3. CAD - beta blocker when able. 4. Dispo - pending workup and treatment of above. TOTAL TIME: 25 minutes, >50% during counseling and coordination of care      Thank you for allowing me to participate in the electrophysiologic care of this most pleasant patient. Please feel free to contact me if there are any questions or concerns. Arian Pineda. MD [place], MS  Clinical Cardiac Electrophysiology  [facility]    Subjective:   No complaints this AM, no chest pain or shortness of breath    Interval History: (History of pertinent interval events obtained from nursing staff)    ROS:  GEN:  No fever or chills  Cardiovascular:  As noted above  Pulmonary:  As noted above  Neuro:  No new focal motor or sensory loss      Objective:     Vitals:    06/09/21 1930 06/10/21 0039 06/10/21 0222 06/10/21 0527   BP: (!) 154/68 (!) 160/72  132/65   Pulse: 90 90  91   Resp: 15 16  18   Temp: 97.7 °F (36.5 °C) 98.2 °F (36.8 °C) 98.7 °F (37.1 °C) 98.3 °F (36.8 °C)   SpO2: 97% 98%  96%   Weight:    109 lb 12.8 oz (49.8 kg)   Height:           Physical Exam:  General-Well Developed, Well Nourished, No Acute Distress, Alert & Oriented x 3, appropriate mood. Neck- supple, no JVD  CV- Paced rhythm no MRG, left sided CIED C/D/I.    Lung- clear bilaterally  Abd- soft, nontender, nondistended  Ext- no edema bilaterally.   Skin- warm and dry    Current Facility-Administered Medications   Medication Dose Route Frequency    lidocaine (XYLOCAINE) 10 mg/mL (1 %) injection 0.1 mL  0.1 mL SubCUTAneous PRN    oxyCODONE IR (ROXICODONE) tablet 5 mg  5 mg Oral ONCE PRN    HYDROmorphone (DILAUDID) injection 0.2 mg  0.2 mg IntraVENous Multiple    naloxone (NARCAN) injection 0.04 mg  0.04 mg IntraVENous EVERY 2 MINUTES AS NEEDED    sodium chloride (NS) flush 5-40 mL  5-40 mL IntraVENous PRN    acetaminophen (TYLENOL) tablet 650 mg  650 mg Oral Q4H PRN    ondansetron (ZOFRAN) injection 4 mg  4 mg IntraVENous Q4H PRN    ceFAZolin (ANCEF) 2 g/20 mL in sterile water IV syringe  2 g IntraVENous Q8H    docusate sodium (COLACE) capsule 100 mg  100 mg Oral BID PRN    [Held by provider] methIMAzole (TAPAZOLE) tablet 5 mg  5 mg Oral DAILY    sodium chloride (NS) flush 5-10 mL  5-10 mL IntraVENous PRN    [Held by provider] apixaban (ELIQUIS) tablet 2.5 mg  2.5 mg Oral Q12H    furosemide (LASIX) tablet 20 mg  20 mg Oral EVERY OTHER DAY    oxyCODONE IR (OXY-IR) immediate release tablet 15 mg  15 mg Oral Q4H PRN    polyethylene glycol (MIRALAX) powder 17 g  17 g Oral PRN    sodium chloride (NS) flush 5-40 mL  5-40 mL IntraVENous Q8H    sodium chloride (NS) flush 5-40 mL  5-40 mL IntraVENous PRN    morphine injection 2 mg  2 mg IntraVENous Q4H PRN       Data Review:   Recent Results (from the past 24 hour(s))   EKG, 12 LEAD, INITIAL    Collection Time: 06/09/21  6:48 PM   Result Value Ref Range    Ventricular Rate 90 BPM    Atrial Rate 90 BPM    P-R Interval 170 ms    QRS Duration 120 ms    Q-T Interval 430 ms    QTC Calculation (Bezet) 526 ms    Calculated P Axis 12 degrees    Calculated R Axis -82 degrees    Calculated T Axis -149 degrees    Diagnosis       Electronic ventricular pacemaker  Confirmed by Olivia Redding MD (), GENA BABIN ([id number]) on 6/10/2021 3:15:25 AM     METABOLIC PANEL, BASIC Collection Time: 06/10/21  3:57 AM   Result Value Ref Range    Sodium 139 136 - 145 mmol/L    Potassium 4.2 3.5 - 5.1 mmol/L    Chloride 109 (H) 98 - 107 mmol/L    CO2 25 21 - 32 mmol/L    Anion gap 5 (L) 7 - 16 mmol/L    Glucose 151 (H) 65 - 100 mg/dL    BUN 17 8 - 23 MG/DL    Creatinine 0.74 0.6 - 1.0 MG/DL    GFR est AA >60 >60 ml/min/1.73m2    GFR est non-AA >60 >60 ml/min/1.73m2    Calcium 8.2 (L) 8.3 - 10.4 MG/DL   CBC WITH AUTOMATED DIFF    Collection Time: 06/10/21  3:57 AM   Result Value Ref Range    WBC 15.5 (H) 4.3 - 11.1 K/uL    RBC 2.91 (L) 4.05 - 5.2 M/uL    HGB 9.4 (L) 11.7 - 15.4 g/dL    HCT 28.8 (L) 35.8 - 46.3 %    MCV 99.0 (H) 79.6 - 97.8 FL    MCH 32.3 26.1 - 32.9 PG    MCHC 32.6 31.4 - 35.0 g/dL    RDW 13.9 11.9 - 14.6 %    PLATELET 386 674 - 210 K/uL    MPV 9.9 9.4 - 12.3 FL    ABSOLUTE NRBC 0.00 0.0 - 0.2 K/uL    DF AUTOMATED      NEUTROPHILS 86 (H) 43 - 78 %    LYMPHOCYTES 6 (L) 13 - 44 %    MONOCYTES 6 4.0 - 12.0 %    EOSINOPHILS 0 (L) 0.5 - 7.8 %    BASOPHILS 1 0.0 - 2.0 %    IMMATURE GRANULOCYTES 2 0.0 - 5.0 %    ABS. NEUTROPHILS 13.4 (H) 1.7 - 8.2 K/UL    ABS. LYMPHOCYTES 0.9 0.5 - 4.6 K/UL    ABS. MONOCYTES 0.9 0.1 - 1.3 K/UL    ABS. EOSINOPHILS 0.0 0.0 - 0.8 K/UL    ABS. BASOPHILS 0.1 0.0 - 0.2 K/UL    ABS. IMM. GRANS. 0.3 0.0 - 0.5 K/UL   TSH 3RD GENERATION    Collection Time: 06/10/21  3:57 AM   Result Value Ref Range    TSH 6.380 (H) 0.358 - 3.740 uIU/mL   T4, FREE    Collection Time: 06/10/21  3:57 AM   Result Value Ref Range    T4, Free 1.0 0.9 - 1.8 NG/DL       EKG:  (EKG has been independently visualized by me with interpretation below): Biventricular pacing.

## 2021-06-10 NOTE — DISCHARGE INSTRUCTIONS
Patient Education   Patient Education      The patient is being discharged home in stable condition on a low saturated fat, low cholesterol and low salt diet. Pt is instructed to advance activities as tolerated limited to fatigue or shortness of breath. Pt is instructed not to lift anything over 5-10 lbs with affected arm. No pushing or pulling or lifting arm above shoulder. See complete discharge instructions. Pt is instructed to watch PM site for bleeding/oozing, if seen Pt is instructed to apply firm pressure with clean cloth and call office at [phone]. Pt is instructed to watch for signs of infection which include increasing area of redness around site, fever/hot to touch or purulent drainage. Pt is instructed to call office or return to ER for immediate evaluation of any shortness of breath, chest pain or palpitations. Electrophysiology Study and Catheter Ablation: What to Expect at [address] [place] [address] Drive had an electrophysiology study for a problem with your heartbeat. You may also have had a catheter ablation to try to correct the problem. You may have swelling, bruising, or a small lump around the site where the catheters went into your body. These should go away in 3 to 4 weeks. Do not exercise hard or lift anything heavy for a week. You may be able to go back to work and to your normal routine in 1 or 2 days. This care sheet gives you a general idea about how long it will take for you to recover. But each person recovers at a different pace. Follow the steps below to get better as quickly as possible. How can you care for yourself at home? Activity    · For 1 week, do not lift anything that would make you strain.  This may include heavy grocery bags and milk containers, a heavy briefcase or backpack, cat litter or dog food bags, a vacuum , or a child.     · For 1 week, do not exercise hard or do any activity that could strain your blood vessels or the site where the catheters went into your body.     · Ask your doctor when it is okay to have sex. Diet    · You can eat your normal diet. If your stomach is upset, try bland, low-fat foods like plain rice, broiled chicken, toast, and yogurt.     · Drink plenty of fluids (unless your doctor tells you not to). Medicines    · Your doctor will tell you if and when you can restart your medicines. He or she will also give you instructions about taking any new medicines.     · If you take aspirin or some other blood thinner, ask your doctor if and when to start taking it again. Make sure that you understand exactly what your doctor wants you to do.     · Ask your doctor if you can take acetaminophen (Tylenol) for pain. Do not take aspirin for 3 days, unless your doctor says it is okay.     · Check with your doctor before you take aspirin or anti-inflammatory medicines to reduce pain and swelling. These include ibuprofen (Advil, Motrin) and naproxen (Aleve).   · Make sure you know which heart medicines to continue and which ones to stop. Ask your doctor if you aren't sure. Catheter site care    · You can remove your bandages the day after the procedure.     · You may shower 24 to 48 hours after the procedure, if your doctor okays it. Pat the incision dry.     · Do not soak the catheter site until it is healed. Don't take a bath for 1 week, or until your doctor tells you it is okay.     · Watch for bleeding from the site. A small amount of blood (up to the size of a quarter) on the bandage can be normal.     · If you are bleeding, lie down and press on the area for 15 minutes to try to make it stop. If the bleeding does not stop, call your doctor or seek immediate medical care. Follow-up care is a key part of your treatment and safety. Be sure to make and go to all appointments, and call your doctor if you are having problems. It's also a good idea to know your test results and keep a list of the medicines you take. When should you call for help? Call  911 anytime you think you may need emergency care. For example, call if:    · You passed out (lost consciousness).     · You have symptoms of a heart attack. These may include:  ? Chest pain or pressure, or a strange feeling in the chest.  ? Sweating. ? Shortness of breath. ? Nausea or vomiting. ? Pain, pressure, or a strange feeling in the back, neck, jaw, or upper belly, or in one or both shoulders or arms. ? Lightheadedness or sudden weakness. ? A fast or irregular heartbeat. After you call 911, the  may tell you to chew 1 adult-strength or 2 to 4 low-dose aspirin. Wait for an ambulance. Do not try to drive yourself.     · You have symptoms of a stroke. These may include:  ? Sudden numbness, tingling, weakness, or loss of movement in your face, arm, or leg, especially on only one side of your body. ? Sudden vision changes. ? Sudden trouble speaking. ? Sudden confusion or trouble understanding simple statements. ? Sudden problems with walking or balance. ? A sudden, severe headache that is different from past headaches. Call your doctor now or seek immediate medical care if:    · You are bleeding from the area where the catheter was put in your blood vessel.     · You have a fast-growing, painful lump at the catheter site.     · You have signs of infection, such as:  ? Increased pain, swelling, warmth, or redness. ? Red streaks leading from the catheter site. ? Pus draining from the catheter site. ? A fever.     · Your leg, arm, or hand is painful, looks blue, or feels cold, numb, or tingly. Watch closely for any changes in your health, and be sure to contact your doctor if you have any problems. Where can you learn more? Go to http://www.gray.com/  Enter H580 in the search box to learn more about \"Electrophysiology Study and Catheter Ablation: What to Expect at Home. \"  Current as of: August 31, 2020               Content Version: 12.8  © 8126-1878 [employer]. Care instructions adapted under license by [employer] (which disclaims liability or warranty for this information). If you have questions about a medical condition or this instruction, always ask your healthcare professional. Clara Santacruz any warranty or liability for your use of this information. Pacemaker Placement: What to Expect at [address] placement is surgery to put a pacemaker in your chest. This surgery may be done if you have bradycardia (a slow heart rate). Your doctor made a cut (incision) in your chest. The doctor put the pacemaker leads through the cut, into a large blood vessel, then into the heart. The doctor put the pacemaker under the skin of your chest and attached the leads to it. Your chest may be sore where the doctor made the cut. You also may have a bruise and mild swelling. These symptoms usually get better in 1 to 2 weeks. You may feel a hard ridge along the incision. This usually gets softer in the months after surgery. You may be able to see or feel the outline of the pacemaker under your skin. You will probably be able to go back to work or your usual routine 1 to 2 weeks after surgery. Pacemaker batteries usually last 5 to 15 years. Your doctor will talk to you about how often you will need to have your pacemaker checked. You'll need to take steps to safely use electric devices. Some of these devices can stop your pacemaker from working right for a short time. Check with your doctor about what to avoid and what to keep a short distance away from your pacemaker. For example, you will need to stay away from things with strong magnetic and electrical fields. An example is an MRI machine (unless your pacemaker is safe for an MRI). You can use a cellphone and other wireless devices, but keep them at least 6 inches away from your pacemaker.  Many household and office electronics don't affect a pacemaker. These include kitchen appliances and computers. This care sheet gives you a general idea about how long it will take for you to recover. But each person recovers at a different pace. Follow the steps below to get better as quickly as possible. How can you care for yourself at home? Activity    · Rest when you feel tired.     · Be active. Walking is a good choice.     · For 4 to 6 weeks:  ? Avoid activities that strain your chest or upper arm muscles. This includes pushing a  or vacuum, or mopping floors. It also includes swimming, or swinging a golf club or tennis racquet. ? Do not raise your arm (the one on the side of your body where the pacemaker is located) above your shoulder. ? Allow your body to heal. Don't move quickly or lift anything heavy until you are feeling better.     · Many people are able to return to work within 1 to 2 weeks after surgery.     · Ask your doctor when it is okay for you to have sex. Diet    · You can eat your normal diet. If your stomach is upset, try bland, low-fat foods like plain rice, broiled chicken, toast, and yogurt. Medicines    · Your doctor will tell you if and when you can restart your medicines. He or she will also give you instructions about taking any new medicines.     · If you take aspirin or some other blood thinner, be sure to talk to your doctor. He or she will tell you if and when to start taking this medicine again. Make sure that you understand exactly what your doctor wants you to do.     · Be safe with medicines. Read and follow all instructions on the label. ? If the doctor gave you a prescription medicine for pain, take it as prescribed. ? If you are not taking a prescription pain medicine, ask your doctor if you can take an over-the-counter medicine.   ? Do not take aspirin, ibuprofen (Advil, Motrin), naproxen (Aleve), or other nonsteroidal anti-inflammatory drugs (NSAIDs) unless your doctor says it is okay.     · If your doctor prescribed antibiotics, take them as directed. Do not stop taking them just because you feel better. You need to take the full course of antibiotics. Incision care    · If you have strips of tape on the incision, leave the tape on for a week or until it falls off.     · Keep the incision dry while it heals. Your doctor may recommend sponge baths for about 7 days, but do not get the incision wet. Your doctor will let you know when you may take showers. After a shower, pat the incision dry.     · Don't use hydrogen peroxide or alcohol on the incision, which can slow healing. You may cover the area with a gauze bandage if it oozes fluid or rubs against clothing. Change the bandage every day.     · Do not take a bath or get into a hot tub for the first 2 weeks, or until your doctor tells you it is okay. Other instructions    · Keep a medical ID card with you at all times that says you have a pacemaker. The card should include the  and model information.     · Wear medical alert jewelry stating that you have a pacemaker. You can buy this at most [employer].     · Check your pulse as directed by your doctor.     · Have your pacemaker checked as often as your doctor recommends. In some cases, this may be done over the phone or the Internet. Your doctor will give you instructions about how to do this. Follow-up care is a key part of your treatment and safety. Be sure to make and go to all appointments, and call your doctor if you are having problems. It's also a good idea to know your test results and keep a list of the medicines you take. When should you call for help? Call 911 anytime you think you may need emergency care. For example, call if:    · You passed out (lost consciousness).     · You have trouble breathing.    Call your doctor now or seek immediate medical care if:    · You are dizzy or light-headed, or you feel like you may faint.     · You have pain that does not get better after you take pain medicine.     · You hear an alarm or feel a vibration from your pacemaker.     · You have loose stitches, or your incision comes open.     · Bright red blood has soaked through the bandage over your incision.     · You have signs of infection, such as:  ? Increased pain, swelling, warmth, or redness. ? Red streaks leading from the incision. ? Pus draining from the incision. ? A fever. Watch closely for changes in your health, and be sure to contact your doctor if:    · You have any problems with your pacemaker. Where can you learn more? Go to http://www.gray.com/  Enter G550 in the search box to learn more about \"Pacemaker Placement: What to Expect at Home. \"  Current as of: August 31, 2020               Content Version: 12.8  © 2006-2021 [employer]. Care instructions adapted under license by [facility] (which disclaims liability or warranty for this information). If you have questions about a medical condition or this instruction, always ask your healthcare professional. [place] [address] any warranty or liability for your use of this information. Patient Education        Learning About Catheter Ablation for Heart Rhythm Problems  What is catheter ablation? Catheter ablation is a procedure that treats heart rhythm problems. These problems include atrial fibrillation, supraventricular tachycardia (SVT), atrial flutter, and ventricular tachycardia. Your heart should have a strong, steady beat. That beat is controlled by the heart's electrical system. Sometimes that system misfires. This causes a heartbeat that is too fast and isn't steady. Catheter ablation is a way to get into your heart and fix the problem. Ablation is not surgery. How is catheter ablation done? Your doctor inserts thin tubes called catheters into a blood vessel in your groin, arm, or neck. Then your doctor feeds them into the heart.  Wires in the catheters help the doctor find the problem areas. Then the doctor uses the wires to send energy to destroy the tiny areas of heart tissue that are causing the problems. It may seem like a bad idea to destroy parts of your heart on purpose. But the areas that are destroyed are very tiny. They should not affect your heart's ability to do its job. You may be awake during the procedure. Or you may be asleep. The doctor will give you medicines to help you feel relaxed and to numb the areas where the catheters go in. You may feel a little uncomfortable, but you should not feel pain. What can you expect after catheter ablation? You may stay overnight in the hospital. How long you stay in the hospital depends on the type of ablation you have. Do not exercise hard or lift anything heavy for a week. You will probably be able to go back to work and to your normal routine in 1 or 2 days. You may have swelling, bruising, or a small lump around the site where the catheters went into your body. These should go away in 3 to 4 weeks. You may have to take some medicines for a while. Follow-up care is a key part of your treatment and safety. Be sure to make and go to all appointments, and call your doctor if you are having problems. It's also a good idea to know your test results and keep a list of the medicines you take. Where can you learn more? Go to http://www.gray.com/  Enter N533 in the search box to learn more about \"Learning About Catheter Ablation for Heart Rhythm Problems. \"  Current as of: August 31, 2020               Content Version: 12.8  © 2006-2021 [employer]. Care instructions adapted under license by [employer] (which disclaims liability or warranty for this information).  If you have questions about a medical condition or this instruction, always ask your healthcare professional. [place] [address] any warranty or liability for your use of this information. Patient Education   Cephalexin (By mouth)   Cephalexin ([id number])  Treats infections. This medicine is a cephalosporin antibiotic. Brand Name(s): Daxbia, Keflex   There may be other brand names for this medicine. When This Medicine Should Not Be Used: This medicine is not right for everyone. Do not use this medicine if you had an allergic reaction to cephalexin or another cephalosporin medicine. How to Use This Medicine:   Capsule, Tablet, Tablet for Suspension, Liquid  · Your doctor will tell you how much medicine to use. Do not use more than directed. · Read and follow the patient instructions that come with this medicine. Talk to your doctor or pharmacist if you have any questions. · You may take your medicine with food or milk to avoid stomach upset. · Oral liquid: Shake well just before use. Measure the oral liquid medicine with a marked measuring spoon, oral syringe, or medicine cup. · Take all of the medicine in your prescription to clear up your infection, even if you feel better after the first few doses. · Missed dose: Take a dose as soon as you remember. If it is almost time for your next dose, wait until then and take a regular dose. Do not take extra medicine to make up for a missed dose. · Capsule or tablet: Store at room temperature away from heat, moisture, and direct light. · Oral liquid: Store in the refrigerator for 14 days. After 14 days, throw away any unused medicine. Do not freeze. Drugs and Foods to Avoid:   Ask your doctor or pharmacist before using any other medicine, including over-the-counter medicines, vitamins, and herbal products. · Some medicines and foods can affect how cephalexin works.  Tell your doctor if you are also using  ¨ Metformin  ¨ Probenecid  Warnings While Using This Medicine:   · Tell your doctor if you are pregnant or breastfeeding, or if you have kidney disease, liver disease, or a history of digestive problems, such as colitis. Tell your doctor if you are allergic to penicillin. · This medicine can cause diarrhea. Call your doctor if the diarrhea becomes severe, does not stop, or is bloody. Do not take any medicine to stop diarrhea until you have talked to your doctor. Diarrhea can occur 2 months or more after you stop taking this medicine. · Tell any doctor or dentist who treats you that you are using this medicine. This medicine may affect certain medical test results. · Call your doctor if your symptoms do not improve or if they get worse. · Keep all medicine out of the reach of children. Never share your medicine with anyone. Possible Side Effects While Using This Medicine:   Call your doctor right away if you notice any of these side effects:  · Allergic reaction: Itching or hives, swelling in your face or hands, swelling or tingling in your mouth or throat, chest tightness, trouble breathing  · Blistering, peeling, red skin rash  · Severe diarrhea, especially if bloody or ongoing  · Severe stomach pain, vomiting  If you notice these less serious side effects, talk with your doctor:   · Mild diarrhea or nausea  If you notice other side effects that you think are caused by this medicine, tell your doctor. Call your doctor for medical advice about side effects. You may report side effects to FDA at 3-034-FDA-9108  © 2017 [facility] Information is for End User's use only and may not be sold, redistributed or otherwise used for commercial purposes. The above information is an  only. It is not intended as medical advice for individual conditions or treatments. Talk to your doctor, nurse or pharmacist before following any medical regimen to see if it is safe and effective for you.

## 2021-07-13 ENCOUNTER — HOSPITAL ENCOUNTER (OUTPATIENT)
Dept: GENERAL RADIOLOGY | Age: 86
Discharge: HOME | End: 2021-07-13
Payer: MEDICARE
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
## 2021-07-13 PROCEDURE — 73502 X-RAY EXAM HIP UNI 2-3 VIEWS: CPT

## 2021-08-03 PROBLEM — I48.91 ATRIAL FIBRILLATION (HCC): Status: RESOLVED | Noted: 2018-08-31 | Resolved: 2021-08-03

## 2021-08-03 PROBLEM — I50.9 CONGESTIVE CARDIAC FAILURE (HCC): Status: RESOLVED | Noted: 2018-09-28 | Resolved: 2021-08-03

## 2021-08-03 PROBLEM — I48.91 ATRIAL FIBRILLATION WITH RAPID VENTRICULAR RESPONSE (HCC): Status: RESOLVED | Noted: 2019-04-24 | Resolved: 2021-08-03

## 2022-01-30 NOTE — CONSULTS
LEAPFROG PROTOCOL NOTE    Dell Del Toro  7/29/2018    The patient is currently in the critical care setting managed by Dr. Juancarlos Gustafson with appendencitis. The patient's chart is reviewed and the patient is discussed with the staff. Patient is currently hemodynamically stable. Patient has no needs identified for Intensivist management in the critical care setting at this time. Please notify us if can be of assistance.     Raj Randhawa MD R ear pain and decreased hearing for a few days. States also having problem with allergies.

## 2022-03-18 PROBLEM — I44.2 COMPLETE HEART BLOCK (HCC): Status: ACTIVE | Noted: 2019-03-02

## 2022-03-19 PROBLEM — I49.5 TACHY-BRADY SYNDROME (HCC): Status: ACTIVE | Noted: 2019-04-23

## 2022-03-19 PROBLEM — J96.01 ACUTE HYPOXEMIC RESPIRATORY FAILURE (HCC): Status: ACTIVE | Noted: 2018-07-29

## 2022-03-19 PROBLEM — R91.8 PULMONARY INFILTRATES: Status: ACTIVE | Noted: 2019-03-04

## 2022-03-19 PROBLEM — I48.91 ATRIAL FIBRILLATION WITH RVR (HCC): Status: ACTIVE | Noted: 2021-06-01

## 2022-03-19 PROBLEM — M48.00 SPINAL STENOSIS: Status: ACTIVE | Noted: 2019-03-02

## 2022-03-19 PROBLEM — I50.9 ACUTE EXACERBATION OF CHF (CONGESTIVE HEART FAILURE) (HCC): Status: ACTIVE | Noted: 2018-09-28

## 2022-03-19 PROBLEM — I50.22 SYSTOLIC CHF, CHRONIC (HCC): Status: ACTIVE | Noted: 2021-06-01

## 2022-03-19 PROBLEM — K21.9 GERD (GASTROESOPHAGEAL REFLUX DISEASE): Status: ACTIVE | Noted: 2019-03-02

## 2022-07-19 ENCOUNTER — TELEPHONE (OUTPATIENT)
Dept: CARDIOLOGY CLINIC | Age: 87
End: 2022-07-19

## 2022-07-19 NOTE — TELEPHONE ENCOUNTER
Patient's daughter says her mom feels like she has hiccups and saying \"her heart is thumping. \" It only happens when her mother eats. Please call and advise.

## 2022-07-19 NOTE — TELEPHONE ENCOUNTER
Spoke to daughter and symptoms were discussed, sounds like LV stim. Office appt scheduled to adjust LV lead.

## 2022-09-19 DIAGNOSIS — I44.2 COMPLETE HEART BLOCK (HCC): Primary | ICD-10-CM

## 2022-10-07 PROCEDURE — 93294 REM INTERROG EVL PM/LDLS PM: CPT | Performed by: INTERNAL MEDICINE

## 2022-10-07 PROCEDURE — 93296 REM INTERROG EVL PM/IDS: CPT | Performed by: INTERNAL MEDICINE

## 2022-10-26 DIAGNOSIS — Z95.0 PACEMAKER: ICD-10-CM

## 2022-10-26 DIAGNOSIS — I50.22 SYSTOLIC CHF, CHRONIC (HCC): ICD-10-CM

## 2022-10-26 DIAGNOSIS — I44.2 COMPLETE HEART BLOCK (HCC): Primary | ICD-10-CM

## 2022-10-26 DIAGNOSIS — I44.2 COMPLETE HEART BLOCK (HCC): ICD-10-CM

## 2023-01-09 PROCEDURE — 93296 REM INTERROG EVL PM/IDS: CPT | Performed by: INTERNAL MEDICINE

## 2023-01-09 PROCEDURE — 93294 REM INTERROG EVL PM/LDLS PM: CPT | Performed by: INTERNAL MEDICINE

## 2023-01-09 RX ORDER — FUROSEMIDE 40 MG/1
40 TABLET ORAL EVERY OTHER DAY
Qty: 30 TABLET | Refills: 11 | Status: SHIPPED | OUTPATIENT
Start: 2023-01-09

## 2023-01-09 RX ORDER — FUROSEMIDE 20 MG/1
20 TABLET ORAL EVERY OTHER DAY
Qty: 30 TABLET | Refills: 11 | Status: SHIPPED | OUTPATIENT
Start: 2023-01-09

## 2023-01-09 NOTE — TELEPHONE ENCOUNTER
Patient needing Rx called in For Furosemide 20 mg and 40 mg to [facility].  Please call this in today because she has no more and if a problem, call patient daughter abiel

## 2023-01-23 ENCOUNTER — OFFICE VISIT (OUTPATIENT)
Dept: CARDIOLOGY CLINIC | Age: 88
End: 2023-01-23
Payer: MEDICARE

## 2023-01-23 VITALS
HEART RATE: 80 BPM | BODY MASS INDEX: 18.33 KG/M2 | SYSTOLIC BLOOD PRESSURE: 88 MMHG | HEIGHT: 62 IN | DIASTOLIC BLOOD PRESSURE: 40 MMHG | WEIGHT: 99.6 LBS

## 2023-01-23 DIAGNOSIS — I50.22 SYSTOLIC CHF, CHRONIC (HCC): ICD-10-CM

## 2023-01-23 DIAGNOSIS — I48.91 ATRIAL FIBRILLATION WITH RVR (HCC): ICD-10-CM

## 2023-01-23 DIAGNOSIS — I44.2 COMPLETE HEART BLOCK (HCC): ICD-10-CM

## 2023-01-23 DIAGNOSIS — Z95.0 PACEMAKER: Primary | ICD-10-CM

## 2023-01-23 PROCEDURE — 1036F TOBACCO NON-USER: CPT | Performed by: INTERNAL MEDICINE

## 2023-01-23 PROCEDURE — G8428 CUR MEDS NOT DOCUMENT: HCPCS | Performed by: INTERNAL MEDICINE

## 2023-01-23 PROCEDURE — G8484 FLU IMMUNIZE NO ADMIN: HCPCS | Performed by: INTERNAL MEDICINE

## 2023-01-23 PROCEDURE — 1090F PRES/ABSN URINE INCON ASSESS: CPT | Performed by: INTERNAL MEDICINE

## 2023-01-23 PROCEDURE — 1123F ACP DISCUSS/DSCN MKR DOCD: CPT | Performed by: INTERNAL MEDICINE

## 2023-01-23 PROCEDURE — G8419 CALC BMI OUT NRM PARAM NOF/U: HCPCS | Performed by: INTERNAL MEDICINE

## 2023-01-23 PROCEDURE — 99214 OFFICE O/P EST MOD 30 MIN: CPT | Performed by: INTERNAL MEDICINE

## 2023-01-23 RX ORDER — FUROSEMIDE 40 MG/1
20 TABLET ORAL DAILY PRN
Qty: 60 TABLET | Refills: 3 | Status: SHIPPED | OUTPATIENT
Start: 2023-01-23 | End: 2023-01-23

## 2023-01-23 RX ORDER — VITAMIN B COMPLEX
1 CAPSULE ORAL DAILY
COMMUNITY

## 2023-01-23 RX ORDER — POTASSIUM CHLORIDE 750 MG/1
10 TABLET, FILM COATED, EXTENDED RELEASE ORAL DAILY
Qty: 90 TABLET | Refills: 3 | Status: SHIPPED | OUTPATIENT
Start: 2023-01-23

## 2023-01-23 RX ORDER — FUROSEMIDE 20 MG/1
20 TABLET ORAL DAILY
Qty: 90 TABLET | Refills: 3 | Status: SHIPPED | OUTPATIENT
Start: 2023-01-23

## 2023-01-23 RX ORDER — METHIMAZOLE 5 MG/1
5 TABLET ORAL DAILY
Qty: 90 TABLET | Refills: 3
Start: 2023-01-23

## 2023-01-23 RX ORDER — FUROSEMIDE 20 MG/1
20 TABLET ORAL DAILY PRN
Qty: 60 TABLET | Refills: 3 | Status: SHIPPED | OUTPATIENT
Start: 2023-01-23 | End: 2023-02-22

## 2023-01-23 ASSESSMENT — ENCOUNTER SYMPTOMS
NAIL CHANGES: 0
EYE PAIN: 0
COUGH: 0
STRIDOR: 0
ABDOMINAL PAIN: 0
APHONIA: 0

## 2023-01-23 NOTE — PROGRESS NOTES
[address], [address] [place] [address]  PHONE: [phone]    SUBJECTIVE:   Jerry Castle is a 80 y.o. female 3/3/1924   seen for a follow up visit regarding the following:     Chief Complaint   Patient presents with    Coronary Artery Disease    Hypertension    Atrial Fibrillation    6 Month Follow-Up         History of present illness: 80 y.o. female presented for follow-up 23 complete heart block CRT-P feeling well has had lower blood pressures recently is on metoprolol lisinopril was discontinued at last appointment       Interval history   Admitted to [facility] high degree AV block noted on amiodarone. Recent afib with rvr/        Temporary pacemaker inserted and removed. We deferred additional pacemaker placement at our last appointment. However since her last appointment patient has documentation of low heart rates in her home monitor with symptoms of near syncope and fatigue. Heart rates detected as low as 30 bpm      The patient has a history of coronary artery disease, status post PCI to LAD. She was previously followed by Dr. Fermin Lo. She was transferred to [address] [place] [address] with appendicitis. She received IV fluid resuscitation resulting  in pulmonary edema. At that time, the patient was placed on noninvasive ventilation with improvement of symptoms and treated with IV Lasix. The patient was taken to the OR and found to have a ruptured appendix, which was treated successfully with surgical  therapies by Dr. Benita Mora. The patient was high risk for surgery. Echocardiogram was performed during her hospitalization, which showed EF of 20-30% with moderate diffuse hypokinesis, right ventricular size upper limits of normal, aortic valve mild stenosis. She presented 2018 patient desires to change care to [facility].   She is on intermittent doses of Lasix alternating between 20 and 40 mg daily with extra doses of 20 mg [max 40 mg] and today her baseline weight is around 106 pounds she has  symptoms at 109 pounds of volume overload.      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    5/17/2021 ECG sinus rhythm nonspecific ST changes poor R wave progression    CRT-P      Assessment and Plan:        Congestive heart failure, New York Heart Association class 2 symptoms.    Intolerant to ACE/beta-blocker therapies  Change Lasix to 20 daily as needed dosing with extra 20 mg for weight gain        Atrial fibrillation     Patient declined anticoagulation therapy this has been discussed extensively with the patient's primary care physician as well as family      Long-term use of diuretic therapy    See plan above    AV block     CRT-P         Current Outpatient Medications   Medication Sig    b complex vitamins capsule Take 1 capsule by mouth daily    furosemide (LASIX) 20 MG tablet Take 1 tablet by mouth daily    potassium chloride (KLOR-CON) 10 MEQ extended release tablet Take 1 tablet by mouth daily TAKE ONE TABLET BY MOUTH ONE TIME DAILY    furosemide (LASIX) 20 MG tablet Take 1 tablet by mouth daily as needed (Take for weight gain greater than 2 pounds in 24 hours or 5 pounds in 48 hours.)    methIMAzole (TAPAZOLE) 5 MG tablet Take 1 tablet by mouth daily    Cholecalciferol 50 MCG (2000 UT) TABS Take 1,000 Int'l Units by mouth daily    nitroGLYCERIN (NITROSTAT) 0.3 MG SL tablet Place 0.3 mg under the tongue    oxyCODONE (OXY-IR) 15 MG immediate release tablet Take 15 mg by mouth every 4 hours as needed.    polyethylene glycol (GLYCOLAX) 17 GM/SCOOP powder Take  17 g by mouth as needed     No current facility-administered medications for this visit. Past Medical History, Past Surgical History, Family history, Social History, and Medications were all reviewed with the patient today and updated as necessary. Allergies   Allergen Reactions    Amiodarone Other (See Comments)    Amlodipine Hives    Naproxen-Esomeprazole Mg Nausea Only    Amoxicillin Rash    Tetracycline Rash     Past Medical History:   Diagnosis Date    CAD (coronary artery disease)     Hypertension     Ill-defined condition     afib    Thyroid cyst Distant past    thyroid cyst removed x40 years ago     Past Surgical History:   Procedure Laterality Date    CHOLECYSTECTOMY      CHOLECYSTECTOMY  2005    HEENT      thyroglossal cyst    HYSTERECTOMY (CERVIX STATUS UNKNOWN)      [place] APPENDECTOMY       Family History   Problem Relation Age of Onset    Other Father             Heart Disease Father     Other Mother             Heart Disease Mother       Social History     Tobacco Use    Smoking status: Never    Smokeless tobacco: Never   Substance Use Topics    Alcohol use: No       ROS:    Review of Systems   Constitutional: Negative for fever. HENT:  Negative for stridor. Eyes:  Negative for pain. Cardiovascular:  Negative for chest pain. Respiratory:  Negative for cough. Endocrine: Negative for cold intolerance. Skin:  Negative for nail changes. Musculoskeletal:  Negative for arthritis. Gastrointestinal:  Negative for abdominal pain. Genitourinary:  Negative for dysuria. Neurological:  Negative for aphonia. Psychiatric/Behavioral:  Negative for altered mental status. Allergic/Immunologic: Negative for hives.          PHYSICAL EXAM:    BP (!) 88/40   Pulse 80   Ht 5' 2\" (1.575 m)   Wt 99 lb 9.6 oz (45.2 kg)   BMI 18.22 kg/m²        Wt Readings from Last 3 Encounters:   23 99 lb 9.6 oz (45.2 kg)   22 107 lb (48.5 kg)   21 105 lb (47.6 kg)     BP Readings from Last 3 Encounters:   01/23/23 (!) 88/40   05/18/22 (!) 102/56   11/17/21 (!) 100/50         Physical Exam  Vitals reviewed. HENT:      Head: Normocephalic. Right Ear: External ear normal.      Left Ear: External ear normal.      Nose: Nose normal.   Eyes:      General: No scleral icterus. Pulmonary:      Effort: Pulmonary effort is normal.   Abdominal:      General: There is no distension. Musculoskeletal:      Cervical back: Neck supple. Skin:     General: Skin is warm. Neurological:      Mental Status: She is alert. Mental status is at baseline. Medical problems and test results were reviewed with the patient today. No results found for this or any previous visit (from the past 672 hour(s)). Lab Results   Component Value Date/Time    CHOL 133 06/02/2021 05:26 AM    HDL 40 06/02/2021 05:26 AM       No results found for any visits on 01/23/23. Micki Bower was seen today for coronary artery disease, hypertension, atrial fibrillation and 6 month follow-up. Diagnoses and all orders for this visit:    Pacemaker    Systolic CHF, chronic ([address]    Complete heart block ([address]    Atrial fibrillation with RVR ([address]    Other orders  -     furosemide (LASIX) 20 MG tablet; Take 1 tablet by mouth daily  -     Discontinue: furosemide (LASIX) 40 MG tablet; Take 0.5 tablets by mouth daily as needed (Take for weight gain greater than 2 pounds in 24 hours or 5 pounds in 48 hours. )  -     potassium chloride (KLOR-CON) 10 MEQ extended release tablet; Take 1 tablet by mouth daily TAKE ONE TABLET BY MOUTH ONE TIME DAILY  -     furosemide (LASIX) 20 MG tablet; Take 1 tablet by mouth daily as needed (Take for weight gain greater than 2 pounds in 24 hours or 5 pounds in 48 hours. )  -     methIMAzole (TAPAZOLE) 5 MG tablet; Take 1 tablet by mouth daily    Return in about 6 months (around 7/23/2023).        Shona Moreira MD  1/23/2023  5:01 PM

## 2023-02-17 DIAGNOSIS — I50.22 SYSTOLIC CHF, CHRONIC (HCC): ICD-10-CM

## 2023-02-17 DIAGNOSIS — Z95.0 PACEMAKER: ICD-10-CM

## 2023-02-17 DIAGNOSIS — I44.2 COMPLETE HEART BLOCK (HCC): ICD-10-CM

## 2023-03-06 DIAGNOSIS — Z95.0 PACEMAKER: Primary | ICD-10-CM

## 2023-03-06 DIAGNOSIS — I48.91 ATRIAL FIBRILLATION WITH RVR (HCC): ICD-10-CM

## 2023-03-06 DIAGNOSIS — I49.5 TACHY-BRADY SYNDROME (HCC): ICD-10-CM

## 2023-06-26 DIAGNOSIS — I50.22 SYSTOLIC CHF, CHRONIC (HCC): ICD-10-CM

## 2023-06-26 DIAGNOSIS — Z95.0 PACEMAKER: ICD-10-CM

## 2023-06-26 DIAGNOSIS — I44.2 COMPLETE HEART BLOCK (HCC): ICD-10-CM

## 2023-07-20 PROCEDURE — 93294 REM INTERROG EVL PM/LDLS PM: CPT | Performed by: INTERNAL MEDICINE

## 2023-07-20 PROCEDURE — 93296 REM INTERROG EVL PM/IDS: CPT | Performed by: INTERNAL MEDICINE

## 2023-07-25 ENCOUNTER — OFFICE VISIT (OUTPATIENT)
Age: 88
End: 2023-07-25
Payer: MEDICARE

## 2023-07-25 VITALS
SYSTOLIC BLOOD PRESSURE: 108 MMHG | BODY MASS INDEX: 18.13 KG/M2 | HEIGHT: 62 IN | DIASTOLIC BLOOD PRESSURE: 50 MMHG | WEIGHT: 98.5 LBS

## 2023-07-25 DIAGNOSIS — I50.20 HFREF (HEART FAILURE WITH REDUCED EJECTION FRACTION) (HCC): ICD-10-CM

## 2023-07-25 DIAGNOSIS — Z95.0 PACEMAKER: ICD-10-CM

## 2023-07-25 DIAGNOSIS — I48.91 ATRIAL FIBRILLATION WITH RVR (HCC): ICD-10-CM

## 2023-07-25 DIAGNOSIS — I44.2 COMPLETE HEART BLOCK (HCC): Primary | ICD-10-CM

## 2023-07-25 PROCEDURE — G8427 DOCREV CUR MEDS BY ELIG CLIN: HCPCS | Performed by: INTERNAL MEDICINE

## 2023-07-25 PROCEDURE — 1123F ACP DISCUSS/DSCN MKR DOCD: CPT | Performed by: INTERNAL MEDICINE

## 2023-07-25 PROCEDURE — 99214 OFFICE O/P EST MOD 30 MIN: CPT | Performed by: INTERNAL MEDICINE

## 2023-07-25 PROCEDURE — 93000 ELECTROCARDIOGRAM COMPLETE: CPT | Performed by: INTERNAL MEDICINE

## 2023-07-25 PROCEDURE — 1090F PRES/ABSN URINE INCON ASSESS: CPT | Performed by: INTERNAL MEDICINE

## 2023-07-25 PROCEDURE — G8419 CALC BMI OUT NRM PARAM NOF/U: HCPCS | Performed by: INTERNAL MEDICINE

## 2023-07-25 PROCEDURE — 1036F TOBACCO NON-USER: CPT | Performed by: INTERNAL MEDICINE

## 2023-07-25 RX ORDER — FUROSEMIDE 20 MG/1
20 TABLET ORAL EVERY OTHER DAY
Qty: 90 TABLET | Refills: 3 | Status: SHIPPED | OUTPATIENT
Start: 2023-07-25

## 2023-07-25 RX ORDER — FUROSEMIDE 20 MG/1
20 TABLET ORAL DAILY PRN
Qty: 90 TABLET | Refills: 3 | Status: SHIPPED | OUTPATIENT
Start: 2023-07-25 | End: 2023-08-24

## 2023-07-25 ASSESSMENT — ENCOUNTER SYMPTOMS
STRIDOR: 0
EYE PAIN: 0
ABDOMINAL PAIN: 0
APHONIA: 0
NAIL CHANGES: 0
COUGH: 0

## 2023-07-25 NOTE — PROGRESS NOTES
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 5/17/2021 ECG sinus rhythm nonspecific ST changes poor R wave progression    CRT-P      Assessment and Plan:  HFrEF  Intolerant to ACE/beta-blocker therapies  Lasix prn and dialy dose   Lasix therapy with as needed and daily dosing  Atrial fibrillation  Patient has declined anticoagulation therapy  Extensively discussed in the past  Pacemaker in situ  Status post CRT-P             Current Outpatient Medications   Medication Sig    furosemide (LASIX) 20 MG tablet Take 1 tablet by mouth daily as needed (Take for weight gain greater than 2 pounds in 24 hours or 5 pounds in 48 hours.)    furosemide (LASIX) 20 MG tablet Take 1 tablet by mouth every other day    b complex vitamins capsule Take 1 capsule by mouth daily    potassium chloride (KLOR-CON) 10 MEQ extended release tablet Take 1 tablet by mouth daily TAKE ONE TABLET BY MOUTH ONE TIME DAILY    methIMAzole (TAPAZOLE) 5 MG tablet Take 1 tablet by mouth daily    Cholecalciferol 50 MCG (2000 UT) TABS Take 1,000 Int'l Units by mouth daily    nitroGLYCERIN (NITROSTAT) 0.3 MG SL tablet Place 1 tablet under the tongue    oxyCODONE (OXY-IR) 15 MG immediate release tablet Take 1 tablet by mouth every 4 hours as needed. polyethylene glycol (GLYCOLAX) 17 GM/SCOOP powder Take 17 g by mouth as needed     No current facility-administered medications for this visit. Past Medical History, Past Surgical History, Family history, Social History, and Medications were all reviewed with the patient today and updated as necessary.        Allergies   Allergen Reactions

## 2024-01-29 ENCOUNTER — OFFICE VISIT (OUTPATIENT)
Age: 89
End: 2024-01-29
Payer: MEDICARE

## 2024-01-29 ENCOUNTER — NURSE ONLY (OUTPATIENT)
Age: 89
End: 2024-01-29

## 2024-01-29 VITALS
SYSTOLIC BLOOD PRESSURE: 106 MMHG | BODY MASS INDEX: 18.02 KG/M2 | HEART RATE: 72 BPM | HEIGHT: 62 IN | DIASTOLIC BLOOD PRESSURE: 58 MMHG

## 2024-01-29 DIAGNOSIS — I44.2 COMPLETE HEART BLOCK (HCC): Primary | ICD-10-CM

## 2024-01-29 DIAGNOSIS — I48.11 LONGSTANDING PERSISTENT ATRIAL FIBRILLATION (HCC): Primary | ICD-10-CM

## 2024-01-29 DIAGNOSIS — I50.20 HFREF (HEART FAILURE WITH REDUCED EJECTION FRACTION) (HCC): ICD-10-CM

## 2024-01-29 DIAGNOSIS — Z95.0 BIVENTRICULAR CARDIAC PACEMAKER IN SITU: ICD-10-CM

## 2024-01-29 DIAGNOSIS — I50.22 SYSTOLIC CHF, CHRONIC (HCC): ICD-10-CM

## 2024-01-29 PROCEDURE — 1090F PRES/ABSN URINE INCON ASSESS: CPT | Performed by: INTERNAL MEDICINE

## 2024-01-29 PROCEDURE — G8419 CALC BMI OUT NRM PARAM NOF/U: HCPCS | Performed by: INTERNAL MEDICINE

## 2024-01-29 PROCEDURE — 99214 OFFICE O/P EST MOD 30 MIN: CPT | Performed by: INTERNAL MEDICINE

## 2024-01-29 PROCEDURE — 1036F TOBACCO NON-USER: CPT | Performed by: INTERNAL MEDICINE

## 2024-01-29 PROCEDURE — G8484 FLU IMMUNIZE NO ADMIN: HCPCS | Performed by: INTERNAL MEDICINE

## 2024-01-29 PROCEDURE — 1123F ACP DISCUSS/DSCN MKR DOCD: CPT | Performed by: INTERNAL MEDICINE

## 2024-01-29 PROCEDURE — G8427 DOCREV CUR MEDS BY ELIG CLIN: HCPCS | Performed by: INTERNAL MEDICINE

## 2024-01-29 RX ORDER — ACETAMINOPHEN 500 MG
500 TABLET ORAL EVERY 6 HOURS PRN
COMMUNITY

## 2024-01-29 RX ORDER — MULTIVIT WITH MINERALS/LUTEIN
250 TABLET ORAL DAILY
COMMUNITY

## 2024-01-29 ASSESSMENT — ENCOUNTER SYMPTOMS
COUGH: 0
APHONIA: 0
NAIL CHANGES: 0
STRIDOR: 0
EYE PAIN: 0
ABDOMINAL PAIN: 0

## 2024-01-29 NOTE — PROGRESS NOTES
[facility], [address], [address]  [place] [address]  PHONE: [phone]    SUBJECTIVE:   Tasneem Segura is a 89 y.o. female 3/3/1924   seen for a follow up visit regarding the following:     Chief Complaint   Patient presents with    6 Month Follow-Up    Hypertension         History of present illness: 89 y.o. female patient doing well she is accompanied by her 2 daughters today.  She will turn 89 years old this year.  She is also followed closely by Dr. Colton Mcnally.  Labs reviewed from October 2023 normal renal function and electrolytes.  Since her device placement she has had no hospitalizations and no issues.    Interval history   Admitted to [facility] high degree AV block noted on amiodarone. Recent afib with rvr/        Temporary pacemaker inserted and removed.  We deferred additional pacemaker placement at our last appointment.  However since her last appointment patient has documentation of low heart rates in her home monitor with symptoms of near syncope and fatigue.   Heart rates detected as low as 30 bpm      The patient has a history of coronary artery disease, status post PCI to LAD.  She was previously followed by Dr. Janel Marinelli.  She was transferred to [facility] with appendicitis.  She received IV fluid resuscitation resulting  in pulmonary edema.  At that time, the patient was placed on noninvasive ventilation with improvement of symptoms and treated with IV Lasix.  The patient was taken to the OR and found to have a ruptured appendix, which was treated successfully with surgical  therapies by Dr. Magaña.        The patient was high risk for surgery.  Echocardiogram was performed during her hospitalization, which showed EF of 20-30% with moderate diffuse hypokinesis, right ventricular size upper limits of normal, aortic valve mild stenosis.        She presented 11/28/2018 patient desires to change care to [facility].  She is on

## 2024-01-30 RX ORDER — POTASSIUM CHLORIDE 750 MG/1
10 TABLET, FILM COATED, EXTENDED RELEASE ORAL DAILY
Qty: 90 TABLET | Refills: 3 | Status: SHIPPED | OUTPATIENT
Start: 2024-01-30

## 2024-04-15 PROCEDURE — 93294 REM INTERROG EVL PM/LDLS PM: CPT | Performed by: INTERNAL MEDICINE

## 2024-04-15 PROCEDURE — 93296 REM INTERROG EVL PM/IDS: CPT | Performed by: INTERNAL MEDICINE

## (undated) DEVICE — CATHETER DIAG 6FR L110CM INTRO 6FR BLLN DIA9MM 1CC PULM ART

## (undated) DEVICE — SUTURE VCRL SZ 4-0 L27IN ABSRB UD L19MM PS-2 3/8 CIR PRIM J426H

## (undated) DEVICE — SUT ETHBND 0 18IN MO6 MP GRN --

## (undated) DEVICE — 2, DISPOSABLE SUCTION/IRRIGATOR WITHOUT DISPOSABLE TIP: Brand: STRYKEFLOW

## (undated) DEVICE — NEEDLE HYPO 25GA L1.5IN BLU POLYPR HUB S STL REG BVL STR

## (undated) DEVICE — PINNACLE TIF INTRODUCER SHEATH: Brand: PINNACLE

## (undated) DEVICE — MASTISOL ADHESIVE LIQ 2/3ML

## (undated) DEVICE — (D)STRIP SKN CLSR 0.5X4IN WHT --

## (undated) DEVICE — SUTURE VCRL UD BR CT 3-0 18IN CT1 J838D

## (undated) DEVICE — PATCH CARTO 3 EXT REF --

## (undated) DEVICE — UNIVERSAL FIXATION CANNULA: Brand: VERSAONE

## (undated) DEVICE — ILLUMINATOR ELECTROCAUTERY RETRACTED L8IN EXT L11IN DYN 10PK

## (undated) DEVICE — BLUNT TROCAR WITH THREADED ANCHOR: Brand: VERSAONE

## (undated) DEVICE — DRAPE TOWEL: Brand: CONVERTORS

## (undated) DEVICE — (D)SPONGE DISECT ENDOSCP KTTNR -- DISC BY MFR

## (undated) DEVICE — BUTTON SWITCH PENCIL BLADE ELECTRODE, HOLSTER: Brand: EDGE

## (undated) DEVICE — KENDALL SCD EXPRESS SLEEVES, KNEE LENGTH, MEDIUM: Brand: KENDALL SCD

## (undated) DEVICE — (D)PREP SKN CHLRAPRP APPL 26ML -- CONVERT TO ITEM 371833

## (undated) DEVICE — Z DUPLICATE USE 2275497 DRSG POSTOP PRMSL AG 3.5X6IN

## (undated) DEVICE — [HIGH FLOW INSUFFLATOR,  DO NOT USE IF PACKAGE IS DAMAGED,  KEEP DRY,  KEEP AWAY FROM SUNLIGHT,  PROTECT FROM HEAT AND RADIOACTIVE SOURCES.]: Brand: PNEUMOSURE

## (undated) DEVICE — AMD ANTIMICROBIAL GAUZE SPONGES 8 PLY USP TYPE VII: Brand: CURITY

## (undated) DEVICE — CATHETER ABLAT 8FR L115CM 1-6-2MM SPC TIP 3.5MM FJ CRV

## (undated) DEVICE — 3M™ TEGADERM™ TRANSPARENT FILM DRESSING FRAME STYLE, 1624W, 2-3/8 IN X 2-3/4 IN (6 CM X 7 CM), 100/CT 4CT/CASE: Brand: 3M™ TEGADERM™

## (undated) DEVICE — 2000CC GUARDIAN II: Brand: GUARDIAN

## (undated) DEVICE — SUTURE SZ 0 27IN 5/8 CIR UR-6  TAPER PT VIOLET ABSRB VICRYL J603H

## (undated) DEVICE — GDWIRE WHISPER HITORQ EDS CSJ -- ACUITY SOLD BY BX ONLY 4648

## (undated) DEVICE — 18G NG KIT WITH 96IN PROBE COVER (10 PK): Brand: SITE-RITE

## (undated) DEVICE — LAP CHOLE: Brand: MEDLINE INDUSTRIES, INC.

## (undated) DEVICE — DERMABOND SKIN ADH 0.7ML -- DERMABOND ADVANCED 12/BX

## (undated) DEVICE — SHEARS ENDOSCP L36CM DIA5MM ULTRASONIC CRV TIP W/ ADV

## (undated) DEVICE — SYSTEM SURG HEMSTAT PWD 1 GM POLYSACCHARIDE HEMOSPHERES

## (undated) DEVICE — ARTICULATION RELOAD WITH TRI-STAPLE TECHNOLOGY: Brand: ENDO GIA

## (undated) DEVICE — REM POLYHESIVE ADULT PATIENT RETURN ELECTRODE: Brand: VALLEYLAB

## (undated) DEVICE — INTRO SHTH 6FR 13X20CM -- TEARAWAY

## (undated) DEVICE — 3M™ TEGADERM™ TRANSPARENT FILM DRESSING FRAME STYLE, 1626W, 4 IN X 4-3/4 IN (10 CM X 12 CM), 50/CT 4CT/CASE: Brand: 3M™ TEGADERM™

## (undated) DEVICE — KIT ANGIO CNTRST ADMIN W O BWL WORLEY

## (undated) DEVICE — BLADELESS OPTICAL TROCAR WITH FIXATION CANNULA: Brand: VERSAPORT

## (undated) DEVICE — RELOAD STPL 35MM THN VASC TISS WHT ENDOPATH ECHELON

## (undated) DEVICE — SOLUTION IV 1000ML 0.9% SOD CHL

## (undated) DEVICE — STANDARD HYPODERMIC NEEDLE,POLYPROPYLENE HUB: Brand: MONOJECT

## (undated) DEVICE — SOL ANTI-FOG 6ML MEDC -- MEDICHOICE - CONVERT TO 358427

## (undated) DEVICE — DRAIN SURG L49IN DIA1/4IN 10IN H SIL W/O TRCR END PERF

## (undated) DEVICE — TRAY CATH 16F DRN BG LTX -- CONVERT TO ITEM 363158

## (undated) DEVICE — TUBE SET IRR PUMP THERMALCOOL -- SMARTABLATE

## (undated) DEVICE — SUTURE STRATAFIX SPRL SZ 3-0 L9IN ABSRB VLT FS L26MM 3/8 SXPD2B419

## (undated) DEVICE — BAG SPEC RETRV 275ML 10ML DISPOSABLE RELIACATCH

## (undated) DEVICE — GUIDE COR SNUS L40CM DIA9FR 0.035IN STD CRV ADV UNIQUE

## (undated) DEVICE — VISUALIZATION SYSTEM: Brand: CLEARIFY

## (undated) DEVICE — SUTURE ABSORBABLE MONOFILAMENT 4-0 CV15 6 IN PUR V-LOC 90 VLOCM1203

## (undated) DEVICE — MICROPUNCTURE INTRODUCER SET SILHOUETTE TRANSITIONLESS WITH NITINOL WIRE GUIDE: Brand: MICROPUNCTURE

## (undated) DEVICE — Device

## (undated) DEVICE — BLADE ASSEMB CLP HAIR FINE --

## (undated) DEVICE — GUIDE WIRE WITH HYDROPHILIC COATING: Brand: ACUITY WHISPER VIEW™

## (undated) DEVICE — UNIVERSAL STAPLER: Brand: ENDO GIA ULTRA

## (undated) DEVICE — SUTURE PERMAHAND SZ 2-0 L18IN NONABSORBABLE BLK L26MM PS 1588H

## (undated) DEVICE — INTRO PEELWY HEMVLV 6F 13CM -- SHRT PRELUDE SNAP

## (undated) DEVICE — RELOAD STPL 45MM THCK TISS GRN W/ GRIPPING SURF TECHNOLOGY

## (undated) DEVICE — INTRODUCER LAT VEIN L62CM OD5.5FR ADV TELSCP SYS RENAL

## (undated) DEVICE — INTENDED FOR TISSUE SEPARATION, AND OTHER PROCEDURES THAT REQUIRE A SHARP SURGICAL BLADE TO PUNCTURE OR CUT.: Brand: BARD-PARKER ® STAINLESS STEEL BLADES